# Patient Record
Sex: FEMALE | Race: WHITE | NOT HISPANIC OR LATINO | Employment: FULL TIME | URBAN - METROPOLITAN AREA
[De-identification: names, ages, dates, MRNs, and addresses within clinical notes are randomized per-mention and may not be internally consistent; named-entity substitution may affect disease eponyms.]

---

## 2017-03-13 ENCOUNTER — DOCUMENTATION ONLY (OUTPATIENT)
Dept: BARIATRICS | Facility: CLINIC | Age: 42
End: 2017-03-13

## 2017-03-13 NOTE — PROGRESS NOTES
Bariatric Surgery Online Course Form Submission  Someone has submitted a Bariatric Surgery Online Course Form Submission on this page.  Date: 2017-03-08 - 16:59  Patient's Name: Emilia Coyle  YOB: 1975 Email: [email or web address]  Phone: [phone]   Patient Address: [address] [place] [address]  Preferred Surgical Location: [facility]   I certify that I am 18 years of age or older:   Confirmation Code: [id number]  Verification of Bariatric Seminar: y  Insurance Information  Insurance or Self Pay? Insurance - Fill out fields below  Insurance Company Name: [employer]   Type of Coverage/Coverage Plan (i.e. PPO, HMO): PPO   Group Name: [id number]   Subscriber Name: Rivas Ireneo   Member Name (patient's name)   Member ID/Policy #:[id number]   Plan Effective Date:   Card Issuance date:

## 2017-03-22 ENCOUNTER — INITIAL CONSULT (OUTPATIENT)
Dept: BARIATRICS | Facility: CLINIC | Age: 42
End: 2017-03-22
Payer: COMMERCIAL

## 2017-03-22 VITALS
BODY MASS INDEX: 37.1 KG/M2 | WEIGHT: 222.69 LBS | SYSTOLIC BLOOD PRESSURE: 106 MMHG | HEIGHT: 65 IN | DIASTOLIC BLOOD PRESSURE: 72 MMHG | TEMPERATURE: 66 F

## 2017-03-22 DIAGNOSIS — K90.0 CELIAC DISEASE: ICD-10-CM

## 2017-03-22 DIAGNOSIS — R06.81 WITNESSED APNEIC SPELLS: ICD-10-CM

## 2017-03-22 DIAGNOSIS — R29.818 SUSPECTED SLEEP APNEA: ICD-10-CM

## 2017-03-22 DIAGNOSIS — E66.9 DIABETES MELLITUS TYPE 2 IN OBESE: ICD-10-CM

## 2017-03-22 DIAGNOSIS — G47.33 OSA (OBSTRUCTIVE SLEEP APNEA): ICD-10-CM

## 2017-03-22 DIAGNOSIS — E11.69 DIABETES MELLITUS TYPE 2 IN OBESE: ICD-10-CM

## 2017-03-22 DIAGNOSIS — E66.9 OBESITY (BMI 30-39.9): Primary | ICD-10-CM

## 2017-03-22 PROCEDURE — 3060F POS MICROALBUMINURIA REV: CPT | Mod: S$GLB,,, | Performed by: PHYSICIAN ASSISTANT

## 2017-03-22 PROCEDURE — 99205 OFFICE O/P NEW HI 60 MIN: CPT | Mod: S$GLB,,, | Performed by: PHYSICIAN ASSISTANT

## 2017-03-22 PROCEDURE — 3046F HEMOGLOBIN A1C LEVEL >9.0%: CPT | Mod: S$GLB,,, | Performed by: PHYSICIAN ASSISTANT

## 2017-03-22 PROCEDURE — 1160F RVW MEDS BY RX/DR IN RCRD: CPT | Mod: S$GLB,,, | Performed by: PHYSICIAN ASSISTANT

## 2017-03-22 PROCEDURE — 99999 PR PBB SHADOW E&M-EST. PATIENT-LVL V: CPT | Mod: PBBFAC,,, | Performed by: PHYSICIAN ASSISTANT

## 2017-03-22 PROCEDURE — 2022F DILAT RTA XM EVC RTNOPTHY: CPT | Mod: S$GLB,,, | Performed by: PHYSICIAN ASSISTANT

## 2017-03-22 RX ORDER — LEVOTHYROXINE SODIUM 100 UG/1
100 TABLET ORAL EVERY MORNING
Refills: 3 | COMMUNITY
Start: 2017-03-06 | End: 2018-12-10

## 2017-03-22 RX ORDER — ESTRADIOL 1 MG/1
1 TABLET ORAL
Refills: 3 | COMMUNITY
Start: 2017-03-06 | End: 2019-07-01

## 2017-03-22 RX ORDER — FERROUS SULFATE 324(65)MG
325 TABLET, DELAYED RELEASE (ENTERIC COATED) ORAL EVERY MORNING
COMMUNITY
End: 2017-08-01

## 2017-03-22 RX ORDER — BUTALBITAL, ASPIRIN, AND CAFFEINE 325; 50; 40 MG/1; MG/1; MG/1
1 CAPSULE ORAL 2 TIMES DAILY PRN
Refills: 1 | COMMUNITY
Start: 2016-12-28 | End: 2019-03-06

## 2017-03-22 RX ORDER — FUROSEMIDE 20 MG/1
1 TABLET ORAL DAILY PRN
Refills: 0 | Status: ON HOLD | COMMUNITY
Start: 2017-02-17 | End: 2017-07-29 | Stop reason: HOSPADM

## 2017-03-22 RX ORDER — CLONAZEPAM 1 MG/1
1 TABLET ORAL 2 TIMES DAILY PRN
Refills: 0 | COMMUNITY
Start: 2017-03-20 | End: 2017-07-05 | Stop reason: SDUPTHER

## 2017-03-22 RX ORDER — MULTIVITAMIN
1 TABLET ORAL 2 TIMES DAILY
COMMUNITY

## 2017-03-22 RX ORDER — METFORMIN HYDROCHLORIDE 500 MG/1
500 TABLET, EXTENDED RELEASE ORAL EVERY MORNING
Refills: 3 | Status: ON HOLD | COMMUNITY
Start: 2017-03-07 | End: 2017-07-29 | Stop reason: HOSPADM

## 2017-03-22 NOTE — PROGRESS NOTES
BARIATRIC NEW PATIENT EVALUATION    CHIEF COMPLAINT:   Obesity, Body mass index is 37.05 kg/(m^2). and inability to lose weight.    HPI:  Emilia Coyle is a 41 y.o. female presenting for obesity, Body mass index is 37.05 kg/(m^2). and inability to lose weight. The patient has tried Weight Watchers, Slim Fast, Maricruz Eric, Adipex several times, Topamax and multiple fad diets.  She has been overweight since childhood.  At 9 years old she was already 135 lbs.  The most weight lost with a diet was 45 lbs with Adipex, Weight Watchers and eating 500 calories daily.  Her highest weight was 255 lbs about 10 years ago.  Her challenge with weight loss is never feeling full.   She wants a Sleeve Gastrectomy with Dr. Aleman.    PAST MEDICAL HISTORY:  Past Medical History:   Diagnosis Date    Celiac disease     Diabetes mellitus     Hypothyroidism     Thyroid disease          PAST SURGICAL HISTORY:  Past Surgical History:   Procedure Laterality Date    ABDOMINAL SURGERY      abdominalplasty     ADENOIDECTOMY      BLADDER SUSPENSION      BREAST SURGERY      HYSTERECTOMY      TONSILLECTOMY      TOTAL THYROIDECTOMY Right     TUBAL LIGATION      TYMPANOSTOMY TUBE PLACEMENT         FAMILY HISTORY:  Family History   Problem Relation Age of Onset    No Known Problems Mother     Hyperlipidemia Father     Obesity Brother     Hyperlipidemia Brother     Hyperlipidemia Daughter     Kidney failure Daughter     Kidney failure Maternal Aunt     Hypothyroidism Maternal Aunt     Hyperlipidemia Paternal Uncle     Hypertension Paternal Uncle     Heart disease Paternal Uncle     Cancer Maternal Grandmother     Diabetes Maternal Grandmother     Depression Maternal Grandmother     Heart disease Maternal Grandmother     Hypertension Maternal Grandmother     Sleep apnea Maternal Grandmother     Obesity Maternal Grandmother     Cancer Maternal Grandfather     Hyperlipidemia Maternal Grandfather     Hypertension  Maternal Grandfather     Heart disease Maternal Grandfather     Kidney failure Maternal Grandfather     Stroke Maternal Grandfather     Hyperlipidemia Paternal Grandmother     Hypertension Paternal Grandmother     Diabetes Paternal Grandmother     Obesity Paternal Grandmother     Cancer Paternal Grandfather     Hyperlipidemia Paternal Grandfather     Diabetes Paternal Grandfather     Hypertension Paternal Grandfather     Heart disease Paternal Grandfather     Kidney failure Paternal Grandfather     Obesity Paternal Grandfather     Sleep apnea Paternal Grandfather     Stroke Paternal Grandfather           SOCIAL HISTORY:   reports that she has never smoked. She has never used smokeless tobacco. She reports that she does not drink alcohol or use illicit drugs.  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  She is  with 1 child and 2 step-kids.      MEDICATIONS:  Medications have been reviewed.    ALLERGIES:  Allergies have been reviewed.    Review of Systems   Constitutional: Negative for chills, fever and malaise/fatigue.   Eyes: Negative for blurred vision and double vision.   Respiratory: Negative for cough, hemoptysis and shortness of breath.    Cardiovascular: Negative for chest pain, palpitations and leg swelling.   Gastrointestinal: Negative for abdominal pain, blood in stool, constipation, diarrhea, heartburn, melena, nausea and vomiting.        Celiac dz   Genitourinary: Negative for dysuria and hematuria.   Musculoskeletal: Positive for joint pain (knees, ankles, hips) and myalgias. Negative for back pain, falls and neck pain.   Skin: Negative for rash.   Neurological: Positive for tingling (hands and feet). Negative for dizziness, weakness and headaches.   Endo/Heme/Allergies: Negative for environmental allergies. Does not bruise/bleed easily.   Psychiatric/Behavioral: Negative for depression, hallucinations, memory loss,  substance abuse and suicidal ideas. The patient is not nervous/anxious and does not have insomnia.        Vitals:    03/22/17 0828   BP: 106/72   Temp: (!) 66 °F (18.9 °C)       Physical Exam   Constitutional: She is oriented to person, place, and time and well-developed, well-nourished, and in no distress.   obese   HENT:   Head: Normocephalic and atraumatic.   Eyes: No scleral icterus.   Cardiovascular: Normal rate, regular rhythm, normal heart sounds and intact distal pulses.  Exam reveals no gallop and no friction rub.    No murmur heard.  Pulmonary/Chest: Effort normal and breath sounds normal. No respiratory distress. She has no wheezes. She has no rales. She exhibits no tenderness.   Abdominal: Soft. Bowel sounds are normal. She exhibits no distension and no mass. There is no tenderness. There is no rebound and no guarding.   Musculoskeletal: She exhibits no edema.   Neurological: She is alert and oriented to person, place, and time.   Skin: Skin is warm and dry. No rash noted. No erythema. No pallor.   Psychiatric: Mood, memory, affect and judgment normal.   Nursing note and vitals reviewed.      DIAGNOSIS:  1. Obesity, Body mass index is 37.05 kg/(m^2). and inability to lose weight.  2. Co-morbidities: diabetes mellitus and obstructive sleep apnea    PLAN:  The patient is a good candidate for Bariatric Surgery. The patient is interested in sleeve gastrectomy. The surgery and post-op care was discussed in detail with the patient. All questions were answered.    The patient understands that bariatric surgery is a tool to aid in weight loss and that they need to be committed to the diet and exercise post-operatively for successful weight loss. Discussed with patient that bariatric surgery is not the easy way out and that it will take plenty of dedication on the patient's part to be successful. Also discussed the possibility of weight regain if the patient strays from the diet guidelines or exercise  requirements. Patient verbalized understanding and wishes to proceed with the work-up.    Estimated Goal weight is 182 lbs, approximately 50% of their excess weight.      **I certify that I have personally reviewed the patient's blue intake packet with the patient.  All information has been added into epic.        ORDERS:  1. Sleep Study, Chest X-Ray and EKG  2. Psychological Consult, Bariatric Dietician Consult and PCP Clearance  3. Bariatric Labs: BMP, CBC, Folate Serum, H. pylori, HgA1C, Hepatic Panel/LFT, Iron & TIBC, Lipid Profile, Magnesium, Phosphate, T3, T4, TSH, Free T4, Vitamin B12, and Vitamin B1.    Referring Physician: Primary Doctor No  RTC: As scheduled.

## 2017-03-22 NOTE — MR AVS SNAPSHOT
[facility] - Bariatric Surgery  [address]  [facility] [address]  Phone: [phone]  Fax: [phone]                  Emilia Coyle   3/22/2017 7:40 AM   Initial consult    Description:  Female : 1975   Provider:  Marta Mayers PA-C   Department:  [facility] - Bariatric Surgery           Reason for Visit     Consult           Diagnoses this Visit        Comments    Obesity (BMI 35.0-39.9 without comorbidity)    -  Primary     Obesity (BMI 30-39.9)         Witnessed apneic spells         Suspected sleep apnea                To Do List           Future Appointments        Provider Department Dept Phone    3/22/2017 5:30 PM ONLINE BARIATRIC SEMINAR [facility] Bariatric Surgery [phone]      Goals (5 Years of Data)     None      [facility]     [facility] Care [facility] -  Assistance  Registered nurses in the [facility] provide clinical advisement, health education, appointment booking, and other advisory services.  Call for this free service at [phone].             Medications           Message regarding Medications     Verify the changes and/or additions to your medication regime listed below are the same as discussed with your clinician today.  If any of these changes or additions are incorrect, please notify your healthcare provider.             Verify that the below list of medications is an accurate representation of the medications you are currently taking.  If none reported, the list may be blank. If incorrect, please contact your healthcare provider. Carry this list with you in case of emergency.           Current Medications     butalbital-aspirin-caffeine -40 mg (FIORINAL) -40 mg Cap Take 1 capsule by mouth 2 (two) times daily as needed.    CALCIUM CARBONATE/VITAMIN D3 (CALCIUM 600 + D,3, ORAL) Take 1 tablet by mouth once daily.    clonazePAM (KLONOPIN) 1 MG tablet Take 1 tablet by mouth 2 (two) times daily as needed.    estradiol  "(ESTRACE) 1 MG tablet Take 1 mg by mouth once daily.    ferrous sulfate 324 mg (65 mg iron) TbEC Take 325 mg by mouth once daily.    furosemide (LASIX) 20 MG tablet Take 1 tablet by mouth daily as needed.    levothyroxine (SYNTHROID) 100 MCG tablet Take 100 mcg by mouth once daily.    metformin (GLUCOPHAGE-XR) 500 MG 24 hr tablet Take 500 mg by mouth 2 (two) times daily.    multivitamin (ONE DAILY MULTIVITAMIN) per tablet Take 1 tablet by mouth once daily.           Clinical Reference Information           Your Vitals Were     BP Temp Height Weight BMI    106/72 66 °F (18.9 °C) 5' 5" (1.651 m) 101 kg (222 lb 10.6 oz) 37.05 kg/m2      Blood Pressure          Most Recent Value    BP  106/72      Allergies as of 3/22/2017     Latex, Natural Rubber    Rizatriptan    Sumatriptan    Zolmitriptan      Immunizations Administered on Date of Encounter - 3/22/2017     None      Orders Placed During Today's Visit      Normal Orders This Visit    Ambulatory consult for Sleep Study     Psych Consult     Future Labs/Procedures Expected by Expires    BMP  3/22/2017 5/21/2018    CBC w/ Auto Differential  3/22/2017 5/21/2018    CXR  3/22/2017 3/22/2018    Folate Serum  3/22/2017 5/21/2018    Free T4  3/22/2017 5/21/2018    H. Pylori Antibody, IGG  3/22/2017 5/21/2018    Hepatic Panel  3/22/2017 5/21/2018    Hg A1c  3/22/2017 5/21/2018    Iron & TIBC  3/22/2017 5/21/2018    Lipid Profile  3/22/2017 5/21/2018    Magnesium  3/22/2017 5/21/2018    Phosphorus  3/22/2017 5/21/2018    T3  3/22/2017 5/21/2018    T4  3/22/2017 5/21/2018    TSH  3/22/2017 5/21/2018    Vitamin B12  3/22/2017 5/21/2018    Vitamin B1  3/22/2017 5/21/2018    Vitamin D 25 Hydroxy  3/22/2017 5/21/2018    EKG  As directed 3/22/2018      Instructions    The following tests will be required for your Bariatric Surgery work-up.  There may be additional tests added.      - Lab work  - Chest X-ray  - EKG  - Nutrition Consult and Clearance.    - Psychological Consult and " Clearance (Call the Psychiatry Department to schedule a Bariatric Surgery Clearance Appointment with Dr. Landry or Dr. Blanton at [phone])  - Primary Care Doctor Clearance (This needs to be done within 30 days of surgery date)  - Sleep Medicine Consult      In preparation for bariatric surgery, please complete the following:   · Discuss your current medications with your primary care provider, remember your medications will need to be crushed, chewable, or in liquid form for the first 3 months after a sleeve.  .    · If you take a blood thinner such as: Coumadin (warfarin), Pradaxa (dabigatran), or Plavix (clopidogrel), you will need to speak with your prescribing provider on how or if this medication can be stopped before surgery.   · If you take a medication for depression or anxiety, you will need to begin crushing or opening the capsule 1-3 months prior to surgery.  Remember to discuss this with the psychologist or psychiatrist that you see.   · If you take medication for arthritis on a daily basis that is considered a non-steroidal anti-inflammatory (NSAID), please discuss with your prescribing physician an alternative medication.  After having gastric bypass or gastric sleeve, this group of medications is not appropriate to take due to increased risk of bleeding stomach ulcers.      NO SHOW POLICY      DEFINITIONS  Appointments: Pre-scheduled meetings or consultations with any physician, advanced practice provider, dietitian, or psychologist, and labs, imaging studies, sleep studies, etc.   Late cancellation: Cancelling an appointment 24-48 hours prior to scheduled time.  No-Show appointment:  is when    You do NOT arrive to your appointment at the time its scheduled.   You call to cancel or cancel via [facility] less than 24 hours in advance of your scheduled appointment   You show up 15 minutes AFTER your scheduled appointment time without any notification of being late.     POLICY  1. You are allowed  up to 3 cancellations for appointments.    After 3 cancellations your case will be placed on hold for 2 months and after that time you can resume the program.   2. You are allowed only 1 no-show for an appointment.    You will be re-scheduled one time and if there is a 2nd no-show at any point, your case will be placed on hold for 3 months.  After 3 months you can resume the program.     3. Upon resuming the program after being placed on hold for either above mentioned reasons, if you have a late cancel or no show for any appointment, the bariatric team will review if youre an appropriate candidate for surgery at the monthly meeting.           Language Assistance Services     ATTENTION: Language assistance services are available, free of charge. Please call [phone].      ATENCIÓN: Si henriquela amada, tiene a olivo disposición servicios gratuitos de asistencia lingüística. Llame al [phone].     CHÚ Ý: N?u b?n nói Ti?ng Vi?t, có các d?ch v? h? tr? ngôn ng? mi?n phí dành cho b?n. G?i s? [phone].         [facility] complies with applicable Federal civil rights laws and does not discriminate on the basis of race, color, national origin, age, disability, or sex.

## 2017-03-22 NOTE — PATIENT INSTRUCTIONS
The following tests will be required for your Bariatric Surgery work-up.  There may be additional tests added.      - Lab work  - Chest X-ray  - EKG  - Nutrition Consult and Clearance.    - Psychological Consult and Clearance (Call the Psychiatry Department to schedule a Bariatric Surgery Clearance Appointment with Dr. Landry or Dr. Blanton at [phone])  - Primary Care Doctor Clearance (This needs to be done within 30 days of surgery date)  - Sleep Medicine Consult      In preparation for bariatric surgery, please complete the following:   · Discuss your current medications with your primary care provider, remember your medications will need to be crushed, chewable, or in liquid form for the first 3 months after a sleeve.  .    · If you take a blood thinner such as: Coumadin (warfarin), Pradaxa (dabigatran), or Plavix (clopidogrel), you will need to speak with your prescribing provider on how or if this medication can be stopped before surgery.   · If you take a medication for depression or anxiety, you will need to begin crushing or opening the capsule 1-3 months prior to surgery.  Remember to discuss this with the psychologist or psychiatrist that you see.   · If you take medication for arthritis on a daily basis that is considered a non-steroidal anti-inflammatory (NSAID), please discuss with your prescribing physician an alternative medication.  After having gastric bypass or gastric sleeve, this group of medications is not appropriate to take due to increased risk of bleeding stomach ulcers.      NO SHOW POLICY      DEFINITIONS  Appointments: Pre-scheduled meetings or consultations with any physician, advanced practice provider, dietitian, or psychologist, and labs, imaging studies, sleep studies, etc.   Late cancellation: Cancelling an appointment 24-48 hours prior to scheduled time.  No-Show appointment:  is when    You do NOT arrive to your appointment at the time its scheduled.   You call to cancel or  cancel via [facility] less than 24 hours in advance of your scheduled appointment   You show up 15 minutes AFTER your scheduled appointment time without any notification of being late.     POLICY  1. You are allowed up to 3 cancellations for appointments.    After 3 cancellations your case will be placed on hold for 2 months and after that time you can resume the program.   2. You are allowed only 1 no-show for an appointment.    You will be re-scheduled one time and if there is a 2nd no-show at any point, your case will be placed on hold for 3 months.  After 3 months you can resume the program.     3. Upon resuming the program after being placed on hold for either above mentioned reasons, if you have a late cancel or no show for any appointment, the bariatric team will review if youre an appropriate candidate for surgery at the monthly meeting.

## 2017-03-25 ENCOUNTER — LAB VISIT (OUTPATIENT)
Dept: LAB | Facility: HOSPITAL | Age: 42
End: 2017-03-25
Attending: SURGERY
Payer: COMMERCIAL

## 2017-03-25 DIAGNOSIS — R29.818 SUSPECTED SLEEP APNEA: ICD-10-CM

## 2017-03-25 DIAGNOSIS — E66.9 OBESITY (BMI 30-39.9): ICD-10-CM

## 2017-03-25 DIAGNOSIS — R06.81 WITNESSED APNEIC SPELLS: ICD-10-CM

## 2017-03-25 LAB
25(OH)D3+25(OH)D2 SERPL-MCNC: 17 NG/ML
ALBUMIN SERPL BCP-MCNC: 3.5 G/DL
ALP SERPL-CCNC: 103 U/L
ALT SERPL W/O P-5'-P-CCNC: 14 U/L
ANION GAP SERPL CALC-SCNC: 11 MMOL/L
AST SERPL-CCNC: 15 U/L
BASOPHILS # BLD AUTO: 0.05 K/UL
BASOPHILS NFR BLD: 0.6 %
BILIRUB DIRECT SERPL-MCNC: 0.1 MG/DL
BILIRUB SERPL-MCNC: 0.3 MG/DL
BUN SERPL-MCNC: 17 MG/DL
CALCIUM SERPL-MCNC: 9.2 MG/DL
CHLORIDE SERPL-SCNC: 105 MMOL/L
CHOLEST/HDLC SERPL: 3.1 {RATIO}
CO2 SERPL-SCNC: 25 MMOL/L
CREAT SERPL-MCNC: 0.8 MG/DL
DIFFERENTIAL METHOD: ABNORMAL
EOSINOPHIL # BLD AUTO: 0.2 K/UL
EOSINOPHIL NFR BLD: 2.7 %
ERYTHROCYTE [DISTWIDTH] IN BLOOD BY AUTOMATED COUNT: 14.8 %
EST. GFR  (AFRICAN AMERICAN): >60 ML/MIN/1.73 M^2
EST. GFR  (NON AFRICAN AMERICAN): >60 ML/MIN/1.73 M^2
FOLATE SERPL-MCNC: 5.2 NG/ML
GLUCOSE SERPL-MCNC: 137 MG/DL
HCT VFR BLD AUTO: 37.1 %
HDL/CHOLESTEROL RATIO: 32.1 %
HDLC SERPL-MCNC: 187 MG/DL
HDLC SERPL-MCNC: 60 MG/DL
HGB BLD-MCNC: 11.8 G/DL
IRON SERPL-MCNC: 64 UG/DL
LDLC SERPL CALC-MCNC: 90.8 MG/DL
LYMPHOCYTES # BLD AUTO: 1.6 K/UL
LYMPHOCYTES NFR BLD: 20.1 %
MAGNESIUM SERPL-MCNC: 1.7 MG/DL
MCH RBC QN AUTO: 27.4 PG
MCHC RBC AUTO-ENTMCNC: 31.8 %
MCV RBC AUTO: 86 FL
MONOCYTES # BLD AUTO: 0.5 K/UL
MONOCYTES NFR BLD: 5.6 %
NEUTROPHILS # BLD AUTO: 5.7 K/UL
NEUTROPHILS NFR BLD: 70.8 %
NONHDLC SERPL-MCNC: 127 MG/DL
PHOSPHATE SERPL-MCNC: 3.7 MG/DL
PLATELET # BLD AUTO: 359 K/UL
PMV BLD AUTO: 10.3 FL
POTASSIUM SERPL-SCNC: 4 MMOL/L
PROT SERPL-MCNC: 6.9 G/DL
RBC # BLD AUTO: 4.3 M/UL
SATURATED IRON: 16 %
SODIUM SERPL-SCNC: 141 MMOL/L
T3 SERPL-MCNC: 95 NG/DL
T4 FREE SERPL-MCNC: 0.93 NG/DL
T4 SERPL-MCNC: 6.1 UG/DL
TOTAL IRON BINDING CAPACITY: 403 UG/DL
TRANSFERRIN SERPL-MCNC: 272 MG/DL
TRIGL SERPL-MCNC: 181 MG/DL
TSH SERPL DL<=0.005 MIU/L-ACNC: 0.92 UIU/ML
VIT B12 SERPL-MCNC: 231 PG/ML
WBC # BLD AUTO: 8.07 K/UL

## 2017-03-25 PROCEDURE — 84439 ASSAY OF FREE THYROXINE: CPT

## 2017-03-25 PROCEDURE — 82607 VITAMIN B-12: CPT

## 2017-03-25 PROCEDURE — 83735 ASSAY OF MAGNESIUM: CPT

## 2017-03-25 PROCEDURE — 83540 ASSAY OF IRON: CPT

## 2017-03-25 PROCEDURE — 84466 ASSAY OF TRANSFERRIN: CPT

## 2017-03-25 PROCEDURE — 84480 ASSAY TRIIODOTHYRONINE (T3): CPT

## 2017-03-25 PROCEDURE — 84436 ASSAY OF TOTAL THYROXINE: CPT

## 2017-03-25 PROCEDURE — 80076 HEPATIC FUNCTION PANEL: CPT

## 2017-03-25 PROCEDURE — 36415 COLL VENOUS BLD VENIPUNCTURE: CPT

## 2017-03-25 PROCEDURE — 80061 LIPID PANEL: CPT

## 2017-03-25 PROCEDURE — 84100 ASSAY OF PHOSPHORUS: CPT

## 2017-03-25 PROCEDURE — 80048 BASIC METABOLIC PNL TOTAL CA: CPT

## 2017-03-25 PROCEDURE — 85025 COMPLETE CBC W/AUTO DIFF WBC: CPT

## 2017-03-25 PROCEDURE — 84425 ASSAY OF VITAMIN B-1: CPT

## 2017-03-25 PROCEDURE — 83036 HEMOGLOBIN GLYCOSYLATED A1C: CPT

## 2017-03-25 PROCEDURE — 82306 VITAMIN D 25 HYDROXY: CPT

## 2017-03-25 PROCEDURE — 86677 HELICOBACTER PYLORI ANTIBODY: CPT

## 2017-03-25 PROCEDURE — 82746 ASSAY OF FOLIC ACID SERUM: CPT

## 2017-03-25 PROCEDURE — 84443 ASSAY THYROID STIM HORMONE: CPT

## 2017-03-27 DIAGNOSIS — E55.9 VITAMIN D DEFICIENCY: Primary | ICD-10-CM

## 2017-03-27 LAB
ESTIMATED AVG GLUCOSE: 120 MG/DL
H PYLORI IGG SERPL QL IA: NEGATIVE
HBA1C MFR BLD HPLC: 5.8 %

## 2017-03-27 RX ORDER — ERGOCALCIFEROL 1.25 MG/1
50000 CAPSULE ORAL
Qty: 24 CAPSULE | Refills: 0 | Status: SHIPPED | OUTPATIENT
Start: 2017-03-30 | End: 2017-07-27 | Stop reason: ALTCHOICE

## 2017-03-29 ENCOUNTER — OFFICE VISIT (OUTPATIENT)
Dept: SLEEP MEDICINE | Facility: CLINIC | Age: 42
End: 2017-03-29
Attending: SURGERY
Payer: COMMERCIAL

## 2017-03-29 VITALS
HEART RATE: 72 BPM | HEIGHT: 65 IN | DIASTOLIC BLOOD PRESSURE: 70 MMHG | WEIGHT: 224.19 LBS | BODY MASS INDEX: 37.35 KG/M2 | SYSTOLIC BLOOD PRESSURE: 110 MMHG

## 2017-03-29 DIAGNOSIS — G47.30 UNSPECIFIED SLEEP APNEA: Primary | ICD-10-CM

## 2017-03-29 DIAGNOSIS — Z87.09 HX OF EXTRINSIC ASTHMA: ICD-10-CM

## 2017-03-29 PROCEDURE — 99204 OFFICE O/P NEW MOD 45 MIN: CPT | Mod: S$GLB,,, | Performed by: NURSE PRACTITIONER

## 2017-03-29 PROCEDURE — 1160F RVW MEDS BY RX/DR IN RCRD: CPT | Mod: S$GLB,,, | Performed by: NURSE PRACTITIONER

## 2017-03-29 PROCEDURE — 99999 PR PBB SHADOW E&M-EST. PATIENT-LVL IV: CPT | Mod: PBBFAC,,, | Performed by: NURSE PRACTITIONER

## 2017-03-29 RX ORDER — ALBUTEROL SULFATE 90 UG/1
2 AEROSOL, METERED RESPIRATORY (INHALATION) EVERY 6 HOURS PRN
Qty: 18 G | Refills: 3 | Status: SHIPPED | OUTPATIENT
Start: 2017-03-29 | End: 2019-07-01

## 2017-03-29 NOTE — PATIENT INSTRUCTIONS
Obstructive Sleep Apnea  Obstructive sleep apnea is a condition that causes your air passages to become narrowed or blocked during sleep. As a result, breathing stops for short periods. Your body wakes up enough for breathing to begin again, though you don't remember it. The cycle of stopped breathing and brief awakenings can repeat dozens of times a night. This prevents the body from getting to the deeper stages of sleep that are needed for good rest.  Signs of sleep apnea include loud snoring, noisy breathing, and gasping sounds during sleep. Daytime symptoms include waking up tired after a full night's sleep, waking up with headaches, feeling very sleepy or falling asleep during the day, and having problems with memory or concentration.  Risk factors for sleep apnea include:  · Being overweight  · Being a man, or a woman in menopause  · Smoking  · Using alcohol or sedating medications or herbs  · Having enlarged structures in the nose or throat  Home care  Lifestyle changes that can help treat snoring and sleep apnea include the following:  · If you are overweight, lose weight. Talk to your healthcare provider about a weight-loss plan for you.  · Avoid alcohol for 3 to 4 hours before bedtime. Avoid sedating medications. Ask your healthcare provider about the medications you take.  · If you smoke, talk to your healthcare provider about ways to quit.  · Sleep on your side. This can help prevent gravity from pulling relaxed throat tissues into your breathing passages.  · If you have allergies or sinus problems that block your nose, ask your healthcare provider for help.  Follow up  Follow up with your healthcare provider as advised. A diagnosis of sleep apnea is made with a sleep study. Your healthcare provider can tell you more about this test.  When to seek medical care  Sleep apnea can make you more likely to have certain health problems. These include high blood pressure, heart attack, stroke, and sexual  dysfunction. If you have sleep apnea, talk to your healthcare provider about the best treatments for you.  Date Last Reviewed: 5/3/2015  © 3492-3925 [employer] [address] [place] [address], [place] [address]. All rights reserved. This information is not intended as a substitute for professional medical care. Always follow your healthcare professional's instructions.      [place] or [place] will contact you to schedule your sleep study. Their number is [phone] (ext 1). The [facility] is located on 7th floor of the [facility].    We will call you when the sleep study results are ready - if you have not heard from us by 2 weeks from the date of the study, please call [phone] (ext 2).    You are advised to abstain from driving should you feel sleepy or drowsy.

## 2017-03-29 NOTE — LETTER
March 29, 2017      Laith Aleman MD  [address]  [facility] [address]           [facility]  [address]  [facility] [address]  Phone: [phone]          Patient: Emilia Coyle   MR Number: [id number]   YOB: 1975   Date of Visit: 3/29/2017       Dear Dr. Laith Aleman:    Thank you for referring Emilia Coyle to me for evaluation. Attached you will find relevant portions of my assessment and plan of care.    If you have questions, please do not hesitate to call me. I look forward to following Emilia Coyle along with you.    Sincerely,    Katerin Osei, NP    Enclosure  CC:  No Recipients    If you would like to receive this communication electronically, please contact [email or web address] or [phone] to request more information on [employer] access.    For providers and/or their staff who would like to refer a patient to [facility], please contact us through our one-stop-shop provider referral line, [facility] , at [phone].    If you feel you have received this communication in error or would no longer like to receive these types of communications, please e-mail [email or web address]

## 2017-03-29 NOTE — MR AVS SNAPSHOT
[facility], [place], operated by [facility]  [address]  [place] [address]  Phone: [phone]                  Emilia Coyle   3/29/2017 8:00 AM   Office Visit    Description:  Female : 1975   Provider:  Katerin Osei NP   Department:  [facility], [place], operated by [facility]           Reason for Visit     Snoring           Diagnoses this Visit        Comments    Unspecified sleep apnea    -  Primary     Hx of extrinsic asthma                To Do List           Future Appointments        Provider Department Dept Phone    4/3/2017 8:30 AM Mirian Ari [facility] - Bariatric Surgery [phone]    4/3/2017 9:45 AM EKG, APPT [facility] - [phone]    4/3/2017 10:15 AM [facility] [id number] 485 LB LIMIT [facility] [phone]      Goals (5 Years of Data)     None       These Medications        Disp Refills Start End    albuterol 90 mcg/actuation inhaler 18 g 3 3/29/2017 3/29/2018    Inhale 2 puffs into the lungs every 6 (six) hours as needed for Wheezing. Rescue - Inhalation    Pharmacy: [facility]  [place]  [place] [facility] Ph #: [phone]         [facility]     [facility] - 24/7 Assistance  Registered nurses in the [facility] provide clinical advisement, health education, appointment booking, and other advisory services.  Call for this free service at [phone].             Medications           Message regarding Medications     Verify the changes and/or additions to your medication regime listed below are the same as discussed with your clinician today.  If any of these changes or additions are incorrect, please notify your healthcare provider.        START taking these NEW medications        Refills    albuterol 90 mcg/actuation inhaler 3    Sig: Inhale 2 puffs into the lungs every 6 (six) hours as needed for Wheezing. Rescue    Class: Normal    Route: Inhalation           Verify that the below list of  "medications is an accurate representation of the medications you are currently taking.  If none reported, the list may be blank. If incorrect, please contact your healthcare provider. Carry this list with you in case of emergency.           Current Medications     butalbital-aspirin-caffeine -40 mg (FIORINAL) -40 mg Cap Take 1 capsule by mouth 2 (two) times daily as needed.    CALCIUM CARBONATE/VITAMIN D3 (CALCIUM 600 + D,3, ORAL) Take 1 tablet by mouth once daily.    clonazePAM (KLONOPIN) 1 MG tablet Take 1 tablet by mouth 2 (two) times daily as needed.    ergocalciferol (ERGOCALCIFEROL) 50,000 unit Cap Starting on Mar 30, 2017. Take 1 capsule (50,000 Units total) by mouth twice a week.    estradiol (ESTRACE) 1 MG tablet Take 1 mg by mouth once daily.    ferrous sulfate 324 mg (65 mg iron) TbEC Take 325 mg by mouth once daily.    furosemide (LASIX) 20 MG tablet Take 1 tablet by mouth daily as needed.    levothyroxine (SYNTHROID) 100 MCG tablet Take 100 mcg by mouth once daily.    metformin (GLUCOPHAGE-XR) 500 MG 24 hr tablet Take 500 mg by mouth 2 (two) times daily.    multivitamin (ONE DAILY MULTIVITAMIN) per tablet Take 1 tablet by mouth once daily.    albuterol 90 mcg/actuation inhaler Inhale 2 puffs into the lungs every 6 (six) hours as needed for Wheezing. Rescue           Clinical Reference Information           Your Vitals Were     BP Pulse Height Weight BMI    110/70 72 5' 5" (1.651 m) 101.7 kg (224 lb 3.3 oz) 37.31 kg/m2      Blood Pressure          Most Recent Value    BP  110/70      Allergies as of 3/29/2017     Latex, Natural Rubber    Rizatriptan    Sumatriptan    Zolmitriptan      Immunizations Administered on Date of Encounter - 3/29/2017     None      Orders Placed During Today's Visit     Future Labs/Procedures Expected by Expires    Home Sleep Studies  As directed 3/29/2018      Instructions      Obstructive Sleep Apnea  Obstructive sleep apnea is a condition that causes your air " passages to become narrowed or blocked during sleep. As a result, breathing stops for short periods. Your body wakes up enough for breathing to begin again, though you don't remember it. The cycle of stopped breathing and brief awakenings can repeat dozens of times a night. This prevents the body from getting to the deeper stages of sleep that are needed for good rest.  Signs of sleep apnea include loud snoring, noisy breathing, and gasping sounds during sleep. Daytime symptoms include waking up tired after a full night's sleep, waking up with headaches, feeling very sleepy or falling asleep during the day, and having problems with memory or concentration.  Risk factors for sleep apnea include:  · Being overweight  · Being a man, or a woman in menopause  · Smoking  · Using alcohol or sedating medications or herbs  · Having enlarged structures in the nose or throat  Home care  Lifestyle changes that can help treat snoring and sleep apnea include the following:  · If you are overweight, lose weight. Talk to your healthcare provider about a weight-loss plan for you.  · Avoid alcohol for 3 to 4 hours before bedtime. Avoid sedating medications. Ask your healthcare provider about the medications you take.  · If you smoke, talk to your healthcare provider about ways to quit.  · Sleep on your side. This can help prevent gravity from pulling relaxed throat tissues into your breathing passages.  · If you have allergies or sinus problems that block your nose, ask your healthcare provider for help.  Follow up  Follow up with your healthcare provider as advised. A diagnosis of sleep apnea is made with a sleep study. Your healthcare provider can tell you more about this test.  When to seek medical care  Sleep apnea can make you more likely to have certain health problems. These include high blood pressure, heart attack, stroke, and sexual dysfunction. If you have sleep apnea, talk to your healthcare provider about the best  treatments for you.  Date Last Reviewed: 5/3/2015  © 8742-5756 [employer]. [address] [place] [address], [place] [address]. All rights reserved. This information is not intended as a substitute for professional medical care. Always follow your healthcare professional's instructions.      [place] [facility] [place] will contact you to schedule your sleep study. Their number is [phone] (ext 1). The [facility] is located on 7th floor of the [facility].    We will call you when the sleep study results are ready - if you have not heard from us by 2 weeks from the date of the study, please call [phone] (ext 2).    You are advised to abstain from driving should you feel sleepy or drowsy.         Language Assistance Services     ATTENTION: Language assistance services are available, free of charge. Please call [phone].      ATENCIÓN: Si habla español, tiene a olivo disposición servicios gratuitos de asistencia lingüística. Llame al [phone].     CHÚ Ý: N?u b?n nói Ti?ng Vi?t, có các d?ch v? h? tr? ngôn ng? mi?n phí dành cho b?n. G?i s? [phone].         [facility] complies with applicable Federal civil rights laws and does not discriminate on the basis of race, color, national origin, age, disability, or sex.

## 2017-03-29 NOTE — PROGRESS NOTES
"Emilia Coyle  was seen as a new patient, referred by ROBERT Mayers , for the evaluation of obstructive sleep apnea.     CHIEF COMPLAINT: Snoring, excessive daytime sleepiness    HISTORY OF PRESENT ILLNESS: Emilia Coyle a 41 y.o. female presents for the evaluation of obstructive sleep apnea. She had a sleep study at [facility] [place] 1 yr ago. Was told she might have sleep apnea and encouraged to have sinus surgery for snoring. +persistent disruptive snoring. Previous use mouth guard young age for teeth grinding. +anixety, takes klonopin infrequently prn. Does not feel like she sleeps well. +excessive daytime sleepiness. Nocturia 1+. AM headaches 2-3x/week. , present today, reports frequent kicking and moving legs during sleep. She sleeps mostly on her side. Pursuing bariatric surgery (goal 150-170#). +disrupted sleep. She has awakened coughing and gasping for air.  +witnessed apneic pauses.     On todays [place] Sleepiness Scale the patient scores a 15/24.  Hx asthma   Exercises PF regularly,  hx gastric sleeve with requal PSG negative for MIKAELA  No ferritin, takes Fe qd       BT:10:30-12a  SL: 20min  Disruptions: 3x  WT: 3589-4504  Sleep quality: 2/5 quality, unrefreshing     FAMILY HISTORY: Brother and maternal GM +MIKAELA, dad snore    SOCIAL HISTORY: . rare ETOH, no tobacco. /    REVIEW OF SYSTEMS:  Sleep related symptoms as per HPI; Positive overweight; occasional sinus congestion (allergies, takes prn benadryl or zyrtec, infrequent sinus flush); Denies dyspnea with ADLs; Denies palpitations; occasional acid reflux; Denies polyuria;   + headaches; + mood disturbance. +body aches.  Otherwise, a balance of 10 systems reviewed is negative        PHYSICAL EXAM:   /70  Pulse 72  Ht 5' 5" (1.651 m)  Wt 101.7 kg (224 lb 3.3 oz)  BMI 37.31 kg/m2  GENERAL: Obese body habitus, well groomed   HEENT: Conjunctivae are non-erythematous; Pupils equal, round, and " reactive to light; Nose is symmetrical; Nasal mucosa is normal; Septum is midline; Inferior turbinates are normal; Nasal airflow is normal; Posterior pharynx is pink; Modified Mallampati: II; Posterior palate is low; Tonsils absent, redundant pharyngeal tissue; Uvula is normal;Tongue is high, with scalloped edges; Dentition is fair; No TMJ tenderness; Jaw opening and protrusion without click and without discomfort.   NECK: Supple. Neck circumference is 14.5 inches. No thyromegaly. No palpable nodes.   SKIN: On face and neck: No abrasions, no rashes, no lesions. No subcutaneous nodules are palpable.   RESPIRATORY: Inspiratory wheezes left anterior/left upper posterior. Normal chest expansion and non-labored breathing at rest.   CARDIOVASCULAR: Normal S1, S2. No murmurs, gallops or rubs. No carotid bruits bilaterally.   EXTREMITIES: No edema. No clubbing. No cyanosis. Station normal. Gait normal.   NEURO/PSYCH: Oriented to time, place and person. Normal attention span and concentration. Affect is full. Mood is normal.       ASSESSMENT:     Unspecified Sleep Apnea, with symptoms of disruptive snoring, witnessed apneic pauses, un-refreshing disrupted sleep, am headaches, teeth grinding, and excessive daytime sleepiness, with exam findings of a crowded oral airway, with medical comorbidities of obesity, DM2. Warrants further investigation for untreated sleep apnea.     Hx asthma  Allergic rhinitis, could be potential limiting factor to successful PAP acclimation  Limb movements during sleep    PLAN:   1. Home Sleep Study, discussed plan of care, myokatiener results/plan of care   2. Discussed etiology of MIKAELA and potential ramifications of untreated MIKAELA, including stroke, heart disease, HTN.  We discussed potential treatment options, which could include weight loss (10-15%), body positioning, continuous positive airway pressure (CPAP-definitive), mandibular advancement splint by dentist, or referral for surgical  consideration (defers).   3. The patient was advised to abstain from driving should she feel sleepy or drowsy.   4. Albuterol inh prn. Encouraged continued weight loss efforts for potential improvement of MIKAELA and overall health benefits. Continue side sleep, ensure optimal nasal patency to reduce potential UARS.  Further eval limb movements during sleep after study      Thank you for allowing me the opportunity to participate in the care of your patient

## 2017-03-30 ENCOUNTER — TELEPHONE (OUTPATIENT)
Dept: SLEEP MEDICINE | Facility: OTHER | Age: 42
End: 2017-03-30

## 2017-03-30 LAB — VIT B1 SERPL-MCNC: 53 UG/L (ref 38–122)

## 2017-03-31 ENCOUNTER — TELEPHONE (OUTPATIENT)
Dept: BARIATRICS | Facility: CLINIC | Age: 42
End: 2017-03-31

## 2017-03-31 NOTE — TELEPHONE ENCOUNTER
Left vm to confirm nutrition consult appointment on Monday April 3. Reminded patient to bring purple pkt with her to the appointment. Informed pt of location and time.

## 2017-04-03 ENCOUNTER — DOCUMENTATION ONLY (OUTPATIENT)
Dept: BARIATRICS | Facility: CLINIC | Age: 42
End: 2017-04-03

## 2017-04-03 ENCOUNTER — HOSPITAL ENCOUNTER (OUTPATIENT)
Dept: RADIOLOGY | Facility: HOSPITAL | Age: 42
Discharge: HOME | End: 2017-04-03
Attending: SURGERY
Payer: COMMERCIAL

## 2017-04-03 ENCOUNTER — CLINICAL SUPPORT (OUTPATIENT)
Dept: BARIATRICS | Facility: CLINIC | Age: 42
End: 2017-04-03
Payer: COMMERCIAL

## 2017-04-03 ENCOUNTER — TELEPHONE (OUTPATIENT)
Dept: BARIATRICS | Facility: CLINIC | Age: 42
End: 2017-04-03

## 2017-04-03 ENCOUNTER — HOSPITAL ENCOUNTER (OUTPATIENT)
Dept: CARDIOLOGY | Facility: CLINIC | Age: 42
Discharge: HOME | End: 2017-04-03
Payer: COMMERCIAL

## 2017-04-03 VITALS — BODY MASS INDEX: 37.64 KG/M2 | WEIGHT: 226.19 LBS

## 2017-04-03 DIAGNOSIS — E66.9 OBESITY (BMI 30-39.9): ICD-10-CM

## 2017-04-03 DIAGNOSIS — R06.81 WITNESSED APNEIC SPELLS: ICD-10-CM

## 2017-04-03 DIAGNOSIS — E11.69 DIABETES MELLITUS TYPE 2 IN OBESE: ICD-10-CM

## 2017-04-03 DIAGNOSIS — R94.31 ABNORMAL EKG: Primary | ICD-10-CM

## 2017-04-03 DIAGNOSIS — E66.09 NON MORBID OBESITY DUE TO EXCESS CALORIES: ICD-10-CM

## 2017-04-03 DIAGNOSIS — R29.818 SUSPECTED SLEEP APNEA: ICD-10-CM

## 2017-04-03 DIAGNOSIS — Z71.3 DIETARY COUNSELING AND SURVEILLANCE: ICD-10-CM

## 2017-04-03 DIAGNOSIS — E66.9 DIABETES MELLITUS TYPE 2 IN OBESE: ICD-10-CM

## 2017-04-03 PROCEDURE — 71020 XR CHEST PA AND LATERAL: CPT | Mod: 26,,, | Performed by: RADIOLOGY

## 2017-04-03 PROCEDURE — 99499 UNLISTED E&M SERVICE: CPT | Mod: S$GLB,,, | Performed by: DIETITIAN, REGISTERED

## 2017-04-03 PROCEDURE — 97802 MEDICAL NUTRITION INDIV IN: CPT | Mod: S$GLB,,, | Performed by: DIETITIAN, REGISTERED

## 2017-04-03 PROCEDURE — 93000 ELECTROCARDIOGRAM COMPLETE: CPT | Mod: S$GLB,,, | Performed by: INTERNAL MEDICINE

## 2017-04-03 PROCEDURE — 71020 XR CHEST PA AND LATERAL: CPT | Mod: TC

## 2017-04-03 PROCEDURE — 99999 PR PBB SHADOW E&M-EST. PATIENT-LVL II: CPT | Mod: PBBFAC,,, | Performed by: DIETITIAN, REGISTERED

## 2017-04-03 NOTE — PATIENT INSTRUCTIONS
1200- 1500 calorie Sample meal plan  80-120g protein per day.   Protein drinks and bars: 0-4 grams sugar  Drink lots of water throughout the day and exercise!  MENU # 1  Breakfast: 2 eggs, 1 turkey sausage abdiel, 1 apple  Snack: Atkins bar  Lunch: 2 roll-ups (sliced turkey, low-fat sliced cheese, mustard), 12 baby carrots dipped in 1 Tbsp natural peanut butter  Mid-Day Snack: ¼ cup unsalted almonds, ½ cup fruit  Dinner: 1 chicken thigh simmered in tomato sauce + 2 Tbsp mozzarella cheese, ½ cup black beans, 1/2 cup steamed carrots  Evening Snack: 1/2 cup grapes with 4 cubes low-fat cheddar cheese   ___________________________________________________  MENU # 2  Breakfast: 200 calorie protein drink  Mid-morning snack : ¼ cup unsalted nuts, medium banana  Lunch: 3oz tuna or chicken salad made with 2 tbsp light wynne, over salad with tomatoes and cucumbers.   Snack: low-fat cheese stick  Dinner: 3oz hamburger abdiel, slice of low-fat cheese, 1 cup boiled yellow squash and zucchini.   Snack: 6oz light yogurt  _______________________________________________________  Menu #3  Breakfast: 6oz plain Greek yogurt + fruit (½ banana, ½ cup fruit - pineapple, blueberries, strawberries, peach), may add Splenda to leidy.  Lunch: Grilled  chicken breast w/ slice low-fat pepper hilda cheese, 1/2 cup grilled/baked asparagus and small salad with Salad Spritzer.    Mid-Day snack: 200 calorie protein drink   Dinner: 4oz Grilled fish, ½ cup white beans, ½ cup cooked spinach  Evening Snack: fudgsicle no-sugar-added    Menu # 4  Breakfast: 1 scoop vanilla protein powder + 4oz skim milk + 4oz coffee   Snack: Pure protein bar  Lunch: 2 Lettuce tacos: 3oz seasoned ground turkey wrapped in a Seth lettuce leaves with 1 Tbsp shredded cheese and dollop low-fat sour cream  Snack: ½ cup cottage cheese, ½ cup pineapple chunks  Dinner: Shrimp omelet: 2 eggs, ½ cup shrimp, green onions, and shredded  cheese  ______________________________________________________  Menu #5  Breakfast: 1 cup low-fat cottage cheese, ½ cup peaches (no sugar added)  Snack: 1 apple, 4 cubes cheese  Lunch: 3oz baked pork chop, 1 cup okra and tomato stew  Snack: 1/2 cup black beans + salsa + dollop light sour cream  Dinner: Caprese chicken salad: 3 oz chicken breast, 1oz fresh mozzarella, sliced tomato, 1 Tbsp olive oil, basil  Snack: sugar-free popsicle    Menu #6  Breakfast: ½ cup part-skim ricotta cheese, 2 Tbsp sugar-free strawberry preserves, sprinkle of slivered almonds  Snack: 1 orange  Lunch: 3 oz canned chicken, 1oz shredded cheddar cheese, ½  cup black beans, 2 Tbsp salsa  Snack: 200 calorie Protein drink  Dinner: 4 oz grilled salmon steak, over mixed green salad with 2 tbsp light dressing    Meal Ideas for Regular Bariatric Diet  *Recipes and products available at [email or web address]      Breakfast: (15-20g protein)    - Egg white omelet: 2 egg whites or ½ cup Egg Beaters. (Optional proteins: cheese, shrimp, black beans, chicken, sliced turkey) (Optional veggies: tomatoes, salsa, spinach, mushrooms, onions, green peppers, or small slice avocado)     - Egg and sausage: 1 egg or ¼ cup Egg Beaters (any variety), with 1 abdiel or 2 links of Turkey sausage or Veggie breakfast sausage (Myndnet or ClaraStream)    - Crust-less breakfast quiche: To make a glass pie dish, mix 4oz part skim Ricotta, 1 cup skim milk, and 2 eggs as your base. Add protein: shredded cheese, sliced lean ham or turkey, turkey adams/sausage. Add veggies: tomato, onion, green onion, mushroom, green pepper, spinach, etc.    - Yogurt parfait: Mix 1 - 6oz container Dannon Light N Fit vanilla yogurt, with ¼ cup crushed unsalted nuts    - Cottage cheese and fruit: ½ cup part-skim cottage cheese or ricotta cheese topped with fresh fruit or sugar free preserves     - Chikis Snow's Vanilla Egg custard* (add 2 Tbsp instant coffee granules to make Cappuccino  Custard*)    - Hi-Protein café latte (skim milk, decaf coffee, 1 scoop protein powder). Optional to add Sugar free syrup or extract flavoring.    - Breakfast Lox: spread fat free cream cheese on slices of smoked salmon. Serve over scrambled or egg over easy (sauteed with nonstick cookspray) OR on a cucumber slice    - Eggwhich: Scramble or cook 1 large egg over easy using nonstick cookspray. Place between 2 slices of [place]inamese adams and low fat cheese.     Lunch: (20-30g protein)    - ½ cup Black bean soup (Homemade or Progresso), with ¼ cup shredded low-fat cheese. Top with chopped tomato or fresh salsa.     - Lean deli turkey breast and low-fat sliced cheese, mustard or light wynne to moisten, rolled up together, or wrapped in a Seth lettuce leaf    - Chicken salad made from dinner leftovers, moisten with low-fat salad dressing or light wynne. Also try leftover salmon, shrimp, tuna or boiled eggs. Serve ½ cup over dark green salad    - Fat-free canned refried beans, topped with ¼ cup shredded low-fat cheese. Top with chopped tomato or fresh salsa.     - Greek salad: Top mixed greens with 1-2oz grilled chicken, tomatoes, red onions, 2-3 kalamata olives, and sprinkle lightly with feta cheese. Spritz with Balsamic vinegar to taste.     - Crust-less lunch quiche: To make a glass pie dish, mix 4oz part skim Ricotta, 1 cup skim milk, and 2 eggs as your base. Add protein: shredded cheese, sliced lean ham or turkey, shrimp, chicken. Add veggies: tomato, onion, green onion, mushroom, green pepper, spinach, artichoke, broccoli, etc.    - Pizza bake: spread a  otilio fernando mushroom with tomato sauce, low-fat shredded mozzarella and turkey pepperoni or Cheyenne adams. Add any veggies. Roast for 10-15 minutes, until cheese melted.     - Cucumber crab bites: Spread ¼ cup crab dip (lump crabmeat + light cream cheese and green onions) over sliced cucumber.     - Chicken with light spinach and artichoke dip*: Puree in food  processor: 6oz cooked and drained spinach, 2 cloves garlic, 1 can cannelloni beans, ½ cup chopped green onions, 1 can drained artichoke hearts (not marinated in oil), lemon juice and basil. Mix in 2oz chopped up chicken.    Supper: (20-30g protein)    - Serve grilled fish over dark green salad tossed with low-fat dressing, served with grilled asparagus peck     - Rotisserie chicken salad: served with sliced strawberries, walnuts, fat-free feta cheese crumbles and 1 tbsp Padillas Own Light Raspberry [place] Vinaigrette    - Shrimp cocktail: Dip cold boiled shrimp in homemade low-sugar cocktail sauce (1/2 cup George One Carb ketchup, 2 tbsp horseradish, 1/4 tsp hot sauce, 1 tsp Worcestershire sauce, 1 tbsp freshly-squeezed lemon juice). Serve with dark green salad, walnuts, and crumbled blue cheese drizzled with olive oil and Balsamic vinegar    - Tuna Melt: Spread tuna salad onto 2 thick slices of tomato. Top with low-fat cheese and broil until cheese is melted. May also be made with chicken salad of shrimp salad. [place] with different types of cheeses.    - Chicken or beef fajitas (no tortilla, rice, beans, chips). Top meat and veggies w/ fresh salsa, fat free sour cream.     - Homemade low-fat Chili using extra lean ground beef or ground turkey. Top with shredded cheese and salsa as desired. May add dollop fat-free sour cream if desired    - Chicken parmesan: Top chicken breast w/ low sugar marinara sauce, mozzarella cheese and bake until chicken reaches 165*.  Serve w/ spaghetti SQUASH or [place]awk cut green beans    - Dinner Omelet with shrimp or chicken and onion, green peppers and chives.    - No noodle lasagna: Use sliced zucchini or eggplant in place of noodles.  Layer with part skim ricotta cheese and low sugar meat sauce (use very lean ground beef or ground turkey).    - Mexican chicken bake: Bake chunks of chicken breast or thigh with taco seasoning, Pace brand enchilada sauce, green onions and low-fat  cheese. Serve with ¼ cup black beans or fat free refried beans topped with chopped tomatoes or salsa.    - Robert frozen meatballs, simmered in Classico Marinara sauce. Different flavors of salsa or spaghetti sauce create different dishes! Sprinkle with parmesan cheese. Serve with grilled or steamed veggies, or a dark green salad.    - Simmer boneless skinless chicken thigh chunks in Classico Marinara sauce or roasted salsa until tender with chopped onion, bell pepper, garlic, mushrooms, spinach, etc.     - Hamburger or veggie burger, without the bun, dressed the way you like. Served with grilled or steamed veggies.    - Eggplant parmesan: Bake slices of eggplant at 350 degrees for 15 minutes. Layer tomato sauce, sliced eggplant and low-fat mozzarella cheese in a baking dish and cover with foil. Bake 30-40 more minutes or until bubbly. Uncover and bake at 400 degrees for about 15 more minutes, or until top is slightly crisp.    - Fish tacos: grilled/baked white fish, wrapped in Seth lettuce leaf, topped with salsa, shredded low-fat cheese, and light coleslaw.    - Chicken adolph: Sprinkle chicken w/ 1 tsp of hidden [place] ranch dip mix. Then grill chicken and top with black beans, salsa and 1 tsp fat free sour cream.     - Cauliflower pizza crust: Use cauliflower as crust (see recipe on john, no flour!). Top w/ low fat cheese, turkey pepperoni and veggies and bake again    - chicken or turkey crust pizza: use ground chicken or turkey instead of cauliflower, spread in [place] and bake at 350 for about 20-30 minutes(may want to add garlic, black pepper, oregano and other herbs to ground meat mixture).  Remove and top w/ low fat cheese, turkey pepperoni and veggies and bake again for another 10 minutes or until cheese is browned.     Snacks: (100-200 calories; >5g protein)    - 1 low-fat cheese stick with 8 cherry tomatoes or 1 serving fresh fruit  - 4 thin slices fat-free turkey breast and 1 slice low-fat  cheese  - 4 thin slices fat-free honey ham with wedge of melon  - 6-8 edamame pods (equivalent to about 1/4 cup edamame without pods).   - 1/4 cup unsalted nuts with ½ cup fruit  - 6-oz container Dannon Light n Fit vanilla yogurt, topped with 1oz unsalted nuts         - apple, celery or baby carrots spread with 2 Tbsp PB2  - apple slices with 1 oz slice low-fat cheese  - Apple slices dipped in 2 Tbsp of PB2  - celery, cucumber, bell pepper or baby carrots dipped in ¼ cup hummus bean spread or light spinach and artichoke dip (*recipe in lunch section)  - celery, cucumber, baby carrots dipped in high protein greek yogurt (Mix 16 oz plain greek yogurt + 1 packet of hidden valley ranch dip mix)  - Sergio Links Beef Steak - 14g protein! (similar to beef jerky)  - 2 wedges Laughing Cow - Light Herb & Garlic Cheese with sliced cucumber or green bell pepper  - 1/2 cup low-fat cottage cheese with ¼ cup fruit or ¼ cup salsa  - RTD Protein drinks: Atkins, Low Carb Slim Fast, EAS light, Muscle Milk Light, etc.  - Homemade Protein drinks: GNC Soy95, Isopure, Nectar, UNJURY, Whey Gourmet, etc. Mix 1 scoop powder with 8oz skim/1% milk or light soymilk.  - Protein bars: Atkins, EAS, Pure Protein, Think Thin, Detour, etc. Must have 0-4 grams sugar - Read the label.    Takeout Options: No more than twice/week  Deli - Salads (no pasta or rice), meats, cheeses. Roasted chicken. Lox (salmon)    Mexican - Platters which don't include tortillas, chips, or rice. Go easy on the beans. Example: Fajitas without the tortillas. Ask the  not to bring chips to the table if they are too tempting.    Greek - Meat or fish and vegetable, but no bread or rice. Including hummus, baba ganoush, etc, is OK. Most sit-down Greek restaurants can provide you with cucumber slices for dipping instead of дмитрий bread.    Fast Food (Avoid as much as possible) - Salads (no croutons and limit salad dressing to 2 tbsp), grilled chicken sandwich without the bun  and ask for no wynne. Shirins low fat chili or Taco Bell pintos and cheese.    BBQ - The meats are fine if you ask for sauces on the side, but most of the traditional side dishes are loaded with carbs. Tevin slaw, baked beans and BBQ sauce are typically made with sugar.    Chinese - Nothing deep-fried, no rice or noodles. Many Chinese sauces have starch and sugar in them, so you'll have to use your judgement. If you find that these sauces trigger cravings, or cause Dumping, you can ask for the sauce to be made without sugar or just use soy sauce.

## 2017-04-03 NOTE — PROGRESS NOTES
NUTRITIONAL CONSULT    Referring Physician: Laith Aleman M.D.  Reason for MNT Referral: Initial assessment for sleeve gastrectomy work-up    PAST MEDICAL HISTORY:   41 y.o. female presents with a BMI of Body mass index is 37.64 kg/(m^2).  Weight history includes she has been overweight since childhood. At 9 years old she was already 135 lbs. The most weight lost with a diet was 45 lbs with Adipex, Weight Watchers and eating 500 calories daily. Her highest weight was 255 lbs about 10 years ago. Her challenge with weight loss is never feeling full. Patient states her mom's side of the family is [place] and her great-grandmother and grandmother over-fed her as a child. Patient states over-eating was a learned behavior from childhood.   Dieting attempts include the patient has tried Weight Watchers, Slim Fast, Maricruz Eric, Adipex several times, Topamax and multiple fad diets.  Patient seems to be very motivated to make dietary changes in preparation for bariatric surgery.     Past Medical History:   Diagnosis Date    Celiac disease     Diabetes mellitus     Hypothyroidism     Thyroid disease        CLINICAL DATA:  41 y.o.-year-old    White   or Other  female.  Height: 5 ft 5 in  Weight: 226 lbs  IBW: 141 lbs  BMI: 37.6  The patient's goal weight (50 % EBW): 184 lbs  Personal goal weight: 150-170 lbs    Goal for Bariatric Surgery: to improve health, to improve quality of life, to lose weight, to prevent future medical conditions and to fit into smaller clothes    NUTRITION & HEALTH HISTORY:  Greatest challenge: sweets, starchy CHO and portion control    Current diet recall:   Brkfst (7:45 am): 2 eggs scrambled, 2 strips of adams, 1 cup of OJ  Lunch (1pm): Loaded baked potato, burger abdiel dressed no bun, Diet Coke  Snk (3:30pm): 4 Gluten free cookies  Dinner (6:45pm): 6 oz rib eye, steamed vegetables  Snk (7:30pm):1 cup ice cream    Current Diet:  Meal pattern: 3 meals/day  Protein  supplements: Premier Protein RTD & powder at home   Snackin-3 / day  Vegetables: Likes a variety. Eats daily.  Fruits: Likes a variety. Eats daily.  Beverages: water, diet soda, diet tea, coffee without sugar, soy milk and juice occassionally, unsweetened almond milk  Dining out: Weekly. (3x's/week) Mostly restaurants and take-out. (orders: Chinese-egg drop soup, rice dishes, others-baked potato, fried catfish, etc)  Cooking at home: Weekly. (meal prep-2 x's/week) Mostly baked and grilled meat, fish, starchy CHO and vegetables.    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  Grocery shopping and food prep done by patient.  Patient believes the household will be supportive after surgery.  Alcohol: None.  Smoking: None.    ASSESSMENT:  · Patient reports attempts at weight loss, only to regain lost weight.  · Patient demonstrated knowledge of healthy eating behaviors and exercise patterns; admits to not eating healthy and not exercising at this point.  · Patient states willingness to change lifestyle and make behavior modifications.  · Expect good  compliance after surgery at this time.    Insurance requires NO medically supervised diet prior to consideration for bariatric surgery.    BARIATRIC DIET DISCUSSION:  Discussed diet after surgery and related to patients food record.  Reviewed diet progression before and after surgery.  Reinforced that surgery is not a magic bullet and importance of low fat foods and no snacking.  Stressed importance of exercise and its role in achieving weight  loss goals.  Answered all questions.    RECOMMENDATIONS:  Patient is a potential candidate for bariatric surgery.    Needs additional visit(s) with RD for dietary modifications in preparation for bariatric surgery.     PLAN:  Continue to review Bariatric Nutrition Guidebook at home and call with any questions.  Work on Bariatric Nutrition Checklist.  Work on gradually cutting back on starchy CHO in the diet.  Begin trying various protein supplements to determine preference.  1200-calorie diet.  1500-calorie diet.  5-6 meals per day.  Start including protein supplements in the diet plan daily.  Reduce frequency of dining out.  More grocery shopping and meal preparation at home.  Start shopping for bariatric vitamins & minerals.  Return to clinic.    SESSION TIME:  60 minutes

## 2017-04-03 NOTE — TELEPHONE ENCOUNTER
----- Message from Marta Mayers PA-C sent at 4/3/2017  3:03 PM CDT -----  Abnormal need Dobutamine Stress Echo

## 2017-04-03 NOTE — MR AVS SNAPSHOT
[facility] - Bariatric Surgery  [address]  [facility] [address]  Phone: [phone]  Fax: [phone]                  Emilia Coyle   4/3/2017 8:30 AM   Clinical Support    Description:  Female : 1975   Provider:  Keyanna Rodriguez RD   Department:  [facility]                To Do List           Future Appointments        Provider Department Dept Phone    4/3/2017 9:45 AM EKG, APPT [facility] - [phone]    4/3/2017 10:15 AM NOM [id number] 485 LB LIMIT [facility] [phone]    2017 7:30 AM Keyanna Rodriguez RD [facility] - Bariatric Surgery [phone]    2017 1:00 PM HOME STUDY, SLEEP [facility] [phone]      Goals (5 Years of Data)     None      [facility]     [facility] Care Line -  Assistance  Unless otherwise directed by your provider, please contact [facility], our nurse care line that is available for  assistance.     Registered nurses in the [facility] provide: appointment scheduling, clinical advisement, health education, and other advisory services.  Call: [phone] (toll free)               Medications           Message regarding Medications     Verify the changes and/or additions to your medication regime listed below are the same as discussed with your clinician today.  If any of these changes or additions are incorrect, please notify your healthcare provider.             Verify that the below list of medications is an accurate representation of the medications you are currently taking.  If none reported, the list may be blank. If incorrect, please contact your healthcare provider. Carry this list with you in case of emergency.           Current Medications     albuterol 90 mcg/actuation inhaler Inhale 2 puffs into the lungs every 6 (six) hours as needed for Wheezing. Rescue    butalbital-aspirin-caffeine -40 mg (FIORINAL) -40 mg Cap Take 1  capsule by mouth 2 (two) times daily as needed.    CALCIUM CARBONATE/VITAMIN D3 (CALCIUM 600 + D,3, ORAL) Take 1 tablet by mouth once daily.    clonazePAM (KLONOPIN) 1 MG tablet Take 1 tablet by mouth 2 (two) times daily as needed.    ergocalciferol (ERGOCALCIFEROL) 50,000 unit Cap Take 1 capsule (50,000 Units total) by mouth twice a week.    estradiol (ESTRACE) 1 MG tablet Take 1 mg by mouth once daily.    ferrous sulfate 324 mg (65 mg iron) TbEC Take 325 mg by mouth once daily.    furosemide (LASIX) 20 MG tablet Take 1 tablet by mouth daily as needed.    levothyroxine (SYNTHROID) 100 MCG tablet Take 100 mcg by mouth once daily.    metformin (GLUCOPHAGE-XR) 500 MG 24 hr tablet Take 500 mg by mouth 2 (two) times daily.    multivitamin (ONE DAILY MULTIVITAMIN) per tablet Take 1 tablet by mouth once daily.           Clinical Reference Information           Your Vitals Were     Weight BMI             102.6 kg (226 lb 3.1 oz) 37.64 kg/m2         Allergies as of 4/3/2017     Latex, Natural Rubber    Rizatriptan    Sumatriptan    Zolmitriptan      Immunizations Administered on Date of Encounter - 4/3/2017     None      Instructions    1200- 1500 calorie Sample meal plan  80-120g protein per day.   Protein drinks and bars: 0-4 grams sugar  Drink lots of water throughout the day and exercise!  MENU # 1  Breakfast: 2 eggs, 1 turkey sausage abdiel, 1 apple  Snack: Atkins bar  Lunch: 2 roll-ups (sliced turkey, low-fat sliced cheese, mustard), 12 baby carrots dipped in 1 Tbsp natural peanut butter  Mid-Day Snack: ¼ cup unsalted almonds, ½ cup fruit  Dinner: 1 chicken thigh simmered in tomato sauce + 2 Tbsp mozzarella cheese, ½ cup black beans, 1/2 cup steamed carrots  Evening Snack: 1/2 cup grapes with 4 cubes low-fat cheddar cheese   ___________________________________________________  MENU # 2  Breakfast: 200 calorie protein drink  Mid-morning snack : ¼ cup unsalted nuts, medium banana  Lunch: 3oz tuna or chicken salad made  with 2 tbsp light wynne, over salad with tomatoes and cucumbers.   Snack: low-fat cheese stick  Dinner: 3oz hamburger abdiel, slice of low-fat cheese, 1 cup boiled yellow squash and zucchini.   Snack: 6oz light yogurt  _______________________________________________________  Menu #3  Breakfast: 6oz plain Greek yogurt + fruit (½ banana, ½ cup fruit - pineapple, blueberries, strawberries, peach), may add Splenda to leidy.  Lunch: Grilled  chicken breast w/ slice low-fat pepper hilda cheese, 1/2 cup grilled/baked asparagus and small salad with Salad Spritzer.    Mid-Day snack: 200 calorie protein drink   Dinner: 4oz Grilled fish, ½ cup white beans, ½ cup cooked spinach  Evening Snack: fudgsicle no-sugar-added    Menu # 4  Breakfast: 1 scoop vanilla protein powder + 4oz skim milk + 4oz coffee   Snack: Pure protein bar  Lunch: 2 Lettuce tacos: 3oz seasoned ground turkey wrapped in a Seth lettuce leaves with 1 Tbsp shredded cheese and dollop low-fat sour cream  Snack: ½ cup cottage cheese, ½ cup pineapple chunks  Dinner: Shrimp omelet: 2 eggs, ½ cup shrimp, green onions, and shredded cheese  ______________________________________________________  Menu #5  Breakfast: 1 cup low-fat cottage cheese, ½ cup peaches (no sugar added)  Snack: 1 apple, 4 cubes cheese  Lunch: 3oz baked pork chop, 1 cup okra and tomato stew  Snack: 1/2 cup black beans + salsa + dollop light sour cream  Dinner: Caprese chicken salad: 3 oz chicken breast, 1oz fresh mozzarella, sliced tomato, 1 Tbsp olive oil, basil  Snack: sugar-free popsicle    Menu #6  Breakfast: ½ cup part-skim ricotta cheese, 2 Tbsp sugar-free strawberry preserves, sprinkle of slivered almonds  Snack: 1 orange  Lunch: 3 oz canned chicken, 1oz shredded cheddar cheese, ½  cup black beans, 2 Tbsp salsa  Snack: 200 calorie Protein drink  Dinner: 4 oz grilled salmon steak, over mixed green salad with 2 tbsp light dressing    Meal Ideas for Regular Bariatric Diet  *Recipes and products  available at www.bariatriceating.com      Breakfast: (15-20g protein)    - Egg white omelet: 2 egg whites or ½ cup Egg Beaters. (Optional proteins: cheese, shrimp, black beans, chicken, sliced turkey) (Optional veggies: tomatoes, salsa, spinach, mushrooms, onions, green peppers, or small slice avocado)     - Egg and sausage: 1 egg or ¼ cup Egg Beaters (any variety), with 1 abdiel or 2 links of Turkey sausage or Veggie breakfast sausage (BitWave or [email or web address].COM)    - Crust-less breakfast quiche: To make a glass pie dish, mix 4oz part skim Ricotta, 1 cup skim milk, and 2 eggs as your base. Add protein: shredded cheese, sliced lean ham or turkey, turkey adams/sausage. Add veggies: tomato, onion, green onion, mushroom, green pepper, spinach, etc.    - Yogurt parfait: Mix 1 - 6oz container Dannon Light N Fit vanilla yogurt, with ¼ cup crushed unsalted nuts    - Cottage cheese and fruit: ½ cup part-skim cottage cheese or ricotta cheese topped with fresh fruit or sugar free preserves     - Chikis Snow's Vanilla Egg custard* (add 2 Tbsp instant coffee granules to make Cappuccino Custard*)    - Hi-Protein café latte (skim milk, decaf coffee, 1 scoop protein powder). Optional to add Sugar free syrup or extract flavoring.    - Breakfast Lox: spread fat free cream cheese on slices of smoked salmon. Serve over scrambled or egg over easy (sauteed with nonstick cookspray) OR on a cucumber slice    - Eggwhich: Scramble or cook 1 large egg over easy using nonstick cookspray. Place between 2 slices of Guamanian adams and low fat cheese.     Lunch: (20-30g protein)    - ½ cup Black bean soup (Homemade or Progresso), with ¼ cup shredded low-fat cheese. Top with chopped tomato or fresh salsa.     - Lean deli turkey breast and low-fat sliced cheese, mustard or light wynne to moisten, rolled up together, or wrapped in a Seth lettuce leaf    - Chicken salad made from dinner leftovers, moisten with low-fat salad dressing or light wynne.  Also try leftover salmon, shrimp, tuna or boiled eggs. Serve ½ cup over dark green salad    - Fat-free canned refried beans, topped with ¼ cup shredded low-fat cheese. Top with chopped tomato or fresh salsa.     - Greek salad: Top mixed greens with 1-2oz grilled chicken, tomatoes, red onions, 2-3 kalamata olives, and sprinkle lightly with feta cheese. Spritz with Balsamic vinegar to taste.     - Crust-less lunch quiche: To make a glass pie dish, mix 4oz part skim Ricotta, 1 cup skim milk, and 2 eggs as your base. Add protein: shredded cheese, sliced lean ham or turkey, shrimp, chicken. Add veggies: tomato, onion, green onion, mushroom, green pepper, spinach, artichoke, broccoli, etc.    - Pizza bake: spread a  otilio fernando mushroom with tomato sauce, low-fat shredded mozzarella and turkey pepperoni or [place] adams. Add any veggies. Roast for 10-15 minutes, until cheese melted.     - Cucumber crab bites: Spread ¼ cup crab dip (lump crabmeat + light cream cheese and green onions) over sliced cucumber.     - Chicken with light spinach and artichoke dip*: Puree in : 6oz cooked and drained spinach, 2 cloves garlic, 1 can cannelloni beans, ½ cup chopped green onions, 1 can drained artichoke hearts (not marinated in oil), lemon juice and basil. Mix in 2oz chopped up chicken.    Supper: (20-30g protein)    - Serve grilled fish over dark green salad tossed with low-fat dressing, served with grilled asparagus peck     - Rotisserie chicken salad: served with sliced strawberries, walnuts, fat-free feta cheese crumbles and 1 tbsp Padillas Own Light Raspberry [place] Vinaigrette    - Shrimp cocktail: Dip cold boiled shrimp in homemade low-sugar cocktail sauce (1/2 cup George One Carb ketchup, 2 tbsp horseradish, 1/4 tsp hot sauce, 1 tsp Worcestershire sauce, 1 tbsp freshly-squeezed lemon juice). Serve with dark green salad, walnuts, and crumbled blue cheese drizzled with olive oil and Balsamic vinegar    - Tuna  Melt: Spread tuna salad onto 2 thick slices of tomato. Top with low-fat cheese and broil until cheese is melted. May also be made with chicken salad of shrimp salad. Romeo with different types of cheeses.    - Chicken or beef fajitas (no tortilla, rice, beans, chips). Top meat and veggies w/ fresh salsa, fat free sour cream.     - Homemade low-fat Chili using extra lean ground beef or ground turkey. Top with shredded cheese and salsa as desired. May add dollop fat-free sour cream if desired    - Chicken parmesan: Top chicken breast w/ low sugar marinara sauce, mozzarella cheese and bake until chicken reaches 165*.  Serve w/ spaghetti SQUASH or Uzbek cut green beans    - Dinner Omelet with shrimp or chicken and onion, green peppers and chives.    - No noodle lasagna: Use sliced zucchini or eggplant in place of noodles.  Layer with part skim ricotta cheese and low sugar meat sauce (use very lean ground beef or ground turkey).    - Mexican chicken bake: Bake chunks of chicken breast or thigh with taco seasoning, Pace brand enchilada sauce, green onions and low-fat cheese. Serve with ¼ cup black beans or fat free refried beans topped with chopped tomatoes or salsa.    - Robert frozen meatballs, simmered in Classico Marinara sauce. Different flavors of salsa or spaghetti sauce create different dishes! Sprinkle with parmesan cheese. Serve with grilled or steamed veggies, or a dark green salad.    - Simmer boneless skinless chicken thigh chunks in Classico Marinara sauce or roasted salsa until tender with chopped onion, bell pepper, garlic, mushrooms, spinach, etc.     - Hamburger or veggie burger, without the bun, dressed the way you like. Served with grilled or steamed veggies.    - Eggplant parmesan: Bake slices of eggplant at 350 degrees for 15 minutes. Layer tomato sauce, sliced eggplant and low-fat mozzarella cheese in a baking dish and cover with foil. Bake 30-40 more minutes or until bubbly. Uncover and  bake at 400 degrees for about 15 more minutes, or until top is slightly crisp.    - Fish tacos: grilled/baked white fish, wrapped in Seth lettuce leaf, topped with salsa, shredded low-fat cheese, and light coleslaw.    - Chicken adolph: Sprinkle chicken w/ 1 tsp of hidden valley ranch dip mix. Then grill chicken and top with black beans, salsa and 1 tsp fat free sour cream.     - Cauliflower pizza crust: Use cauliflower as crust (see recipe on pinterest, no flour!). Top w/ low fat cheese, turkey pepperoni and veggies and bake again    - chicken or turkey crust pizza: use ground chicken or turkey instead of cauliflower, spread in [place] and bake at 350 for about 20-30 minutes(may want to add garlic, black pepper, oregano and other herbs to ground meat mixture).  Remove and top w/ low fat cheese, turkey pepperoni and veggies and bake again for another 10 minutes or until cheese is browned.     Snacks: (100-200 calories; >5g protein)    - 1 low-fat cheese stick with 8 cherry tomatoes or 1 serving fresh fruit  - 4 thin slices fat-free turkey breast and 1 slice low-fat cheese  - 4 thin slices fat-free honey ham with wedge of melon  - 6-8 edamame pods (equivalent to about 1/4 cup edamame without pods).   - 1/4 cup unsalted nuts with ½ cup fruit  - 6-oz container Dannon Light n Fit vanilla yogurt, topped with 1oz unsalted nuts         - apple, celery or baby carrots spread with 2 Tbsp PB2  - apple slices with 1 oz slice low-fat cheese  - Apple slices dipped in 2 Tbsp of PB2  - celery, cucumber, bell pepper or baby carrots dipped in ¼ cup hummus bean spread or light spinach and artichoke dip (*recipe in lunch section)  - celery, cucumber, baby carrots dipped in high protein greek yogurt (Mix 16 oz plain greek yogurt + 1 packet of hidden valley ranch dip mix)  - Sergio Links Beef Steak - 14g protein! (similar to beef jerky)  - 2 wedges Laughing Cow - Light Herb & Garlic Cheese with sliced cucumber or green bell  pepper  - 1/2 cup low-fat cottage cheese with ¼ cup fruit or ¼ cup salsa  - RTD Protein drinks: Atkins, Low Carb Slim Fast, EAS light, Muscle Milk Light, etc.  - Homemade Protein drinks: C Soy95, Isopure, Nectar, UNJURY, Whey Gourmet, etc. Mix 1 scoop powder with 8oz skim/1% milk or light soymilk.  - Protein bars: Atkins, EAS, Pure Protein, Think Thin, Detour, etc. Must have 0-4 grams sugar - Read the label.    Takeout Options: No more than twice/week  Deli - Salads (no pasta or rice), meats, cheeses. Roasted chicken. Lox (salmon)    Mexican - Platters which don't include tortillas, chips, or rice. Go easy on the beans. Example: Fajitas without the tortillas. Ask the  not to bring chips to the table if they are too tempting.    Greek - Meat or fish and vegetable, but no bread or rice. Including hummus, baba ganoush, etc, is OK. Most sit-down Greek restaurants can provide you with cucumber slices for dipping instead of дмитрий bread.    Fast Food (Avoid as much as possible) - Salads (no croutons and limit salad dressing to 2 tbsp), grilled chicken sandwich without the bun and ask for no wynne. Shirins low fat chili or Taco Bell pintos and cheese.    BBQ - The meats are fine if you ask for sauces on the side, but most of the traditional side dishes are loaded with carbs. Tevin slaw, baked beans and BBQ sauce are typically made with sugar.    Chinese - Nothing deep-fried, no rice or noodles. Many Chinese sauces have starch and sugar in them, so you'll have to use your judgement. If you find that these sauces trigger cravings, or cause Dumping, you can ask for the sauce to be made without sugar or just use soy sauce.           Language Assistance Services     ATTENTION: Language assistance services are available, free of charge. Please call [phone].      ATENCIÓN: Si habla amada, tiene a olivo disposición servicios gratuitos de asistencia lingüística. Llame al [phone].     VALENTE Ý: N?u b?n nói Ti?ng Vi?t,  có các d?ch v? h? tr? ngôn ng? mi?n phí dành cho b?n. G?i s? [phone].         [facility] complies with applicable Federal civil rights laws and does not discriminate on the basis of race, color, national origin, age, disability, or sex.

## 2017-04-06 ENCOUNTER — OFFICE VISIT (OUTPATIENT)
Dept: PSYCHIATRY | Facility: CLINIC | Age: 42
End: 2017-04-06
Payer: COMMERCIAL

## 2017-04-06 DIAGNOSIS — Z01.818 PREOPERATIVE EVALUATION TO RULE OUT SURGICAL CONTRAINDICATION: Primary | ICD-10-CM

## 2017-04-06 DIAGNOSIS — E66.9 OBESITY, UNSPECIFIED: ICD-10-CM

## 2017-04-06 DIAGNOSIS — E11.8 TYPE 2 DIABETES MELLITUS WITH COMPLICATION, WITHOUT LONG-TERM CURRENT USE OF INSULIN: ICD-10-CM

## 2017-04-06 PROCEDURE — 90791 PSYCH DIAGNOSTIC EVALUATION: CPT | Mod: S$GLB,,, | Performed by: PSYCHOLOGIST

## 2017-04-06 PROCEDURE — 96102 PR PSYCHOLOGIC TESTING BY TECHNICIAN: CPT | Mod: 59,S$GLB,, | Performed by: PSYCHOLOGIST

## 2017-04-06 PROCEDURE — 96101 PR PSYCHOLOGIC TESTING BY PSYCH/PHYS: CPT | Mod: S$GLB,,, | Performed by: PSYCHOLOGIST
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
## 2017-04-10 ENCOUNTER — DOCUMENTATION ONLY (OUTPATIENT)
Dept: BARIATRICS | Facility: CLINIC | Age: 42
End: 2017-04-10

## 2017-04-12 ENCOUNTER — HOSPITAL ENCOUNTER (OUTPATIENT)
Dept: CARDIOLOGY | Facility: CLINIC | Age: 42
Discharge: HOME | End: 2017-04-12
Payer: COMMERCIAL

## 2017-04-12 DIAGNOSIS — R94.31 ABNORMAL EKG: ICD-10-CM

## 2017-04-12 LAB
DIASTOLIC DYSFUNCTION: NO
RETIRED EF AND QEF - SEE NOTES: 55 (ref 55–65)

## 2017-04-12 PROCEDURE — 93351 STRESS TTE COMPLETE: CPT | Mod: S$GLB,,, | Performed by: INTERNAL MEDICINE

## 2017-04-12 PROCEDURE — 93321 DOPPLER ECHO F-UP/LMTD STD: CPT | Mod: S$GLB,,, | Performed by: INTERNAL MEDICINE

## 2017-04-12 PROCEDURE — 96372 THER/PROPH/DIAG INJ SC/IM: CPT | Mod: 59,S$GLB,, | Performed by: INTERNAL MEDICINE

## 2017-04-13 ENCOUNTER — DOCUMENTATION ONLY (OUTPATIENT)
Dept: BARIATRICS | Facility: CLINIC | Age: 42
End: 2017-04-13

## 2017-04-13 ENCOUNTER — PATIENT MESSAGE (OUTPATIENT)
Dept: BARIATRICS | Facility: CLINIC | Age: 42
End: 2017-04-13

## 2017-04-19 ENCOUNTER — TELEPHONE (OUTPATIENT)
Dept: BARIATRICS | Facility: CLINIC | Age: 42
End: 2017-04-19

## 2017-04-20 ENCOUNTER — PATIENT MESSAGE (OUTPATIENT)
Dept: BARIATRICS | Facility: CLINIC | Age: 42
End: 2017-04-20

## 2017-04-20 ENCOUNTER — CLINICAL SUPPORT (OUTPATIENT)
Dept: BARIATRICS | Facility: CLINIC | Age: 42
End: 2017-04-20
Payer: COMMERCIAL

## 2017-04-20 VITALS — BODY MASS INDEX: 37.57 KG/M2 | WEIGHT: 225.75 LBS

## 2017-04-20 DIAGNOSIS — Z71.3 DIETARY COUNSELING AND SURVEILLANCE: ICD-10-CM

## 2017-04-20 DIAGNOSIS — E66.9 OBESITY (BMI 30-39.9): ICD-10-CM

## 2017-04-20 DIAGNOSIS — E66.9 DIABETES MELLITUS TYPE 2 IN OBESE: ICD-10-CM

## 2017-04-20 DIAGNOSIS — E66.09 NON MORBID OBESITY DUE TO EXCESS CALORIES: ICD-10-CM

## 2017-04-20 DIAGNOSIS — E11.69 DIABETES MELLITUS TYPE 2 IN OBESE: ICD-10-CM

## 2017-04-20 PROCEDURE — 97803 MED NUTRITION INDIV SUBSEQ: CPT | Mod: S$GLB,,, | Performed by: DIETITIAN, REGISTERED

## 2017-04-20 PROCEDURE — 99999 PR PBB SHADOW E&M-EST. PATIENT-LVL I: CPT | Mod: PBBFAC,,, | Performed by: DIETITIAN, REGISTERED

## 2017-04-20 PROCEDURE — 99499 UNLISTED E&M SERVICE: CPT | Mod: S$GLB,,, | Performed by: DIETITIAN, REGISTERED

## 2017-04-20 NOTE — MR AVS SNAPSHOT
[facility]  [address]  [facility] [address]  Phone: [phone]  Fax: [phone]                  Emilia Coyle   2017 7:30 AM   Clinical Support    Description:  Female : 1975   Provider:  Keyanna Rodriguez RD   Department:  [facility]                To Do List           Future Appointments        Provider Department Dept Phone    2017 1:00 PM HOME STUDY, SLEEP [facility] [phone]      Goals (5 Years of Data)     None      [facility]     [facility] Nurse Care Line -  Assistance  Unless otherwise directed by your provider, please contact [facility], our nurse care line that is available for  assistance.     Registered nurses in the [facility] Center provide: appointment scheduling, clinical advisement, health education, and other advisory services.  Call: [phone] (toll free)               Medications           Message regarding Medications     Verify the changes and/or additions to your medication regime listed below are the same as discussed with your clinician today.  If any of these changes or additions are incorrect, please notify your healthcare provider.             Verify that the below list of medications is an accurate representation of the medications you are currently taking.  If none reported, the list may be blank. If incorrect, please contact your healthcare provider. Carry this list with you in case of emergency.           Current Medications     albuterol 90 mcg/actuation inhaler Inhale 2 puffs into the lungs every 6 (six) hours as needed for Wheezing. Rescue    butalbital-aspirin-caffeine -40 mg (FIORINAL) -40 mg Cap Take 1 capsule by mouth 2 (two) times daily as needed.    CALCIUM CARBONATE/VITAMIN D3 (CALCIUM 600 + D,3, ORAL) Take 1 tablet by mouth once daily.    clonazePAM (KLONOPIN) 1 MG tablet Take 1 tablet by mouth 2 (two) times daily as needed.     ergocalciferol (ERGOCALCIFEROL) 50,000 unit Cap Take 1 capsule (50,000 Units total) by mouth twice a week.    estradiol (ESTRACE) 1 MG tablet Take 1 mg by mouth once daily.    ferrous sulfate 324 mg (65 mg iron) TbEC Take 325 mg by mouth once daily.    furosemide (LASIX) 20 MG tablet Take 1 tablet by mouth daily as needed.    levothyroxine (SYNTHROID) 100 MCG tablet Take 100 mcg by mouth once daily.    metformin (GLUCOPHAGE-XR) 500 MG 24 hr tablet Take 500 mg by mouth 2 (two) times daily.    multivitamin (ONE DAILY MULTIVITAMIN) per tablet Take 1 tablet by mouth once daily.           Clinical Reference Information           Your Vitals Were     Weight BMI             102.4 kg (225 lb 12 oz) 37.57 kg/m2         Allergies as of 4/20/2017     Latex, Natural Rubber    Rizatriptan    Sumatriptan    Zolmitriptan      Immunizations Administered on Date of Encounter - 4/20/2017     None      Language Assistance Services     ATTENTION: Language assistance services are available, free of charge. Please call [phone].      ATENCIÓN: Si habla amada, tiene a olivo disposición servicios gratuitos de asistencia lingüística. Llame al [phone].     VALENTE Ý: N?u b?n nói Ti?ng Vi?t, có các d?ch v? h? tr? ngôn ng? mi?n phí dành cho b?n. G?i s? [phone].         [facility] complies with applicable Federal civil rights laws and does not discriminate on the basis of race, color, national origin, age, disability, or sex.

## 2017-04-20 NOTE — PROGRESS NOTES
NUTRITION NOTE    Referring Physician: Laith Aleman M.D.  Reason for MNT Referral: Nutrition Follow Up pending Gastric Sleeve    PAST MEDICAL HISTORY:    Patient presents for pre-op nutrition follow up with (-1 lbs) weight loss over the past 2 weeks; total weight loss by making numerous dietary and lifestyle changes.    Past Medical History:   Diagnosis Date    Celiac disease     Diabetes mellitus     Hypothyroidism     Thyroid disease        CLINICAL DATA:  41 y.o. female.    There were no vitals filed for this visit.    Current Weight: 225 lbs  Weight Change Since Initial Visit: -1 lbs  Ideal Body Weight: 141 lbs  BMI: 37.5    CURRENT DIET:  Reduced-calorie diet.  Diet Recall: Food records are present.    B: Egg white omelet with veggies and turkey  Snk: Premier Protein RTD  L: Tuna salad pkt, 1/2 cup of tomatoes and cucumbers  Snk: Greek yogurt  D: Salad with chicken  Snk: string cheese    Diet Includes: healthy options, lean proteins, vegetables, fruit, protein drinks  Meal Pattern: Same.  Protein Supplements: 1-2 per day.  Snacking: Adequate. Snacks include healthy choices.    Vegetables: Likes a variety. Eats daily.  Fruits: Likes a variety. Eats daily.  Beverages: water and decaf tea with splenda  Dining Out: Dining out: Reduced lately. Mostly restaurants.  Cooking at home: Daily. Mostly baked and grilled meat, fish and vegetables.    CURRENT EXERCISE:  Adequate  30-45 mins cardio (3-4x's/week)    Vitamins / Minerals / Herbs:   MultiVit  Vitamin D   Iron (occ)  Calcium     Food Allergies:   Celiac Disease  Occasional issues with milk and ice cream (okay with yogurt and cheeses)    Social:  Works regular daytime shifts.  Alcohol: None.  Smoking: None.    ASSESSMENT:  Patient demonstrates all willingness to change lifestyle habits as evidenced by weight loss, good exercise, daily food logs, dietary changes, including protein drinks, increased fruits, increased vegetables, reduced dining out, better  choices when dining out, more food preparation at [employer], healthier cooking at home, bringing snacks to work, healthier snacking at work and healthier snacking at home.    Doing well with working on greatest challenges (sweets, starchy CHO and portion control).    Adequate calorie intake.  Adequate protein intake.    PLAN:    Diet: Maintain diet plan.    Exercise: Maintain.    Behavior Modification: Continue to document food & activity logs daily.    RECOMMENDATIONS:  Patient a good candidate for bariatric surgery-sleeve.    SESSION TIME:  30 minutes

## 2017-05-01 ENCOUNTER — TELEPHONE (OUTPATIENT)
Dept: SLEEP MEDICINE | Facility: OTHER | Age: 42
End: 2017-05-01

## 2017-05-02 ENCOUNTER — HOSPITAL ENCOUNTER (OUTPATIENT)
Dept: SLEEP MEDICINE | Facility: OTHER | Age: 42
Discharge: HOME | End: 2017-05-02
Attending: NURSE PRACTITIONER
Payer: COMMERCIAL

## 2017-05-02 DIAGNOSIS — G47.33 OSA (OBSTRUCTIVE SLEEP APNEA): ICD-10-CM

## 2017-05-02 DIAGNOSIS — G47.30 UNSPECIFIED SLEEP APNEA: ICD-10-CM

## 2017-05-02 PROCEDURE — 95800 SLP STDY UNATTENDED: CPT | Mod: 26,,, | Performed by: PSYCHIATRY & NEUROLOGY

## 2017-05-02 PROCEDURE — 95800 SLP STDY UNATTENDED: CPT

## 2017-05-03 ENCOUNTER — PATIENT MESSAGE (OUTPATIENT)
Dept: BARIATRICS | Facility: CLINIC | Age: 42
End: 2017-05-03

## 2017-05-08 ENCOUNTER — PATIENT MESSAGE (OUTPATIENT)
Dept: BARIATRICS | Facility: CLINIC | Age: 42
End: 2017-05-08

## 2017-05-11 ENCOUNTER — DOCUMENTATION ONLY (OUTPATIENT)
Dept: BARIATRICS | Facility: CLINIC | Age: 42
End: 2017-05-11

## 2017-05-12 ENCOUNTER — PATIENT MESSAGE (OUTPATIENT)
Dept: BARIATRICS | Facility: CLINIC | Age: 42
End: 2017-05-12

## 2017-05-12 ENCOUNTER — PATIENT MESSAGE (OUTPATIENT)
Dept: SLEEP MEDICINE | Facility: CLINIC | Age: 42
End: 2017-05-12

## 2017-05-29 ENCOUNTER — PATIENT MESSAGE (OUTPATIENT)
Dept: BARIATRICS | Facility: CLINIC | Age: 42
End: 2017-05-29

## 2017-06-05 ENCOUNTER — PATIENT MESSAGE (OUTPATIENT)
Dept: BARIATRICS | Facility: CLINIC | Age: 42
End: 2017-06-05

## 2017-06-08 ENCOUNTER — PATIENT MESSAGE (OUTPATIENT)
Dept: BARIATRICS | Facility: CLINIC | Age: 42
End: 2017-06-08

## 2017-06-22 ENCOUNTER — PATIENT MESSAGE (OUTPATIENT)
Dept: BARIATRICS | Facility: CLINIC | Age: 42
End: 2017-06-22

## 2017-06-22 RX ORDER — ERGOCALCIFEROL 1.25 MG/1
CAPSULE ORAL
Qty: 24 CAPSULE | OUTPATIENT
Start: 2017-06-22

## 2017-06-23 NOTE — TELEPHONE ENCOUNTER
----- Message from Homero Schilling Jr., MD sent at 6/22/2017  4:17 PM CDT -----  This looks like a Rich patient.  I got sent a prescription for her.

## 2017-06-28 DIAGNOSIS — Z98.84 STATUS POST LAPAROSCOPIC SLEEVE GASTRECTOMY: ICD-10-CM

## 2017-06-28 DIAGNOSIS — E55.9 VITAMIN D DEFICIENCY: ICD-10-CM

## 2017-06-28 DIAGNOSIS — E66.9 DIABETES MELLITUS TYPE 2 IN OBESE: ICD-10-CM

## 2017-06-28 DIAGNOSIS — K90.0 CELIAC DISEASE: ICD-10-CM

## 2017-06-28 DIAGNOSIS — E11.69 DIABETES MELLITUS TYPE 2 IN OBESE: ICD-10-CM

## 2017-06-28 DIAGNOSIS — G47.30 SLEEP APNEA, UNSPECIFIED TYPE: ICD-10-CM

## 2017-06-28 DIAGNOSIS — Z01.818 PREOP TESTING: ICD-10-CM

## 2017-06-28 DIAGNOSIS — G47.33 OSA (OBSTRUCTIVE SLEEP APNEA): ICD-10-CM

## 2017-06-28 DIAGNOSIS — E66.09 NON MORBID OBESITY DUE TO EXCESS CALORIES: Primary | ICD-10-CM

## 2017-06-29 ENCOUNTER — TELEPHONE (OUTPATIENT)
Dept: BARIATRICS | Facility: CLINIC | Age: 42
End: 2017-06-29

## 2017-06-29 NOTE — TELEPHONE ENCOUNTER
----- Message from Betty Pritchett sent at 6/29/2017  2:52 PM CDT -----  Contact: self  Pt states she needs to speak with nurse and she states she would like for you to send her a message on [facility]. She states you are not on her drop down but, she has a question she forgot to ask.

## 2017-06-30 NOTE — TELEPHONE ENCOUNTER
Spoke with patient and scheduled preop appts/ surgery date/ post op appts. All dates and times agreed upon. Pt aware that must have PCP clearance within 30 days of surgery date signed and in chart for preop clearance. Pt aware that protein liquid diet start date is 7/21/17. Pt aware all appts can be seen in my [facility] patient portal at this time and appt reminders mailed to patient's address today by RN. Given office phone and fax number for any future questions/concerns.

## 2017-07-05 ENCOUNTER — PATIENT MESSAGE (OUTPATIENT)
Dept: INTERNAL MEDICINE | Facility: CLINIC | Age: 42
End: 2017-07-05

## 2017-07-05 ENCOUNTER — PATIENT MESSAGE (OUTPATIENT)
Dept: BARIATRICS | Facility: CLINIC | Age: 42
End: 2017-07-05

## 2017-07-05 ENCOUNTER — HOSPITAL ENCOUNTER (OUTPATIENT)
Dept: RADIOLOGY | Facility: HOSPITAL | Age: 42
Discharge: HOME | End: 2017-07-05
Attending: INTERNAL MEDICINE
Payer: COMMERCIAL

## 2017-07-05 ENCOUNTER — OFFICE VISIT (OUTPATIENT)
Dept: INTERNAL MEDICINE | Facility: CLINIC | Age: 42
End: 2017-07-05
Payer: COMMERCIAL

## 2017-07-05 VITALS
WEIGHT: 232.38 LBS | SYSTOLIC BLOOD PRESSURE: 98 MMHG | HEART RATE: 81 BPM | HEIGHT: 65 IN | BODY MASS INDEX: 38.72 KG/M2 | OXYGEN SATURATION: 96 % | DIASTOLIC BLOOD PRESSURE: 68 MMHG | RESPIRATION RATE: 16 BRPM | TEMPERATURE: 98 F

## 2017-07-05 DIAGNOSIS — Z01.818 PRE-OP EVALUATION: ICD-10-CM

## 2017-07-05 DIAGNOSIS — E66.09 NON MORBID OBESITY DUE TO EXCESS CALORIES: ICD-10-CM

## 2017-07-05 DIAGNOSIS — G47.33 OSA (OBSTRUCTIVE SLEEP APNEA): ICD-10-CM

## 2017-07-05 DIAGNOSIS — K90.0 CELIAC DISEASE: ICD-10-CM

## 2017-07-05 DIAGNOSIS — Z01.818 PREOPERATIVE CLEARANCE: Primary | ICD-10-CM

## 2017-07-05 DIAGNOSIS — E11.69 DIABETES MELLITUS TYPE 2 IN OBESE: ICD-10-CM

## 2017-07-05 DIAGNOSIS — E55.9 VITAMIN D DEFICIENCY: ICD-10-CM

## 2017-07-05 DIAGNOSIS — E66.9 DIABETES MELLITUS TYPE 2 IN OBESE: ICD-10-CM

## 2017-07-05 LAB
B-HCG UR QL: NEGATIVE
BILIRUB UR QL STRIP: NEGATIVE
CLARITY UR REFRACT.AUTO: CLEAR
COLOR UR AUTO: COLORLESS
CTP QC/QA: YES
GLUCOSE UR QL STRIP: NEGATIVE
HGB UR QL STRIP: NEGATIVE
KETONES UR QL STRIP: NEGATIVE
LEUKOCYTE ESTERASE UR QL STRIP: NEGATIVE
NITRITE UR QL STRIP: NEGATIVE
PH UR STRIP: 6 [PH] (ref 5–8)
PROT UR QL STRIP: NEGATIVE
SP GR UR STRIP: 1 (ref 1–1.03)
URN SPEC COLLECT METH UR: ABNORMAL
UROBILINOGEN UR STRIP-ACNC: NEGATIVE EU/DL

## 2017-07-05 PROCEDURE — 3044F HG A1C LEVEL LT 7.0%: CPT | Mod: S$GLB,,, | Performed by: INTERNAL MEDICINE

## 2017-07-05 PROCEDURE — 99999 PR PBB SHADOW E&M-EST. PATIENT-LVL III: CPT | Mod: PBBFAC,,, | Performed by: INTERNAL MEDICINE

## 2017-07-05 PROCEDURE — 71020 XR CHEST PA AND LATERAL: CPT | Mod: TC,PO

## 2017-07-05 PROCEDURE — 81003 URINALYSIS AUTO W/O SCOPE: CPT

## 2017-07-05 PROCEDURE — 99214 OFFICE O/P EST MOD 30 MIN: CPT | Mod: 25,S$GLB,, | Performed by: INTERNAL MEDICINE

## 2017-07-05 PROCEDURE — 93010 ELECTROCARDIOGRAM REPORT: CPT | Mod: S$GLB,,, | Performed by: INTERNAL MEDICINE

## 2017-07-05 PROCEDURE — 93005 ELECTROCARDIOGRAM TRACING: CPT | Mod: S$GLB,,, | Performed by: INTERNAL MEDICINE

## 2017-07-05 PROCEDURE — 81025 URINE PREGNANCY TEST: CPT | Mod: QW,S$GLB,, | Performed by: INTERNAL MEDICINE

## 2017-07-05 PROCEDURE — 71020 XR CHEST PA AND LATERAL: CPT | Mod: 26,,, | Performed by: RADIOLOGY

## 2017-07-05 RX ORDER — PROMETHAZINE HYDROCHLORIDE AND CODEINE PHOSPHATE 6.25; 1 MG/5ML; MG/5ML
SOLUTION ORAL
Refills: 0 | COMMUNITY
Start: 2017-04-05 | End: 2017-09-15 | Stop reason: SDUPTHER

## 2017-07-05 RX ORDER — CLONAZEPAM 1 MG/1
1 TABLET ORAL 2 TIMES DAILY PRN
Qty: 60 TABLET | Refills: 0 | Status: SHIPPED | OUTPATIENT
Start: 2017-07-05 | End: 2017-08-21 | Stop reason: SDUPTHER

## 2017-07-05 NOTE — PROGRESS NOTES
Subjective:       Patient ID: Emilia Coyle is a 42 y.o. female.    Chief Complaint: Pre-op Exam (gastric sleeve surgery scheduled 7/28/2017)    Patient is a 42 y.o.female who presents today for preop. She is scheduled to have gastric sleeve on 7/28.       Denies CP/SOB/nausea/MI last 3 months, denies heart failure, denies arrhythmias, denies valvular heart disease.    RCRI criteria: denies IDDM, denies CAD, denies CVA, denies chronic renal insufficiency, denies heart failure, denies high risk surgery    Functional status: good    Bleeding risk - history of bleeding with prior surgeries - none, family history of bleeding disorders - none    History of prior anesthetic complications none      Review of Systems   Constitutional: Negative for appetite change, chills, diaphoresis, fatigue and fever.   HENT: Negative for congestion, dental problem, ear discharge, ear pain, hearing loss, postnasal drip, sinus pressure and sore throat.    Eyes: Negative for discharge, redness and itching.   Respiratory: Negative for cough, chest tightness, shortness of breath and wheezing.    Cardiovascular: Negative for chest pain, palpitations and leg swelling.   Gastrointestinal: Negative for abdominal pain, constipation, diarrhea, nausea and vomiting.   Endocrine: Negative for cold intolerance and heat intolerance.   Genitourinary: Negative for difficulty urinating, frequency, hematuria and urgency.   Musculoskeletal: Negative for arthralgias, back pain, gait problem, myalgias and neck pain.   Skin: Negative for color change and rash.   Neurological: Negative for dizziness, syncope and headaches.   Hematological: Negative for adenopathy.   Psychiatric/Behavioral: Negative for behavioral problems and sleep disturbance. The patient is not nervous/anxious.        Objective:      Physical Exam   Constitutional: She is oriented to person, place, and time. She appears well-developed and well-nourished. No distress.   HENT:   Head:  Normocephalic and atraumatic.   Right Ear: External ear normal.   Left Ear: External ear normal.   Mouth/Throat: No oropharyngeal exudate.   Eyes: Conjunctivae and EOM are normal. Pupils are equal, round, and reactive to light. Right eye exhibits no discharge. Left eye exhibits no discharge. No scleral icterus.   Neck: Normal range of motion. Neck supple. No JVD present. No thyromegaly present.   Cardiovascular: Normal rate, regular rhythm, normal heart sounds and intact distal pulses.  Exam reveals no gallop and no friction rub.    No murmur heard.  Pulmonary/Chest: Effort normal and breath sounds normal. No stridor. No respiratory distress. She has no wheezes. She has no rales. She exhibits no tenderness.   Abdominal: Soft. Bowel sounds are normal. She exhibits no distension. There is no tenderness. There is no rebound.   Musculoskeletal: Normal range of motion. She exhibits no edema or tenderness.   Lymphadenopathy:     She has no cervical adenopathy.   Neurological: She is alert and oriented to person, place, and time.   Skin: Skin is warm. No rash noted. She is not diaphoretic. No erythema.   Psychiatric: She has a normal mood and affect. Her behavior is normal.   Nursing note and vitals reviewed.      Assessment and Plan:       1. Pre-op evaluation  - labs pending  - Urinalysis  - EKG 12-lead  - X-Ray Chest PA And Lateral; Future  - POCT urine pregnancy  - all labs are wnl  - pt cleared from cardiology  - No contraindications to anesthesia or surgery at this time.    2. Vitamin D deficiency  - on supplements  - Vitamin D  - Urinalysis  - EKG 12-lead  - X-Ray Chest PA And Lateral; Future    3. Non morbid obesity due to excess calories  - CBC auto differential  - Comprehensive metabolic panel  - Hemoglobin A1c  - Urinalysis  - EKG 12-lead  - X-Ray Chest PA And Lateral; Future    4. BMI 37.0-37.9, adult  - CBC auto differential  - Comprehensive metabolic panel  - Hemoglobin A1c  - Urinalysis  - EKG 12-lead  -  X-Ray Chest PA And Lateral; Future    5. MIKAELA (obstructive sleep apnea)  - CBC auto differential  - Comprehensive metabolic panel  - Hemoglobin A1c  - Urinalysis  - EKG 12-lead  - X-Ray Chest PA And Lateral; Future    6. Diabetes mellitus type 2 in obese  - on metformin once daily  - CBC auto differential  - Comprehensive metabolic panel  - Hemoglobin A1c  - Urinalysis  - EKG 12-lead  - X-Ray Chest PA And Lateral; Future    7. Celiac disease  - CBC auto differential  - Comprehensive metabolic panel  - Hemoglobin A1c  - Urinalysis  - EKG 12-lead  - X-Ray Chest PA And Lateral; Future          No Follow-up on file.

## 2017-07-05 NOTE — LETTER
July 5, 2017      Homero Schilling Jr., MD  [address] [facility] [address]           [place] [facility]  [address] [facility] [address] [place] [address]  Phone: [phone]  Fax: [phone]          Patient: Emilia Coyle   MR Number: [id number]   YOB: 1975   Date of Visit: 7/5/2017       Dear Dr. Homero Schilling Jr.:    Thank you for referring Emilia Coyle to me for evaluation. Attached you will find relevant portions of my assessment and plan of care.    If you have questions, please do not hesitate to call me. I look forward to following Emilia Coyle along with you.    Sincerely,    Rosanna Benitez,     Enclosure  CC:  No Recipients    If you would like to receive this communication electronically, please contact [email or web address] or [phone] to request more information on [email or web address] access.    For providers and/or their staff who would like to refer a patient to [facility], please contact us through our one-stop-shop provider referral line, [facility], at [phone].    If you feel you have received this communication in error or would no longer like to receive these types of communications, please e-mail [email or web address]

## 2017-07-06 ENCOUNTER — TELEPHONE (OUTPATIENT)
Dept: INTERNAL MEDICINE | Facility: CLINIC | Age: 42
End: 2017-07-06

## 2017-07-06 NOTE — TELEPHONE ENCOUNTER
Spoke with pt re: EKG. Informed her to keep appt with the cardiology next week.  She verbalized understanding.

## 2017-07-06 NOTE — TELEPHONE ENCOUNTER
----- Message from Gricelda Barron sent at 7/6/2017 10:49 AM CDT -----  Contact: [phone]  Pt would like to know if having her jewelry on, a underwire bra, and not laying flat could have effected her EKG. She would like to know if it should possibly be redone?    Thanks

## 2017-07-07 DIAGNOSIS — E11.9 TYPE 2 DIABETES MELLITUS WITHOUT COMPLICATION: ICD-10-CM

## 2017-07-10 ENCOUNTER — TELEPHONE (OUTPATIENT)
Dept: INTERNAL MEDICINE | Facility: CLINIC | Age: 42
End: 2017-07-10

## 2017-07-10 DIAGNOSIS — Z00.00 ANNUAL PHYSICAL EXAM: Primary | ICD-10-CM

## 2017-07-10 NOTE — TELEPHONE ENCOUNTER
Annual booked, labs already ordered for post-op apt   Cbc, cmp, lipid, vit b12, vit b1, vit d, and iron & tibc    Please order additional labs needed.

## 2017-07-12 ENCOUNTER — OFFICE VISIT (OUTPATIENT)
Dept: CARDIOLOGY | Facility: CLINIC | Age: 42
End: 2017-07-12
Payer: COMMERCIAL

## 2017-07-12 ENCOUNTER — TELEPHONE (OUTPATIENT)
Dept: INTERNAL MEDICINE | Facility: CLINIC | Age: 42
End: 2017-07-12

## 2017-07-12 ENCOUNTER — PATIENT MESSAGE (OUTPATIENT)
Dept: BARIATRICS | Facility: CLINIC | Age: 42
End: 2017-07-12

## 2017-07-12 VITALS
OXYGEN SATURATION: 97 % | HEART RATE: 67 BPM | DIASTOLIC BLOOD PRESSURE: 78 MMHG | HEIGHT: 65 IN | SYSTOLIC BLOOD PRESSURE: 111 MMHG | WEIGHT: 229.19 LBS | BODY MASS INDEX: 38.19 KG/M2

## 2017-07-12 DIAGNOSIS — E78.1 HIGH TRIGLYCERIDES: ICD-10-CM

## 2017-07-12 DIAGNOSIS — G47.33 OSA (OBSTRUCTIVE SLEEP APNEA): ICD-10-CM

## 2017-07-12 DIAGNOSIS — E11.69 DIABETES MELLITUS TYPE 2 IN OBESE: ICD-10-CM

## 2017-07-12 DIAGNOSIS — Z01.818 PRE-OP EVALUATION: Primary | ICD-10-CM

## 2017-07-12 DIAGNOSIS — E66.9 DIABETES MELLITUS TYPE 2 IN OBESE: ICD-10-CM

## 2017-07-12 DIAGNOSIS — E66.9 OBESITY (BMI 30-39.9): ICD-10-CM

## 2017-07-12 PROCEDURE — 99999 PR PBB SHADOW E&M-EST. PATIENT-LVL III: CPT | Mod: PBBFAC,,, | Performed by: INTERNAL MEDICINE

## 2017-07-12 PROCEDURE — 99204 OFFICE O/P NEW MOD 45 MIN: CPT | Mod: S$GLB,,, | Performed by: INTERNAL MEDICINE

## 2017-07-12 PROCEDURE — 3044F HG A1C LEVEL LT 7.0%: CPT | Mod: S$GLB,,, | Performed by: INTERNAL MEDICINE

## 2017-07-12 NOTE — LETTER
July 12, 2017        Rosanna Benitez, DO  [address] [facility] [address]             [facility]  [address], [address]  [facility] [address]  Phone: [phone]   Patient: Emilia Coyle   MR Number: [id number]   YOB: 1975   Date of Visit: 7/12/2017       Dear Dr. Benitez:    Thank you for referring Emilia Coyle to me for evaluation. Below are the relevant portions of my assessment and plan of care.        ECGs reviewed-NSR with non specific changes  LABS reviewed- elevated triglyceride  Imaging including Echoes reviewed- normal ef    Assessment:     1. Pre-op evaluation    2. Diabetes mellitus type 2 in obese    3. Obesity (BMI 30-39.9)    4. MIKAELA (obstructive sleep apnea)    5. High triglycerides        Plan:     Patient is low risk for intermediate risk surgery with no contraindications to surgery such as recent MI, CHF, tachyarrhythmias or severe obstructive valve disease. Mets >4. ECG changes secondary to lead placement and non specific.    Continue current medication  Activity as tolerated    F/u in 6 months.     If you have questions, please do not hesitate to call me. I look forward to following Emilia along with you.    Sincerely,      Tae Julien MD           CC  Marta Mayers PA-C

## 2017-07-12 NOTE — LETTER
July 12, 2017      [facility]  [address], [address]  [facility] [place] [address]  Phone: [phone]       Patient: Emilia Coyle   YOB: 1975  Date of Visit: 07/12/2017    To Whom It May Concern:    Emilia Moore was at [facility] on 07/12/2017. She may return to work/school on 7/12/2017 with no restrictions. If you have any questions or concerns, or if I can be of further assistance, please do not hesitate to contact me.    Sincerely,    Tae Julien MD

## 2017-07-12 NOTE — Clinical Note
July 12, 2017      Rosanna Benitez, DO  [address] [facility] [address]           [place] [facility]  [address], [address]  [facility] [address]  Phone: [phone]          Patient: Emilia Coyle   MR Number: [id number]   YOB: 1975   Date of Visit: 7/12/2017       Dear Dr. Rosanna Benitez:    Thank you for referring Emilia Coyle to me for evaluation. Attached you will find relevant portions of my assessment and plan of care.    If you have questions, please do not hesitate to call me. I look forward to following Emilia Coyle along with you.    Sincerely,    Tae Julien MD    Enclosure  CC:  No Recipients    If you would like to receive this communication electronically, please contact [email or web address] or [phone] to request more information on [employer] access.    For providers and/or their staff who would like to refer a patient to [facility], please contact us through our one-stop-shop provider referral line, [facility], at [phone].    If you feel you have received this communication in error or would no longer like to receive these types of communications, please e-mail [email or web address]

## 2017-07-12 NOTE — PROGRESS NOTES
Subjective:   Patient ID:  Emilia Coyle is a 42 y.o. female who presents for evaluation of Consult      HPI: 43 y/o female with DM and thyroid disease present for pre op valuation before having gastric sleeve done. She had stress test that was negative for ischemia. She had repeat ECG that showed T wave inversion in lead 3 and V3. No chest pain or dyspnea. She had Dobutamine stress done because that was the way test was ordered but she is very active going 10,000 steps daily as per fit bit. She denies orthopnea or PND. She does not smoke but is diabetes that is controlled. No history of MI or CVA. Stress echo showed no valve disease.     BP is controlled.     She does have family history of CAD.    Past Medical History:   Diagnosis Date    Celiac disease     Diabetes mellitus     Hypothyroidism     Thyroid disease        Past Surgical History:   Procedure Laterality Date    ABDOMINAL SURGERY      abdominalplasty     ADENOIDECTOMY      BLADDER SUSPENSION      BREAST SURGERY      HYSTERECTOMY      TONSILLECTOMY      TOTAL THYROIDECTOMY Right     TUBAL LIGATION      TYMPANOSTOMY TUBE PLACEMENT         Social History   Substance Use Topics    Smoking status: Never Smoker    Smokeless tobacco: Never Used    Alcohol use No       Family History   Problem Relation Age of Onset    No Known Problems Mother     Hyperlipidemia Father     Obesity Brother     Hyperlipidemia Brother     Hyperlipidemia Daughter     Kidney failure Daughter     Kidney failure Maternal Aunt     Hypothyroidism Maternal Aunt     Hyperlipidemia Paternal Uncle     Hypertension Paternal Uncle     Heart disease Paternal Uncle     Cancer Maternal Grandmother     Diabetes Maternal Grandmother     Depression Maternal Grandmother     Heart disease Maternal Grandmother     Hypertension Maternal Grandmother     Sleep apnea Maternal Grandmother     Obesity Maternal Grandmother     Cancer Maternal Grandfather     Hyperlipidemia  Maternal Grandfather     Hypertension Maternal Grandfather     Heart disease Maternal Grandfather     Kidney failure Maternal Grandfather     Stroke Maternal Grandfather     Hyperlipidemia Paternal Grandmother     Hypertension Paternal Grandmother     Diabetes Paternal Grandmother     Obesity Paternal Grandmother     Cancer Paternal Grandfather     Hyperlipidemia Paternal Grandfather     Diabetes Paternal Grandfather     Hypertension Paternal Grandfather     Heart disease Paternal Grandfather     Kidney failure Paternal Grandfather     Obesity Paternal Grandfather     Sleep apnea Paternal Grandfather     Stroke Paternal Grandfather        Patient's Medications   New Prescriptions    No medications on file   Previous Medications    ALBUTEROL 90 MCG/ACTUATION INHALER    Inhale 2 puffs into the lungs every 6 (six) hours as needed for Wheezing. Rescue    BUTALBITAL-ASPIRIN-CAFFEINE -40 MG (FIORINAL) -40 MG CAP    Take 1 capsule by mouth 2 (two) times daily as needed.    CALCIUM CARBONATE/VITAMIN D3 (CALCIUM 600 + D,3, ORAL)    Take 1 tablet by mouth once daily.    CLONAZEPAM (KLONOPIN) 1 MG TABLET    Take 1 tablet (1 mg total) by mouth 2 (two) times daily as needed.    ERGOCALCIFEROL (ERGOCALCIFEROL) 50,000 UNIT CAP    Take 1 capsule (50,000 Units total) by mouth twice a week.    ESTRADIOL (ESTRACE) 1 MG TABLET    Take 1 mg by mouth once daily.    FERROUS SULFATE 324 MG (65 MG IRON) TBEC    Take 325 mg by mouth once daily.    FUROSEMIDE (LASIX) 20 MG TABLET    Take 1 tablet by mouth daily as needed.    LEVOTHYROXINE (SYNTHROID) 100 MCG TABLET    Take 100 mcg by mouth once daily.    METFORMIN (GLUCOPHAGE-XR) 500 MG 24 HR TABLET    Take 500 mg by mouth 2 (two) times daily.    MULTIVITAMIN (ONE DAILY MULTIVITAMIN) PER TABLET    Take 1 tablet by mouth once daily.    PROMETHAZINE-CODEINE 6.25-10 MG/5 ML (PHENERGAN WITH CODEINE) 6.25-10 MG/5 ML SYRUP    TK 5 TO 10 ML PO Q 6 TO 8 H PRF COUGH AND  CONGESTION   Modified Medications    No medications on file   Discontinued Medications    No medications on file        Review of patient's allergies indicates:   Allergen Reactions    Latex, natural rubber Dermatitis     To the hands ONLY    Rizatriptan Nausea Only, Other (See Comments) and Photosensitivity    Sumatriptan Nausea Only, Other (See Comments) and Photosensitivity    Zolmitriptan Nausea Only, Other (See Comments) and Photosensitivity    Gluten protein Anxiety, Dermatitis, Diarrhea, Hives, Itching, Nausea And Vomiting and Swelling       Review of Systems   Constitution: Negative.   HENT: Negative.    Eyes: Negative.    Cardiovascular: Negative.    Respiratory: Negative.    Endocrine: Negative.    Hematologic/Lymphatic: Negative.    Skin: Negative.    Musculoskeletal: Negative.    Gastrointestinal: Negative.    Neurological: Negative.    Psychiatric/Behavioral: Negative.      Objective:   Physical Exam   Constitutional: She is oriented to person, place, and time. She appears well-developed and well-nourished. No distress.   Examination of the digits showed no clubbing or cyanosis   HENT:   Head: Normocephalic and atraumatic.   Eyes: Conjunctivae are normal. Pupils are equal, round, and reactive to light. Right eye exhibits no discharge.   Neck: Normal range of motion. Neck supple. No JVD present. No thyromegaly present.   No carotid bruits   Cardiovascular: Normal rate, regular rhythm, S1 normal, S2 normal, normal heart sounds, intact distal pulses and normal pulses.  PMI is not displaced.  Exam reveals no gallop, no friction rub and no opening snap.    No murmur heard.  Pulmonary/Chest: Effort normal and breath sounds normal. No respiratory distress. She has no wheezes. She has no rales. She exhibits no tenderness.   Abdominal: Soft. Bowel sounds are normal. She exhibits no distension and no mass. There is no tenderness. There is no guarding.   No hepatosplenomegaly   Musculoskeletal: Normal range  of motion. She exhibits no edema or tenderness.   Lymphadenopathy:     She has no cervical adenopathy.   Neurological: She is alert and oriented to person, place, and time.   Skin: Skin is warm. No rash noted. She is not diaphoretic. No erythema.   Psychiatric: She has a normal mood and affect.   Nursing note and vitals reviewed.      Lab Results   Component Value Date    WBC 6.33 07/05/2017    HGB 11.8 (L) 07/05/2017    HCT 36.5 (L) 07/05/2017    MCV 85 07/05/2017     (H) 07/05/2017         Chemistry        Component Value Date/Time     07/05/2017 0940    K 4.0 07/05/2017 0940     07/05/2017 0940    CO2 25 07/05/2017 0940    BUN 16 07/05/2017 0940    CREATININE 0.8 07/05/2017 0940     (H) 07/05/2017 0940        Component Value Date/Time    CALCIUM 8.9 07/05/2017 0940    ALKPHOS 90 07/05/2017 0940    AST 15 07/05/2017 0940    ALT 13 07/05/2017 0940    BILITOT 0.4 07/05/2017 0940    ESTGFRAFRICA >60.0 07/05/2017 0940    EGFRNONAA >60.0 07/05/2017 0940            Lab Results   Component Value Date    CHOL 187 03/25/2017    CHOL 194 03/30/2007     Lab Results   Component Value Date    HDL 60 03/25/2017    HDL 47.0 03/30/2007     Lab Results   Component Value Date    LDLCALC 90.8 03/25/2017    LDLCALC 110.4 03/30/2007     Lab Results   Component Value Date    TRIG 181 (H) 03/25/2017    TRIG 183 (H) 03/30/2007     Lab Results   Component Value Date    CHOLHDL 32.1 03/25/2017    CHOLHDL 24.2 03/30/2007       Lab Results   Component Value Date    TSH 0.922 03/25/2017       Lab Results   Component Value Date    HGBA1C 5.6 07/05/2017       ECGs reviewed-NSR with non specific changes  LABS reviewed- elevated triglyceride  Imaging including Echoes reviewed- normal ef    Assessment:     1. Pre-op evaluation    2. Diabetes mellitus type 2 in obese    3. Obesity (BMI 30-39.9)    4. MIKAELA (obstructive sleep apnea)    5. High triglycerides        Plan:     Patient is low risk for intermediate risk surgery  with no contraindications to surgery such as recent MI, CHF, tachyarrhythmias or severe obstructive valve disease. Mets >4. ECG changes secondary to lead placement and non specific.    Continue current medication  Activity as tolerated    F/u in 6 months.

## 2017-07-13 ENCOUNTER — TELEPHONE (OUTPATIENT)
Dept: BARIATRICS | Facility: CLINIC | Age: 42
End: 2017-07-13

## 2017-07-13 RX ORDER — ONDANSETRON 8 MG/1
8 TABLET, ORALLY DISINTEGRATING ORAL EVERY 6 HOURS PRN
Qty: 30 TABLET | Refills: 0 | Status: SHIPPED | OUTPATIENT
Start: 2017-07-13 | End: 2017-11-29 | Stop reason: SDUPTHER

## 2017-07-13 RX ORDER — PROMETHAZINE HYDROCHLORIDE 25 MG/1
SUPPOSITORY RECTAL
Qty: 15 SUPPOSITORY | Refills: 0 | Status: SHIPPED | OUTPATIENT
Start: 2017-07-13 | End: 2017-11-29 | Stop reason: ALTCHOICE

## 2017-07-13 RX ORDER — POLYETHYLENE GLYCOL 3350 17 G/17G
17 POWDER, FOR SOLUTION ORAL DAILY
Qty: 1 BOTTLE | Refills: 3 | Status: SHIPPED | OUTPATIENT
Start: 2017-07-13 | End: 2017-11-02 | Stop reason: SDUPTHER

## 2017-07-13 RX ORDER — URSODIOL 500 MG/1
TABLET, FILM COATED ORAL
Qty: 90 TABLET | Refills: 1 | Status: SHIPPED | OUTPATIENT
Start: 2017-07-13 | End: 2017-11-02 | Stop reason: ALTCHOICE

## 2017-07-13 RX ORDER — OMEPRAZOLE 20 MG/1
20 CAPSULE, DELAYED RELEASE ORAL 2 TIMES DAILY
Qty: 180 CAPSULE | Refills: 1 | Status: SHIPPED | OUTPATIENT
Start: 2017-07-13 | End: 2017-08-03 | Stop reason: SDUPTHER

## 2017-07-14 ENCOUNTER — DOCUMENTATION ONLY (OUTPATIENT)
Dept: BARIATRICS | Facility: CLINIC | Age: 42
End: 2017-07-14

## 2017-07-14 DIAGNOSIS — Z12.31 OTHER SCREENING MAMMOGRAM: ICD-10-CM

## 2017-07-15 ENCOUNTER — DOCUMENTATION ONLY (OUTPATIENT)
Dept: BARIATRICS | Facility: CLINIC | Age: 42
End: 2017-07-15

## 2017-07-18 ENCOUNTER — DOCUMENTATION ONLY (OUTPATIENT)
Dept: BARIATRICS | Facility: CLINIC | Age: 42
End: 2017-07-18

## 2017-07-18 ENCOUNTER — OFFICE VISIT (OUTPATIENT)
Dept: BARIATRICS | Facility: CLINIC | Age: 42
End: 2017-07-18
Payer: COMMERCIAL

## 2017-07-18 VITALS
DIASTOLIC BLOOD PRESSURE: 69 MMHG | HEART RATE: 69 BPM | SYSTOLIC BLOOD PRESSURE: 117 MMHG | WEIGHT: 231.06 LBS | HEIGHT: 65 IN | BODY MASS INDEX: 38.5 KG/M2

## 2017-07-18 DIAGNOSIS — E66.9 OBESITY (BMI 30-39.9): Primary | ICD-10-CM

## 2017-07-18 DIAGNOSIS — E66.9 DIABETES MELLITUS TYPE 2 IN OBESE: ICD-10-CM

## 2017-07-18 DIAGNOSIS — G47.33 OSA (OBSTRUCTIVE SLEEP APNEA): ICD-10-CM

## 2017-07-18 DIAGNOSIS — E11.69 DIABETES MELLITUS TYPE 2 IN OBESE: ICD-10-CM

## 2017-07-18 PROCEDURE — 3044F HG A1C LEVEL LT 7.0%: CPT | Mod: S$GLB,,, | Performed by: SURGERY

## 2017-07-18 PROCEDURE — 99213 OFFICE O/P EST LOW 20 MIN: CPT | Mod: S$GLB,,, | Performed by: SURGERY

## 2017-07-18 PROCEDURE — 99999 PR PBB SHADOW E&M-EST. PATIENT-LVL IV: CPT | Mod: PBBFAC,,, | Performed by: SURGERY

## 2017-07-18 RX ORDER — HEPARIN SODIUM 5000 [USP'U]/ML
5000 INJECTION, SOLUTION INTRAVENOUS; SUBCUTANEOUS ONCE
Status: CANCELLED | OUTPATIENT
Start: 2017-07-18 | End: 2017-07-18

## 2017-07-18 RX ORDER — FAMOTIDINE 10 MG/ML
20 INJECTION INTRAVENOUS ONCE
Status: CANCELLED | OUTPATIENT
Start: 2017-07-18 | End: 2017-07-18

## 2017-07-18 RX ORDER — SODIUM CITRATE AND CITRIC ACID MONOHYDRATE 334; 500 MG/5ML; MG/5ML
15 SOLUTION ORAL ONCE
Status: CANCELLED | OUTPATIENT
Start: 2017-07-18 | End: 2017-07-18

## 2017-07-18 RX ORDER — HYDROCODONE BITARTRATE AND ACETAMINOPHEN 7.5; 325 MG/15ML; MG/15ML
15 SOLUTION ORAL 4 TIMES DAILY PRN
Qty: 473 ML | Refills: 0 | Status: SHIPPED | OUTPATIENT
Start: 2017-07-18 | End: 2017-11-02 | Stop reason: ALTCHOICE

## 2017-07-18 RX ORDER — METOCLOPRAMIDE HYDROCHLORIDE 5 MG/ML
10 INJECTION INTRAMUSCULAR; INTRAVENOUS ONCE
Status: CANCELLED | OUTPATIENT
Start: 2017-07-18 | End: 2017-07-18

## 2017-07-18 NOTE — PROGRESS NOTES
NUTRITION NOTE    Pre-op for Gastric Sleeve:    Provided written/verbal information on pre- and post-bariatric surgery diet.  Reviewed pre-op liquid diet instructions.  Explained diet progression, stressing the importance of meeting protein & fluid needs.  Instructed on the importance of keeping a journal in order to monitor intake.  Provided and reviewed list of multivitamin/mineral supplementation to prevent deficiencies.  Provided written material and phone number to contact if any further questions.    Comprehension:  Patient with good  comprehension as evidenced by appropriate questions and concern expressed.

## 2017-07-18 NOTE — PROGRESS NOTES
History & Physical    SUBJECTIVE:     History of Present Illness:  Emilia Coyle is a 42 y.o. female who presents for pre-operative exam for weight loss surgery.  she has completed her pre-operative evaluation.  she has failed medical treatment for obesity.  1. Obesity, Body mass index is 37.05 kg/(m^2). and inability to lose weight.  2. Co-morbidities: type 2 diabetes mellitus, uncomplicated and obstructive sleep apnea    Chief Complaint   Patient presents with    Pre-op Exam       Review of patient's allergies indicates:   Allergen Reactions    Latex, natural rubber Dermatitis     To the hands ONLY    Rizatriptan Nausea Only, Other (See Comments) and Photosensitivity    Sumatriptan Nausea Only, Other (See Comments) and Photosensitivity    Zolmitriptan Nausea Only, Other (See Comments) and Photosensitivity    Gluten protein Anxiety, Dermatitis, Diarrhea, Hives, Itching, Nausea And Vomiting and Swelling       Current Outpatient Prescriptions   Medication Sig    albuterol 90 mcg/actuation inhaler Inhale 2 puffs into the lungs every 6 (six) hours as needed for Wheezing. Rescue    butalbital-aspirin-caffeine -40 mg (FIORINAL) -40 mg Cap Take 1 capsule by mouth 2 (two) times daily as needed.    CALCIUM CARBONATE/VITAMIN D3 (CALCIUM 600 + D,3, ORAL) Take 1 tablet by mouth once daily.    clonazePAM (KLONOPIN) 1 MG tablet Take 1 tablet (1 mg total) by mouth 2 (two) times daily as needed.    ergocalciferol (ERGOCALCIFEROL) 50,000 unit Cap Take 1 capsule (50,000 Units total) by mouth twice a week.    estradiol (ESTRACE) 1 MG tablet Take 1 mg by mouth once daily.    ferrous sulfate 324 mg (65 mg iron) TbEC Take 325 mg by mouth once daily.    furosemide (LASIX) 20 MG tablet Take 1 tablet by mouth daily as needed.    levothyroxine (SYNTHROID) 100 MCG tablet Take 100 mcg by mouth once daily.    metformin (GLUCOPHAGE-XR) 500 MG 24 hr tablet Take 500 mg by mouth 2 (two) times daily.    multivitamin  "(ONE DAILY MULTIVITAMIN) per tablet Take 1 tablet by mouth once daily.    omeprazole (PRILOSEC) 20 MG capsule Take 1 capsule (20 mg total) by mouth 2 (two) times daily. Open one capsule twice daily and add to yogurt or applesauce    ondansetron (ZOFRAN-ODT) 8 MG TbDL Take 1 tablet (8 mg total) by mouth every 6 (six) hours as needed.    polyethylene glycol (GLYCOLAX) 17 gram/dose powder Take 17 g by mouth once daily.    promethazine (PHENERGAN) 25 MG suppository 1 rectally every 6 hours prn.  Okay to change to liquid or pills.    promethazine-codeine 6.25-10 mg/5 ml (PHENERGAN WITH CODEINE) 6.25-10 mg/5 mL syrup TK 5 TO 10 ML PO Q 6 TO 8 H PRF COUGH AND CONGESTION    ursodiol (CORTES FORTE) 500 MG tablet Crush one tablet daily for gall bladder. Please compound into liquid and supply for 90 days if insurance approves     No current facility-administered medications for this visit.        Past Medical History:   Diagnosis Date    Celiac disease     Diabetes mellitus     Hypothyroidism     Thyroid disease        Past Surgical History:   Procedure Laterality Date    ABDOMINAL SURGERY      abdominalplasty     ADENOIDECTOMY      BLADDER SUSPENSION      BREAST SURGERY      HYSTERECTOMY      TONSILLECTOMY      TOTAL THYROIDECTOMY Right     TUBAL LIGATION      TYMPANOSTOMY TUBE PLACEMENT         Review of Systems   Constitutional: Negative for fever.   Respiratory: Negative for cough.    Cardiovascular: Negative for chest pain.   Gastrointestinal: Negative for abdominal pain, constipation, diarrhea, heartburn, nausea and vomiting.        Gets rare constipation if she eats the wrong things (on celiac diet)   Genitourinary: Negative for dysuria.   Endo/Heme/Allergies: Does not bruise/bleed easily.          OBJECTIVE:     Vitals:    07/18/17 1517   BP: 117/69   Pulse: 69   Weight: 104.8 kg (231 lb 0.7 oz)   Height: 5' 5" (1.651 m)       Physical Exam   Constitutional: She is oriented to person, place, and time " and well-developed, well-nourished, and in no distress.   Cardiovascular: Normal rate, regular rhythm and normal heart sounds.    Pulmonary/Chest: Effort normal and breath sounds normal.   Abdominal: Soft. Bowel sounds are normal. She exhibits no distension. There is no tenderness.   Neurological: She is alert and oriented to person, place, and time.   Skin: Skin is warm and dry.   Psychiatric: Mood, memory, affect and judgment normal.   Vitals reviewed.       Laboratory  CBC: Reviewed  CMP: Reviewed    Diagnostic Results:  ECG: Reviewed  X-Ray: Reviewed  Stress test: Reviewed     Dietitian: Patient has participated in pre-operative nutritional program with understanding of necessary lifelong dietary changes required with surgery.    Psych: No overt contraindications to bariatric surgery, patient has completed psychological evaluation and is able to give informed consent.    PCP: Medically cleared for surgery.     ASSESSMENT/PLAN:     Morbid obesity with failure of medical conservative therapy.    Co Morbid Conditions:   Patient Active Problem List   Diagnosis    Celiac disease    Obesity (BMI 30-39.9)    Witnessed apneic spells    Suspected sleep apnea    Diabetes mellitus type 2 in obese    Unspecified sleep apnea    MIKAELA (obstructive sleep apnea)    High triglycerides       Patient wishes to undergo sleeve gastrectomy.      The patient was informed that risks include, but are not limited to: death, leak, obstruction, bleeding, and sepsis. Any of these could require further surgery. Other risks include DVT, PE, pneumonia, wound dehiscence, hernia, wound infection, the need for dilatations and the inability to lose appropriate weight and keep it off.     We discussed that our goal is to ameliorate her medical problems and not to obtain a specific body mass index. she understands the risks and benefits and wishes to proceed with the procedure.  she has signed a consent form.

## 2017-07-19 ENCOUNTER — PATIENT MESSAGE (OUTPATIENT)
Dept: BARIATRICS | Facility: CLINIC | Age: 42
End: 2017-07-19

## 2017-07-21 ENCOUNTER — TELEPHONE (OUTPATIENT)
Dept: BARIATRICS | Facility: CLINIC | Age: 42
End: 2017-07-21

## 2017-07-21 NOTE — TELEPHONE ENCOUNTER
----- Message from Prerna Connor sent at 7/21/2017  9:15 AM CDT -----  Contact: self   Fabiana States that she needsa call returned by a nurse in reference to her liquid diet.    Please call fabiana @ [phone] . Thanks :)

## 2017-07-21 NOTE — TELEPHONE ENCOUNTER
----- Message from Maria Del Carmen Crain RN sent at 6/30/2017  7:43 AM CDT -----  Regarding: liquid diet  1 week liquid diet starts 7/21/17  sleeve surgery scheduled 7/28/17  preop appt scheduled 7/18/17

## 2017-07-27 ENCOUNTER — ANESTHESIA EVENT (OUTPATIENT)
Dept: SURGERY | Facility: HOSPITAL | Age: 42
DRG: 621 | End: 2017-07-27
Payer: COMMERCIAL

## 2017-07-27 ENCOUNTER — TELEPHONE (OUTPATIENT)
Dept: BARIATRICS | Facility: CLINIC | Age: 42
End: 2017-07-27

## 2017-07-27 RX ORDER — MULTIVIT WITH MINERALS/HERBS
2 TABLET ORAL EVERY MORNING
COMMUNITY
End: 2018-05-09

## 2017-07-27 RX ORDER — LANOLIN ALCOHOL/MO/W.PET/CERES
500 CREAM (GRAM) TOPICAL DAILY
COMMUNITY
End: 2019-07-01

## 2017-07-27 NOTE — TELEPHONE ENCOUNTER
Notified patient of arrival time to the [facility] at 0530 with expected surgery start time 0730 on 7/28/17.   Instructed patient regarding pre-op instructions including npo status, showering and preop medications to hold/take per anesthesia/preop.  Instructed patient on the s/s of dehydration and for patient to call at the first sign of dehydration.  Informed patient that someone from bariatrics will be call them 1 week post op to review diet/fluid intake and to ensure adequate hydration.   Pt verbalized understanding. Pt given office phone number for any additional questions/concerns.

## 2017-07-27 NOTE — PRE-PROCEDURE INSTRUCTIONS
Spoke with Patient.  NPO, medication, and pre-op instructions reviewed.  Denies previous problems with Anesthesia.  Stated that she has a fear of waking up during surgery, although this has never happened to her.  Is on a clear liquid diet.  Vitamins are on Hold.  Stated that her morning glucose readings have been 60-80.  Her Metformin has been on Hold for the past week.  Denies Hypoglycemia symptoms.  Verbalized understanding of instructions.

## 2017-07-27 NOTE — ANESTHESIA PREPROCEDURE EVALUATION
07/27/2017  Emilia Coyle is a 42 y.o., female pmhx T2DM, obesity, MIKAELA, Celiac presenting for the following:    Pre-operative evaluation for Procedure(s) (LRB):  GASTRECTOMY-SLEEVE-LAPAROSCOPIC-03964 with intraop EGD (N/A)    Last Airway:  None on file    Patient Active Problem List   Diagnosis    Celiac disease    Obesity (BMI 30-39.9)    Witnessed apneic spells    Suspected sleep apnea    Diabetes mellitus type 2 in obese    Unspecified sleep apnea    MIKAELA (obstructive sleep apnea)    High triglycerides       Review of patient's allergies indicates:   Allergen Reactions    Latex, natural rubber Dermatitis     To the hands ONLY    Rizatriptan Nausea Only, Other (See Comments) and Photosensitivity    Sumatriptan Nausea Only, Other (See Comments) and Photosensitivity    Zolmitriptan Nausea Only, Other (See Comments) and Photosensitivity    Gluten protein Anxiety, Dermatitis, Diarrhea, Hives, Itching, Nausea And Vomiting and Swelling       No current facility-administered medications on file prior to encounter.      Current Outpatient Prescriptions on File Prior to Encounter   Medication Sig Dispense Refill    albuterol 90 mcg/actuation inhaler Inhale 2 puffs into the lungs every 6 (six) hours as needed for Wheezing. Rescue 18 g 3    butalbital-aspirin-caffeine -40 mg (FIORINAL) -40 mg Cap Take 1 capsule by mouth 2 (two) times daily as needed.  1    CALCIUM CARBONATE/VITAMIN D3 (CALCIUM 600 + D,3, ORAL) Take 1 tablet by mouth every morning.       ergocalciferol (ERGOCALCIFEROL) 50,000 unit Cap Take 1 capsule (50,000 Units total) by mouth twice a week. 24 capsule 0    estradiol (ESTRACE) 1 MG tablet Take 1 mg by mouth daily with dinner or evening meal.   3    ferrous sulfate 324 mg (65 mg iron) TbEC Take 325 mg by mouth every morning.       furosemide (LASIX) 20 MG tablet Take 1  tablet by mouth daily as needed.  0    levothyroxine (SYNTHROID) 100 MCG tablet Take 100 mcg by mouth every morning.   3    metformin (GLUCOPHAGE-XR) 500 MG 24 hr tablet Take 500 mg by mouth every morning.   3    multivitamin (ONE DAILY MULTIVITAMIN) per tablet Take 1 tablet by mouth 2 (two) times daily.          Past Surgical History:   Procedure Laterality Date    ABDOMINAL SURGERY      abdominalplasty     ADENOIDECTOMY      BLADDER SUSPENSION      BREAST SURGERY      HYSTERECTOMY      TONSILLECTOMY      TOTAL THYROIDECTOMY Right     TUBAL LIGATION      TYMPANOSTOMY TUBE PLACEMENT         Social History     Social History    Marital status:      Spouse name: N/A    Number of children: N/A    Years of education: N/A     Occupational History    Not on file.     Social History Main Topics    Smoking status: Never Smoker    Smokeless tobacco: Never Used    Alcohol use No    Drug use: No    Sexual activity: Not on file     Other Topics Concern    Not on file     Social History Narrative    No narrative on file         Vital Signs Range (Last 24H):         CBC: No results for input(s): WBC, RBC, HGB, HCT, PLT, MCV, MCH, MCHC in the last 72 hours.    CMP: No results for input(s): NA, K, CL, CO2, BUN, CREATININE, GLU, MG, PHOS, CALCIUM, ALBUMIN, PROT, ALKPHOS, ALT, AST, BILITOT in the last 72 hours.    INR  No results for input(s): INR, PROTIME, APTT in the last 72 hours.    Invalid input(s): PT        Diagnostic Studies:      EK17  Vent. Rate : 058 BPM     Atrial Rate : 058 BPM     P-R Int : 162 ms          QRS Dur : 086 ms      QT Int : 418 ms       P-R-T Axes : 009 002 -09 degrees     QTc Int : 410 ms    Sinus bradycardia with sinus arrhythmia  Otherwise normal ECG  When compared with ECG of 2017 08:31,  Previous ECG has undetermined rhythm, needs review  Inverted T waves have replaced nonspecific T wave abnormality in Inferior  leads  T wave inversion now evident in  Anterior leads      2D Echo:  4/12/17  EKG Conclusions:    1. The EKG portion of this study is negative for ischemia at a peak heart rate of 146 bpm (86% of predicted).   2. Blood pressure remained stable throughout the protocol  (Presenting BP: 102/66 Peak BP: 136/60).   3. No significant arrhythmias were present.   4. There were no symptoms of chest discomfort or significant dyspnea throughout the protocol.     CONCLUSIONS     1 - Normal left ventricular systolic function (EF 55-60%).     2 - Normal left ventricular diastolic function.     3 - Normal right ventricular systolic function .     No evidence of stress induced myocardial ischemia.     Anesthesia Evaluation    I have reviewed the Patient Summary Reports.    I have reviewed the Nursing Notes.   I have reviewed the Medications.     Review of Systems  Anesthesia Hx:  History of prior surgery of interest to airway management or planning: Denies Family Hx of Anesthesia complications.   Denies Personal Hx of Anesthesia complications.   Social:  Non-Smoker    Cardiovascular:   Denies MI.  Denies CAD.    Denies CABG/stent.   Denies Angina.    Pulmonary:   Denies Pneumonia Denies COPD. Sleep Apnea    Hepatic/GI:   GERD, well controlled    Endocrine:   Diabetes, well controlled Hypothyroidism        Physical Exam  General:  Obesity    Airway/Jaw/Neck:  Airway Findings: Mouth Opening: Normal Tongue: Normal  General Airway Assessment: Adult  Mallampati: I  Improves to I with phonation.  TM Distance: Normal, at least 6 cm     Eyes/Ears/Nose:  EYES/EARS/NOSE FINDINGS: Normal   Dental:  Dental Findings: In tact   Chest/Lungs:  Chest/Lungs Findings: Clear to auscultation, Normal Respiratory Rate     Heart/Vascular:  Heart Findings: Rate: Normal  Rhythm: Regular Rhythm        Mental Status:  Mental Status Findings: Normal        Anesthesia Plan  Type of Anesthesia, risks & benefits discussed:  Anesthesia Type:  general  Patient's Preference:   Intra-op Monitoring Plan:  standard ASA monitors  Intra-op Monitoring Plan Comments:   Post Op Pain Control Plan: per primary service following discharge from PACU  Post Op Pain Control Plan Comments:   Induction:   IV  Beta Blocker:  Patient is not currently on a Beta-Blocker (No further documentation required).       Informed Consent: Patient understands risks and agrees with Anesthesia plan.  Questions answered. Anesthesia consent signed with patient.  ASA Score: 3     Day of Surgery Review of History & Physical:    H&P update referred to the surgeon.  H&P completed by Anesthesiologist.       Ready For Surgery From Anesthesia Perspective.

## 2017-07-28 ENCOUNTER — DOCUMENTATION ONLY (OUTPATIENT)
Dept: BARIATRICS | Facility: CLINIC | Age: 42
End: 2017-07-28

## 2017-07-28 ENCOUNTER — ANESTHESIA (OUTPATIENT)
Dept: SURGERY | Facility: HOSPITAL | Age: 42
DRG: 621 | End: 2017-07-28
Payer: COMMERCIAL

## 2017-07-28 ENCOUNTER — HOSPITAL ENCOUNTER (INPATIENT)
Facility: HOSPITAL | Age: 42
LOS: 1 days | Discharge: HOME | DRG: 621 | End: 2017-07-29
Attending: SURGERY | Admitting: SURGERY
Payer: COMMERCIAL

## 2017-07-28 DIAGNOSIS — E66.9 OBESITY (BMI 30-39.9): ICD-10-CM

## 2017-07-28 LAB
POCT GLUCOSE: 133 MG/DL (ref 70–110)
POCT GLUCOSE: 77 MG/DL (ref 70–110)
POCT GLUCOSE: 85 MG/DL (ref 70–110)

## 2017-07-28 PROCEDURE — 63600175 PHARM REV CODE 636 W HCPCS: Performed by: STUDENT IN AN ORGANIZED HEALTH CARE EDUCATION/TRAINING PROGRAM

## 2017-07-28 PROCEDURE — 36000710: Performed by: SURGERY

## 2017-07-28 PROCEDURE — 88307 TISSUE EXAM BY PATHOLOGIST: CPT | Performed by: PATHOLOGY

## 2017-07-28 PROCEDURE — 82962 GLUCOSE BLOOD TEST: CPT | Performed by: SURGERY

## 2017-07-28 PROCEDURE — 25000003 PHARM REV CODE 250: Performed by: SURGERY

## 2017-07-28 PROCEDURE — 36000711: Performed by: SURGERY

## 2017-07-28 PROCEDURE — 37000009 HC ANESTHESIA EA ADD 15 MINS: Performed by: SURGERY

## 2017-07-28 PROCEDURE — 63600175 PHARM REV CODE 636 W HCPCS: Performed by: SURGERY

## 2017-07-28 PROCEDURE — 27201423 OPTIME MED/SURG SUP & DEVICES STERILE SUPPLY: Performed by: SURGERY

## 2017-07-28 PROCEDURE — 25000003 PHARM REV CODE 250: Performed by: STUDENT IN AN ORGANIZED HEALTH CARE EDUCATION/TRAINING PROGRAM

## 2017-07-28 PROCEDURE — 37000008 HC ANESTHESIA 1ST 15 MINUTES: Performed by: SURGERY

## 2017-07-28 PROCEDURE — 71000033 HC RECOVERY, INTIAL HOUR: Performed by: SURGERY

## 2017-07-28 PROCEDURE — 11000001 HC ACUTE MED/SURG PRIVATE ROOM

## 2017-07-28 PROCEDURE — 0DJ08ZZ INSPECTION OF UPPER INTESTINAL TRACT, VIA NATURAL OR ARTIFICIAL OPENING ENDOSCOPIC: ICD-10-PCS | Performed by: SURGERY

## 2017-07-28 PROCEDURE — C9113 INJ PANTOPRAZOLE SODIUM, VIA: HCPCS | Performed by: SURGERY

## 2017-07-28 PROCEDURE — 63600175 PHARM REV CODE 636 W HCPCS

## 2017-07-28 PROCEDURE — 43775 LAP SLEEVE GASTRECTOMY: CPT | Mod: ,,, | Performed by: SURGERY

## 2017-07-28 PROCEDURE — 71000039 HC RECOVERY, EACH ADD'L HOUR: Performed by: SURGERY

## 2017-07-28 PROCEDURE — S0028 INJECTION, FAMOTIDINE, 20 MG: HCPCS | Performed by: SURGERY

## 2017-07-28 PROCEDURE — D9220A PRA ANESTHESIA: Mod: ,,, | Performed by: ANESTHESIOLOGY

## 2017-07-28 PROCEDURE — 88307 TISSUE EXAM BY PATHOLOGIST: CPT | Mod: 26,,, | Performed by: PATHOLOGY

## 2017-07-28 PROCEDURE — 0DB64Z3 EXCISION OF STOMACH, PERCUTANEOUS ENDOSCOPIC APPROACH, VERTICAL: ICD-10-PCS | Performed by: SURGERY

## 2017-07-28 RX ORDER — SODIUM CHLORIDE 9 MG/ML
INJECTION, SOLUTION INTRAVENOUS CONTINUOUS PRN
Status: DISCONTINUED | OUTPATIENT
Start: 2017-07-28 | End: 2017-07-28

## 2017-07-28 RX ORDER — ONDANSETRON 2 MG/ML
INJECTION INTRAMUSCULAR; INTRAVENOUS
Status: DISCONTINUED | OUTPATIENT
Start: 2017-07-28 | End: 2017-07-28

## 2017-07-28 RX ORDER — SODIUM CHLORIDE 0.9 % (FLUSH) 0.9 %
3 SYRINGE (ML) INJECTION
Status: DISCONTINUED | OUTPATIENT
Start: 2017-07-28 | End: 2017-07-28 | Stop reason: HOSPADM

## 2017-07-28 RX ORDER — ONDANSETRON 2 MG/ML
4 INJECTION INTRAMUSCULAR; INTRAVENOUS DAILY PRN
Status: DISCONTINUED | OUTPATIENT
Start: 2017-07-28 | End: 2017-07-28 | Stop reason: HOSPADM

## 2017-07-28 RX ORDER — METOCLOPRAMIDE HYDROCHLORIDE 5 MG/ML
10 INJECTION INTRAMUSCULAR; INTRAVENOUS ONCE
Status: COMPLETED | OUTPATIENT
Start: 2017-07-28 | End: 2017-07-28

## 2017-07-28 RX ORDER — HEPARIN SODIUM 5000 [USP'U]/ML
5000 INJECTION, SOLUTION INTRAVENOUS; SUBCUTANEOUS ONCE
Status: COMPLETED | OUTPATIENT
Start: 2017-07-28 | End: 2017-07-28

## 2017-07-28 RX ORDER — HYDROMORPHONE HCL IN 0.9% NACL 6 MG/30 ML
PATIENT CONTROLLED ANALGESIA SYRINGE INTRAVENOUS CONTINUOUS
Status: DISCONTINUED | OUTPATIENT
Start: 2017-07-28 | End: 2017-07-29

## 2017-07-28 RX ORDER — GLYCOPYRROLATE 0.2 MG/ML
INJECTION INTRAMUSCULAR; INTRAVENOUS
Status: DISCONTINUED | OUTPATIENT
Start: 2017-07-28 | End: 2017-07-28

## 2017-07-28 RX ORDER — ACETAMINOPHEN 10 MG/ML
1000 INJECTION, SOLUTION INTRAVENOUS EVERY 8 HOURS
Status: COMPLETED | OUTPATIENT
Start: 2017-07-28 | End: 2017-07-29

## 2017-07-28 RX ORDER — LIDOCAINE HYDROCHLORIDE 10 MG/ML
1 INJECTION, SOLUTION EPIDURAL; INFILTRATION; INTRACAUDAL; PERINEURAL ONCE
Status: DISCONTINUED | OUTPATIENT
Start: 2017-07-28 | End: 2017-07-28

## 2017-07-28 RX ORDER — FENTANYL CITRATE 50 UG/ML
25 INJECTION, SOLUTION INTRAMUSCULAR; INTRAVENOUS EVERY 5 MIN PRN
Status: DISCONTINUED | OUTPATIENT
Start: 2017-07-28 | End: 2017-07-28 | Stop reason: HOSPADM

## 2017-07-28 RX ORDER — KETAMINE HYDROCHLORIDE 100 MG/ML
INJECTION, SOLUTION INTRAMUSCULAR; INTRAVENOUS
Status: DISCONTINUED | OUTPATIENT
Start: 2017-07-28 | End: 2017-07-28

## 2017-07-28 RX ORDER — SUCCINYLCHOLINE CHLORIDE 20 MG/ML
INJECTION INTRAMUSCULAR; INTRAVENOUS
Status: DISCONTINUED | OUTPATIENT
Start: 2017-07-28 | End: 2017-07-28

## 2017-07-28 RX ORDER — NALOXONE HCL 0.4 MG/ML
0.02 VIAL (ML) INJECTION
Status: DISCONTINUED | OUTPATIENT
Start: 2017-07-28 | End: 2017-07-29

## 2017-07-28 RX ORDER — ENOXAPARIN SODIUM 100 MG/ML
40 INJECTION SUBCUTANEOUS
Status: DISCONTINUED | OUTPATIENT
Start: 2017-07-28 | End: 2017-07-29 | Stop reason: HOSPADM

## 2017-07-28 RX ORDER — ESMOLOL HYDROCHLORIDE 10 MG/ML
INJECTION INTRAVENOUS
Status: DISCONTINUED | OUTPATIENT
Start: 2017-07-28 | End: 2017-07-28

## 2017-07-28 RX ORDER — ONDANSETRON 2 MG/ML
4 INJECTION INTRAMUSCULAR; INTRAVENOUS EVERY 6 HOURS PRN
Status: DISCONTINUED | OUTPATIENT
Start: 2017-07-28 | End: 2017-07-29

## 2017-07-28 RX ORDER — DEXAMETHASONE SODIUM PHOSPHATE 4 MG/ML
INJECTION, SOLUTION INTRA-ARTICULAR; INTRALESIONAL; INTRAMUSCULAR; INTRAVENOUS; SOFT TISSUE
Status: DISCONTINUED | OUTPATIENT
Start: 2017-07-28 | End: 2017-07-28

## 2017-07-28 RX ORDER — INSULIN ASPART 100 [IU]/ML
0-5 INJECTION, SOLUTION INTRAVENOUS; SUBCUTANEOUS EVERY 6 HOURS PRN
Status: DISCONTINUED | OUTPATIENT
Start: 2017-07-28 | End: 2017-07-29 | Stop reason: HOSPADM

## 2017-07-28 RX ORDER — SODIUM CHLORIDE 9 MG/ML
INJECTION, SOLUTION INTRAVENOUS CONTINUOUS
Status: DISCONTINUED | OUTPATIENT
Start: 2017-07-28 | End: 2017-07-29 | Stop reason: HOSPADM

## 2017-07-28 RX ORDER — SODIUM CITRATE AND CITRIC ACID MONOHYDRATE 334; 500 MG/5ML; MG/5ML
15 SOLUTION ORAL ONCE
Status: COMPLETED | OUTPATIENT
Start: 2017-07-28 | End: 2017-07-28

## 2017-07-28 RX ORDER — ROCURONIUM BROMIDE 10 MG/ML
INJECTION, SOLUTION INTRAVENOUS
Status: DISCONTINUED | OUTPATIENT
Start: 2017-07-28 | End: 2017-07-28

## 2017-07-28 RX ORDER — LIDOCAINE HCL/PF 100 MG/5ML
SYRINGE (ML) INTRAVENOUS
Status: DISCONTINUED | OUTPATIENT
Start: 2017-07-28 | End: 2017-07-28

## 2017-07-28 RX ORDER — GLUCAGON 1 MG
1 KIT INJECTION
Status: DISCONTINUED | OUTPATIENT
Start: 2017-07-28 | End: 2017-07-29 | Stop reason: HOSPADM

## 2017-07-28 RX ORDER — ACETAMINOPHEN 10 MG/ML
INJECTION, SOLUTION INTRAVENOUS
Status: DISCONTINUED | OUTPATIENT
Start: 2017-07-28 | End: 2017-07-28

## 2017-07-28 RX ORDER — PROPOFOL 10 MG/ML
VIAL (ML) INTRAVENOUS
Status: DISCONTINUED | OUTPATIENT
Start: 2017-07-28 | End: 2017-07-28

## 2017-07-28 RX ORDER — FENTANYL CITRATE 50 UG/ML
INJECTION, SOLUTION INTRAMUSCULAR; INTRAVENOUS
Status: DISCONTINUED | OUTPATIENT
Start: 2017-07-28 | End: 2017-07-28

## 2017-07-28 RX ORDER — HYDROCODONE BITARTRATE AND ACETAMINOPHEN 7.5; 325 MG/15ML; MG/15ML
15 SOLUTION ORAL EVERY 4 HOURS PRN
Status: DISCONTINUED | OUTPATIENT
Start: 2017-07-29 | End: 2017-07-29 | Stop reason: HOSPADM

## 2017-07-28 RX ORDER — FAMOTIDINE 10 MG/ML
20 INJECTION INTRAVENOUS ONCE
Status: COMPLETED | OUTPATIENT
Start: 2017-07-28 | End: 2017-07-28

## 2017-07-28 RX ORDER — PANTOPRAZOLE SODIUM 40 MG/10ML
40 INJECTION, POWDER, LYOPHILIZED, FOR SOLUTION INTRAVENOUS 2 TIMES DAILY
Status: DISCONTINUED | OUTPATIENT
Start: 2017-07-28 | End: 2017-07-29 | Stop reason: HOSPADM

## 2017-07-28 RX ORDER — NEOSTIGMINE METHYLSULFATE 1 MG/ML
INJECTION, SOLUTION INTRAVENOUS
Status: DISCONTINUED | OUTPATIENT
Start: 2017-07-28 | End: 2017-07-28

## 2017-07-28 RX ORDER — HYDROMORPHONE HCL IN 0.9% NACL 6 MG/30 ML
PATIENT CONTROLLED ANALGESIA SYRINGE INTRAVENOUS
Status: COMPLETED
Start: 2017-07-28 | End: 2017-07-28

## 2017-07-28 RX ORDER — MIDAZOLAM HYDROCHLORIDE 1 MG/ML
INJECTION, SOLUTION INTRAMUSCULAR; INTRAVENOUS
Status: DISCONTINUED | OUTPATIENT
Start: 2017-07-28 | End: 2017-07-28

## 2017-07-28 RX ORDER — CEFAZOLIN SODIUM 1 G/3ML
INJECTION, POWDER, FOR SOLUTION INTRAMUSCULAR; INTRAVENOUS
Status: DISCONTINUED | OUTPATIENT
Start: 2017-07-28 | End: 2017-07-28

## 2017-07-28 RX ADMIN — FENTANYL CITRATE 150 MCG: 50 INJECTION, SOLUTION INTRAMUSCULAR; INTRAVENOUS at 07:07

## 2017-07-28 RX ADMIN — PANTOPRAZOLE SODIUM 40 MG: 40 INJECTION, POWDER, FOR SOLUTION INTRAVENOUS at 09:07

## 2017-07-28 RX ADMIN — GLYCOPYRROLATE 0.6 MG: 0.2 INJECTION, SOLUTION INTRAMUSCULAR; INTRAVENOUS at 09:07

## 2017-07-28 RX ADMIN — ONDANSETRON 4 MG: 2 INJECTION INTRAMUSCULAR; INTRAVENOUS at 10:07

## 2017-07-28 RX ADMIN — ROCURONIUM BROMIDE 20 MG: 10 INJECTION, SOLUTION INTRAVENOUS at 08:07

## 2017-07-28 RX ADMIN — Medication: at 10:07

## 2017-07-28 RX ADMIN — DEXAMETHASONE SODIUM PHOSPHATE 8 MG: 4 INJECTION, SOLUTION INTRAMUSCULAR; INTRAVENOUS at 07:07

## 2017-07-28 RX ADMIN — SODIUM CITRATE AND CITRIC ACID MONOHYDRATE 15 ML: 500; 334 SOLUTION ORAL at 06:07

## 2017-07-28 RX ADMIN — KETAMINE HYDROCHLORIDE 30 MG: 100 INJECTION, SOLUTION, CONCENTRATE INTRAMUSCULAR; INTRAVENOUS at 08:07

## 2017-07-28 RX ADMIN — ACETAMINOPHEN 1000 MG: 10 INJECTION, SOLUTION INTRAVENOUS at 08:07

## 2017-07-28 RX ADMIN — LIDOCAINE HYDROCHLORIDE 75 MG: 20 INJECTION, SOLUTION INTRAVENOUS at 07:07

## 2017-07-28 RX ADMIN — ONDANSETRON 4 MG: 2 INJECTION INTRAMUSCULAR; INTRAVENOUS at 09:07

## 2017-07-28 RX ADMIN — HEPARIN SODIUM 5000 UNITS: 5000 INJECTION, SOLUTION INTRAVENOUS; SUBCUTANEOUS at 06:07

## 2017-07-28 RX ADMIN — MIDAZOLAM HYDROCHLORIDE 2 MG: 1 INJECTION, SOLUTION INTRAMUSCULAR; INTRAVENOUS at 07:07

## 2017-07-28 RX ADMIN — PROMETHAZINE HYDROCHLORIDE 6.25 MG: 25 INJECTION INTRAMUSCULAR; INTRAVENOUS at 11:07

## 2017-07-28 RX ADMIN — SUCCINYLCHOLINE CHLORIDE 100 MG: 20 INJECTION, SOLUTION INTRAMUSCULAR; INTRAVENOUS at 07:07

## 2017-07-28 RX ADMIN — FAMOTIDINE 20 MG: 10 INJECTION, SOLUTION INTRAVENOUS at 06:07

## 2017-07-28 RX ADMIN — CEFAZOLIN 1 G: 1 INJECTION, POWDER, FOR SOLUTION INTRAVENOUS at 07:07

## 2017-07-28 RX ADMIN — SODIUM CHLORIDE: 0.9 INJECTION, SOLUTION INTRAVENOUS at 07:07

## 2017-07-28 RX ADMIN — METOCLOPRAMIDE 10 MG: 5 INJECTION, SOLUTION INTRAMUSCULAR; INTRAVENOUS at 06:07

## 2017-07-28 RX ADMIN — FENTANYL CITRATE 50 MCG: 50 INJECTION, SOLUTION INTRAMUSCULAR; INTRAVENOUS at 08:07

## 2017-07-28 RX ADMIN — PROPOFOL 150 MG: 10 INJECTION, EMULSION INTRAVENOUS at 07:07

## 2017-07-28 RX ADMIN — SODIUM CHLORIDE: 0.9 INJECTION, SOLUTION INTRAVENOUS at 10:07

## 2017-07-28 RX ADMIN — ACETAMINOPHEN 1000 MG: 10 INJECTION, SOLUTION INTRAVENOUS at 09:07

## 2017-07-28 RX ADMIN — NEOSTIGMINE METHYLSULFATE 5 MG: 1 INJECTION INTRAVENOUS at 09:07

## 2017-07-28 RX ADMIN — SODIUM CHLORIDE, SODIUM GLUCONATE, SODIUM ACETATE, POTASSIUM CHLORIDE, MAGNESIUM CHLORIDE, SODIUM PHOSPHATE, DIBASIC, AND POTASSIUM PHOSPHATE: .53; .5; .37; .037; .03; .012; .00082 INJECTION, SOLUTION INTRAVENOUS at 08:07

## 2017-07-28 RX ADMIN — ESMOLOL HYDROCHLORIDE 10 MG: 10 INJECTION INTRAVENOUS at 09:07

## 2017-07-28 RX ADMIN — PROPOFOL 50 MG: 10 INJECTION, EMULSION INTRAVENOUS at 08:07

## 2017-07-28 RX ADMIN — ENOXAPARIN SODIUM 40 MG: 100 INJECTION SUBCUTANEOUS at 04:07

## 2017-07-28 RX ADMIN — ACETAMINOPHEN 1000 MG: 10 INJECTION, SOLUTION INTRAVENOUS at 04:07

## 2017-07-28 RX ADMIN — ONDANSETRON 4 MG: 2 INJECTION INTRAMUSCULAR; INTRAVENOUS at 04:07

## 2017-07-28 RX ADMIN — ROCURONIUM BROMIDE 10 MG: 10 INJECTION, SOLUTION INTRAVENOUS at 07:07

## 2017-07-28 NOTE — OP NOTE
DATE OF PROCEDURE: 7/28/2017    PRE OP DIAGNOSIS: Celiac disease [K90.0]  Diabetes mellitus type 2 in obese [E11.9, E66.9]  BMI 37.0-37.9, adult [Z68.37]  Non morbid obesity due to excess calories [E66.09]  Sleep apnea, unspecified type [G47.30]    POST OP DIAGNOSIS: Celiac disease [K90.0]  Diabetes mellitus type 2 in obese [E11.9, E66.9]  BMI 37.0-37.9, adult [Z68.37]  Non morbid obesity due to excess calories [E66.09]  Sleep apnea, unspecified type [G47.30]    PROCEDURE: Procedure(s) (LRB):  GASTRECTOMY-SLEEVE-LAPAROSCOPIC-16073 with intraop EGD (N/A)    Surgeon(s) and Role:     * Eduardo Em MD - Resident - Assisting     * Laith Aleman MD - PrimaryANESTHESIA: General.   INDICATIONS: A 42 y.o. with 1. Obesity, Body mass index is 37.05 kg/(m^2). and inability to lose weight.  2. Co-morbidities: type 2 diabetes mellitus, uncomplicated and obstructive sleep apnea  DESCRIPTION OF PROCEDURE: The patient was placed under general anesthesia. The   abdomen was prepped and draped in usual manner. Access to peritoneum was   gained through the umbilicus using Optiview trocar under direct vision.   Pneumoperitoneum to 15 mmHg with CO2 gas was obtained. Four 5 mm trocars were   placed medially, subcostally at the midclavicular and anterior axial lines   bilaterally. A 10 mm trocar was placed 1 handbreadth caudad to the right   midclavicular trocar. The liver retractor was placed. The greater curve was   taken down starting 6 cm from the pylorus going all the way to the base of the   left yung taking all posterior gastric attachments with the Harmonic scalpel. A   42-Citizen of [place] bougie was passed towards the pylorus and the stomach was resected  along the bougie starting 6 cm from the pylorus and coming out just a   little bit at the angle of His. The staple line was oversewn with a running   Quill stitch and Tisseel placed across it. The bougie was removed. Endoscopy was   performed. The sleeve appeared appropriate size  and configuration and there   were no air leaks seen. The air was aspirated from the endoscope and the   endoscope was withdrawn. The liver retractor was removed. The gastrectomy was   removed through the primary trochar site. That incision was then closed with 0 Vicryl   transfascially. The trocars removed under direct vision. Prior to   removing the last trocar, the pneumoperitoneum was allowed to escape. The skin   incisions were infiltrated with Marcaine solution, closed with 4-0 plain catgut,   and reinforced with Mastisol, Steri-Strips, and Band-Aids. The patient   tolerated procedure well and was brought to Recovery Room in stable condition.   Sponge and needle counts were correct at the end of the case.    Blood loss was min, complications are none, consent was obtained and pathology was gastrectomy.

## 2017-07-28 NOTE — NURSING TRANSFER
Nursing Transfer Note      7/28/2017    Transfer 510    Transfer via strecher    Transfer with pca    Transported by pct    Medicines sent: no    Chart send with patient: yes    Notified: spouse    Patient reassessed at: July 27. 2017, 1968

## 2017-07-28 NOTE — PLAN OF CARE
Problem: Patient Care Overview  Goal: Plan of Care Review  Outcome: Ongoing (interventions implemented as appropriate)  Pt AAO x 4 Dx Lap Gastric Sleeve, incision CDI Ambulated to Bathroom, PCA pump, Diabetic, Free from falls, Q1 hour rounding protocol followed, Whiteboard updated.

## 2017-07-28 NOTE — TRANSFER OF CARE
"Anesthesia Transfer of Care Note    Patient: Emilia Coyle    Procedure(s) Performed: Procedure(s) (LRB):  GASTRECTOMY-SLEEVE-LAPAROSCOPIC-45922 with intraop EGD (N/A)    Patient location: PACU    Anesthesia Type: general    Transport from OR: Transported from OR on room air with adequate spontaneous ventilation    Post pain: adequate analgesia    Post assessment: no apparent anesthetic complications    Post vital signs: stable    Level of consciousness: awake and alert    Nausea/Vomiting: no nausea/vomiting    Complications: none    Transfer of care protocol was followed      Last vitals:   Visit Vitals  /72 (BP Location: Right arm, Patient Position: Lying, BP Method: Automatic)   Pulse 75   Temp 37.1 °C (98.7 °F) (Oral)   Resp 16   Ht 5' 5" (1.651 m)   Wt 101.1 kg (222 lb 14.2 oz)   SpO2 97%   Breastfeeding? No   BMI 37.09 kg/m²     "

## 2017-07-28 NOTE — PROGRESS NOTES
Vital signs monitored and recorded, spouse updated with pt progress of care, complain of pain, pca given as prescribe, complain of nausea, prn med given with very good effect

## 2017-07-28 NOTE — PLAN OF CARE
Vital signs stable, pt comfortable, nil nausea and vomitting , nil post op bleeding, criteria met for transfer

## 2017-07-28 NOTE — ANESTHESIA POSTPROCEDURE EVALUATION
"Anesthesia Post Evaluation    Patient: Emilia Coyle    Procedure(s) Performed: Procedure(s) (LRB):  GASTRECTOMY-SLEEVE-LAPAROSCOPIC-27601 with intraop EGD (N/A)    Final Anesthesia Type: general  Patient location during evaluation: PACU  Patient participation: Yes- Able to Participate  Level of consciousness: awake and alert  Post-procedure vital signs: reviewed and stable  Pain management: adequate  Airway patency: patent  PONV status at discharge: No PONV  Anesthetic complications: no      Cardiovascular status: blood pressure returned to baseline  Respiratory status: unassisted  Hydration status: euvolemic  Follow-up not needed.        Visit Vitals  /61 (BP Location: Right arm, Patient Position: Lying, BP Method: Automatic)   Pulse 61   Temp 36.5 °C (97.7 °F)   Resp 11   Ht 5' 5" (1.651 m)   Wt 101.1 kg (222 lb 14.2 oz)   SpO2 99%   Breastfeeding? No   BMI 37.09 kg/m²       Pain/Stella Score: Pain Assessment Performed: Yes (7/28/2017  1:38 PM)  Presence of Pain: non-verbal indicators absent (7/28/2017  1:38 PM)  Pain Rating Prior to Med Admin: 0 (7/28/2017  1:38 PM)  Pain Rating Post Med Admin: 0 (7/28/2017 11:21 AM)  Stella Score: 9 (7/28/2017  1:38 PM)      "

## 2017-07-28 NOTE — ANESTHESIA RELEASE NOTE
Anesthesia Release from PACU note     Patient: Emilia Coyle  Procedure(s) Performed: Procedure(s) (LRB):  GASTRECTOMY-SLEEVE-LAPAROSCOPIC-37117 with intraop EGD (N/A)  Anesthesia type: general  Post pain: Adequate analgesi  Post assessment: no apparent anesthetic complications, tolerated procedure well and no evidence of recall  Last Vitals:   Vitals:    07/28/17 1338   BP:    Pulse: 61   Resp:    Temp:    SpO2:      Post vital signs: stable  Level of consciousness: awake, alert  and oriented  Nausea/Vomiting: no nausea/no vomiting  Complications: none  Airway Patency: patent  Respiratory: unassisted  Cardiovascular: stable and blood pressure at baseline  Hydration: euvolemic

## 2017-07-28 NOTE — BRIEF OP NOTE
Operative Note       Surgery Date: 7/28/2017     Surgeon(s) and Role:     * Eduardo Em MD - Resident - Assisting     * Laith Aleman MD - Primary    Pre-op Diagnosis:  Celiac disease [K90.0]  Diabetes mellitus type 2 in obese [E11.9, E66.9]  BMI 37.0-37.9, adult [Z68.37]  Non morbid obesity due to excess calories [E66.09]  Sleep apnea, unspecified type [G47.30]    Post-op Diagnosis:  Celiac disease [K90.0]  Diabetes mellitus type 2 in obese [E11.9, E66.9]  BMI 37.0-37.9, adult [Z68.37]  Non morbid obesity due to excess calories [E66.09]  Sleep apnea, unspecified type [G47.30]    Procedure(s) (LRB):  GASTRECTOMY-SLEEVE-LAPAROSCOPIC-54285 with intraop EGD (N/A)    Anesthesia: General    Procedure in Detail/Findings:  Sleeve without apparent complication    Estimated Blood Loss: Minimal           Specimens     Start     Ordered    07/28/17 0922  Specimen to Pathology - Surgery  Once      07/28/17 0926        Implants: * No implants in log *           Disposition: PACU - hemodynamically stable.           Condition: Good    Attestation:  I was present and scrubbed for the entire procedure.

## 2017-07-28 NOTE — INTERVAL H&P NOTE
The patient has been examined and the H&P has been reviewed:    I concur with the findings and no changes have occurred since H&P was written.    Anesthesia/Surgery risks, benefits and alternative options discussed and understood by patient/family.          Active Hospital Problems    Diagnosis  POA    Obesity (BMI 30-39.9) [E66.9]  Yes      Resolved Hospital Problems    Diagnosis Date Resolved POA   No resolved problems to display.

## 2017-07-29 VITALS
DIASTOLIC BLOOD PRESSURE: 58 MMHG | HEART RATE: 71 BPM | HEIGHT: 65 IN | WEIGHT: 222.88 LBS | OXYGEN SATURATION: 97 % | SYSTOLIC BLOOD PRESSURE: 121 MMHG | RESPIRATION RATE: 18 BRPM | TEMPERATURE: 98 F | BODY MASS INDEX: 37.13 KG/M2

## 2017-07-29 LAB
ANION GAP SERPL CALC-SCNC: 8 MMOL/L
BASOPHILS # BLD AUTO: 0.02 K/UL
BASOPHILS # BLD AUTO: 0.03 K/UL
BASOPHILS NFR BLD: 0.2 %
BASOPHILS NFR BLD: 0.3 %
BUN SERPL-MCNC: 9 MG/DL
CALCIUM SERPL-MCNC: 8.1 MG/DL
CHLORIDE SERPL-SCNC: 109 MMOL/L
CO2 SERPL-SCNC: 22 MMOL/L
CREAT SERPL-MCNC: 0.7 MG/DL
DIFFERENTIAL METHOD: ABNORMAL
DIFFERENTIAL METHOD: ABNORMAL
EOSINOPHIL # BLD AUTO: 0 K/UL
EOSINOPHIL # BLD AUTO: 0 K/UL
EOSINOPHIL NFR BLD: 0.1 %
EOSINOPHIL NFR BLD: 0.3 %
ERYTHROCYTE [DISTWIDTH] IN BLOOD BY AUTOMATED COUNT: 13.8 %
ERYTHROCYTE [DISTWIDTH] IN BLOOD BY AUTOMATED COUNT: 13.8 %
EST. GFR  (AFRICAN AMERICAN): >60 ML/MIN/1.73 M^2
EST. GFR  (NON AFRICAN AMERICAN): >60 ML/MIN/1.73 M^2
GLUCOSE SERPL-MCNC: 81 MG/DL
HCT VFR BLD AUTO: 31.8 %
HCT VFR BLD AUTO: 31.9 %
HGB BLD-MCNC: 10.4 G/DL
HGB BLD-MCNC: 10.5 G/DL
LYMPHOCYTES # BLD AUTO: 1.8 K/UL
LYMPHOCYTES # BLD AUTO: 2.1 K/UL
LYMPHOCYTES NFR BLD: 15.2 %
LYMPHOCYTES NFR BLD: 17.8 %
MAGNESIUM SERPL-MCNC: 2.1 MG/DL
MCH RBC QN AUTO: 27.6 PG
MCH RBC QN AUTO: 27.9 PG
MCHC RBC AUTO-ENTMCNC: 32.6 G/DL
MCHC RBC AUTO-ENTMCNC: 33 G/DL
MCV RBC AUTO: 84 FL
MCV RBC AUTO: 86 FL
MONOCYTES # BLD AUTO: 0.6 K/UL
MONOCYTES # BLD AUTO: 0.7 K/UL
MONOCYTES NFR BLD: 5.5 %
MONOCYTES NFR BLD: 5.5 %
NEUTROPHILS # BLD AUTO: 9 K/UL
NEUTROPHILS # BLD AUTO: 9.2 K/UL
NEUTROPHILS NFR BLD: 75.9 %
NEUTROPHILS NFR BLD: 78.6 %
PHOSPHATE SERPL-MCNC: 2.4 MG/DL
PLATELET # BLD AUTO: 293 K/UL
PLATELET # BLD AUTO: 296 K/UL
PMV BLD AUTO: 10 FL
PMV BLD AUTO: 10.1 FL
POCT GLUCOSE: 69 MG/DL (ref 70–110)
POCT GLUCOSE: 72 MG/DL (ref 70–110)
POCT GLUCOSE: 77 MG/DL (ref 70–110)
POTASSIUM SERPL-SCNC: 4.3 MMOL/L
RBC # BLD AUTO: 3.73 M/UL
RBC # BLD AUTO: 3.8 M/UL
SODIUM SERPL-SCNC: 139 MMOL/L
WBC # BLD AUTO: 11.7 K/UL
WBC # BLD AUTO: 11.88 K/UL

## 2017-07-29 PROCEDURE — 80048 BASIC METABOLIC PNL TOTAL CA: CPT

## 2017-07-29 PROCEDURE — C9113 INJ PANTOPRAZOLE SODIUM, VIA: HCPCS | Performed by: SURGERY

## 2017-07-29 PROCEDURE — 84100 ASSAY OF PHOSPHORUS: CPT

## 2017-07-29 PROCEDURE — 36415 COLL VENOUS BLD VENIPUNCTURE: CPT

## 2017-07-29 PROCEDURE — 25000003 PHARM REV CODE 250: Performed by: STUDENT IN AN ORGANIZED HEALTH CARE EDUCATION/TRAINING PROGRAM

## 2017-07-29 PROCEDURE — 63600175 PHARM REV CODE 636 W HCPCS: Performed by: SURGERY

## 2017-07-29 PROCEDURE — 85025 COMPLETE CBC W/AUTO DIFF WBC: CPT | Mod: 91

## 2017-07-29 PROCEDURE — 83735 ASSAY OF MAGNESIUM: CPT

## 2017-07-29 PROCEDURE — 25000003 PHARM REV CODE 250: Performed by: SURGERY

## 2017-07-29 RX ORDER — LEVOTHYROXINE SODIUM 100 UG/1
100 TABLET ORAL EVERY MORNING
Status: DISCONTINUED | OUTPATIENT
Start: 2017-07-29 | End: 2017-07-29 | Stop reason: HOSPADM

## 2017-07-29 RX ORDER — ONDANSETRON 8 MG/1
8 TABLET, ORALLY DISINTEGRATING ORAL EVERY 6 HOURS PRN
Status: DISCONTINUED | OUTPATIENT
Start: 2017-07-29 | End: 2017-07-29 | Stop reason: HOSPADM

## 2017-07-29 RX ORDER — PROMETHAZINE HYDROCHLORIDE 25 MG/1
25 SUPPOSITORY RECTAL EVERY 6 HOURS PRN
Status: DISCONTINUED | OUTPATIENT
Start: 2017-07-29 | End: 2017-07-29 | Stop reason: HOSPADM

## 2017-07-29 RX ADMIN — LEVOTHYROXINE SODIUM 100 MCG: 100 TABLET ORAL at 08:07

## 2017-07-29 RX ADMIN — Medication: at 05:07

## 2017-07-29 RX ADMIN — PANTOPRAZOLE SODIUM 40 MG: 40 INJECTION, POWDER, FOR SOLUTION INTRAVENOUS at 08:07

## 2017-07-29 RX ADMIN — HYDROCODONE BITARTRATE AND ACETAMINOPHEN 15 ML: 7.5; 325 SOLUTION ORAL at 08:07

## 2017-07-29 RX ADMIN — SODIUM CHLORIDE, SODIUM LACTATE, POTASSIUM CHLORIDE, AND CALCIUM CHLORIDE 1000 ML: .6; .31; .03; .02 INJECTION, SOLUTION INTRAVENOUS at 08:07

## 2017-07-29 RX ADMIN — ACETAMINOPHEN 1000 MG: 10 INJECTION, SOLUTION INTRAVENOUS at 05:07

## 2017-07-29 RX ADMIN — ONDANSETRON 8 MG: 8 TABLET, ORALLY DISINTEGRATING ORAL at 09:07

## 2017-07-29 NOTE — PROGRESS NOTES
"[facility]  General Surgery  Progress Note    Subjective:     History of Present Illness:  Elective sleeve    Post-Op Info:  Procedure(s) (LRB):  GASTRECTOMY-SLEEVE-LAPAROSCOPIC-53368 with intraop EGD (N/A)   1 Day Post-Op     Interval History: No events overnight.  Pain and nausea well controlled.  BP slightly low this morning.      Medications:  Continuous Infusions:   sodium chloride 0.9% 125 mL/hr at 07/28/17 1008     Scheduled Meds:   enoxparin  40 mg Subcutaneous Q24H    lactated ringers  1,000 mL Intravenous Once    levothyroxine  100 mcg Oral QAM    pantoprazole  40 mg Intravenous BID     PRN Meds:dextrose 50%, glucagon (human recombinant), hydrocodone-apap 7.5-325 MG/15 ML, insulin aspart, ondansetron, promethazine     Review of patient's allergies indicates:   Allergen Reactions    Gluten protein Hives, Diarrhea, Itching, Nausea And Vomiting, Swelling, Anxiety and Dermatitis    Latex, natural rubber Dermatitis     Latex gloves with Powder    Rizatriptan Photosensitivity, Nausea Only and Other (See Comments)     "Feels like my brain is on fire."    Sumatriptan Photosensitivity, Nausea Only and Other (See Comments)     "Feels like my brain is on fire."    Zolmitriptan Photosensitivity, Nausea Only and Other (See Comments)     "Feels like my brain is on fire."     Objective:     Vital Signs (Most Recent):  Temp: 96.8 °F (36 °C) (07/29/17 0801)  Pulse: 65 (07/29/17 0801)  Resp: 16 (07/29/17 0801)  BP: (!) 95/52 (07/29/17 0801)  SpO2: 97 % (07/29/17 0801) Vital Signs (24h Range):  Temp:  [96.8 °F (36 °C)-98.3 °F (36.8 °C)] 96.8 °F (36 °C)  Pulse:  [57-73] 65  Resp:  [11-20] 16  SpO2:  [92 %-99 %] 97 %  BP: ()/(50-71) 95/52     Weight: 101.1 kg (222 lb 14.2 oz)  Body mass index is 37.09 kg/m².    Intake/Output - Last 3 Shifts       07/27 0700 - 07/28 0659 07/28 0700 - 07/29 0659 07/29 0700 - 07/30 0659    P.O.  0     I.V. (mL/kg)  1392 (13.8)     Total Intake(mL/kg)  1392 " (13.8)     Net   +1392             Urine Occurrence  2 x     Stool Occurrence  0 x     Emesis Occurrence  0 x           Physical Exam   Gen: NAD  CV: RRR  Pulm: Unlabored  GI: Soft, NT, ND.  Incisions CDI    Significant Labs:  CBC:   Recent Labs  Lab 07/29/17  0437   WBC 11.70   RBC 3.80*   HGB 10.5*   HCT 31.8*      MCV 84   MCH 27.6   MCHC 33.0     BMP:   Recent Labs  Lab 07/29/17  0437   GLU 81      K 4.3      CO2 22*   BUN 9   CREATININE 0.7   CALCIUM 8.1*   MG 2.1         Assessment/Plan:     Obesity (BMI 30-39.9)    POD1 sleeve    Doing well  Start water protocol  Bariatric clears  PO pain/ nausea  OOB  DVT proph  IV bolus now  Repeat CBC at noon    If labs stable, vitals improve, tolerating adequate PO, pain/nausea controlled- ok for DC later today vs tomorrow            Yovany Mejias MD  General Surgery  [facility]

## 2017-07-29 NOTE — NURSING
Pt ready for D/C home. AAox4. VSS. MD on call notified of Po intake, vital signs, CBC result--states may D/c Pt home. Pt already has RX. D/C instructions given -- understanding verbalized.

## 2017-07-29 NOTE — SUBJECTIVE & OBJECTIVE
"Interval History: No events overnight.  Pain and nausea well controlled.  BP slightly low this morning.      Medications:  Continuous Infusions:   sodium chloride 0.9% 125 mL/hr at 07/28/17 1008     Scheduled Meds:   enoxparin  40 mg Subcutaneous Q24H    lactated ringers  1,000 mL Intravenous Once    levothyroxine  100 mcg Oral QAM    pantoprazole  40 mg Intravenous BID     PRN Meds:dextrose 50%, glucagon (human recombinant), hydrocodone-apap 7.5-325 MG/15 ML, insulin aspart, ondansetron, promethazine     Review of patient's allergies indicates:   Allergen Reactions    Gluten protein Hives, Diarrhea, Itching, Nausea And Vomiting, Swelling, Anxiety and Dermatitis    Latex, natural rubber Dermatitis     Latex gloves with Powder    Rizatriptan Photosensitivity, Nausea Only and Other (See Comments)     "Feels like my brain is on fire."    Sumatriptan Photosensitivity, Nausea Only and Other (See Comments)     "Feels like my brain is on fire."    Zolmitriptan Photosensitivity, Nausea Only and Other (See Comments)     "Feels like my brain is on fire."     Objective:     Vital Signs (Most Recent):  Temp: 96.8 °F (36 °C) (07/29/17 0801)  Pulse: 65 (07/29/17 0801)  Resp: 16 (07/29/17 0801)  BP: (!) 95/52 (07/29/17 0801)  SpO2: 97 % (07/29/17 0801) Vital Signs (24h Range):  Temp:  [96.8 °F (36 °C)-98.3 °F (36.8 °C)] 96.8 °F (36 °C)  Pulse:  [57-73] 65  Resp:  [11-20] 16  SpO2:  [92 %-99 %] 97 %  BP: ()/(50-71) 95/52     Weight: 101.1 kg (222 lb 14.2 oz)  Body mass index is 37.09 kg/m².    Intake/Output - Last 3 Shifts       07/27 0700 - 07/28 0659 07/28 0700 - 07/29 0659 07/29 0700 - 07/30 0659    P.O.  0     I.V. (mL/kg)  1392 (13.8)     Total Intake(mL/kg)  1392 (13.8)     Net   +1392             Urine Occurrence  2 x     Stool Occurrence  0 x     Emesis Occurrence  0 x           Physical Exam   Gen: NAD  CV: RRR  Pulm: Unlabored  GI: Soft, NT, ND.  Incisions CDI    Significant Labs:  CBC:   Recent Labs  Lab " 07/29/17 0437   WBC 11.70   RBC 3.80*   HGB 10.5*   HCT 31.8*      MCV 84   MCH 27.6   MCHC 33.0     BMP:   Recent Labs  Lab 07/29/17 0437   GLU 81      K 4.3      CO2 22*   BUN 9   CREATININE 0.7   CALCIUM 8.1*   MG 2.1

## 2017-07-29 NOTE — ASSESSMENT & PLAN NOTE
POD1 sleeve    Doing well  Start water protocol  Bariatric clears  PO pain/ nausea  OOB  DVT proph  IV bolus now  Repeat CBC at noon    If labs stable, vitals improve, tolerating adequate PO, pain/nausea controlled- ok for DC later today vs tomorrow

## 2017-07-31 ENCOUNTER — PATIENT MESSAGE (OUTPATIENT)
Dept: BARIATRICS | Facility: CLINIC | Age: 42
End: 2017-07-31

## 2017-07-31 NOTE — ANESTHESIA POSTPROCEDURE EVALUATION
"Anesthesia Post Evaluation    Patient: Emilia Coyle    Procedure(s) Performed: Procedure(s) (LRB):  GASTRECTOMY-SLEEVE-LAPAROSCOPIC-73924 with intraop EGD (N/A)    Final Anesthesia Type: general  Patient location during evaluation: PACU  Patient participation: Yes- Able to Participate  Level of consciousness: awake and alert  Post-procedure vital signs: reviewed and stable  Pain management: adequate  Airway patency: patent  PONV status at discharge: No PONV  Anesthetic complications: no      Cardiovascular status: blood pressure returned to baseline  Respiratory status: unassisted  Hydration status: euvolemic  Follow-up not needed.        Visit Vitals  BP (!) 121/58 (BP Location: Right arm, Patient Position: Sitting, BP Method: Automatic)   Pulse 71   Temp 36.8 °C (98.3 °F)   Resp 18   Ht 5' 5" (1.651 m)   Wt 101.1 kg (222 lb 14.2 oz)   SpO2 97%   Breastfeeding? No   BMI 37.09 kg/m²       Pain/Stella Score: No Data Recorded      "

## 2017-07-31 NOTE — TELEPHONE ENCOUNTER
Spoke with patient.  Instructed her to either take children's Tylenol or Extra Strength Tylenol liquid.  Continue Prilosec OTC since unable to get prescription Prilosec. Patient getting sufficient fluids-  denies issues dehydration.  Last BM prior to surgery.   Pt is taking Glycolax 1 capful- increasing to 2 capfuls daily and will start emema or Suppository if no BM by Wednesday.   Patient doing Lift drinks and will attempt powder with water and almond milk tomorrow.  Patient to call if any further issues or questions

## 2017-07-31 NOTE — ANESTHESIA RELEASE NOTE
Anesthesia Release from PACU note     Patient: Emilia Coyle  Procedure(s) Performed: Procedure(s) (LRB):  GASTRECTOMY-SLEEVE-LAPAROSCOPIC-68829 with intraop EGD (N/A)  Anesthesia type: general  Post pain: Adequate analgesia  Post assessment: no apparent anesthetic complications, tolerated procedure well and no evidence of recall  Last Vitals:   Vitals:    07/29/17 1448   BP: (!) 121/58   Pulse: 71   Resp: 18   Temp: 36.8 °C (98.3 °F)   SpO2:      Post vital signs: stable  Level of consciousness: awake, alert  and oriented  Nausea/Vomiting: no nausea/no vomiting  Complications: none  Airway Patency: patent  Respiratory: unassisted  Cardiovascular: stable and blood pressure at baseline  Hydration: euvolemic

## 2017-08-01 ENCOUNTER — TELEPHONE (OUTPATIENT)
Dept: BARIATRICS | Facility: CLINIC | Age: 42
End: 2017-08-01

## 2017-08-01 ENCOUNTER — PATIENT OUTREACH (OUTPATIENT)
Dept: ADMINISTRATIVE | Facility: CLINIC | Age: 42
End: 2017-08-01

## 2017-08-01 RX ORDER — ACETAMINOPHEN 160 MG/5ML
10 SUSPENSION ORAL DAILY PRN
COMMUNITY
End: 2018-05-09

## 2017-08-01 NOTE — DISCHARGE SUMMARY
[facility]  Discharge Summary  General Surgery      Admit Date: 7/28/2017    Discharge Date and Time: 7/29/2017  4:35 PM    Attending Physician: No att. providers found     Discharge Provider: Laith Aleman    Reason for Admission: Celiac disease [K90.0]  Diabetes mellitus type 2 in obese [E11.9, E66.9]  BMI 37.0-37.9, adult [Z68.37]  Non morbid obesity due to excess calories [E66.09]  Sleep apnea, unspecified type [G47.30]    Procedures Performed: Procedure(s) (LRB):  GASTRECTOMY-SLEEVE-LAPAROSCOPIC-58277 with intraop EGD (N/A)    Hospital Course (synopsis of major diagnoses, care, treatment, and services provided during the course of the hospital stay): She had an uneventful postop stay     Consults: none    Significant Diagnostic Studies: none    Final Diagnoses:   Principal Problem: Obesity (BMI 30-39.9)   Secondary Diagnoses:   Active Hospital Problems    Diagnosis  POA    *Obesity (BMI 30-39.9) [E66.9]  Yes      Resolved Hospital Problems    Diagnosis Date Resolved POA   No resolved problems to display.       Discharged Condition: good    Disposition: Home or Self Care    Follow Up/Patient Instructions:     Medications:  Reconciled Home Medications: Discharge Medication List as of 7/29/2017  3:36 PM      CONTINUE these medications which have NOT CHANGED    Details   albuterol 90 mcg/actuation inhaler Inhale 2 puffs into the lungs every 6 (six) hours as needed for Wheezing. Rescue, Starting 3/29/2017, Until Thu 3/29/18, Normal      b complex vitamins tablet Take 1 tablet by mouth every morning., Historical Med      butalbital-aspirin-caffeine -40 mg (FIORINAL) -40 mg Cap Take 1 capsule by mouth 2 (two) times daily as needed., Starting 12/28/2016, Until Discontinued, Historical Med      CALCIUM CARBONATE/VITAMIN D3 (CALCIUM 600 + D,3, ORAL) Take 1 tablet by mouth every morning. , Historical Med      clonazePAM (KLONOPIN) 1 MG tablet Take 1 tablet (1 mg total) by mouth 2 (two) times  daily as needed., Starting Wed 7/5/2017, Print      cyanocobalamin (VITAMIN B-12) 1000 MCG tablet Take 100 mcg by mouth every morning., Historical Med      estradiol (ESTRACE) 1 MG tablet Take 1 mg by mouth daily with dinner or evening meal. , Starting Mon 3/6/2017, Historical Med      ferrous sulfate 324 mg (65 mg iron) TbEC Take 325 mg by mouth every morning. , Historical Med      hydrocodone-acetaminophen (HYCET) solution 7.5-325 mg/15mL Take 15 mLs by mouth 4 (four) times daily as needed., Starting Tue 7/18/2017, Normal      levothyroxine (SYNTHROID) 100 MCG tablet Take 100 mcg by mouth every morning. , Starting Mon 3/6/2017, Historical Med      multivitamin (ONE DAILY MULTIVITAMIN) per tablet Take 1 tablet by mouth 2 (two) times daily. , Historical Med      omeprazole (PRILOSEC) 20 MG capsule Take 1 capsule (20 mg total) by mouth 2 (two) times daily. Open one capsule twice daily and add to yogurt or applesauce, Starting Thu 7/13/2017, Normal      ondansetron (ZOFRAN-ODT) 8 MG TbDL Take 1 tablet (8 mg total) by mouth every 6 (six) hours as needed., Starting Thu 7/13/2017, Normal      polyethylene glycol (GLYCOLAX) 17 gram/dose powder Take 17 g by mouth once daily., Starting Thu 7/13/2017, Normal      promethazine (PHENERGAN) 25 MG suppository 1 rectally every 6 hours prn.  Okay to change to liquid or pills., Normal      promethazine-codeine 6.25-10 mg/5 ml (PHENERGAN WITH CODEINE) 6.25-10 mg/5 mL syrup TK 5 TO 10 ML PO Q 6 TO 8 H PRF COUGH AND CONGESTION, Historical Med      ursodiol (CORTES FORTE) 500 MG tablet Crush one tablet daily for gall bladder. Please compound into liquid and supply for 90 days if insurance approves, Normal         STOP taking these medications       furosemide (LASIX) 20 MG tablet Comments:   Reason for Stopping:         metformin (GLUCOPHAGE-XR) 500 MG 24 hr tablet Comments:   Reason for Stopping:               Discharge Procedure Orders  Diet general   Order Comments: Liquids as  instructed.  Please crush all meds and open all capsules.     Lifting restrictions   Order Comments: No more than 10 lbs.  Ok to shower tomorrow.  No soaking     Call MD for:  increased confusion or weakness     Call MD for:  persistent dizziness, light-headedness, or visual disturbances     Call MD for:  worsening rash     Call MD for:  severe persistent headache     Call MD for:  difficulty breathing or increased cough     Call MD for:  redness, tenderness, or signs of infection (pain, swelling, redness, odor or green/yellow discharge around incision site)     Call MD for:  severe uncontrolled pain     Call MD for:  persistent nausea and vomiting or diarrhea     Call MD for:  temperature >100.4     No dressing needed   Order Comments: Leave steri strips on for 2 weeks       Follow-up Information     Laith Aleman MD. Schedule an appointment as soon as possible for a visit in 2 weeks.    Specialties:  General Surgery, Bariatrics  Contact information:  [address]  [facility] [address]  [phone]

## 2017-08-01 NOTE — PATIENT INSTRUCTIONS
[facility] Discharge Instructions    Please follow these instructions from the Department of Bariatric Surgery  Dr. Laith Schilling    Medications  Specific, written instructions about your medications will be given to you by the nurse. The following general halie usually apply:    Most medications you were taken before surgery can and should be resumed at the same dose and schedule. Speak with your primary care doctor or the physician who prescribed your medication if you have any concerns. You should NOT take aspirin, Motrin, Ibuprofen, Advil, Celebrex or other pain medications in this group (medically known as non-steroidal anti-inflammatory drugs or NSAIDs). Tylenol is OK.    All of your medications must be taken in a liquid or chewable form. Pulls MUST BE CRUSHED until instructed differently by your surgeon. Make sure you know if your medications can be crushed and discuss this with the doctor who prescribed them.     Reminder: [facility] is located on the 1st floor in the atrium near the coffee shop.    You will receive a prescription for a liquid pain medication (usually Hycet elixir) upon discharge. Be sure you have your prescription for pain prior to going home. If not, speak to the nurse who is taking care of you.        You will receive a prescription for an antacid (Omeprazole) to take each day to prevent ulcers. PLEASE TAKE AS PRESCRIBED AND NOT AS NEEDED. This is a medication that you need to take. Since you are unable to swallow pills, pull it apart, mix its contents with a tablespoon of apple sauce or 2-3 tablespoons of your protein shake, and then swallow it. If your insurance does not cover this medication, contact our office for an alternative as soon as possible. If it's after hours or on the weekend you may reach out to the Surgical Resident on Call at [phone].     You will also receive medications for nausea. Zofran is a  dissolving tablet and Phenergan is a suppository. You can take either medication but do not take both at the same time.    You should begin taking a multivitamin of your choice immediately upon discharge.    You should begin taking 1,000-1,200 mg of Calcium Citrate every day for the rest of your life.    You should begin Vitamin B12 500 mcg every day. You can get this in a form to place under your tongue or request a monthly injection from your surgeon. Refer to your nutrition booklet for vitamin and mineral information.        If you still have your gallbladder, you will receive a prescription for Clay Forte on your first post-operative visit. This is a bile thinning medicine to help reduce your chances of forming gallstones during the most rapid period of your weight loss. You are instructed to take this medicine for six months. IT MUST BE CRUSHED.     Diet   Refer to your nutritional booklet for a complete description of what you are allowed to eat. Remember you will be resuming your protein liquid diet for another 2 weeks after surgery. Here are a few points should always be kept in mind:       You should drink zero-calorie fluids constantly. But remember the 30/30 rule: no drinking liquids 30 minutes after a meal or during a meal.     It is normal not to have much of an appetite for 2-4 weeks following weight loss surgery. If you force food too keep your energy up you will make yourself feel sick, delay your recovery, and impair weight loss.     This is the time to begin learning to listen to your stomach. Eat small amounts only when your new little stomach pouch tells you it is ready. If you go for a whole day or two without eating food, its OK--just be certain you are drinking plenty of fluids.     Activity     Activity   Walking is highly encouraged. You should walk as far as you can at least once each day, and twice is even better. This means you should get out of the house and walk around the  neighborhood. Going up and down stairs will not impair healing so it is OK as long as youre steady on your feet.     You should not lift anything greater than 10 lbs. for 6 weeks after your surgery.     Showers are OK.     Riding in a car is OK, but DO NOT drive until you have stopped taking all pain medications. If you go on a long drive, be sure to frequently stop and walk around to prevent developing blood clots.     You cannot travel by plane for 4-6 weeks after your surgery.     Wound Care       Your wound(s) should heal nicely without special care. It is OK for the incision(s) to get wet in the shower, but dont soak (tub bath, swimming pool, hot tub, etc) until your surgeon says it is OK.     Your incisions will be covered with steri-strips, which you should leave on until they fall off.     For women whose incision extends underneath the usual spot for their bra straps, put a large Band-Aid (the patch style) on the upper end of the incision. The Band-Aid will pad the incision and let the strap slide more easily in this area.     Be on the lookout for signs of an infection in the wound. If you have redness, increasing pain, a thick white or yellow substance leaking from the wound or have a fever, call our office right away.     Some patients develop a red-orange drainage from the incision within the first two weeks at home. In most cases, this is not serious. Call if you have any questions or concerns.     Follow-Up     Your surgeon would like to see you in the office for a general checkup 2 weeks after you have gone home. Refer to the follow up schedule you received and keep your follow up appointments. It is important that we maintain a relationship with you for life. We want to ensure you remain healthy and stay on track so your weight loss journey is a success.   Call our office at [phone] about any of the following problems:     Any obvious bleeding (some dried blood is normal)   · Fever of 101º F  or greater   · Chills   · Worsening or unrelieved pain   · Increasing or pus-like drainage from your incision   · Redness, swelling, or increasing tenderness for the incision   · Inability to keep down liquids   · Shortness of breath   · Chest pain   · Increased heart rate or palpitations of the heart     Should you go to the emergency department during your recovery, tell them you had a bariatric procedure and to notify your surgeon. This is very important.   For more information, call [facility]s [facility] at [phone].     Item: [id number]   Revised: 04/2016     © 2015 [facility] ([email or web address]) is a non-profit, academic, multi-specialty, healthcare delivery system dedicated to patient care, research and education.

## 2017-08-01 NOTE — TELEPHONE ENCOUNTER
Calling patient as the [employer] sent a note she wants a return to work letter written.  Wants to return to work on Thursday, 8/ 3/ 17. Letter written and sent to patient.

## 2017-08-03 ENCOUNTER — PATIENT MESSAGE (OUTPATIENT)
Dept: BARIATRICS | Facility: CLINIC | Age: 42
End: 2017-08-03

## 2017-08-03 RX ORDER — OMEPRAZOLE 20 MG/1
20 CAPSULE, DELAYED RELEASE ORAL 2 TIMES DAILY
Qty: 180 CAPSULE | Refills: 1 | Status: SHIPPED | OUTPATIENT
Start: 2017-08-03 | End: 2018-02-07

## 2017-08-04 ENCOUNTER — TELEPHONE (OUTPATIENT)
Dept: BARIATRICS | Facility: CLINIC | Age: 42
End: 2017-08-04

## 2017-08-04 ENCOUNTER — PATIENT MESSAGE (OUTPATIENT)
Dept: BARIATRICS | Facility: CLINIC | Age: 42
End: 2017-08-04

## 2017-08-04 NOTE — TELEPHONE ENCOUNTER
Returned patient's phone call in reference to 1 week phone call earlier this AM.   Discussed vitamin regimen. Updated phone note from earlier.

## 2017-08-10 ENCOUNTER — CLINICAL SUPPORT (OUTPATIENT)
Dept: BARIATRICS | Facility: CLINIC | Age: 42
End: 2017-08-10
Payer: COMMERCIAL

## 2017-08-10 ENCOUNTER — LAB VISIT (OUTPATIENT)
Dept: LAB | Facility: HOSPITAL | Age: 42
End: 2017-08-10
Attending: SURGERY
Payer: COMMERCIAL

## 2017-08-10 ENCOUNTER — OFFICE VISIT (OUTPATIENT)
Dept: BARIATRICS | Facility: CLINIC | Age: 42
End: 2017-08-10
Payer: COMMERCIAL

## 2017-08-10 VITALS
HEART RATE: 73 BPM | HEIGHT: 65 IN | BODY MASS INDEX: 35.01 KG/M2 | DIASTOLIC BLOOD PRESSURE: 73 MMHG | SYSTOLIC BLOOD PRESSURE: 99 MMHG | WEIGHT: 210.13 LBS

## 2017-08-10 DIAGNOSIS — E66.9 DIABETES MELLITUS TYPE 2 IN OBESE: ICD-10-CM

## 2017-08-10 DIAGNOSIS — K90.0 CELIAC DISEASE: ICD-10-CM

## 2017-08-10 DIAGNOSIS — E66.09 NON MORBID OBESITY DUE TO EXCESS CALORIES: ICD-10-CM

## 2017-08-10 DIAGNOSIS — Z98.84 STATUS POST LAPAROSCOPIC SLEEVE GASTRECTOMY: ICD-10-CM

## 2017-08-10 DIAGNOSIS — Z98.84 S/P LAPAROSCOPIC SLEEVE GASTRECTOMY: ICD-10-CM

## 2017-08-10 DIAGNOSIS — E11.69 DIABETES MELLITUS TYPE 2 IN OBESE: ICD-10-CM

## 2017-08-10 DIAGNOSIS — G47.33 OSA (OBSTRUCTIVE SLEEP APNEA): ICD-10-CM

## 2017-08-10 DIAGNOSIS — E66.9 OBESITY (BMI 30-39.9): Primary | ICD-10-CM

## 2017-08-10 PROBLEM — R06.81 WITNESSED APNEIC SPELLS: Status: RESOLVED | Noted: 2017-03-22 | Resolved: 2017-08-10

## 2017-08-10 PROBLEM — R29.818 SUSPECTED SLEEP APNEA: Status: RESOLVED | Noted: 2017-03-22 | Resolved: 2017-08-10

## 2017-08-10 LAB
ALBUMIN SERPL BCP-MCNC: 3.8 G/DL
ALP SERPL-CCNC: 70 U/L
ALT SERPL W/O P-5'-P-CCNC: 14 U/L
ANION GAP SERPL CALC-SCNC: 14 MMOL/L
AST SERPL-CCNC: 17 U/L
BASOPHILS # BLD AUTO: 0.07 K/UL
BASOPHILS NFR BLD: 1 %
BILIRUB SERPL-MCNC: 0.4 MG/DL
BUN SERPL-MCNC: 12 MG/DL
CALCIUM SERPL-MCNC: 9.4 MG/DL
CHLORIDE SERPL-SCNC: 101 MMOL/L
CO2 SERPL-SCNC: 25 MMOL/L
CREAT SERPL-MCNC: 0.7 MG/DL
DIFFERENTIAL METHOD: ABNORMAL
EOSINOPHIL # BLD AUTO: 0.2 K/UL
EOSINOPHIL NFR BLD: 2.9 %
ERYTHROCYTE [DISTWIDTH] IN BLOOD BY AUTOMATED COUNT: 13.8 %
EST. GFR  (AFRICAN AMERICAN): >60 ML/MIN/1.73 M^2
EST. GFR  (NON AFRICAN AMERICAN): >60 ML/MIN/1.73 M^2
GLUCOSE SERPL-MCNC: 83 MG/DL
HCT VFR BLD AUTO: 36.5 %
HGB BLD-MCNC: 12.4 G/DL
LYMPHOCYTES # BLD AUTO: 1.5 K/UL
LYMPHOCYTES NFR BLD: 21.1 %
MCH RBC QN AUTO: 27.2 PG
MCHC RBC AUTO-ENTMCNC: 34 G/DL
MCV RBC AUTO: 80 FL
MONOCYTES # BLD AUTO: 0.6 K/UL
MONOCYTES NFR BLD: 7.5 %
NEUTROPHILS # BLD AUTO: 4.9 K/UL
NEUTROPHILS NFR BLD: 67.4 %
PLATELET # BLD AUTO: 350 K/UL
PMV BLD AUTO: 10.7 FL
POTASSIUM SERPL-SCNC: 3.8 MMOL/L
PROT SERPL-MCNC: 7.3 G/DL
RBC # BLD AUTO: 4.56 M/UL
SODIUM SERPL-SCNC: 140 MMOL/L
VIT B12 SERPL-MCNC: >2000 PG/ML
WBC # BLD AUTO: 7.3 K/UL

## 2017-08-10 PROCEDURE — 99499 UNLISTED E&M SERVICE: CPT | Mod: S$GLB,,, | Performed by: DIETITIAN, REGISTERED

## 2017-08-10 PROCEDURE — 99999 PR PBB SHADOW E&M-EST. PATIENT-LVL I: CPT | Mod: PBBFAC,,, | Performed by: DIETITIAN, REGISTERED

## 2017-08-10 PROCEDURE — 99999 PR PBB SHADOW E&M-EST. PATIENT-LVL V: CPT | Mod: PBBFAC,,, | Performed by: PHYSICIAN ASSISTANT

## 2017-08-10 PROCEDURE — 85025 COMPLETE CBC W/AUTO DIFF WBC: CPT

## 2017-08-10 PROCEDURE — 84425 ASSAY OF VITAMIN B-1: CPT

## 2017-08-10 PROCEDURE — 82607 VITAMIN B-12: CPT

## 2017-08-10 PROCEDURE — 80053 COMPREHEN METABOLIC PANEL: CPT

## 2017-08-10 PROCEDURE — 99024 POSTOP FOLLOW-UP VISIT: CPT | Mod: S$GLB,,, | Performed by: PHYSICIAN ASSISTANT

## 2017-08-10 NOTE — PROGRESS NOTES
NUTRITION NOTE    Referring Physician: Laith Aleman M.D.  Reason for MNT Referral: Follow-up 2 Weeks s/p Gastric Sleeve    Denies nausea, vomiting, constipation and diarrhea.  Reports doing well.    Past Medical History:   Diagnosis Date    Celiac disease     Diabetes mellitus     Hypothyroidism     Sleep apnea in adult     Thyroid disease        CLINICAL DATA:  42 y.o. female.    CURRENT DIET:  Bariatric Liquid Diet    Diet Recall: 80 grams of protein/day; 48 oz of fluids/day    Diet Includes:   Protein Supplements: 2 Premier shakes and 1 Premier Clear    EXERCISE:  Adequate light exercise.    Restrictions to Exercise: None.    VITAMINS/MINERALS:  Adequate. See BA    ASSESSMENT:  Doing well overall.  Adequate protein intake.  Adequate fluid intake.    BARIATRIC DIET DISCUSSION:  Instructed and provided written materials on bariatric pureed diet plan.  Reinforced post-op nutrition guidelines.    PLAN/RECOMMONDATIONS:  Advance to bariatric pureed diet.  Maintain protein intake.  Maintain fluid intake.  Continue light exercise.  Continue appropriate vitamins & minerals.    Return to clinic in 2 weeks.    SESSION TIME: 15 minutes

## 2017-08-10 NOTE — LETTER
August 10, 2017    Emilia Coyle  [address]  [place] [address]         [facility] - Bariatric Surgery  [address]  [place] [address]  Phone: [phone]  Fax: [phone] August 10, 2017     Patient: Emilia Coyle   YOB: 1975   Date of Visit: 8/10/2017       To Whom It May Concern:    It is my medical opinion that Emilia Coyle may return to light duty immediately with the following restrictions: no lifting over 10 lbs and 1 day rest a week until November 1, 2017.    If you have any questions or concerns, please don't hesitate to call.    Sincerely,      Marta Mayers PA-C

## 2017-08-10 NOTE — PROGRESS NOTES
BARIATRIC POST OPERATIVE FOLLOW UP:    Chief Complaint   Patient presents with    Follow-up       HISTORY OF PRESENT ILLNESS: Emilia Coyle is a 42 y.o. female with a Body mass index is 34.96 kg/m². who presents for a 1 month follow up s/p Lap Sleeve with Dr. Aleman on 7/28/2017.  She is doing well and tolerating the diet without difficulty.  She feels great and is very pleased with her progress.  Diabetes is resolved and she is off medications, blood sugar is running normal.  She has lost 20 lbs, approximately 23% of their excess weight.  She has no complaints.      Denies: nausea, vomiting, abdominal pain, changes in bowel movement pattern, fever, chills, dysphagia, chest pain, and shortness of breath.    Review of Systems   Constitutional: Negative for chills, fever and malaise/fatigue.   Eyes: Negative for blurred vision and double vision.   Respiratory: Negative for cough, hemoptysis and shortness of breath.    Cardiovascular: Negative for chest pain, palpitations and leg swelling.   Gastrointestinal: Negative for abdominal pain, blood in stool, constipation, diarrhea, heartburn, melena, nausea and vomiting.   Genitourinary: Negative for dysuria and hematuria.   Musculoskeletal: Negative for back pain, falls, joint pain, myalgias and neck pain.   Skin: Negative for rash.   Neurological: Negative for dizziness, tingling, weakness and headaches.   Endo/Heme/Allergies: Negative for environmental allergies. Does not bruise/bleed easily.   Psychiatric/Behavioral: Negative.        EXERCISE & VITAMINS:  See Bariatric Assessment    MEDICATIONS/ALLERGIES:  Have been reviewed.    DIET:  Liquid Bariatric Diet.  2 Premier powder + almond milk  protein shakes daily and 1 Premier Clear, ~80 grams protein.  48+ oz H20 and Clear SF Liquids.      Vitals:    08/10/17 0801   BP: 99/73   Pulse: 73       Physical Exam   Constitutional: She is oriented to person, place, and time. She appears well-developed and well-nourished.    HENT:   Head: Normocephalic and atraumatic.   Cardiovascular: Normal rate and regular rhythm.    Pulmonary/Chest: Effort normal and breath sounds normal.   Abdominal: Soft. Bowel sounds are normal. She exhibits no distension and no mass. There is no tenderness. There is no rebound and no guarding. No hernia.   WHSS   Musculoskeletal: She exhibits no edema.   Neurological: She is alert and oriented to person, place, and time.   Skin: Skin is warm and dry. No rash noted. No erythema. No pallor.   Psychiatric: She has a normal mood and affect. Her behavior is normal. Judgment and thought content normal.   Nursing note and vitals reviewed.      ASSESSMENT:  - Obesity, Body mass index is 34.96 kg/m².,  s/p sleeve gastrectomy on 7/28/2017.  - Estimated goal weight, 185 lbs, which is 50% EWL  - Co-morbidities: MIKAELA (stable), DM2 (resolved)  - Great Weight loss, 20 lbs, 23% EWL  - Good Exercise regimen  - Good Vitamin Regimen  - Good Diet  - Not at risk for fall or abuse    PLAN:  - Emphasized the importance of regular exercise and adherence to bariatric diet to achieve maximum weight loss.  - Encouraged patient to continue regular exercise.  - Follow-up with dietician to advance diet.  - Continue daily vitamins and medications.  - Ursodiol 500 mg daily for 6 months for the gallbladder.  - Anti-Acid medication, Omeprazole daily for 3 months.  - No lifting more than 10 lbs for 4 weeks.  - Miralax daily for constipation, no fiber.  - No NSAIDs, Tylenol for pain.  - No swallowing whole pills for 3 months.  Can swallow whole pills on 10/28/17.  - RTC in 2 weeks or sooner if needed.  - Call the office for any issues.  - Check labs today.    15 minute visit, over 50% of time spent counseling patient face to face on diet, exercise, and weight loss.

## 2017-08-10 NOTE — PATIENT INSTRUCTIONS
- Follow-up with dietician to advance diet.  - Continue daily vitamins and medications.  - Ursodiol 500 mg daily for 6 months for the gallbladder.  - Anti-Acid medication, Omeprazole daily for 3 months.  - No lifting more than 10 lbs for 4 weeks.  - Miralax daily for constipation, no fiber.  - No NSAIDs, Tylenol for pain.  - No swallowing whole pills for 3 months.  Can swallow whole pills on 10/28/17.  - RTC in 2 weeks or sooner if needed.  - Call the office for any issues.  - Check labs today.

## 2017-08-11 LAB — VIT B1 SERPL-MCNC: 79 UG/L (ref 38–122)

## 2017-08-21 RX ORDER — CLONAZEPAM 1 MG/1
TABLET ORAL
Qty: 60 TABLET | Refills: 0 | Status: SHIPPED | OUTPATIENT
Start: 2017-08-21 | End: 2017-09-18 | Stop reason: SDUPTHER

## 2017-08-22 ENCOUNTER — PATIENT MESSAGE (OUTPATIENT)
Dept: INTERNAL MEDICINE | Facility: CLINIC | Age: 42
End: 2017-08-22

## 2017-08-22 RX ORDER — CLONAZEPAM 1 MG/1
TABLET ORAL
Qty: 60 TABLET | OUTPATIENT
Start: 2017-08-22

## 2017-08-24 ENCOUNTER — CLINICAL SUPPORT (OUTPATIENT)
Dept: BARIATRICS | Facility: CLINIC | Age: 42
End: 2017-08-24
Payer: COMMERCIAL

## 2017-08-24 ENCOUNTER — OFFICE VISIT (OUTPATIENT)
Dept: BARIATRICS | Facility: CLINIC | Age: 42
End: 2017-08-24
Payer: COMMERCIAL

## 2017-08-24 VITALS
DIASTOLIC BLOOD PRESSURE: 62 MMHG | SYSTOLIC BLOOD PRESSURE: 98 MMHG | BODY MASS INDEX: 34.56 KG/M2 | WEIGHT: 207.44 LBS | HEART RATE: 64 BPM | HEIGHT: 65 IN

## 2017-08-24 DIAGNOSIS — Z98.84 S/P LAPAROSCOPIC SLEEVE GASTRECTOMY: Primary | ICD-10-CM

## 2017-08-24 DIAGNOSIS — K90.0 CELIAC DISEASE: ICD-10-CM

## 2017-08-24 DIAGNOSIS — G47.33 OSA (OBSTRUCTIVE SLEEP APNEA): ICD-10-CM

## 2017-08-24 DIAGNOSIS — E66.9 OBESITY (BMI 30-39.9): ICD-10-CM

## 2017-08-24 PROCEDURE — 99999 PR PBB SHADOW E&M-EST. PATIENT-LVL I: CPT | Mod: PBBFAC,,, | Performed by: DIETITIAN, REGISTERED

## 2017-08-24 PROCEDURE — 99999 PR PBB SHADOW E&M-EST. PATIENT-LVL IV: CPT | Mod: PBBFAC,,, | Performed by: PHYSICIAN ASSISTANT

## 2017-08-24 PROCEDURE — 99499 UNLISTED E&M SERVICE: CPT | Mod: S$GLB,,, | Performed by: DIETITIAN, REGISTERED

## 2017-08-24 PROCEDURE — 99024 POSTOP FOLLOW-UP VISIT: CPT | Mod: S$GLB,,, | Performed by: PHYSICIAN ASSISTANT

## 2017-08-24 NOTE — LETTER
August 24, 2017    Emilia Coyle  [address]  [place] [address]         [facility]  [address]  [place] [address]  Phone: [phone]  Fax: [phone] August 24, 2017     Patient: Emilia Coyle   YOB: 1975   Date of Visit: 8/24/2017       To Whom It May Concern:    It is my medical opinion that Emilia Coyle may return to light duty immediately with the following restrictions: no lifting over 10 lbs until November 1, 2017.  If you have any questions or concerns, please don't hesitate to call.    Sincerely,      Marta Mayers PA-C

## 2017-08-24 NOTE — PROGRESS NOTES
NUTRITION NOTE    Referring Physician: Laith Aleman M.D.  Reason for MNT Referral: Follow-up 4 Weeks s/p Gastric Sleeve    Denies nausea, vomiting, constipation and diarrhea.  Reports doing well.    Past Medical History:   Diagnosis Date    Celiac disease     Diabetes mellitus     Hypothyroidism     Sleep apnea in adult     Thyroid disease        CLINICAL DATA:  42 y.o. female.    CURRENT DIET:  Bariatric Soft Diet    Diet Recall:  grams of protein/day; 48-64 oz of fluids/day    - decaf coffee  - prot shake  - babybell cheese  - prot shake  - 3oz tuna salad    Diet Includes: tender chicken, tuna salad, beef jerky, boiled egg, greek yogurt   Meal Pattern: 2 meal(s) + 2-3 protein supplement(s)  Adequate protein supplement intake. Premier shakes.  Adequate dairy intake.    EXERCISE:  Adequate light exercise.    Restrictions to Exercise: None.    VITAMINS/MINERALS:  Adequate. See BA.    ASSESSMENT:  Doing well overall.  Adequate protein intake.  Adequate fluid intake.  Advancing diet appropriately.  Exercising.  Adequate vitamins & minerals.    BARIATRIC DIET DISCUSSION:  Instructed and provided written materials on bariatric soft diet plan.  Reinforced post-op nutrition guidelines.    PLAN / RECOMMENDATIONS:  Advance to bariatric soft diet.  Maintain protein intake.  Maintain fluid intake.  Continue light exercise.  Continue appropriate vitamins & minerals.    Return to clinic in 2 months.    SESSION TIME: 15 minutes

## 2017-08-24 NOTE — PATIENT INSTRUCTIONS
Since surgery you have lost 24 lbs, 27% of your excess weight.    - Follow-up with dietician to advance diet.  - Continue daily vitamins and medications.  - Ursodiol 500 mg daily for 5 months for the gallbladder.  - Anti-Acid medication, Omeprazole daily for 2 months.  - No lifting more than 10 lbs for 2 weeks.  - Miralax daily for constipation, no fiber.  - No NSAIDs, Tylenol for pain.  - No swallowing whole pills for 2 months.  Can swallow whole pills on 10/28/17.  - RTC in 2 months or sooner if needed.  - Call the office for any issues.  - Check labs next visit.

## 2017-08-24 NOTE — PROGRESS NOTES
BARIATRIC POST OPERATIVE FOLLOW UP:    Chief Complaint   Patient presents with    Follow-up     4wk sleeve       HISTORY OF PRESENT ILLNESS: Emilia Coyle is a 42 y.o. female with a Body mass index is 34.52 kg/m². who presents for a 1 month follow up s/p Lap Sleeve with Dr. Aleman on 7/28/2017.  She is doing well and tolerating the diet without difficulty.  She feels great and is very pleased with her progress.  Diabetes is resolved and she is off medications, blood sugar is running normal.  She has lost 24 lbs, approximately 27% of their excess weight.  She has no complaints.      Denies: nausea, vomiting, abdominal pain, changes in bowel movement pattern, fever, chills, dysphagia, chest pain, and shortness of breath.    Review of Systems   Constitutional: Negative for chills, fever and malaise/fatigue.   Eyes: Negative for blurred vision and double vision.   Respiratory: Negative for cough, hemoptysis and shortness of breath.    Cardiovascular: Negative for chest pain, palpitations and leg swelling.   Gastrointestinal: Negative for abdominal pain, blood in stool, constipation, diarrhea, heartburn, melena, nausea and vomiting.   Genitourinary: Negative for dysuria and hematuria.   Musculoskeletal: Negative for back pain, falls, joint pain, myalgias and neck pain.   Skin: Negative for rash.   Neurological: Negative for dizziness, tingling, weakness and headaches.   Endo/Heme/Allergies: Negative for environmental allergies. Does not bruise/bleed easily.   Psychiatric/Behavioral: Negative.        EXERCISE & VITAMINS:  See Bariatric Assessment    MEDICATIONS/ALLERGIES:  Have been reviewed.    DIET:  Puree Bariatric Diet.  2-3 Premier RTD protein shakes daily plus chicken and eggs ~80 grams protein.  48+ oz H20 and Clear SF Liquids.      Vitals:    08/24/17 0852   BP: 98/62   Pulse: 64       Physical Exam   Constitutional: She is oriented to person, place, and time. She appears well-developed and well-nourished.    HENT:   Head: Normocephalic and atraumatic.   Cardiovascular: Normal rate and regular rhythm.    Pulmonary/Chest: Effort normal and breath sounds normal.   Abdominal: Soft. Bowel sounds are normal. She exhibits no distension and no mass. There is no tenderness. There is no rebound and no guarding. No hernia.   WHSS   Musculoskeletal: She exhibits no edema.   Neurological: She is alert and oriented to person, place, and time.   Skin: Skin is warm and dry. No rash noted. No erythema. No pallor.   Psychiatric: She has a normal mood and affect. Her behavior is normal. Judgment and thought content normal.   Nursing note and vitals reviewed.      ASSESSMENT:  - Obesity, Body mass index is 34.52 kg/m².,  s/p sleeve gastrectomy on 7/28/2017.  - Estimated goal weight, 185 lbs, which is 50% EWL  - Co-morbidities: MIKAELA (stable), DM2 (resolved)  - Great Weight loss, 24 lbs, 27% EWL  - Good Exercise regimen  - Good Vitamin Regimen  - Good Diet  - Not at risk for fall or abuse    PLAN:  - Emphasized the importance of regular exercise and adherence to bariatric diet to achieve maximum weight loss.  - Encouraged patient to continue regular exercise.  - Follow-up with dietician to advance diet.  - Continue daily vitamins and medications.  - Ursodiol 500 mg daily for 5 months for the gallbladder.  - Anti-Acid medication, Omeprazole daily for 2 months.  - No lifting more than 10 lbs for 2 weeks.  - Miralax daily for constipation, no fiber.  - No NSAIDs, Tylenol for pain.  - No swallowing whole pills for 2 months.  Can swallow whole pills on 10/28/17.  - RTC in 2 months or sooner if needed.  - Call the office for any issues.  - Check labs next visit.    15 minute visit, over 50% of time spent counseling patient face to face on diet, exercise, and weight loss.

## 2017-08-28 ENCOUNTER — PATIENT MESSAGE (OUTPATIENT)
Dept: INTERNAL MEDICINE | Facility: CLINIC | Age: 42
End: 2017-08-28

## 2017-08-28 ENCOUNTER — PATIENT MESSAGE (OUTPATIENT)
Dept: BARIATRICS | Facility: CLINIC | Age: 42
End: 2017-08-28

## 2017-08-29 ENCOUNTER — TELEPHONE (OUTPATIENT)
Dept: INTERNAL MEDICINE | Facility: CLINIC | Age: 42
End: 2017-08-29

## 2017-08-29 ENCOUNTER — OFFICE VISIT (OUTPATIENT)
Dept: INTERNAL MEDICINE | Facility: CLINIC | Age: 42
End: 2017-08-29
Payer: COMMERCIAL

## 2017-08-29 VITALS
DIASTOLIC BLOOD PRESSURE: 68 MMHG | OXYGEN SATURATION: 97 % | WEIGHT: 203.25 LBS | HEIGHT: 65 IN | SYSTOLIC BLOOD PRESSURE: 108 MMHG | TEMPERATURE: 99 F | HEART RATE: 95 BPM | BODY MASS INDEX: 33.86 KG/M2 | RESPIRATION RATE: 16 BRPM

## 2017-08-29 DIAGNOSIS — J01.90 ACUTE SINUSITIS, RECURRENCE NOT SPECIFIED, UNSPECIFIED LOCATION: Primary | ICD-10-CM

## 2017-08-29 PROCEDURE — 99999 PR PBB SHADOW E&M-EST. PATIENT-LVL III: CPT | Mod: PBBFAC,,, | Performed by: INTERNAL MEDICINE

## 2017-08-29 PROCEDURE — 99214 OFFICE O/P EST MOD 30 MIN: CPT | Mod: 25,S$GLB,, | Performed by: INTERNAL MEDICINE

## 2017-08-29 PROCEDURE — 3008F BODY MASS INDEX DOCD: CPT | Mod: S$GLB,,, | Performed by: INTERNAL MEDICINE

## 2017-08-29 PROCEDURE — 96372 THER/PROPH/DIAG INJ SC/IM: CPT | Mod: S$GLB,,, | Performed by: INTERNAL MEDICINE

## 2017-08-29 RX ORDER — PROMETHAZINE HYDROCHLORIDE AND CODEINE PHOSPHATE 6.25; 1 MG/5ML; MG/5ML
5 SOLUTION ORAL NIGHTLY PRN
Qty: 240 ML | Refills: 0 | Status: SHIPPED | OUTPATIENT
Start: 2017-08-29 | End: 2018-02-07

## 2017-08-29 RX ORDER — AMOXICILLIN AND CLAVULANATE POTASSIUM 250; 62.5 MG/5ML; MG/5ML
250 POWDER, FOR SUSPENSION ORAL 2 TIMES DAILY
Qty: 150 ML | Refills: 0 | Status: SHIPPED | OUTPATIENT
Start: 2017-08-29 | End: 2017-09-15 | Stop reason: ALTCHOICE

## 2017-08-29 RX ORDER — TRIAMCINOLONE ACETONIDE 40 MG/ML
40 INJECTION, SUSPENSION INTRA-ARTICULAR; INTRAMUSCULAR
Status: COMPLETED | OUTPATIENT
Start: 2017-08-29 | End: 2017-08-29

## 2017-08-29 RX ADMIN — TRIAMCINOLONE ACETONIDE 40 MG: 40 INJECTION, SUSPENSION INTRA-ARTICULAR; INTRAMUSCULAR at 11:08

## 2017-08-29 NOTE — MEDICAL/APP STUDENT
"Subjective:       Patient ID: Emilia Coyle is a 42 y.o. female.    Chief Complaint: Cough (cough; congestion)    HPI   41 yo F with type 2 DM, obesity, asthma, s/p lap gastric sleeve surgery on 7/28/17 presented to clinic with a 3 day h/o nasal and sinus congestion, cough, rhinorrhea. Initially began Sunday afternoon with the nasal and sinus congestion.  Rhinorrhea has changed from thin and clear to thick and green. She has sinus pressure and tenderness in addition to the congestion.  She stated that she spiked a temperature of 100.2 which is "a fever for me" according to her (her temp normally runs from 96-97).  She has an associated dry, hacking cough.  She denied dysphagia but she has a sore throat 2/2 cough and postnasal drip.  She has been taking Phenergen cough syrup at night to help her sleep and has been doing sinus rinses with minimal benefit.  Additionally, she reported nausea without vomiting and mild diarrhea today.    Review of Systems   Constitutional: Positive for fatigue and fever. Negative for activity change, appetite change, chills, diaphoresis and unexpected weight change.   HENT: Positive for congestion, ear pain (Described as a "deep ear pressure" rather than pain), postnasal drip, rhinorrhea, sinus pressure and sore throat. Negative for drooling and ear discharge.    Eyes: Negative for photophobia, pain, discharge, redness, itching and visual disturbance.   Respiratory: Positive for cough and chest tightness. Negative for apnea, choking, shortness of breath, wheezing and stridor.    Cardiovascular: Negative for chest pain, palpitations and leg swelling.   Gastrointestinal: Positive for diarrhea and nausea. Negative for abdominal distention, abdominal pain, anal bleeding, blood in stool, constipation, rectal pain and vomiting.   Genitourinary: Negative for difficulty urinating, dysuria, enuresis, frequency, hematuria and urgency.   Musculoskeletal: Negative for arthralgias and myalgias. "   Skin: Negative for color change, pallor, rash and wound.   Neurological: Positive for headaches. Negative for dizziness, tremors, seizures, weakness, light-headedness and numbness.       Objective:      Physical Exam   Constitutional: She appears well-developed and well-nourished. No distress.   HENT:   Head: Normocephalic and atraumatic.       Right Ear: Hearing normal. Tympanic membrane is not injected, not perforated, not erythematous and not bulging. A middle ear effusion is present. No hemotympanum.   Left Ear: Hearing normal. Tympanic membrane is not injected, not perforated, not erythematous and not bulging. A middle ear effusion is present. No hemotympanum.   Nose: Mucosal edema and rhinorrhea present. Right sinus exhibits maxillary sinus tenderness and frontal sinus tenderness. Left sinus exhibits maxillary sinus tenderness and frontal sinus tenderness.   Mouth/Throat: Mucous membranes are normal. Posterior oropharyngeal erythema present.   Cardiovascular: Normal rate, regular rhythm, normal heart sounds and intact distal pulses.  Exam reveals no gallop and no friction rub.    No murmur heard.  Pulmonary/Chest: Effort normal and breath sounds normal. No respiratory distress. She has no wheezes. She has no rales. She exhibits no tenderness.   Skin: She is not diaphoretic.   Nursing note and vitals reviewed.      Assessment:       1. Acute sinusitis, recurrence not specified, unspecified location        Plan:       Acute sinusitis  - Augmentin oral suspension 200mg/5ml QD for 10 days  - Steroid shot in office today  - Cont sinus rinse  - Phenergan 25 mg for cough Qpm    RTC in one week if no improvement. Risks, benefits, and limitations of medications were explained and pt expressed understanding.

## 2017-08-29 NOTE — TELEPHONE ENCOUNTER
----- Message from Elias Hylton sent at 8/29/2017  9:14 AM CDT -----  Contact: self   Patient sent detail e-mail on yesterday and would like a call back     Please advise

## 2017-08-29 NOTE — PROGRESS NOTES
Subjective:       Patient ID: Emilia Coyle is a 42 y.o. female.    Chief Complaint: Cough (cough; congestion)    Patient is a 42 y.o.female who presents today for sore throat, sinus drip and a productive cough. Ongoing for 2-3 days. Low grade fever in the evening as well.  Having green nasal discharge. Cough is severe; taking mucinex and codeine syrup at night as needed.    Review of Systems   Constitutional: Negative for appetite change, chills, diaphoresis, fatigue and fever.   HENT: Positive for congestion, ear pain, postnasal drip, rhinorrhea and sinus pressure. Negative for dental problem, hearing loss and sore throat.    Eyes: Negative for discharge, redness and itching.   Respiratory: Positive for cough. Negative for chest tightness, shortness of breath and wheezing.    Cardiovascular: Negative for chest pain, palpitations and leg swelling.   Gastrointestinal: Negative for abdominal pain, constipation, diarrhea, nausea and vomiting.   Endocrine: Negative for cold intolerance and heat intolerance.   Genitourinary: Negative for difficulty urinating, frequency, hematuria and urgency.   Musculoskeletal: Negative for arthralgias, back pain, gait problem, myalgias and neck pain.   Skin: Negative for color change and rash.   Neurological: Negative for dizziness, syncope and headaches.   Hematological: Negative for adenopathy.   Psychiatric/Behavioral: Negative for behavioral problems and sleep disturbance. The patient is not nervous/anxious.        Objective:      Physical Exam   Constitutional: She is oriented to person, place, and time. She appears well-developed and well-nourished. No distress.   HENT:   Head: Normocephalic and atraumatic.   Right Ear: Tympanic membrane and external ear normal.   Left Ear: Tympanic membrane and external ear normal.   Nose: Mucosal edema and rhinorrhea present.   Mouth/Throat: Uvula is midline, oropharynx is clear and moist and mucous membranes are normal. No oropharyngeal  exudate, posterior oropharyngeal edema, posterior oropharyngeal erythema or tonsillar abscesses.   Eyes: Conjunctivae and EOM are normal. Pupils are equal, round, and reactive to light. Right eye exhibits no discharge. Left eye exhibits no discharge. No scleral icterus.   Neck: Normal range of motion. Neck supple. No JVD present. No thyromegaly present.   Cardiovascular: Normal rate, regular rhythm, normal heart sounds and intact distal pulses.  Exam reveals no gallop and no friction rub.    No murmur heard.  Pulmonary/Chest: Effort normal and breath sounds normal. No stridor. No respiratory distress. She has no wheezes. She has no rales. She exhibits no tenderness.   Abdominal: Soft. Bowel sounds are normal. She exhibits no distension. There is no tenderness. There is no rebound.   Musculoskeletal: Normal range of motion. She exhibits no edema or tenderness.   Lymphadenopathy:     She has no cervical adenopathy.   Neurological: She is alert and oriented to person, place, and time. No cranial nerve deficit.   Skin: Skin is warm and dry. No rash noted. She is not diaphoretic. No erythema.   Psychiatric: She has a normal mood and affect. Her behavior is normal.   Nursing note and vitals reviewed.      Assessment and Plan:       1. Acute sinusitis, recurrence not specified, unspecified location  - Notify clinic if symptoms change, worsen or do not improve  - amoxicillin-pot clavulanate 250-62.5 mg/5ml (AUGMENTIN) 250-62.5 mg/5 mL suspension; Take 5 mLs (250 mg total) by mouth 2 (two) times daily.  Dispense: 150 mL; Refill: 0  - triamcinolone acetonide injection 40 mg; Inject 1 mL (40 mg total) into the muscle one time.  - promethazine-codeine 6.25-10 mg/5 ml (PHENERGAN WITH CODEINE) 6.25-10 mg/5 mL syrup; Take 5 mLs by mouth nightly as needed for Cough.  Dispense: 240 mL; Refill: 0  · - Medication Management: The risks and benefits of medication were discussed with the patient            No Follow-up on file.

## 2017-09-11 ENCOUNTER — PATIENT MESSAGE (OUTPATIENT)
Dept: BARIATRICS | Facility: CLINIC | Age: 42
End: 2017-09-11

## 2017-09-14 ENCOUNTER — TELEPHONE (OUTPATIENT)
Dept: BARIATRICS | Facility: CLINIC | Age: 42
End: 2017-09-14

## 2017-09-14 ENCOUNTER — PATIENT MESSAGE (OUTPATIENT)
Dept: INTERNAL MEDICINE | Facility: CLINIC | Age: 42
End: 2017-09-14

## 2017-09-14 ENCOUNTER — PATIENT MESSAGE (OUTPATIENT)
Dept: BARIATRICS | Facility: CLINIC | Age: 42
End: 2017-09-14

## 2017-09-14 NOTE — TELEPHONE ENCOUNTER
Called in response to an email sent. Left vm if she feels she needs to go to the ER she should go and if not I've scheduled an appt with Argelia at 0800.

## 2017-09-15 ENCOUNTER — HOSPITAL ENCOUNTER (OUTPATIENT)
Dept: RADIOLOGY | Facility: HOSPITAL | Age: 42
Discharge: HOME | End: 2017-09-15
Attending: PHYSICIAN ASSISTANT
Payer: COMMERCIAL

## 2017-09-15 ENCOUNTER — DOCUMENTATION ONLY (OUTPATIENT)
Dept: BARIATRICS | Facility: CLINIC | Age: 42
End: 2017-09-15

## 2017-09-15 ENCOUNTER — OFFICE VISIT (OUTPATIENT)
Dept: BARIATRICS | Facility: CLINIC | Age: 42
End: 2017-09-15
Payer: COMMERCIAL

## 2017-09-15 ENCOUNTER — TELEPHONE (OUTPATIENT)
Dept: BARIATRICS | Facility: CLINIC | Age: 42
End: 2017-09-15

## 2017-09-15 ENCOUNTER — PATIENT MESSAGE (OUTPATIENT)
Dept: BARIATRICS | Facility: CLINIC | Age: 42
End: 2017-09-15

## 2017-09-15 VITALS
HEART RATE: 61 BPM | SYSTOLIC BLOOD PRESSURE: 114 MMHG | DIASTOLIC BLOOD PRESSURE: 68 MMHG | HEIGHT: 65 IN | WEIGHT: 198 LBS | BODY MASS INDEX: 32.99 KG/M2 | TEMPERATURE: 98 F

## 2017-09-15 DIAGNOSIS — M54.9 ACUTE MID BACK PAIN: ICD-10-CM

## 2017-09-15 DIAGNOSIS — R11.0 NAUSEA: Primary | ICD-10-CM

## 2017-09-15 DIAGNOSIS — R63.4 WEIGHT LOSS: ICD-10-CM

## 2017-09-15 DIAGNOSIS — R10.11 RUQ ABDOMINAL PAIN: ICD-10-CM

## 2017-09-15 DIAGNOSIS — Z98.890 POST-OPERATIVE STATE: ICD-10-CM

## 2017-09-15 DIAGNOSIS — R12 HEARTBURN: ICD-10-CM

## 2017-09-15 DIAGNOSIS — R11.0 NAUSEA: ICD-10-CM

## 2017-09-15 DIAGNOSIS — Z98.84 S/P LAPAROSCOPIC SLEEVE GASTRECTOMY: ICD-10-CM

## 2017-09-15 DIAGNOSIS — R93.5 ABNORMAL ABDOMINAL CT SCAN: ICD-10-CM

## 2017-09-15 DIAGNOSIS — R10.13 EPIGASTRIC ABDOMINAL PAIN: ICD-10-CM

## 2017-09-15 DIAGNOSIS — R10.11 RUQ ABDOMINAL PAIN: Primary | ICD-10-CM

## 2017-09-15 PROCEDURE — 99024 POSTOP FOLLOW-UP VISIT: CPT | Mod: S$GLB,,, | Performed by: PHYSICIAN ASSISTANT

## 2017-09-15 PROCEDURE — 99999 PR PBB SHADOW E&M-EST. PATIENT-LVL IV: CPT | Mod: PBBFAC,,, | Performed by: PHYSICIAN ASSISTANT

## 2017-09-15 PROCEDURE — 74177 CT ABD & PELVIS W/CONTRAST: CPT | Mod: TC

## 2017-09-15 PROCEDURE — 25500020 PHARM REV CODE 255: Performed by: PHYSICIAN ASSISTANT

## 2017-09-15 PROCEDURE — 74177 CT ABD & PELVIS W/CONTRAST: CPT | Mod: 26,,, | Performed by: RADIOLOGY

## 2017-09-15 RX ORDER — SUCRALFATE 1 G/10ML
1 SUSPENSION ORAL
Qty: 1200 ML | Refills: 1 | Status: SHIPPED | OUTPATIENT
Start: 2017-09-15 | End: 2017-11-02 | Stop reason: ALTCHOICE

## 2017-09-15 RX ORDER — SODIUM, POTASSIUM,MAG SULFATES 17.5-3.13G
SOLUTION, RECONSTITUTED, ORAL ORAL
Qty: 1 BOTTLE | Refills: 0 | Status: SHIPPED | OUTPATIENT
Start: 2017-09-15 | End: 2017-11-02 | Stop reason: ALTCHOICE

## 2017-09-15 RX ADMIN — IOHEXOL 15 ML: 350 INJECTION, SOLUTION INTRAVENOUS at 10:09

## 2017-09-15 RX ADMIN — IOHEXOL 100 ML: 350 INJECTION, SOLUTION INTRAVENOUS at 11:09

## 2017-09-15 NOTE — PROGRESS NOTES
Subjective:       Patient ID: Emilia Coyle is a 42 y.o. female.    Chief Complaint: Abdominal Pain; Nausea; Heartburn; and Post-op Problem    Abdominal Pain   This is a new problem. The current episode started in the past 7 days (Friday Sept 8). The onset quality is sudden. The problem occurs intermittently. The most recent episode lasted 2 hours. The problem has been gradually worsening. The pain is located in the epigastric region and RUQ. The pain is at a severity of 8/10. The patient is experiencing no pain. The quality of the pain is a sensation of fullness, aching and colicky. The abdominal pain radiates to the back and RUQ. Associated symptoms include anorexia, belching, constipation and nausea. Pertinent negatives include no arthralgias, diarrhea, dysuria, fever, flatus, frequency, headaches, hematochezia, hematuria, melena, myalgias, vomiting or weight loss. The pain is aggravated by certain positions and eating. The pain is relieved by being still. She has tried nothing for the symptoms. The treatment provided no relief. Her past medical history is significant for abdominal surgery. There is no history of colon cancer, Crohn's disease, gallstones, GERD, irritable bowel syndrome, pancreatitis, PUD or ulcerative colitis. Patient's medical history does not include kidney stones and UTI.        1 week history of epigastric abdominal pain with radiation to RUQ and mid back, getting worse.   Omeprazole 20 mg BID.   Sleeping sitting up.      Review of Systems   Constitutional: Negative for activity change, appetite change, fatigue, fever and weight loss.   HENT: Negative for trouble swallowing and voice change.    Respiratory: Negative for cough, choking, chest tightness and shortness of breath.    Cardiovascular: Negative for chest pain and palpitations.   Gastrointestinal: Positive for abdominal pain, anorexia, constipation and nausea. Negative for abdominal distention, anal bleeding, blood in stool, diarrhea,  flatus, hematochezia, melena and vomiting.   Genitourinary: Negative for decreased urine volume, dysuria, frequency, hematuria, menstrual problem, pelvic pain and urgency.   Musculoskeletal: Positive for back pain. Negative for arthralgias and myalgias.   Neurological: Negative for dizziness, seizures, facial asymmetry, speech difficulty, light-headedness, numbness and headaches.   Hematological: Negative for adenopathy. Does not bruise/bleed easily.   Psychiatric/Behavioral: Negative for agitation, behavioral problems, confusion, decreased concentration, dysphoric mood and hallucinations. The patient is not nervous/anxious and is not hyperactive.        Objective:      Physical Exam   Constitutional: She is oriented to person, place, and time. She appears well-developed and well-nourished. No distress.   HENT:   Head: Normocephalic and atraumatic.   Eyes: Conjunctivae are normal. Right eye exhibits no discharge. Left eye exhibits no discharge. No scleral icterus.   Cardiovascular: Normal rate, regular rhythm, normal heart sounds and intact distal pulses.    No murmur heard.  Pulmonary/Chest: Effort normal and breath sounds normal. No respiratory distress.   Abdominal: Soft. Bowel sounds are normal. She exhibits no distension and no mass. There is tenderness. There is no rebound and no guarding. No hernia.   Musculoskeletal: She exhibits no edema.   Neurological: She is alert and oriented to person, place, and time.   Skin: Skin is warm and dry. No rash noted. She is not diaphoretic. No erythema. No pallor.   Psychiatric: She has a normal mood and affect. Her behavior is normal. Judgment and thought content normal.   Nursing note and vitals reviewed.      Assessment:       1. RUQ abdominal pain    2. Nausea    3. Heartburn    4. Epigastric abdominal pain    5. Acute mid back pain       *suspect biliary colic, could also be PUD.   Plan:       CT abd/ pelvis today  Labs today     Follow up with phone call this  afternoon.  Follow liquid/ puree bariatric diet  Go to ER if symptoms worsen.  Sent RX for Zantac and Carafate to pharmacy.  Cont omeprazole BID.   If above work up is negative, patient will need EGD.    Patient prefers to have lap choley on Friday next week, if she can manage symptoms and she is not acute.

## 2017-09-15 NOTE — LETTER
September 15, 2017      Emilia Coyle  [address]  [place] [address]             [facility] - Bariatric Surgery  [address]  [place] [address]  Phone: [phone]  Fax: [phone] Patient: Emilia Coyle  MRN: [id number]  YOB: 1975  Date of Visit: 09/15/2017    To Whom It May Concern:    Emilia Moore was seen in the Surgery Clinic on 09/15/2017. She may return to work today after her imaging and labs with no restrictions. If you have any questions or concerns, or if I can be of further assistance, please do not hesitate to contact my office.    Sincerely,    Argelia Nation PA-C

## 2017-09-15 NOTE — TELEPHONE ENCOUNTER
Spoke to patient: reviewed results of CT:  Wall thickening of TI, needs EGD/Colon. GB looks okay.  Order placed, she will call to schedule.  Will also check Hida Scan with [facility].  Pt will call GI to schedule for next Friday if possible.  All questions answered.

## 2017-09-15 NOTE — TELEPHONE ENCOUNTER
Called patient with new appt place, date and time. [facility] on 09/21/17 at 0800. Patient agreed with appt.

## 2017-09-15 NOTE — TELEPHONE ENCOUNTER
Called patient to give time and date of first available HIDA scan. [facility] on 09/18 at 1200. Refused appt, and wants appt at [facility] or [facility]. Instructed patient will call back with new appt.

## 2017-09-18 ENCOUNTER — TELEPHONE (OUTPATIENT)
Dept: INTERNAL MEDICINE | Facility: CLINIC | Age: 42
End: 2017-09-18

## 2017-09-18 ENCOUNTER — PATIENT MESSAGE (OUTPATIENT)
Dept: BARIATRICS | Facility: CLINIC | Age: 42
End: 2017-09-18

## 2017-09-18 RX ORDER — CLONAZEPAM 1 MG/1
1 TABLET ORAL 2 TIMES DAILY PRN
Qty: 60 TABLET | Refills: 0 | Status: SHIPPED | OUTPATIENT
Start: 2017-09-18 | End: 2017-10-18 | Stop reason: SDUPTHER

## 2017-09-21 ENCOUNTER — HOSPITAL ENCOUNTER (OUTPATIENT)
Dept: RADIOLOGY | Facility: HOSPITAL | Age: 42
Discharge: HOME | End: 2017-09-21
Attending: PHYSICIAN ASSISTANT
Payer: COMMERCIAL

## 2017-09-21 DIAGNOSIS — R11.0 NAUSEA: ICD-10-CM

## 2017-09-21 DIAGNOSIS — R10.11 RUQ ABDOMINAL PAIN: ICD-10-CM

## 2017-09-21 PROCEDURE — 78226 HEPATOBILIARY SYSTEM IMAGING: CPT | Mod: TC

## 2017-09-21 PROCEDURE — 78226 HEPATOBILIARY SYSTEM IMAGING: CPT | Mod: 26,,, | Performed by: RADIOLOGY

## 2017-09-22 ENCOUNTER — TELEPHONE (OUTPATIENT)
Dept: BARIATRICS | Facility: CLINIC | Age: 42
End: 2017-09-22

## 2017-09-22 DIAGNOSIS — R10.11 RUQ ABDOMINAL PAIN: ICD-10-CM

## 2017-09-22 DIAGNOSIS — R11.0 NAUSEA: Primary | ICD-10-CM

## 2017-09-22 NOTE — TELEPHONE ENCOUNTER
Called to inform patient that HIDA scan needs to be redone due to [facility] part not done bc medication was on back order. Patient understood but stated the only day she could do the test is 10/26/17 which is when she had her 3month post op planned. Also stated she feels 50% better and no nausea, vomiting or pain for 3 days and she even went to gym. Explained Argelia PA schedule and that the appt on 10/26 was with Sweta who is no longer here and that as of today Argelia does not work on Thursdays. We are trying to get her schedule opened up. She wants the appt left as is in hopes of Argelia working on thursdays at time of appt. Also informed will talk to Argelia about waiting til 10/26 for HIDA scan and then call nuclear med and make appt. Will call her back with time. Patient verbalized understanding.

## 2017-10-04 ENCOUNTER — PATIENT MESSAGE (OUTPATIENT)
Dept: INTERNAL MEDICINE | Facility: CLINIC | Age: 42
End: 2017-10-04

## 2017-10-04 ENCOUNTER — PATIENT MESSAGE (OUTPATIENT)
Dept: BARIATRICS | Facility: CLINIC | Age: 42
End: 2017-10-04

## 2017-10-04 DIAGNOSIS — Z12.83 SKIN CANCER SCREENING: Primary | ICD-10-CM

## 2017-10-06 ENCOUNTER — INITIAL CONSULT (OUTPATIENT)
Dept: DERMATOLOGY | Facility: CLINIC | Age: 42
End: 2017-10-06
Payer: COMMERCIAL

## 2017-10-06 VITALS — BODY MASS INDEX: 32.78 KG/M2 | WEIGHT: 197 LBS

## 2017-10-06 DIAGNOSIS — L60.1 ONYCHOLYSIS OF TOENAIL: Primary | ICD-10-CM

## 2017-10-06 DIAGNOSIS — D22.9 NEVUS OF MULTIPLE SITES: ICD-10-CM

## 2017-10-06 DIAGNOSIS — D23.9 DERMATOFIBROMA: ICD-10-CM

## 2017-10-06 DIAGNOSIS — L72.0 MILIUM CYST: ICD-10-CM

## 2017-10-06 PROCEDURE — 99999 PR PBB SHADOW E&M-EST. PATIENT-LVL III: CPT | Mod: PBBFAC,,, | Performed by: DERMATOLOGY

## 2017-10-06 PROCEDURE — 99203 OFFICE O/P NEW LOW 30 MIN: CPT | Mod: S$GLB,,, | Performed by: DERMATOLOGY

## 2017-10-06 RX ORDER — POLYETHYLENE GLYCOL 3350 17 G/17G
POWDER, FOR SOLUTION ORAL
Refills: 3 | COMMUNITY
Start: 2017-09-18 | End: 2023-03-01

## 2017-10-06 RX ORDER — POLY-UREAURETHANE 16 %
NAIL FILM SOLUTION (ML) TOPICAL
Qty: 1 BOTTLE | Refills: 5 | Status: SHIPPED | OUTPATIENT
Start: 2017-10-06 | End: 2017-11-02

## 2017-10-06 NOTE — LETTER
October 7, 2017      Rosanna Benitez DO  [address]  [place] [address]           [place] - Dermatology  [address] [address]  Phone: [phone]  Fax: [phone]          Patient: Emilia Coyle   MR Number: [id number]   YOB: 1975   Date of Visit: 10/6/2017       Dear Dr. Rosanna Benitez:    Thank you for referring Emilia Coyle to me for evaluation. Attached you will find relevant portions of my assessment and plan of care.    If you have questions, please do not hesitate to call me. I look forward to following Emilia Coyle along with you.    Sincerely,    Abbey Gonzales MD    Enclosure  CC:  No Recipients    If you would like to receive this communication electronically, please contact [email or web address] or [phone] to request more information on [facility] access.    For providers and/or their staff who would like to refer a patient to [facility], please contact us through our one-stop-shop provider referral line, [facility], at [phone].    If you feel you have received this communication in error or would no longer like to receive these types of communications, please e-mail [email or web address]

## 2017-10-07 NOTE — PROGRESS NOTES
"  Subjective:       Patient ID:  Emilia Coyle is a 42 y.o. female who presents for   Chief Complaint   Patient presents with    Lesion     left leg     Skin Check     Lesions on legs for over a year not painful no tx.  Also relates has peeling of toenails off and on now has polish on.  Would like skin exam.         Review of Systems   Constitutional: Negative for fever.   Skin: Negative for itching and rash.   Hematologic/Lymphatic: Does not bruise/bleed easily.        Objective:    Physical Exam   Constitutional: She appears well-developed and well-nourished. No distress.   Neurological: She is alert and oriented to person, place, and time. She is not disoriented.   Psychiatric: She has a normal mood and affect.   Skin:   Areas Examined (abnormalities noted in diagram):   Scalp / Hair Palpated and Inspected  Head / Face Inspection Performed  Neck Inspection Performed  Chest / Axilla Inspection Performed  Abdomen Inspection Performed  Genitals / Buttocks / Groin Inspection Performed  Back Inspection Performed  RUE Inspected  LUE Inspection Performed  RLE Inspected  LLE Inspection Performed  Nails and Digits Inspection Performed                   Diagram Legend       1-2 mm smooth white papules consistent with Milia        Firm pink to brown papule c/w dermatofibroma           Assessment / Plan:        Onycholysis of toenail by hx  -     NUVAIL 16 % NFSo; AAA nails qd - remove 1x/week and restart  Dispense: 1 Bottle; Refill: 5    Dermatofibroma  reassurance  Brochure provided      Milium cyst  reassurance  Brochure provided      Nevus of multiple sites  The "ABCD" rules to observe pigmented lesions were reviewed.  Brochure provided               Return in about 1 year (around 10/6/2018).  "

## 2017-10-13 ENCOUNTER — PATIENT MESSAGE (OUTPATIENT)
Dept: BARIATRICS | Facility: CLINIC | Age: 42
End: 2017-10-13

## 2017-10-18 RX ORDER — CLONAZEPAM 1 MG/1
TABLET ORAL
Qty: 60 TABLET | Refills: 0 | Status: SHIPPED | OUTPATIENT
Start: 2017-10-18 | End: 2017-11-14 | Stop reason: SDUPTHER

## 2017-10-25 ENCOUNTER — TELEPHONE (OUTPATIENT)
Dept: BARIATRICS | Facility: CLINIC | Age: 42
End: 2017-10-25

## 2017-10-26 ENCOUNTER — LAB VISIT (OUTPATIENT)
Dept: LAB | Facility: HOSPITAL | Age: 42
End: 2017-10-26
Attending: SURGERY
Payer: COMMERCIAL

## 2017-10-26 ENCOUNTER — OFFICE VISIT (OUTPATIENT)
Dept: BARIATRICS | Facility: CLINIC | Age: 42
End: 2017-10-26
Payer: COMMERCIAL

## 2017-10-26 ENCOUNTER — CLINICAL SUPPORT (OUTPATIENT)
Dept: BARIATRICS | Facility: CLINIC | Age: 42
End: 2017-10-26
Payer: COMMERCIAL

## 2017-10-26 VITALS
SYSTOLIC BLOOD PRESSURE: 111 MMHG | BODY MASS INDEX: 30.89 KG/M2 | WEIGHT: 185.63 LBS | DIASTOLIC BLOOD PRESSURE: 67 MMHG | HEART RATE: 55 BPM

## 2017-10-26 DIAGNOSIS — Z98.890 POST-OPERATIVE STATE: ICD-10-CM

## 2017-10-26 DIAGNOSIS — G47.33 OSA (OBSTRUCTIVE SLEEP APNEA): ICD-10-CM

## 2017-10-26 DIAGNOSIS — K90.0 CELIAC DISEASE: ICD-10-CM

## 2017-10-26 DIAGNOSIS — Z00.00 ANNUAL PHYSICAL EXAM: ICD-10-CM

## 2017-10-26 DIAGNOSIS — Z98.84 S/P LAPAROSCOPIC SLEEVE GASTRECTOMY: Primary | ICD-10-CM

## 2017-10-26 DIAGNOSIS — Z98.84 STATUS POST LAPAROSCOPIC SLEEVE GASTRECTOMY: ICD-10-CM

## 2017-10-26 DIAGNOSIS — E66.09 NON MORBID OBESITY DUE TO EXCESS CALORIES: ICD-10-CM

## 2017-10-26 DIAGNOSIS — E66.9 DIABETES MELLITUS TYPE 2 IN OBESE: ICD-10-CM

## 2017-10-26 DIAGNOSIS — E11.69 DIABETES MELLITUS TYPE 2 IN OBESE: ICD-10-CM

## 2017-10-26 DIAGNOSIS — R63.4 WEIGHT LOSS: ICD-10-CM

## 2017-10-26 LAB
25(OH)D3+25(OH)D2 SERPL-MCNC: 28 NG/ML
ALBUMIN SERPL BCP-MCNC: 3.8 G/DL
ALP SERPL-CCNC: 69 U/L
ALT SERPL W/O P-5'-P-CCNC: 9 U/L
ANION GAP SERPL CALC-SCNC: 7 MMOL/L
AST SERPL-CCNC: 14 U/L
BASOPHILS # BLD AUTO: 0.07 K/UL
BASOPHILS NFR BLD: 1.1 %
BILIRUB SERPL-MCNC: 0.5 MG/DL
BUN SERPL-MCNC: 12 MG/DL
CALCIUM SERPL-MCNC: 9.7 MG/DL
CHLORIDE SERPL-SCNC: 106 MMOL/L
CHOLEST SERPL-MCNC: 135 MG/DL
CHOLEST/HDLC SERPL: 2.8 {RATIO}
CO2 SERPL-SCNC: 29 MMOL/L
CREAT SERPL-MCNC: 0.8 MG/DL
DIFFERENTIAL METHOD: NORMAL
EOSINOPHIL # BLD AUTO: 0.1 K/UL
EOSINOPHIL NFR BLD: 1.8 %
ERYTHROCYTE [DISTWIDTH] IN BLOOD BY AUTOMATED COUNT: 14.4 %
EST. GFR  (AFRICAN AMERICAN): >60 ML/MIN/1.73 M^2
EST. GFR  (NON AFRICAN AMERICAN): >60 ML/MIN/1.73 M^2
GLUCOSE SERPL-MCNC: 87 MG/DL
HCT VFR BLD AUTO: 37.2 %
HDLC SERPL-MCNC: 48 MG/DL
HDLC SERPL: 35.6 %
HGB BLD-MCNC: 12.1 G/DL
IMM GRANULOCYTES # BLD AUTO: 0.01 K/UL
IMM GRANULOCYTES NFR BLD AUTO: 0.2 %
IRON SERPL-MCNC: 68 UG/DL
LDLC SERPL CALC-MCNC: 73.4 MG/DL
LYMPHOCYTES # BLD AUTO: 1.7 K/UL
LYMPHOCYTES NFR BLD: 27.7 %
MCH RBC QN AUTO: 27.8 PG
MCHC RBC AUTO-ENTMCNC: 32.5 G/DL
MCV RBC AUTO: 85 FL
MONOCYTES # BLD AUTO: 0.5 K/UL
MONOCYTES NFR BLD: 7.9 %
NEUTROPHILS # BLD AUTO: 3.8 K/UL
NEUTROPHILS NFR BLD: 61.3 %
NONHDLC SERPL-MCNC: 87 MG/DL
NRBC BLD-RTO: 0 /100 WBC
PLATELET # BLD AUTO: 317 K/UL
PMV BLD AUTO: 11.4 FL
POTASSIUM SERPL-SCNC: 4 MMOL/L
PROT SERPL-MCNC: 7.1 G/DL
RBC # BLD AUTO: 4.36 M/UL
SATURATED IRON: 22 %
SODIUM SERPL-SCNC: 142 MMOL/L
T4 FREE SERPL-MCNC: 1.41 NG/DL
TOTAL IRON BINDING CAPACITY: 309 UG/DL
TRANSFERRIN SERPL-MCNC: 209 MG/DL
TRIGL SERPL-MCNC: 68 MG/DL
TSH SERPL DL<=0.005 MIU/L-ACNC: 0.08 UIU/ML
VIT B12 SERPL-MCNC: 1370 PG/ML
WBC # BLD AUTO: 6.24 K/UL

## 2017-10-26 PROCEDURE — 99024 POSTOP FOLLOW-UP VISIT: CPT | Mod: S$GLB,,, | Performed by: PHYSICIAN ASSISTANT

## 2017-10-26 PROCEDURE — 99999 PR PBB SHADOW E&M-EST. PATIENT-LVL I: CPT | Mod: PBBFAC,,, | Performed by: DIETITIAN, REGISTERED

## 2017-10-26 PROCEDURE — 99499 UNLISTED E&M SERVICE: CPT | Mod: S$GLB,,, | Performed by: DIETITIAN, REGISTERED

## 2017-10-26 PROCEDURE — 80061 LIPID PANEL: CPT

## 2017-10-26 PROCEDURE — 82607 VITAMIN B-12: CPT

## 2017-10-26 PROCEDURE — 36415 COLL VENOUS BLD VENIPUNCTURE: CPT

## 2017-10-26 PROCEDURE — 84425 ASSAY OF VITAMIN B-1: CPT

## 2017-10-26 PROCEDURE — 99999 PR PBB SHADOW E&M-EST. PATIENT-LVL IV: CPT | Mod: PBBFAC,,, | Performed by: PHYSICIAN ASSISTANT

## 2017-10-26 PROCEDURE — 85025 COMPLETE CBC W/AUTO DIFF WBC: CPT

## 2017-10-26 PROCEDURE — 84443 ASSAY THYROID STIM HORMONE: CPT

## 2017-10-26 PROCEDURE — 82306 VITAMIN D 25 HYDROXY: CPT

## 2017-10-26 PROCEDURE — 83540 ASSAY OF IRON: CPT

## 2017-10-26 PROCEDURE — 84439 ASSAY OF FREE THYROXINE: CPT

## 2017-10-26 PROCEDURE — 80053 COMPREHEN METABOLIC PANEL: CPT

## 2017-10-26 NOTE — PROGRESS NOTES
NUTRITION NOTE    Referring Physician: Laith Aleman M.D.  Reason for MNT Referral: Follow-up 3 months s/p Gastric Sleeve    Denies nausea, vomiting, constipation and diarrhea.  Reports doing well.    Past Medical History:   Diagnosis Date    Celiac disease     Diabetes mellitus     Hypothyroidism     Sleep apnea in adult     Thyroid disease        CLINICAL DATA:  42 y.o. female.    Excess Weight Loss: 51%    CURRENT DIET:  Bariatric Diet.  Diet Recall: 40-70 grams of protein/day; 48+ oz of fluids/day     B: 1.5oz baked chicken (or 1 boiled egg)  Sn: Laughing cow cheese wedge or 1/2 string cheese  L: 1.5 oz baked chicken   D: 4-6oz greek yogurt or cottage cheese    Meal Pattern: 4 meal(s) + 0-1 protein supplement(s)  Inadequate protein supplement intake. 3-5/week.   Adequate dairy intake.  Adequate vegetable intake. Tolerates raw vegetables and lettuce.  Adequate fruit intake.    EXERCISE:  None. the past 2-3 weeks d/t grandma in the hospital.  Cardio at the gym 30-45 min.    Restrictions to Exercise: None.    VITAMINS / MINERALS:  Adequate. See BA    ASSESSMENT:  Doing well overall.  Weight loss excellent 51% EWL  Inadequate calorie intake.  Inadequate protein intake.  Adequate fluid intake.  Following diet appropriately.  Not exercising.  Adequate vitamins & minerals.    BARIATRIC DIET DISCUSSION:  Instructed and provided written materials on bariatric diet plan.  Reinforced post-op nutrition guidelines.    PLAN / RECOMMENDATIONS:  May begin to incorporate raw vegetables, lettuce, unsalted nuts, and protein bars as tolerated.  May begin to swallow whole pills as tolerated.  Back on track with diet plan.  Increase protein intake.   - maintain 40g prot/day from foods  - include 40-60g protein/day from supplements  Maintain fluid intake.  Resume exercise.  Continue appropriate vitamins & minerals.    Return to clinic in 3 months.    SESSION TIME: 15 minutes

## 2017-10-26 NOTE — LETTER
October 26, 2017        Chas Chegn MD  [address], [address]  [place] [address]             [facility] - Bariatric Surgery  [address]  [place] [address]  Phone: [phone]  Fax: [phone]   Patient: Emilia Coyle   MR Number: [id number]   YOB: 1975   Date of Visit: 10/26/2017       Dear Dr. Cheng:    Thank you for referring Emilia Coyle to me for evaluation. Below are the relevant portions of my assessment and plan of care.            If you have questions, please do not hesitate to call me. I look forward to following Emilia along with you.    Sincerely,      Argelia Nation PA-C           CC  No Recipients

## 2017-10-26 NOTE — PATIENT INSTRUCTIONS
Meal and Vitamin Regimen: set timers or use phone parish to remind you to eat every 3-4 hours.  Each meal should contain 10-25 gm protein    - BF: MVI (with iron and thiamine) and SL Vitamin B12 500 mcg  - snack: 2 calcium citrate tablets (500 mg)  - DINESH: 2 calcium citrate tablets  - snack: 2 calcium citrate tablets  - DI: MVI (with iron and thiamine)      Take 1 in the morning with breakfast and 1 at night with dinner    STRETCHING  Stretching involves the ability to move joints and muscles through their full range of motion. It is a neglected area of most fitness routines. Stretching must be an integral part of a workout because it keeps the muscles and joints loose. In addition, it protects against injury, improves blood flow, and increases tendon flexibility. Stretching can be performed at any time of the day and practically anywhere. All one needs is a padded surface or exercise mat.    Stretching should be done for about 10 to 12 minutes and should cover all the muscle groups. Patients should be encouraged to stretch to the point that they can feel some minor discomfort, hold the stretch for 15 to 20 seconds, and repeat 2 to 3 times. Stretching should be done at least 2 to 3 times per week. The following are some basic stretches targeting the major muscle groups:    · [place] stretch (hamstrings, lower back): Sit on the floor with your legs outstretched in front of you and your feet together. Bend forward at the waist and reach toward your toes with your hands.  · Straddle stretch (groin, upper back, obliques): Sit on the floor and spread your legs apart. Reach your right hand toward your left foot. Hold for 15 to 20 seconds. Reach your left hand toward your right foot.  · Cat stretch (back, triceps, laterals): Kneel on the floor with your forearms and hands outstretched on the floor in front of you. Slide your forearms forward as far as possible while trying to keep your thighs perpendicular to the floor.  Maintaining this position, while supporting yourself with your arms and shoulders, attempt to lower and press your abdomen to the floor. Try to hold this position for 15 to 20 seconds.  · Door push (chest):  front of a doorway, step slightly in, and press both hands and arms against the door frame. Lean forward.  · Shoulder stretch: In a seated position with your back upright, grab your elbow with the opposite hand. Gently pull across your body. Hold for 15 to 20 seconds. Repeat with the other arm.    STRENGTHENING  An adequate resistance training program could include the following types of exercise, focusing on the major muscle groups. One can use 5 to 10 pound dumbbells for those exercises that require the use of weight. At home, one can use a household item that provides a comfortable weight, such as a milk carton or beverage container. These exercises can also be performed without weights.    · Chest (Bench Press): Hold the weights with your hands. Lying on a flat surface, with knees bent and feet flat, slowly bring the weights to the chest area with palms facing upward. Begin to exhale and press the weights up, fully extending the arms, and keeping them above your eyes. Inhale as you lower them to the starting position and repeat the movement.  · Back (Bent-Over Row): Start by placing your feet shoulder-width apart.  a weight with each hand just outside the knees. Keeping the back straight and the knees flexed, slightly bend forward at the waist. Let the arms hang naturally while holding the weights. From this starting position, pull the weights to the lower abdomen, keeping the elbows close to the body. Exhale as you pull. Return to the starting position, inhaling as you go.  · Arms (Bicep Curls): Hold a weight in each hand, with elbows at your sides, palms facing forward. The back should be straight, the chest flared outward. Begin to bend your right arm up first while exhaling, keeping the elbow  "totally stationary. Wait until the right arm goes completely down to the fully extended position, and then begin to curl the left arm. Each arm curled completes one full repetition.  · Shoulders (Lateral Raises): Place the feet a few inches apart with the knees bent slightly. Keep the back erect as you lean forward slightly. With the weights in front of your thighs and palms facing together, begin to slowly raise them up to the side until parallel with the floor. Lower the weights to your thighs, and repeat.  · Legs (Stiff Leg Dead Lift): Start by standing straight, holding the weights close to your sides, nearly touching your thighs. Keep the weights close to the body--this protects the back. The back must stay straight. Bend at the waist as far forward as you comfortably can while keeping your legs straight, and begin to feel the pull in your hamstrings as you lower the weights toward the ground. Slowly return to the starting position, keeping the back straight.  · Legs (Dumbbell Lunge): Hold a weight in each hand, arms hanging at your sides. Step out with one leg, keeping the back straight. It is important to step out far enough so that the knee does not extend over the toe. This puts too much stress on the knee. Go down far enough so that the opposite knee nearly touches the ground. Keep this stance and repeat the lunging motion for several repetitions.  · Abdominals: Do not perform standard sit-ups as these could hurt the lower back. Rather, focus on the following 2 exercises: (1) Obliques--Lie on your back with the knees flexed in the bent sit-up position. From this position, bring both knees down to the ground. With the back remaining flat, begin to flex your body toward your toes ("crunch"). Bring your shoulders up off the ground, but go slowly, controlling your momentum. Repeat this for 10 to 15 times. (2) Seated bench kicks/hilda knives--Sit on the end of a bench or chair with the hands placed behind the " buttocks. Begin to kick the legs outward with the knees bent slightly--at the same time, lean back to extend the torso. Come back to the beginning position and repeat this motion 10 to 15 times    Fruits and Vegetables       Include 1-2 servings of fruit daily.      1 serving of fruit includes ½ cup unsweetened applesauce, ½ medium banana, tennis ball size piece of fruit, 17 grapes, 1 cup melon, 1 cup strawberries, ¼ cup dried fruit     Include 2-3 servings of vegetables daily. 1 serving is 1 cup raw or ½ cup cooked.     Non-starchy vegetables include artichoke, asparagus, baby corn, bamboo shoots, beans: green/Italian/wax, bean sprouts, beets, broccoli, [place] sprouts, cabbage, carrots, cauliflower, celery, cucumber, eggplant, green onions or scallions, greens, jicama, leeks, mushrooms, okra, onions, pea pods, peppers, radishes, spinach, summer squash, tomatoes and salsa, turnips, vegetable juice cocktail, water chestnuts, zucchini      Remember these principles:   No liquids with meals. Do no drink 30 minutes before meals and wait 30 minutes to 1 hour after meals to start drinking.   Eat PROTEIN rich foods first at every meal or snack.   Sip on water, sugar-free beverages or non-fat milk throughout the day.  You will need to continue drinking at least 1 protein drink daily to meet protein needs.   100% fruit juice (no sugar added) is allowed, but limit to 4oz a day because it is high in calories and does not contain any protein.   Chew foods slowly; one meal should take 20-30 minutes.   Eat 5 meals (3 solid meals and 2 protein shakes) per day, without any additional snacking.   Stop eating as soon as you feel full.   Avoid using table sugar and foods made with refined sugar, which can trigger dumping syndrome.   Marinating meats with a low sugar marinade, adding low-fat salad dressing, or adding low calorie gravy (made from powder and water) can help meats to digest easier.     May add:  Raw  veggies  Lettuce: start with baby spinach leaves  Plain, Unsalted Nuts and Seeds: almonds, peanuts, pistachios.  1 serving daily or less  Protein bars with 0-4 grams of sugar, see attached handout    Snacks: (100-200 calories; >5g protein)    - 1 low-fat cheese stick with 8 cherry tomatoes or 1 serving fresh fruit  - 4 thin slices fat-free turkey breast and 1 slice low-fat cheese  - 4 thin slices fat-free honey ham with wedge of melon  - 2 slices of turkey adams  - Boiled eggs (can buy at [employer] already boiled w/ shell removed)  - for convenience,  [place] read, snack, go (deli meat and cheese rolls)  - P3 packets (Protein packs w/ cheese, nuts, lean deli meat)  - MHP Fit and Lean Protein Pudding (find at [employer] - per 1 cup serving = 100 calories, 15 g protein, 0 g sugar)  - 6-8 edamame pods (equivalent to about 1/4 cup edamame without pods).   - 1/4 cup unsalted nuts with ½ cup fruit  - 6-oz container Dannon Light n Fit vanilla yogurt, topped with 1oz unsalted nuts         - apple, celery or baby carrots spread with 2 Tbsp PB2  - apple slices with 1 oz slice low-fat cheese  - Apple slices dipped in 2 Tbsp of PB2  - 2 Tbsp PB2 mixed in light or greek yogurt or sugar-free pudding  - celery, cucumber, bell pepper or baby carrots dipped in ¼ cup hummus bean spread   - celery, cucumber, baby carrots dipped in high protein greek yogurt (Mix 16 oz plain greek yogurt + 1 packet of hidden valley ranch dip mix)  - Sergio Links Beef Steak - 14g protein! (similar to beef jerky but very lean)  - 2 wedges Laughing Cow - Light Herb & Garlic Cheese with sliced cucumber or green bell pepper  - 1/2 cup low-fat cottage cheese with ¼ cup fruit or ¼ cup salsa  - 1/2 cup low fat cottage cheese with 10-15 cherry tomatoes  - 8 oz glass of FAIRLIFE fat free milk (13 g protein)  - 8 oz glass of FAIRLIFE fat free milk + 1 packet of sugar-free hot cocoa  - Add Metwit advantage Cafe Caramel shake to decaf coffee. Serve hot or  "blend with ice for "frappaccino" like drink  - RTD Protein drinks: Atkins, Low Carb Slim Fast, EAS light, Muscle Milk Light, etc.  - Homemade Protein drinks: GNC Soy95, Isopure, Nectar, UNJURY, Whey Gourmet, etc. Mix 1 scoop powder with 8oz skim/1% milk or light soymilk.  - Protein bars: Atkins, EAS, Pure Protein,  Quest, Think Thin, Detour, etc. Must have 0-4 grams sugar - Read the label.    ** Be CREATIVE. You can always snack on bites of grilled chicken or tuna salad made with low fat wynne, if needed!   "

## 2017-10-26 NOTE — PROGRESS NOTES
BARIATRIC POST OPERATIVE FOLLOW UP:    Chief Complaint   Patient presents with    Follow-up     3 month sleeve       HISTORY OF PRESENT ILLNESS: Emilia Coyle is a 42 y.o. female with a Body mass index is 30.89 kg/m². who presents for a 3 month follow up s/p Lap Sleeve with Dr. Aleman on 7/28/2017.  She is doing well and tolerating the diet without difficulty.  She feels great and is very pleased with her progress.  Diabetes is resolved and she is off medications, blood sugar is running normal.  She has lost 46 lbs, approximately 51% of their excess weight.  She has no complaints.  She reports that abdominal pain she was experiencing has subsided.  She will have the EGD and colonoscopy with biopsy at the  tomorrow.    Denies: nausea, vomiting, abdominal pain, changes in bowel movement pattern, fever, chills, dysphagia, chest pain, and shortness of breath.    Review of Systems   Constitutional: Negative for chills, fever and malaise/fatigue.   Eyes: Negative for blurred vision and double vision.   Respiratory: Negative for cough, hemoptysis and shortness of breath.    Cardiovascular: Negative for chest pain, palpitations and leg swelling.   Gastrointestinal: Negative for abdominal pain, blood in stool, constipation, diarrhea, heartburn, melena, nausea and vomiting.   Genitourinary: Negative for dysuria and hematuria.   Musculoskeletal: Negative for back pain, falls, joint pain, myalgias and neck pain.   Skin: Negative for rash.   Neurological: Negative for dizziness, tingling, weakness and headaches.   Endo/Heme/Allergies: Negative for environmental allergies. Does not bruise/bleed easily.   Psychiatric/Behavioral: Negative.        EXERCISE & VITAMINS:  See Bariatric Assessment    MEDICATIONS/ALLERGIES:  Have been reviewed.    DIET:  Soft Bariatric Diet.  0-1 Premier RTD protein shakes daily plus chicken and eggs ~40 grams protein.  48+ oz H20 and Clear SF Liquids.      Vitals:    10/26/17 0825   BP: 111/67    Pulse: (!) 55       Physical Exam   Constitutional: She is oriented to person, place, and time. She appears well-developed and well-nourished.   HENT:   Head: Normocephalic and atraumatic.   Cardiovascular: Normal rate and regular rhythm.    Pulmonary/Chest: Effort normal and breath sounds normal.   Abdominal: Soft. Bowel sounds are normal. She exhibits no distension and no mass. There is no tenderness. There is no rebound and no guarding. No hernia.   WHSS   Musculoskeletal: She exhibits no edema.   Neurological: She is alert and oriented to person, place, and time.   Skin: Skin is warm and dry. No rash noted. No erythema. No pallor.   Psychiatric: She has a normal mood and affect. Her behavior is normal. Judgment and thought content normal.   Nursing note and vitals reviewed.      ASSESSMENT:  - Obesity, Body mass index is 30.89 kg/m².,  s/p sleeve gastrectomy on 7/28/2017.  - Estimated goal weight, 185 lbs, which is 50% EWL  - Co-morbidities: MIKAELA (stable), DM2 (resolved)  - Great Weight loss, 46 lbs, 51% EWL  - Good Exercise regimen  - Good Vitamin Regimen  - Good Diet  - Not at risk for fall or abuse    PLAN:  - Emphasized the importance of regular exercise and adherence to bariatric diet to achieve maximum weight loss.  - Encouraged patient to continue regular exercise.  - Follow-up with dietician to advance diet.  - Continue daily vitamins and medications.  - Ursodiol 500 mg daily for 5 months for the gallbladder.  - Anti-Acid medication, Omeprazole daily until next visit.  Will discuss taper in Jan 2017.  - No restrictions to exercise.  - Miralax daily for constipation, no fiber.  - No NSAIDs, Tylenol for pain.  - Can swallow whole pills on 10/28/17.  - RTC in 3 months or sooner if needed.  - Call the office for any issues.  - Check labs next visit.    15 minute visit, over 50% of time spent counseling patient face to face on diet, exercise, and weight loss.

## 2017-10-27 LAB — VIT B1 SERPL-MCNC: 47 UG/L (ref 38–122)

## 2017-11-02 ENCOUNTER — OFFICE VISIT (OUTPATIENT)
Dept: INTERNAL MEDICINE | Facility: CLINIC | Age: 42
End: 2017-11-02
Payer: COMMERCIAL

## 2017-11-02 ENCOUNTER — PATIENT MESSAGE (OUTPATIENT)
Dept: BARIATRICS | Facility: CLINIC | Age: 42
End: 2017-11-02

## 2017-11-02 VITALS
SYSTOLIC BLOOD PRESSURE: 118 MMHG | HEIGHT: 66 IN | TEMPERATURE: 98 F | BODY MASS INDEX: 29.83 KG/M2 | WEIGHT: 185.63 LBS | HEART RATE: 64 BPM | DIASTOLIC BLOOD PRESSURE: 60 MMHG | RESPIRATION RATE: 20 BRPM

## 2017-11-02 DIAGNOSIS — Z00.00 ANNUAL PHYSICAL EXAM: Primary | ICD-10-CM

## 2017-11-02 DIAGNOSIS — K59.00 CONSTIPATION, UNSPECIFIED CONSTIPATION TYPE: ICD-10-CM

## 2017-11-02 DIAGNOSIS — R79.89 LOW TSH LEVEL: ICD-10-CM

## 2017-11-02 PROCEDURE — 99396 PREV VISIT EST AGE 40-64: CPT | Mod: S$GLB,,, | Performed by: INTERNAL MEDICINE

## 2017-11-02 PROCEDURE — 99999 PR PBB SHADOW E&M-EST. PATIENT-LVL III: CPT | Mod: PBBFAC,,, | Performed by: INTERNAL MEDICINE

## 2017-11-02 NOTE — PROGRESS NOTES
"Subjective:       Patient ID: Emilia Coyle is a 42 y.o. female.    Chief Complaint: Annual Exam    Patient is a 42 y.o.female who presents today for annual.      Cholesterol: reviewed  Eye: up to date; done in may  Vaccines: Influenza: done this month  Mammogram: done in aug, sept at [facility]  Gyn exam: goes in may/ [facility]  Colonoscopy: last Friday; some ulcerative spots; colitis with ulcers; going back next Thursday to see dr. Cheng; hemorrhoid surgery in office next week      Past Medical History:   Diagnosis Date    Celiac disease     Diabetes mellitus     Hypothyroidism     Sleep apnea in adult     Thyroid disease      Past Surgical History:   Procedure Laterality Date    ABDOMINAL SURGERY      abdominalplasty     ADENOIDECTOMY      BLADDER SUSPENSION      BREAST SURGERY      GASTRECTOMY      HYSTERECTOMY      TONSILLECTOMY      TOTAL THYROIDECTOMY Right     TUBAL LIGATION      TYMPANOSTOMY TUBE PLACEMENT       Social History     Social History    Marital status:      Spouse name: N/A    Number of children: N/A    Years of education: N/A     Occupational History    Not on file.     Social History Main Topics    Smoking status: Never Smoker    Smokeless tobacco: Never Used    Alcohol use No    Drug use: No    Sexual activity: Not on file     Other Topics Concern    Not on file     Social History Narrative    No narrative on file     Review of patient's allergies indicates:   Allergen Reactions    Gluten protein Hives, Diarrhea, Itching, Nausea And Vomiting, Swelling, Anxiety and Dermatitis    Latex, natural rubber Dermatitis     Latex gloves with Powder    Rizatriptan Photosensitivity, Nausea Only and Other (See Comments)     "Feels like my brain is on fire."    Sumatriptan Photosensitivity, Nausea Only and Other (See Comments)     "Feels like my brain is on fire."    Zolmitriptan Photosensitivity, Nausea Only and Other (See Comments)     "Feels like my brain is on fire." "     Ms. Coyle had no medications administered during this visit.    Review of Systems   Constitutional: Negative for activity change, appetite change, chills, diaphoresis, fatigue, fever and unexpected weight change.   HENT: Negative for congestion, dental problem, ear discharge, ear pain, hearing loss, postnasal drip, rhinorrhea, sinus pressure, sore throat and trouble swallowing.    Eyes: Negative for discharge, redness, itching and visual disturbance.   Respiratory: Negative for cough, chest tightness, shortness of breath and wheezing.    Cardiovascular: Negative for chest pain, palpitations and leg swelling.   Gastrointestinal: Positive for constipation. Negative for abdominal pain, blood in stool, diarrhea, nausea and vomiting.   Endocrine: Negative for cold intolerance, heat intolerance, polydipsia and polyuria.   Genitourinary: Negative for difficulty urinating, dysuria, frequency, hematuria, menstrual problem and urgency.   Musculoskeletal: Positive for arthralgias. Negative for back pain, gait problem, joint swelling, myalgias and neck pain.   Skin: Negative for color change and rash.   Neurological: Negative for dizziness, syncope, weakness and headaches.   Hematological: Negative for adenopathy.   Psychiatric/Behavioral: Negative for behavioral problems, confusion, dysphoric mood and sleep disturbance. The patient is not nervous/anxious.        Objective:      Physical Exam   Constitutional: She is oriented to person, place, and time. She appears well-developed and well-nourished. No distress.   HENT:   Head: Normocephalic and atraumatic.   Right Ear: External ear normal.   Left Ear: External ear normal.   Nose: Nose normal.   Mouth/Throat: Oropharynx is clear and moist. No oropharyngeal exudate.   Eyes: Conjunctivae and EOM are normal. Pupils are equal, round, and reactive to light. Right eye exhibits no discharge. Left eye exhibits no discharge. No scleral icterus.   Neck: Normal range of motion. Neck  supple. No JVD present. No thyromegaly present.   Cardiovascular: Normal rate, regular rhythm, normal heart sounds and intact distal pulses.  Exam reveals no gallop and no friction rub.    No murmur heard.  Pulmonary/Chest: Effort normal and breath sounds normal. No stridor. No respiratory distress. She has no wheezes. She has no rales. She exhibits no tenderness.   Abdominal: Soft. Bowel sounds are normal. She exhibits no distension. There is no tenderness. There is no rebound.   Musculoskeletal: Normal range of motion. She exhibits no edema or tenderness.   Lymphadenopathy:     She has no cervical adenopathy.   Neurological: She is alert and oriented to person, place, and time. No cranial nerve deficit.   Skin: Skin is warm and dry. No rash noted. She is not diaphoretic. No erythema.   Psychiatric: She has a normal mood and affect. Her behavior is normal.   Nursing note and vitals reviewed.      Assessment and Plan:       1. Annual physical exam  - labs reviewed  - mammo and gyn up to date    2. Low TSH level  - TSH; Future  - T4, free; Future    3. Constipation, unspecified constipation type  - on miralax          No Follow-up on file.

## 2017-11-14 RX ORDER — CLONAZEPAM 1 MG/1
TABLET ORAL
Qty: 60 TABLET | Refills: 0 | Status: SHIPPED | OUTPATIENT
Start: 2017-11-14 | End: 2017-12-18 | Stop reason: SDUPTHER

## 2017-11-16 ENCOUNTER — TELEPHONE (OUTPATIENT)
Dept: BARIATRICS | Facility: CLINIC | Age: 42
End: 2017-11-16

## 2017-11-16 RX ORDER — ERGOCALCIFEROL 1.25 MG/1
50000 CAPSULE ORAL WEEKLY
Qty: 12 CAPSULE | Refills: 0 | Status: SHIPPED | OUTPATIENT
Start: 2017-11-16 | End: 2018-02-14

## 2017-11-16 NOTE — TELEPHONE ENCOUNTER
Called to discuss vitamin deficiencies Vitamin D.   Reccommended patient begin taking Vitamin D Rx.  Patient verbalized understanding.

## 2017-11-16 NOTE — TELEPHONE ENCOUNTER
----- Message from Argelia Nation PA-C sent at 11/15/2017  3:59 PM CST -----  Please make sure patient has Vit D 50,000 IU once/ week.

## 2017-11-22 ENCOUNTER — PATIENT MESSAGE (OUTPATIENT)
Dept: BARIATRICS | Facility: CLINIC | Age: 42
End: 2017-11-22

## 2017-11-22 DIAGNOSIS — R10.13 EPIGASTRIC PAIN: Primary | ICD-10-CM

## 2017-11-22 DIAGNOSIS — Z98.84 STATUS POST LAPAROSCOPIC SLEEVE GASTRECTOMY: ICD-10-CM

## 2017-11-22 DIAGNOSIS — R12 HEARTBURN: ICD-10-CM

## 2017-11-22 DIAGNOSIS — R10.33 UMBILICAL PAIN: Primary | ICD-10-CM

## 2017-11-22 DIAGNOSIS — K21.9 GASTROESOPHAGEAL REFLUX DISEASE, ESOPHAGITIS PRESENCE NOT SPECIFIED: ICD-10-CM

## 2017-11-22 RX ORDER — ESOMEPRAZOLE MAGNESIUM 40 MG/1
40 CAPSULE, DELAYED RELEASE ORAL
Qty: 30 CAPSULE | Refills: 12 | Status: SHIPPED | OUTPATIENT
Start: 2017-11-22 | End: 2018-07-16

## 2017-11-22 NOTE — TELEPHONE ENCOUNTER
Spoke to patient: she has been at the ED at .  She is being discharged currently.  She was given GI cocktail, which helped her pain some.  She did not tolerate it easily.      She did HIDA scan, however CCK was not administered.  She needs to have this re-done.  She is available to do it this Friday at 2-3pm.      Suspect she is havin biliary colic.      Will switch PPI to Nexium 40 mg qac and zantac 150 mg qhs.  All questions answered.  She will see gi next week for path report from EGD and colonoscopy. She will call/ email back with those results.

## 2017-11-22 NOTE — TELEPHONE ENCOUNTER
Called patient, Argelia wanting a HIDA with CCK done. Called [facility] unable to take patient Friday as patient requested. Patient informed and wants the earliest scheduled time [facility] has. Called [facility] Tuesday Nov 28th at 8am. Instructions given to patient.

## 2017-11-27 ENCOUNTER — PATIENT MESSAGE (OUTPATIENT)
Dept: BARIATRICS | Facility: CLINIC | Age: 42
End: 2017-11-27

## 2017-11-28 ENCOUNTER — HOSPITAL ENCOUNTER (OUTPATIENT)
Dept: RADIOLOGY | Facility: HOSPITAL | Age: 42
Discharge: HOME | End: 2017-11-28
Attending: PHYSICIAN ASSISTANT
Payer: COMMERCIAL

## 2017-11-28 ENCOUNTER — TELEPHONE (OUTPATIENT)
Dept: BARIATRICS | Facility: CLINIC | Age: 42
End: 2017-11-28

## 2017-11-28 DIAGNOSIS — R12 HEARTBURN: ICD-10-CM

## 2017-11-28 DIAGNOSIS — R10.13 EPIGASTRIC PAIN: ICD-10-CM

## 2017-11-28 DIAGNOSIS — Z98.84 STATUS POST LAPAROSCOPIC SLEEVE GASTRECTOMY: ICD-10-CM

## 2017-11-28 PROCEDURE — 78227 HEPATOBIL SYST IMAGE W/DRUG: CPT | Mod: TC

## 2017-11-28 PROCEDURE — 78227 HEPATOBIL SYST IMAGE W/DRUG: CPT | Mod: 26,,, | Performed by: NUCLEAR MEDICINE

## 2017-11-28 PROCEDURE — B4155 EF INCOMPLETE/MODULAR: HCPCS

## 2017-11-28 NOTE — TELEPHONE ENCOUNTER
----- Message from Rui Maya sent at 11/28/2017  2:32 PM CST -----  Patient states that (s)he needs to speak with nurse in ref to scheduling her sx//please call back at [phone]//thank you

## 2017-11-28 NOTE — TELEPHONE ENCOUNTER
Called patient and notified her of bililary colic and need for lap yared.  appt scheduled with preop clinic on 11/30/17.  Patient aware of date/time/location of appt

## 2017-11-29 ENCOUNTER — PATIENT MESSAGE (OUTPATIENT)
Dept: BARIATRICS | Facility: CLINIC | Age: 42
End: 2017-11-29

## 2017-11-29 DIAGNOSIS — R10.9 ABDOMINAL CRAMPING: ICD-10-CM

## 2017-11-29 DIAGNOSIS — R11.0 NAUSEA: Primary | ICD-10-CM

## 2017-11-29 RX ORDER — PROMETHAZINE HYDROCHLORIDE 25 MG/1
25 TABLET ORAL EVERY 6 HOURS PRN
Qty: 30 TABLET | Refills: 0 | Status: SHIPPED | OUTPATIENT
Start: 2017-11-29 | End: 2018-02-07

## 2017-11-29 RX ORDER — ONDANSETRON 8 MG/1
8 TABLET, ORALLY DISINTEGRATING ORAL EVERY 6 HOURS PRN
Qty: 30 TABLET | Refills: 0 | Status: SHIPPED | OUTPATIENT
Start: 2017-11-29 | End: 2018-02-07

## 2017-11-29 RX ORDER — DICYCLOMINE HYDROCHLORIDE 10 MG/1
10 CAPSULE ORAL 3 TIMES DAILY
Qty: 90 CAPSULE | Refills: 0 | Status: SHIPPED | OUTPATIENT
Start: 2017-11-29 | End: 2017-12-29

## 2017-11-30 ENCOUNTER — OFFICE VISIT (OUTPATIENT)
Dept: SURGERY | Facility: CLINIC | Age: 42
End: 2017-11-30
Payer: COMMERCIAL

## 2017-11-30 ENCOUNTER — PATIENT MESSAGE (OUTPATIENT)
Dept: BARIATRICS | Facility: CLINIC | Age: 42
End: 2017-11-30

## 2017-11-30 VITALS
BODY MASS INDEX: 29.77 KG/M2 | HEART RATE: 59 BPM | WEIGHT: 178.69 LBS | DIASTOLIC BLOOD PRESSURE: 59 MMHG | SYSTOLIC BLOOD PRESSURE: 97 MMHG | HEIGHT: 65 IN

## 2017-11-30 DIAGNOSIS — R10.13 EPIGASTRIC PAIN: ICD-10-CM

## 2017-11-30 DIAGNOSIS — K80.20 GALLSTONES: Primary | ICD-10-CM

## 2017-11-30 PROCEDURE — 99213 OFFICE O/P EST LOW 20 MIN: CPT | Mod: S$GLB,,, | Performed by: SURGERY

## 2017-11-30 PROCEDURE — 99999 PR PBB SHADOW E&M-EST. PATIENT-LVL III: CPT | Mod: PBBFAC,,, | Performed by: SURGERY

## 2017-11-30 NOTE — Clinical Note
November 30, 2017      Rosanna Benitez, DO  [address] [facility] [place] [address]           [facility]  [address]  [place] [address]  Phone: [phone]          Patient: Emilia Coyle   MR Number: [id number]   YOB: 1975   Date of Visit: 11/30/2017       Dear Dr. Rosanna Benitez:    Thank you for referring Emilia Coyle to me for evaluation. Attached you will find relevant portions of my assessment and plan of care.    If you have questions, please do not hesitate to call me. I look forward to following Emilia Coyle along with you.    Sincerely,    Laith Aleman MD    Enclosure  CC:  No Recipients    If you would like to receive this communication electronically, please contact [email or web address] or [phone] to request more information on [facility] access.    For providers and/or their staff who would like to refer a patient to [facility], please contact us through our one-stop-shop provider referral line, [facility] , at [phone].    If you feel you have received this communication in error or would no longer like to receive these types of communications, please e-mail [email or web address]

## 2017-11-30 NOTE — LETTER
[facility]  [address]  [place] [address]  Phone: [phone] November 30, 2017      Rosanna Benitez, DO  [address] [facility] [address]    Patient: Emilia Coyle   MR Number: [id number]   YOB: 1975   Date of Visit: 11/30/2017     Dear Dr. Benitez:    Thank you for referring Emilia Coyle to me for evaluation. Below are the relevant portions of my assessment and plan of care.    Assessment:          1. Epigastric pain     2. Inflammatory bowel disease  Plan:       RUQ pain-               Likely biliary colic, obtain ultrasound  Inflammatory bowel disease (RLQ) -              Follow up GI    If you have questions, please do not hesitate to call me. I look forward to following Emilia along with you.    Sincerely,      Laith Aleman MD   Section Head - General, Laparoscopic, Bariatric  Acute Care and Oncologic Surgery   - Surgical Weight Loss Program  [facility]    WSR/ruth    CC  ROBERT Bradley MD

## 2017-11-30 NOTE — PROGRESS NOTES
Subjective:       Patient ID: Emilia Coyle is a 42 y.o. female.    Chief Complaint: Consult    HPI S/p sleeve 7/28/17.  Now with abdominal pain.  She has recurring epigastric pain and radiates to the right side and right/mid back.  This has been going on for weeks and is getting worse.  She is not sure what makes it start up but when she had a fried pickle she had the pain and also vomited.  The pain lasts 2 minutes to several hours.  It happens intermittently every day.  She has been to the ER twice (but only seen once as she had to wait three hours in our OR and left without being seen).  She denies fevers, is still losing weight due to sleeve, denies diarrhea but is taking mirilax for constipation.  Review of Systems   Respiratory: Negative for chest tightness.    Cardiovascular: Negative for chest pain.   Genitourinary: Negative for difficulty urinating and dysuria.   Hematological: Does not bruise/bleed easily.       Objective:    ct, cckhida, cscope, egd and labs reviewed  Physical Exam   Constitutional: She is oriented to person, place, and time. She appears well-developed and well-nourished.   Cardiovascular: Normal rate, regular rhythm and normal heart sounds.    Pulmonary/Chest: Effort normal and breath sounds normal.   Abdominal: Soft. Bowel sounds are normal. There is tenderness in the right upper quadrant and epigastric area. There is no rigidity and no guarding.   Neurological: She is alert and oriented to person, place, and time.   Skin: Skin is warm and dry.   Psychiatric: She has a normal mood and affect. Her behavior is normal. Judgment and thought content normal.   Vitals reviewed.      Assessment:       1. Epigastric pain      2. Inflammatory bowel disease  Plan:       RUQ pain   Likely biliary colic, obtain u/s  Inflammatory bowel disease (rlq)   Follow up gi.

## 2017-12-11 ENCOUNTER — PATIENT MESSAGE (OUTPATIENT)
Dept: BARIATRICS | Facility: CLINIC | Age: 42
End: 2017-12-11

## 2017-12-12 ENCOUNTER — PATIENT MESSAGE (OUTPATIENT)
Dept: BARIATRICS | Facility: CLINIC | Age: 42
End: 2017-12-12

## 2017-12-12 ENCOUNTER — TELEPHONE (OUTPATIENT)
Dept: BARIATRICS | Facility: CLINIC | Age: 42
End: 2017-12-12

## 2017-12-12 NOTE — TELEPHONE ENCOUNTER
----- Message from Warren Reed sent at 12/12/2017  4:43 PM CST -----  [phone] after [phone]//pt states that she needs to nurse in ref her GI stating that she needs her gallbladder out//please call/thank you

## 2017-12-12 NOTE — TELEPHONE ENCOUNTER
Returned patient call- notified patient that we will call her tomorrow after I discuss a date with the surgeon.  Patient verbalized understanding

## 2017-12-14 ENCOUNTER — HOSPITAL ENCOUNTER (INPATIENT)
Facility: HOSPITAL | Age: 42
LOS: 1 days | Discharge: HOME | DRG: 419 | End: 2017-12-16
Attending: SURGERY | Admitting: SURGERY
Payer: COMMERCIAL

## 2017-12-14 ENCOUNTER — TELEPHONE (OUTPATIENT)
Dept: SURGERY | Facility: CLINIC | Age: 42
End: 2017-12-14

## 2017-12-14 ENCOUNTER — ANESTHESIA EVENT (OUTPATIENT)
Dept: SURGERY | Facility: HOSPITAL | Age: 42
DRG: 419 | End: 2017-12-14
Payer: COMMERCIAL

## 2017-12-14 ENCOUNTER — TELEPHONE (OUTPATIENT)
Dept: BARIATRICS | Facility: CLINIC | Age: 42
End: 2017-12-14

## 2017-12-14 DIAGNOSIS — K80.20 GALLSTONES: Primary | ICD-10-CM

## 2017-12-14 DIAGNOSIS — K81.0 ACUTE CHOLECYSTITIS: ICD-10-CM

## 2017-12-14 DIAGNOSIS — R10.11 RUQ PAIN: ICD-10-CM

## 2017-12-14 LAB
ALBUMIN SERPL BCP-MCNC: 3.8 G/DL
ALP SERPL-CCNC: 100 U/L
ALT SERPL W/O P-5'-P-CCNC: 26 U/L
ANION GAP SERPL CALC-SCNC: 9 MMOL/L
AST SERPL-CCNC: 70 U/L
BASOPHILS # BLD AUTO: 0.09 K/UL
BASOPHILS NFR BLD: 0.6 %
BILIRUB SERPL-MCNC: 0.4 MG/DL
BUN SERPL-MCNC: 17 MG/DL
CALCIUM SERPL-MCNC: 9.3 MG/DL
CHLORIDE SERPL-SCNC: 107 MMOL/L
CO2 SERPL-SCNC: 22 MMOL/L
CREAT SERPL-MCNC: 0.7 MG/DL
DIFFERENTIAL METHOD: ABNORMAL
EOSINOPHIL # BLD AUTO: 0.1 K/UL
EOSINOPHIL NFR BLD: 0.8 %
ERYTHROCYTE [DISTWIDTH] IN BLOOD BY AUTOMATED COUNT: 14.2 %
EST. GFR  (AFRICAN AMERICAN): >60 ML/MIN/1.73 M^2
EST. GFR  (NON AFRICAN AMERICAN): >60 ML/MIN/1.73 M^2
GLUCOSE SERPL-MCNC: 93 MG/DL
HCT VFR BLD AUTO: 35.8 %
HGB BLD-MCNC: 11.9 G/DL
IMM GRANULOCYTES # BLD AUTO: 0.06 K/UL
IMM GRANULOCYTES NFR BLD AUTO: 0.4 %
LIPASE SERPL-CCNC: 31 U/L
LYMPHOCYTES # BLD AUTO: 2 K/UL
LYMPHOCYTES NFR BLD: 13.2 %
MCH RBC QN AUTO: 28.3 PG
MCHC RBC AUTO-ENTMCNC: 33.2 G/DL
MCV RBC AUTO: 85 FL
MONOCYTES # BLD AUTO: 0.6 K/UL
MONOCYTES NFR BLD: 4.1 %
NEUTROPHILS # BLD AUTO: 12.5 K/UL
NEUTROPHILS NFR BLD: 80.9 %
NRBC BLD-RTO: 0 /100 WBC
PLATELET # BLD AUTO: 292 K/UL
PMV BLD AUTO: 11.2 FL
POTASSIUM SERPL-SCNC: 3.7 MMOL/L
PROT SERPL-MCNC: 7.2 G/DL
RBC # BLD AUTO: 4.21 M/UL
SODIUM SERPL-SCNC: 138 MMOL/L
WBC # BLD AUTO: 15.44 K/UL

## 2017-12-14 PROCEDURE — G0378 HOSPITAL OBSERVATION PER HR: HCPCS

## 2017-12-14 PROCEDURE — 25000003 PHARM REV CODE 250: Performed by: STUDENT IN AN ORGANIZED HEALTH CARE EDUCATION/TRAINING PROGRAM

## 2017-12-14 PROCEDURE — 80053 COMPREHEN METABOLIC PANEL: CPT

## 2017-12-14 PROCEDURE — 96375 TX/PRO/DX INJ NEW DRUG ADDON: CPT

## 2017-12-14 PROCEDURE — 25000003 PHARM REV CODE 250: Performed by: SURGERY

## 2017-12-14 PROCEDURE — 96372 THER/PROPH/DIAG INJ SC/IM: CPT | Mod: 59

## 2017-12-14 PROCEDURE — 96374 THER/PROPH/DIAG INJ IV PUSH: CPT

## 2017-12-14 PROCEDURE — 99284 EMERGENCY DEPT VISIT MOD MDM: CPT | Mod: ,,, | Performed by: EMERGENCY MEDICINE

## 2017-12-14 PROCEDURE — S0028 INJECTION, FAMOTIDINE, 20 MG: HCPCS | Performed by: SURGERY

## 2017-12-14 PROCEDURE — 63600175 PHARM REV CODE 636 W HCPCS: Performed by: SURGERY

## 2017-12-14 PROCEDURE — 99221 1ST HOSP IP/OBS SF/LOW 40: CPT | Mod: ,,, | Performed by: SURGERY

## 2017-12-14 PROCEDURE — 85025 COMPLETE CBC W/AUTO DIFF WBC: CPT

## 2017-12-14 PROCEDURE — 96361 HYDRATE IV INFUSION ADD-ON: CPT

## 2017-12-14 PROCEDURE — 63600175 PHARM REV CODE 636 W HCPCS: Performed by: STUDENT IN AN ORGANIZED HEALTH CARE EDUCATION/TRAINING PROGRAM

## 2017-12-14 PROCEDURE — 99285 EMERGENCY DEPT VISIT HI MDM: CPT | Mod: 25

## 2017-12-14 PROCEDURE — 83690 ASSAY OF LIPASE: CPT

## 2017-12-14 RX ORDER — FAMOTIDINE 10 MG/ML
20 INJECTION INTRAVENOUS 2 TIMES DAILY
Status: DISCONTINUED | OUTPATIENT
Start: 2017-12-14 | End: 2017-12-16 | Stop reason: HOSPADM

## 2017-12-14 RX ORDER — ALBUTEROL SULFATE 90 UG/1
2 AEROSOL, METERED RESPIRATORY (INHALATION) EVERY 6 HOURS PRN
Status: DISCONTINUED | OUTPATIENT
Start: 2017-12-14 | End: 2017-12-16 | Stop reason: HOSPADM

## 2017-12-14 RX ORDER — ONDANSETRON 2 MG/ML
4 INJECTION INTRAMUSCULAR; INTRAVENOUS
Status: COMPLETED | OUTPATIENT
Start: 2017-12-14 | End: 2017-12-14

## 2017-12-14 RX ORDER — ENOXAPARIN SODIUM 100 MG/ML
40 INJECTION SUBCUTANEOUS
Status: DISCONTINUED | OUTPATIENT
Start: 2017-12-14 | End: 2017-12-16 | Stop reason: HOSPADM

## 2017-12-14 RX ORDER — HYDROMORPHONE HYDROCHLORIDE 1 MG/ML
1 INJECTION, SOLUTION INTRAMUSCULAR; INTRAVENOUS; SUBCUTANEOUS EVERY 4 HOURS PRN
Status: DISCONTINUED | OUTPATIENT
Start: 2017-12-14 | End: 2017-12-16 | Stop reason: HOSPADM

## 2017-12-14 RX ORDER — SODIUM CHLORIDE 0.9 % (FLUSH) 0.9 %
3 SYRINGE (ML) INJECTION
Status: DISCONTINUED | OUTPATIENT
Start: 2017-12-14 | End: 2017-12-16 | Stop reason: HOSPADM

## 2017-12-14 RX ORDER — HYDROMORPHONE HYDROCHLORIDE 1 MG/ML
0.5 INJECTION, SOLUTION INTRAMUSCULAR; INTRAVENOUS; SUBCUTANEOUS
Status: COMPLETED | OUTPATIENT
Start: 2017-12-14 | End: 2017-12-14

## 2017-12-14 RX ORDER — SODIUM CHLORIDE 9 MG/ML
INJECTION, SOLUTION INTRAVENOUS CONTINUOUS
Status: DISCONTINUED | OUTPATIENT
Start: 2017-12-14 | End: 2017-12-15

## 2017-12-14 RX ORDER — PROMETHAZINE HYDROCHLORIDE 25 MG/1
25 SUPPOSITORY RECTAL EVERY 6 HOURS PRN
Status: DISCONTINUED | OUTPATIENT
Start: 2017-12-14 | End: 2017-12-16 | Stop reason: HOSPADM

## 2017-12-14 RX ORDER — LEVOTHYROXINE SODIUM 100 UG/1
100 TABLET ORAL EVERY MORNING
Status: DISCONTINUED | OUTPATIENT
Start: 2017-12-15 | End: 2017-12-16 | Stop reason: HOSPADM

## 2017-12-14 RX ORDER — HYDROMORPHONE HYDROCHLORIDE 1 MG/ML
0.5 INJECTION, SOLUTION INTRAMUSCULAR; INTRAVENOUS; SUBCUTANEOUS EVERY 4 HOURS PRN
Status: DISCONTINUED | OUTPATIENT
Start: 2017-12-14 | End: 2017-12-16 | Stop reason: HOSPADM

## 2017-12-14 RX ORDER — ONDANSETRON 8 MG/1
8 TABLET, ORALLY DISINTEGRATING ORAL EVERY 8 HOURS PRN
Status: DISCONTINUED | OUTPATIENT
Start: 2017-12-14 | End: 2017-12-16 | Stop reason: HOSPADM

## 2017-12-14 RX ADMIN — SODIUM CHLORIDE 1000 ML: 0.9 INJECTION, SOLUTION INTRAVENOUS at 06:12

## 2017-12-14 RX ADMIN — ONDANSETRON 4 MG: 2 INJECTION INTRAMUSCULAR; INTRAVENOUS at 06:12

## 2017-12-14 RX ADMIN — FAMOTIDINE 20 MG: 10 INJECTION, SOLUTION INTRAVENOUS at 09:12

## 2017-12-14 RX ADMIN — ENOXAPARIN SODIUM 40 MG: 100 INJECTION SUBCUTANEOUS at 09:12

## 2017-12-14 RX ADMIN — HYDROMORPHONE HYDROCHLORIDE 1 MG: 1 INJECTION, SOLUTION INTRAMUSCULAR; INTRAVENOUS; SUBCUTANEOUS at 10:12

## 2017-12-14 RX ADMIN — HYDROMORPHONE HYDROCHLORIDE 0.5 MG: 1 INJECTION, SOLUTION INTRAMUSCULAR; INTRAVENOUS; SUBCUTANEOUS at 06:12

## 2017-12-14 RX ADMIN — SODIUM CHLORIDE: 0.9 INJECTION, SOLUTION INTRAVENOUS at 09:12

## 2017-12-14 NOTE — ED TRIAGE NOTES
"Pt c/o "gallbladder pain".  Pain is epigastric with radiation to RUQ and to back.  Pt is scheduled for surgery tomorrow but pain is worsened.  (+) n/v.    "

## 2017-12-14 NOTE — TELEPHONE ENCOUNTER
----- Message from Alexey Boyle sent at 12/14/2017  4:18 PM CST -----  Maria Del Carmen    Pt said she was scheduled for surgery on tomorrow. Pt said she is in a lot of pain. When I clicked back over to pt she stated that she was going to the Er. Please call pt at [phone]

## 2017-12-14 NOTE — TELEPHONE ENCOUNTER
Called patient and her cousin said she's very sick and calling an ambulance. They are coming to [facility] and I told her I'd notify the ER and also .  I called the ER and notified Cassie the charge RN and gave her info.. Notified Dr. Aleman as well.

## 2017-12-14 NOTE — ED NOTES
Appearance:  Pt awake, alert & oriented to person, place & time.  Pt in no acute distress at present time.  Skin:  Skin warm, dry & intact.  Mucous membranes moist.  Skin turgor normal.  Respiratory:  Respirations even, non-labored.    Neurologic:  Pt moving all extremities without difficulty.  Sensation intact.     Peripheral Vascular:  All peripheral pulses present.  Abdomen:  Abdomen soft.  Tenderness epigastric & RUQ.

## 2017-12-14 NOTE — ANESTHESIA PREPROCEDURE EVALUATION
[facility]  Anesthesia Pre-Operative Evaluation         Patient Name: Emilia Coyle  YOB: 1975  MRN: [id number]    SUBJECTIVE:     Pre-operative evaluation for Procedure(s) (LRB):  CHOLECYSTECTOMY-LAPAROSCOPIC (N/A)  Scheduled for 12/15/2017    HPI 12/14/2017:  Emilia Coyle is a 42 y.o. female with PMH of DM, hypothyroidism, sleep apnea, celiac disease, s/p sleeve gastrectomy and hysterectomy.  Pt saw Dr. Aleman for epigastric abd pain on 11/30/2017 and was scheduled for lap yared on 12/15 but came in to ED and was admitted on 12/14 for worsened epigastric pain.    Patient presents for the above procedure(s).    Prev airway:   Lap Sleeve Gastrectomy 7/28/2017  Placement Date: 07/28/17; Placement Time: 0746; Method of Intubation: Direct laryngoscopy; Inserted by: Anesthesia Resident; Airway Device: Endotracheal Tube; Mask Ventilation: Easy; Intubated: Postinduction; Blade: Byron #3; Airway Device Size: 7.0; Style: Cuffed; Cuff Inflation: Minimal occlusive pressure; Inflation Amount: 6; Placement Verified By: Auscultation, Capnometry, ETT Condensation; Grade: Grade I; Complicating Factors: Overbite, Short neck, Anterior larynx; Intubation Findings: Positive EtCO2, Bilateral breath sounds, Atraumatic/Condition of teeth unchanged;  Depth of Insertion: 21; Securment: Lips; Complications: None; Breath Sounds: Equal Bilateral; Insertion Attempts: 1; Removal Date: 07/28/17;  Removal Time: 0946    Oxygen/Ventilation Requirements:  On room air       Current LDA:   None documented.       Current Drips:  None documented.      Patient Active Problem List   Diagnosis    Celiac disease    Obesity (BMI 30-39.9)    MIKAELA (obstructive sleep apnea)    High triglycerides    S/P laparoscopic sleeve gastrectomy    Epigastric pain    Gallstones       Review of patient's allergies indicates:   Allergen Reactions    Gluten protein Hives, Diarrhea, Itching, Nausea And Vomiting, Swelling,  "Anxiety and Dermatitis    Latex, natural rubber Dermatitis     Latex gloves with Powder    Rizatriptan Photosensitivity, Nausea Only and Other (See Comments)     "Feels like my brain is on fire."    Sumatriptan Photosensitivity, Nausea Only and Other (See Comments)     "Feels like my brain is on fire."    Zolmitriptan Photosensitivity, Nausea Only and Other (See Comments)     "Feels like my brain is on fire."       Outpatient Medications:  No current facility-administered medications on file prior to encounter.      Current Outpatient Prescriptions on File Prior to Encounter   Medication Sig Dispense Refill    levothyroxine (SYNTHROID) 100 MCG tablet Take 100 mcg by mouth every morning.   3    acetaminophen (TYLENOL) 160 mg/5 mL (5 mL) Susp Take 10 mg/kg by mouth daily as needed.       albuterol 90 mcg/actuation inhaler Inhale 2 puffs into the lungs every 6 (six) hours as needed for Wheezing. Rescue 18 g 3    b complex vitamins tablet Take 2 tablets by mouth every morning.       butalbital-aspirin-caffeine -40 mg (FIORINAL) -40 mg Cap Take 1 capsule by mouth 2 (two) times daily as needed.  1    CALCIUM CITRATE/VITAMIN D3 (CALCIUM CITRATE + D ORAL) Take 1 tablet by mouth 2 (two) times daily.       clonazePAM (KLONOPIN) 1 MG tablet TAKE ONE TABLET BY MOUTH TWICE DAILY AS NEEDED 60 tablet 0    cyanocobalamin (VITAMIN B-12) 1000 MCG tablet Take 500 mcg by mouth once daily.       dicyclomine (BENTYL) 10 MG capsule Take 1 capsule (10 mg total) by mouth 3 (three) times daily. 90 capsule 0    ergocalciferol (VITAMIN D2) 50,000 unit Cap Take 1 capsule (50,000 Units total) by mouth once a week. 12 capsule 0    esomeprazole (NEXIUM) 40 MG capsule Take 1 capsule (40 mg total) by mouth before breakfast. 30 capsule 12    estradiol (ESTRACE) 1 MG tablet Take 1 mg by mouth daily with dinner or evening meal.   3    FLUARIX QUAD 2637-0165, PF, 60 mcg (15 mcg x 4)/0.5 mL vaccine TO BE ADMINISTERED BY " PHARMACIST FOR IMMUNIZATION  0    multivitamin (ONE DAILY MULTIVITAMIN) per tablet Take 1 tablet by mouth 2 (two) times daily.       omeprazole (PRILOSEC) 20 MG capsule Take 1 capsule (20 mg total) by mouth 2 (two) times daily. Open one capsule twice daily and add to yogurt or applesauce 180 capsule 1    ondansetron (ZOFRAN-ODT) 8 MG TbDL Take 1 tablet (8 mg total) by mouth every 6 (six) hours as needed. 30 tablet 0    polyethylene glycol (GLYCOLAX) 17 gram PwPk MIX ONE PACKET IN LIQUID AND TAKE DAILY AS DIRECTED  3    promethazine (PHENERGAN) 25 MG tablet Take 1 tablet (25 mg total) by mouth every 6 (six) hours as needed for Nausea. 30 tablet 0    promethazine-codeine 6.25-10 mg/5 ml (PHENERGAN WITH CODEINE) 6.25-10 mg/5 mL syrup Take 5 mLs by mouth nightly as needed for Cough. 240 mL 0    ranitidine (ZANTAC) 150 MG tablet Take 1 tablet (150 mg total) by mouth 2 (two) times daily. 60 tablet 11        Current Inpatient Medications:   HYDROmorphone  0.5 mg Intravenous ED 1 Time    sodium chloride 0.9%  1,000 mL Intravenous ED 1 Time       Past Surgical History:   Procedure Laterality Date    ABDOMINAL SURGERY      abdominalplasty     ADENOIDECTOMY      BLADDER SUSPENSION      BREAST SURGERY      GASTRECTOMY      HYSTERECTOMY      TONSILLECTOMY      TOTAL THYROIDECTOMY Right     TUBAL LIGATION      TYMPANOSTOMY TUBE PLACEMENT         Social History     Social History    Marital status:      Spouse name: N/A    Number of children: N/A    Years of education: N/A     Occupational History    Not on file.     Social History Main Topics    Smoking status: Never Smoker    Smokeless tobacco: Never Used    Alcohol use No    Drug use: No    Sexual activity: Not on file     Other Topics Concern    Not on file     Social History Narrative    No narrative on file       OBJECTIVE:   Weight:  Wt Readings from Last 4 Encounters:   12/14/17 77.1 kg (170 lb)   11/30/17 81.1 kg (178 lb 10.9 oz)    17 80.7 kg (178 lb)   17 84.2 kg (185 lb 10 oz)       Vital Signs Range (Last 24H):  Temp:  [36.8 °C (98.2 °F)]   Pulse:  [84]   Resp:  [16]   BP: (124)/(80)   SpO2:  [99 %]       CBC:   No results for input(s): WBC, RBC, HGB, HCT, PLT, MCV, MCH, MCHC in the last 72 hours.    CMP: No results for input(s): NA, K, CL, CO2, BUN, CREATININE, GLU, MG, PHOS, CALCIUM, ALBUMIN, PROT, ALKPHOS, ALT, AST, BILITOT in the last 72 hours.    INR:  No results for input(s): INR, PROTIME, APTT in the last 72 hours.    Diagnostic Studies:  HIDA scan 2017  1.No evidence of acute cholecystitis.  2.No evidence of chronic cholecystitis.  3.The common bile duct and the cystic duct are patent.    EK2017  Vent. Rate : 058 BPM     Atrial Rate : 058 BPM     P-R Int : 162 ms          QRS Dur : 086 ms      QT Int : 418 ms       P-R-T Axes : 009 002 -09 degrees     QTc Int : 410 ms    Sinus bradycardia with sinus arrhythmia  Otherwise normal ECG  When compared with ECG of 2017 08:31,  Previous ECG has undetermined rhythm, needs review  Inverted T waves have replaced nonspecific T wave abnormality in Inferior  leads  T wave inversion now evident in Anterior leads     2D Echo:  No results found for this or any previous visit.    Dobutamine Stress Test 2017  1. The EKG portion of this study is negative for ischemia at a peak heart rate of 146 bpm (86% of predicted).   2. Blood pressure remained stable throughout the protocol  (Presenting BP: 102/66 Peak BP: 136/60).   3. No significant arrhythmias were present.   4. There were no symptoms of chest discomfort or significant dyspnea throughout the protocol.     1 - Normal left ventricular systolic function (EF 55-60%).     2 - Normal left ventricular diastolic function.     3 - Normal right ventricular systolic function .     No evidence of stress induced myocardial ischemia.       ASSESSMENT/PLAN:         Anesthesia Evaluation    I have reviewed the Patient  Summary Reports.    I have reviewed the Nursing Notes.   I have reviewed the Medications.     Review of Systems  Anesthesia Hx:  History of prior surgery of interest to airway management or planning: Denies Family Hx of Anesthesia complications.   Denies Personal Hx of Anesthesia complications.   Social:  Non-Smoker    Cardiovascular:   Denies MI.  Denies CAD.    Denies CABG/stent.   Denies Angina.    Pulmonary:   Denies Pneumonia Denies COPD. Sleep Apnea    Hepatic/GI:   GERD, well controlled    Endocrine:   Diabetes, well controlled Hypothyroidism        Physical Exam  General:  Obesity    Airway/Jaw/Neck:  Airway Findings: Mouth Opening: Normal Tongue: Normal  General Airway Assessment: Adult  Mallampati: I  Improves to I with phonation.  TM Distance: Normal, at least 6 cm     Eyes/Ears/Nose:  EYES/EARS/NOSE FINDINGS: Normal   Dental:  Dental Findings: In tact   Chest/Lungs:  Chest/Lungs Findings: Clear to auscultation, Normal Respiratory Rate     Heart/Vascular:  Heart Findings: Rate: Normal  Rhythm: Regular Rhythm        Mental Status:  Mental Status Findings: Normal        Anesthesia Plan  Type of Anesthesia, risks & benefits discussed:  Anesthesia Type:  general  Patient's Preference:   Intra-op Monitoring Plan: standard ASA monitors  Intra-op Monitoring Plan Comments:   Post Op Pain Control Plan: per primary service following discharge from PACU, multimodal analgesia and IV/PO Opioids PRN  Post Op Pain Control Plan Comments:   Induction:   IV  Beta Blocker:  Patient is not currently on a Beta-Blocker (No further documentation required).       Informed Consent: Patient understands risks and agrees with Anesthesia plan.  Questions answered. Anesthesia consent signed with patient.  ASA Score: 3     Day of Surgery Review of History & Physical: I have interviewed and examined the patient. I have reviewed the patient's H&P dated:  There are no significant changes.  H&P update referred to the surgeon.  H&P  completed by Anesthesiologist.       Ready For Surgery From Anesthesia Perspective.

## 2017-12-15 ENCOUNTER — ANESTHESIA (OUTPATIENT)
Dept: SURGERY | Facility: HOSPITAL | Age: 42
DRG: 419 | End: 2017-12-15
Payer: COMMERCIAL

## 2017-12-15 LAB
ALBUMIN SERPL BCP-MCNC: 3 G/DL
ALP SERPL-CCNC: 117 U/L
ALT SERPL W/O P-5'-P-CCNC: 185 U/L
ANION GAP SERPL CALC-SCNC: 8 MMOL/L
AST SERPL-CCNC: 279 U/L
BASOPHILS # BLD AUTO: 0.05 K/UL
BASOPHILS NFR BLD: 0.7 %
BILIRUB SERPL-MCNC: 0.8 MG/DL
BUN SERPL-MCNC: 11 MG/DL
CALCIUM SERPL-MCNC: 8.2 MG/DL
CHLORIDE SERPL-SCNC: 112 MMOL/L
CO2 SERPL-SCNC: 23 MMOL/L
CREAT SERPL-MCNC: 0.6 MG/DL
DIFFERENTIAL METHOD: ABNORMAL
EOSINOPHIL # BLD AUTO: 0.1 K/UL
EOSINOPHIL NFR BLD: 1.2 %
ERYTHROCYTE [DISTWIDTH] IN BLOOD BY AUTOMATED COUNT: 14.2 %
EST. GFR  (AFRICAN AMERICAN): >60 ML/MIN/1.73 M^2
EST. GFR  (NON AFRICAN AMERICAN): >60 ML/MIN/1.73 M^2
GLUCOSE SERPL-MCNC: 72 MG/DL
HCT VFR BLD AUTO: 33.8 %
HGB BLD-MCNC: 10.9 G/DL
IMM GRANULOCYTES # BLD AUTO: 0.02 K/UL
IMM GRANULOCYTES NFR BLD AUTO: 0.3 %
LYMPHOCYTES # BLD AUTO: 1.5 K/UL
LYMPHOCYTES NFR BLD: 21.3 %
MAGNESIUM SERPL-MCNC: 1.7 MG/DL
MCH RBC QN AUTO: 27.5 PG
MCHC RBC AUTO-ENTMCNC: 32.2 G/DL
MCV RBC AUTO: 85 FL
MONOCYTES # BLD AUTO: 0.5 K/UL
MONOCYTES NFR BLD: 7.9 %
NEUTROPHILS # BLD AUTO: 4.7 K/UL
NEUTROPHILS NFR BLD: 68.6 %
NRBC BLD-RTO: 0 /100 WBC
PHOSPHATE SERPL-MCNC: 2.9 MG/DL
PLATELET # BLD AUTO: 262 K/UL
PMV BLD AUTO: 11.4 FL
POCT GLUCOSE: 85 MG/DL (ref 70–110)
POTASSIUM SERPL-SCNC: 3.9 MMOL/L
PROT SERPL-MCNC: 5.9 G/DL
RBC # BLD AUTO: 3.97 M/UL
SODIUM SERPL-SCNC: 143 MMOL/L
WBC # BLD AUTO: 6.86 K/UL

## 2017-12-15 PROCEDURE — 84100 ASSAY OF PHOSPHORUS: CPT

## 2017-12-15 PROCEDURE — 25000003 PHARM REV CODE 250: Performed by: SURGERY

## 2017-12-15 PROCEDURE — 36000708 HC OR TIME LEV III 1ST 15 MIN: Performed by: SURGERY

## 2017-12-15 PROCEDURE — 11000001 HC ACUTE MED/SURG PRIVATE ROOM

## 2017-12-15 PROCEDURE — 63600175 PHARM REV CODE 636 W HCPCS: Performed by: NURSE ANESTHETIST, CERTIFIED REGISTERED

## 2017-12-15 PROCEDURE — BF101ZZ FLUOROSCOPY OF BILE DUCTS USING LOW OSMOLAR CONTRAST: ICD-10-PCS | Performed by: SURGERY

## 2017-12-15 PROCEDURE — D9220A PRA ANESTHESIA: Mod: CRNA,,, | Performed by: NURSE ANESTHETIST, CERTIFIED REGISTERED

## 2017-12-15 PROCEDURE — 80053 COMPREHEN METABOLIC PANEL: CPT

## 2017-12-15 PROCEDURE — 88304 TISSUE EXAM BY PATHOLOGIST: CPT | Performed by: PATHOLOGY

## 2017-12-15 PROCEDURE — 63600175 PHARM REV CODE 636 W HCPCS: Performed by: ANESTHESIOLOGY

## 2017-12-15 PROCEDURE — 25000003 PHARM REV CODE 250: Performed by: NURSE ANESTHETIST, CERTIFIED REGISTERED

## 2017-12-15 PROCEDURE — 63600175 PHARM REV CODE 636 W HCPCS: Performed by: SURGERY

## 2017-12-15 PROCEDURE — 83735 ASSAY OF MAGNESIUM: CPT

## 2017-12-15 PROCEDURE — 63600175 PHARM REV CODE 636 W HCPCS: Performed by: STUDENT IN AN ORGANIZED HEALTH CARE EDUCATION/TRAINING PROGRAM

## 2017-12-15 PROCEDURE — S0028 INJECTION, FAMOTIDINE, 20 MG: HCPCS | Performed by: SURGERY

## 2017-12-15 PROCEDURE — 47563 LAPARO CHOLECYSTECTOMY/GRAPH: CPT | Mod: ,,, | Performed by: SURGERY

## 2017-12-15 PROCEDURE — 37000009 HC ANESTHESIA EA ADD 15 MINS: Performed by: SURGERY

## 2017-12-15 PROCEDURE — 88304 TISSUE EXAM BY PATHOLOGIST: CPT | Mod: 26,,, | Performed by: PATHOLOGY

## 2017-12-15 PROCEDURE — 36000709 HC OR TIME LEV III EA ADD 15 MIN: Performed by: SURGERY

## 2017-12-15 PROCEDURE — 71000039 HC RECOVERY, EACH ADD'L HOUR: Performed by: SURGERY

## 2017-12-15 PROCEDURE — 0FT44ZZ RESECTION OF GALLBLADDER, PERCUTANEOUS ENDOSCOPIC APPROACH: ICD-10-PCS | Performed by: SURGERY

## 2017-12-15 PROCEDURE — 71000033 HC RECOVERY, INTIAL HOUR: Performed by: SURGERY

## 2017-12-15 PROCEDURE — 27201423 OPTIME MED/SURG SUP & DEVICES STERILE SUPPLY: Performed by: SURGERY

## 2017-12-15 PROCEDURE — D9220A PRA ANESTHESIA: Mod: ANES,,, | Performed by: ANESTHESIOLOGY

## 2017-12-15 PROCEDURE — 82962 GLUCOSE BLOOD TEST: CPT | Performed by: SURGERY

## 2017-12-15 PROCEDURE — 27000221 HC OXYGEN, UP TO 24 HOURS

## 2017-12-15 PROCEDURE — 37000008 HC ANESTHESIA 1ST 15 MINUTES: Performed by: SURGERY

## 2017-12-15 PROCEDURE — 94761 N-INVAS EAR/PLS OXIMETRY MLT: CPT

## 2017-12-15 PROCEDURE — 36415 COLL VENOUS BLD VENIPUNCTURE: CPT

## 2017-12-15 PROCEDURE — 74300 X-RAY BILE DUCTS/PANCREAS: CPT | Mod: 26,,, | Performed by: SURGERY

## 2017-12-15 PROCEDURE — 85025 COMPLETE CBC W/AUTO DIFF WBC: CPT

## 2017-12-15 PROCEDURE — 25500020 PHARM REV CODE 255: Performed by: SURGERY

## 2017-12-15 RX ORDER — ROCURONIUM BROMIDE 10 MG/ML
INJECTION, SOLUTION INTRAVENOUS
Status: DISCONTINUED | OUTPATIENT
Start: 2017-12-15 | End: 2017-12-15

## 2017-12-15 RX ORDER — FENTANYL CITRATE 50 UG/ML
INJECTION, SOLUTION INTRAMUSCULAR; INTRAVENOUS
Status: DISCONTINUED | OUTPATIENT
Start: 2017-12-15 | End: 2017-12-15

## 2017-12-15 RX ORDER — LIDOCAINE HCL/PF 100 MG/5ML
SYRINGE (ML) INTRAVENOUS
Status: DISCONTINUED | OUTPATIENT
Start: 2017-12-15 | End: 2017-12-15

## 2017-12-15 RX ORDER — ONDANSETRON 2 MG/ML
4 INJECTION INTRAMUSCULAR; INTRAVENOUS DAILY PRN
Status: DISCONTINUED | OUTPATIENT
Start: 2017-12-15 | End: 2017-12-15

## 2017-12-15 RX ORDER — PROPOFOL 10 MG/ML
VIAL (ML) INTRAVENOUS
Status: DISCONTINUED | OUTPATIENT
Start: 2017-12-15 | End: 2017-12-15

## 2017-12-15 RX ORDER — ONDANSETRON 2 MG/ML
4 INJECTION INTRAMUSCULAR; INTRAVENOUS ONCE
Status: COMPLETED | OUTPATIENT
Start: 2017-12-15 | End: 2017-12-15

## 2017-12-15 RX ORDER — DEXAMETHASONE SODIUM PHOSPHATE 4 MG/ML
INJECTION, SOLUTION INTRA-ARTICULAR; INTRALESIONAL; INTRAMUSCULAR; INTRAVENOUS; SOFT TISSUE
Status: DISCONTINUED | OUTPATIENT
Start: 2017-12-15 | End: 2017-12-15

## 2017-12-15 RX ORDER — BUPIVACAINE HYDROCHLORIDE 2.5 MG/ML
INJECTION, SOLUTION EPIDURAL; INFILTRATION; INTRACAUDAL
Status: DISCONTINUED | OUTPATIENT
Start: 2017-12-15 | End: 2017-12-15 | Stop reason: HOSPADM

## 2017-12-15 RX ORDER — GLYCOPYRROLATE 0.2 MG/ML
INJECTION INTRAMUSCULAR; INTRAVENOUS
Status: DISCONTINUED | OUTPATIENT
Start: 2017-12-15 | End: 2017-12-15

## 2017-12-15 RX ORDER — HYDROMORPHONE HYDROCHLORIDE 2 MG/ML
0.2 INJECTION, SOLUTION INTRAMUSCULAR; INTRAVENOUS; SUBCUTANEOUS EVERY 5 MIN PRN
Status: DISCONTINUED | OUTPATIENT
Start: 2017-12-15 | End: 2017-12-15 | Stop reason: HOSPADM

## 2017-12-15 RX ORDER — HYDROCODONE BITARTRATE AND ACETAMINOPHEN 5; 325 MG/1; MG/1
1 TABLET ORAL EVERY 6 HOURS PRN
Qty: 25 TABLET | Refills: 0 | Status: SHIPPED | OUTPATIENT
Start: 2017-12-15 | End: 2017-12-16

## 2017-12-15 RX ORDER — SODIUM CHLORIDE 0.9 % (FLUSH) 0.9 %
3 SYRINGE (ML) INJECTION
Status: DISCONTINUED | OUTPATIENT
Start: 2017-12-15 | End: 2017-12-16 | Stop reason: HOSPADM

## 2017-12-15 RX ORDER — MIDAZOLAM HYDROCHLORIDE 1 MG/ML
INJECTION, SOLUTION INTRAMUSCULAR; INTRAVENOUS
Status: DISCONTINUED | OUTPATIENT
Start: 2017-12-15 | End: 2017-12-15

## 2017-12-15 RX ORDER — NEOSTIGMINE METHYLSULFATE 1 MG/ML
INJECTION, SOLUTION INTRAVENOUS
Status: DISCONTINUED | OUTPATIENT
Start: 2017-12-15 | End: 2017-12-15

## 2017-12-15 RX ORDER — ONDANSETRON 2 MG/ML
INJECTION INTRAMUSCULAR; INTRAVENOUS
Status: DISCONTINUED | OUTPATIENT
Start: 2017-12-15 | End: 2017-12-15

## 2017-12-15 RX ADMIN — HYDROMORPHONE HYDROCHLORIDE 1 MG: 1 INJECTION, SOLUTION INTRAMUSCULAR; INTRAVENOUS; SUBCUTANEOUS at 05:12

## 2017-12-15 RX ADMIN — SODIUM CHLORIDE, SODIUM GLUCONATE, SODIUM ACETATE, POTASSIUM CHLORIDE, MAGNESIUM CHLORIDE, SODIUM PHOSPHATE, DIBASIC, AND POTASSIUM PHOSPHATE: .53; .5; .37; .037; .03; .012; .00082 INJECTION, SOLUTION INTRAVENOUS at 04:12

## 2017-12-15 RX ADMIN — NEOSTIGMINE METHYLSULFATE 3 MG: 1 INJECTION INTRAVENOUS at 05:12

## 2017-12-15 RX ADMIN — FENTANYL CITRATE 100 MCG: 50 INJECTION, SOLUTION INTRAMUSCULAR; INTRAVENOUS at 04:12

## 2017-12-15 RX ADMIN — HYDROMORPHONE HYDROCHLORIDE 1 MG: 1 INJECTION, SOLUTION INTRAMUSCULAR; INTRAVENOUS; SUBCUTANEOUS at 07:12

## 2017-12-15 RX ADMIN — DEXAMETHASONE SODIUM PHOSPHATE 8 MG: 4 INJECTION, SOLUTION INTRAMUSCULAR; INTRAVENOUS at 04:12

## 2017-12-15 RX ADMIN — ONDANSETRON 8 MG: 8 TABLET, ORALLY DISINTEGRATING ORAL at 08:12

## 2017-12-15 RX ADMIN — ONDANSETRON 8 MG: 8 TABLET, ORALLY DISINTEGRATING ORAL at 12:12

## 2017-12-15 RX ADMIN — HYDROMORPHONE HYDROCHLORIDE 0.2 MG: 2 INJECTION INTRAMUSCULAR; INTRAVENOUS; SUBCUTANEOUS at 05:12

## 2017-12-15 RX ADMIN — HYDROMORPHONE HYDROCHLORIDE 1 MG: 1 INJECTION, SOLUTION INTRAMUSCULAR; INTRAVENOUS; SUBCUTANEOUS at 02:12

## 2017-12-15 RX ADMIN — ENOXAPARIN SODIUM 40 MG: 100 INJECTION SUBCUTANEOUS at 09:12

## 2017-12-15 RX ADMIN — ONDANSETRON 4 MG: 2 INJECTION INTRAMUSCULAR; INTRAVENOUS at 05:12

## 2017-12-15 RX ADMIN — HYDROMORPHONE HYDROCHLORIDE 1 MG: 1 INJECTION, SOLUTION INTRAMUSCULAR; INTRAVENOUS; SUBCUTANEOUS at 11:12

## 2017-12-15 RX ADMIN — PROPOFOL 200 MG: 10 INJECTION, EMULSION INTRAVENOUS at 04:12

## 2017-12-15 RX ADMIN — ROCURONIUM BROMIDE 20 MG: 10 INJECTION, SOLUTION INTRAVENOUS at 04:12

## 2017-12-15 RX ADMIN — LIDOCAINE HYDROCHLORIDE 100 MG: 20 INJECTION, SOLUTION INTRAVENOUS at 04:12

## 2017-12-15 RX ADMIN — HYDROMORPHONE HYDROCHLORIDE 1 MG: 1 INJECTION, SOLUTION INTRAMUSCULAR; INTRAVENOUS; SUBCUTANEOUS at 01:12

## 2017-12-15 RX ADMIN — DEXTROSE 2 G: 50 INJECTION, SOLUTION INTRAVENOUS at 04:12

## 2017-12-15 RX ADMIN — MIDAZOLAM HYDROCHLORIDE 2 MG: 1 INJECTION, SOLUTION INTRAMUSCULAR; INTRAVENOUS at 04:12

## 2017-12-15 RX ADMIN — FAMOTIDINE 20 MG: 10 INJECTION, SOLUTION INTRAVENOUS at 08:12

## 2017-12-15 RX ADMIN — ONDANSETRON 8 MG: 8 TABLET, ORALLY DISINTEGRATING ORAL at 07:12

## 2017-12-15 RX ADMIN — FAMOTIDINE 20 MG: 10 INJECTION, SOLUTION INTRAVENOUS at 09:12

## 2017-12-15 RX ADMIN — ONDANSETRON 4 MG: 2 INJECTION, SOLUTION INTRAMUSCULAR; INTRAVENOUS at 03:12

## 2017-12-15 RX ADMIN — GLYCOPYRROLATE 0.4 MG: 0.2 INJECTION, SOLUTION INTRAMUSCULAR; INTRAVENOUS at 05:12

## 2017-12-15 NOTE — ED PROVIDER NOTES
"Encounter Date: 12/14/2017       History     Chief Complaint   Patient presents with    Abdominal Pain     RUQ pain. Scheduled for gallbladder removal tomorrow.      Patient is a 42 year old female with history of gastrectomy sleeve (7/28/17) who presents with RUQ pain. She was scheduled for cholecystectomy tomorrow, but states the pain became unbearable and currently rates it 10/10. She has been having intermittent RUQ pain for the last 2-3 weeks associated with nausea and vomiting, including bilious vomiting at work today. Pain is positional, no aggravation with eating/drinking. She denies fevers, chills, shortness of breath.          Review of patient's allergies indicates:   Allergen Reactions    Gluten protein Hives, Diarrhea, Itching, Nausea And Vomiting, Swelling, Anxiety and Dermatitis    Latex, natural rubber Dermatitis     Latex gloves with Powder    Rizatriptan Photosensitivity, Nausea Only and Other (See Comments)     "Feels like my brain is on fire."    Sumatriptan Photosensitivity, Nausea Only and Other (See Comments)     "Feels like my brain is on fire."    Zolmitriptan Photosensitivity, Nausea Only and Other (See Comments)     "Feels like my brain is on fire."     Past Medical History:   Diagnosis Date    Celiac disease     Diabetes mellitus     Hypothyroidism     Sleep apnea in adult     Thyroid disease      Past Surgical History:   Procedure Laterality Date    ABDOMINAL SURGERY      abdominalplasty     ADENOIDECTOMY      BLADDER SUSPENSION      BREAST SURGERY      GASTRECTOMY      HYSTERECTOMY      TONSILLECTOMY      TOTAL THYROIDECTOMY Right     TUBAL LIGATION      TYMPANOSTOMY TUBE PLACEMENT       Family History   Problem Relation Age of Onset    No Known Problems Mother     Hyperlipidemia Father     Obesity Brother     Hyperlipidemia Brother     Hyperlipidemia Daughter     Kidney failure Daughter     Kidney failure Maternal Aunt     Hypothyroidism Maternal Aunt     " Hyperlipidemia Paternal Uncle     Hypertension Paternal Uncle     Heart disease Paternal Uncle     Cancer Maternal Grandmother     Diabetes Maternal Grandmother     Depression Maternal Grandmother     Heart disease Maternal Grandmother     Hypertension Maternal Grandmother     Sleep apnea Maternal Grandmother     Obesity Maternal Grandmother     Melanoma Maternal Grandmother     Cancer Maternal Grandfather     Hyperlipidemia Maternal Grandfather     Hypertension Maternal Grandfather     Heart disease Maternal Grandfather     Kidney failure Maternal Grandfather     Stroke Maternal Grandfather     Hyperlipidemia Paternal Grandmother     Hypertension Paternal Grandmother     Diabetes Paternal Grandmother     Obesity Paternal Grandmother     Cancer Paternal Grandfather     Hyperlipidemia Paternal Grandfather     Diabetes Paternal Grandfather     Hypertension Paternal Grandfather     Heart disease Paternal Grandfather     Kidney failure Paternal Grandfather     Obesity Paternal Grandfather     Sleep apnea Paternal Grandfather     Stroke Paternal Grandfather      Social History   Substance Use Topics    Smoking status: Never Smoker    Smokeless tobacco: Never Used    Alcohol use No     Review of Systems   Constitutional: Negative for chills, fatigue and fever.   HENT: Negative for sore throat and trouble swallowing.    Eyes: Negative for pain and visual disturbance.   Respiratory: Negative for cough and shortness of breath.    Cardiovascular: Negative for chest pain and palpitations.   Gastrointestinal: Positive for abdominal pain, nausea and vomiting. Negative for constipation and diarrhea.   Endocrine: Negative for cold intolerance and heat intolerance.   Genitourinary: Negative for dysuria and hematuria.   Musculoskeletal: Negative for myalgias.   Skin: Negative for rash.   Neurological: Negative for weakness and headaches.   Psychiatric/Behavioral: Negative for confusion and  hallucinations.       Physical Exam     Initial Vitals [12/14/17 1714]   BP Pulse Resp Temp SpO2   124/80 84 16 98.2 °F (36.8 °C) 99 %      MAP       94.67         Physical Exam    Constitutional: She appears well-developed and well-nourished. No distress.   HENT:   Head: Normocephalic and atraumatic.   Eyes: EOM are normal. Pupils are equal, round, and reactive to light.   Neck: Normal range of motion. Neck supple.   Cardiovascular: Normal rate, regular rhythm, normal heart sounds and intact distal pulses.   No murmur heard.  Pulmonary/Chest: Breath sounds normal. No respiratory distress.   Abdominal: Soft. Bowel sounds are normal. She exhibits no distension. There is tenderness. There is no rebound and no guarding.   Moderate RUQ tenderness, zaldivar's sign negative (patient with pain at the end of expiration)   Neurological: She is alert and oriented to person, place, and time. No cranial nerve deficit.   Skin: Skin is warm and dry.   Psychiatric: She has a normal mood and affect. Her behavior is normal.         ED Course   Procedures  Labs Reviewed   CBC W/ AUTO DIFFERENTIAL   COMPREHENSIVE METABOLIC PANEL   LIPASE             Medical Decision Making:   History:   Old Medical Records: I decided to obtain old medical records.  Old Records Summarized: records from clinic visits and records from previous admission(s).  Initial Assessment:   42 year old female with acute cholecystitis.  Differential Diagnosis:   Acute cholecystitis, biliary colic, choledocholithiasis, ascending cholangitis  Clinical Tests:   Lab Tests: Ordered  ED Management:  CBC, CMP, lipase ordered. IV pain control, NPO, hydration with IVF. General surgery consulted.       APC / Resident Notes:   12/14/2017 6:11 PM   IV pain control, IVF, NPO. Gen surg consulted. CBC, CMP ordered. Patient had her abdominal ultrasound done at [facility], sent the images to [facility] and confirmed with a nurse that it was received. Upon chart review, currently unable to  locate the images at this time.  Added Lipase per gen surg.  Jaylen Wilson MD  Internal Medicine Prelim   Pager: [phone]    12/14/2017 7:01 PM   Labs show leukocytosis, mild elevation of AST (70), lipase wnl. Patient remains afebrile, vitals stable.  Jaylen Wilson MD  Internal Medicine Prelim   Pager: [phone]    12/14/2017 7:17 PM  Repeat US per gen surg as it may  - abx vs ERCP. Will be admitted to gen surg service.              ED Course      Clinical Impression:   The primary encounter diagnosis was Gallstones. Diagnoses of Acute cholecystitis and RUQ pain were also pertinent to this visit.

## 2017-12-15 NOTE — PLAN OF CARE
Patient lives in a 1 story house w/her spouse. Discharging to home, without needs, pending having surgery(scheduled today) & medical stability post-op.     [facility] Packet given to patient after informed about it;patient verbalized their understanding.        12/15/17 1150   Discharge Assessment   Assessment Type Discharge Planning Assessment   Confirmed/corrected address and phone number on facesheet? Yes   Assessment information obtained from? Patient;Medical Record   Expected Length of Stay (days) (1-2)   Communicated expected length of stay with patient/caregiver no  (Per MD)   Prior to hospitilization cognitive status: Alert/Oriented;No Deficits   Prior to hospitalization functional status: Independent   Current cognitive status: Alert/Oriented;No Deficits   Current Functional Status: Independent   Facility Arrived From: (N/A)   Lives With spouse;child(rajiv), adult  (Daughter stays w/her sometimes. )   Able to Return to Prior Arrangements yes   Is patient able to care for self after discharge? Yes   Who are your caregiver(s) and their phone number(s)? (LeonidesRivas Spouse [phone]. Loni Vasques Mother [phone]/patient states her parents live 6 blocks away & will help her if she needs. )   Patient's perception of discharge disposition home or selfcare   Readmission Within The Last 30 Days no previous admission in last 30 days   Patient currently being followed by outpatient case management? No   Patient currently receives any other outside agency services? No   Equipment Currently Used at Home none   Do you have any problems affording any of your prescribed medications? No   Is the patient taking medications as prescribed? yes   Does the patient have transportation home? Yes   Transportation Available car;family or friend will provide   Dialysis Name and Scheduled days (N/a)   Does the patient receive services at the Coumadin Clinic? No   Discharge Plan A Home  with family   Discharge Plan B Home with family   Patient/Family In Agreement With Plan yes

## 2017-12-15 NOTE — TRANSFER OF CARE
"Anesthesia Transfer of Care Note    Patient: Emilia Coyle    Procedure(s) Performed: Procedure(s) (LRB):  CHOLECYSTECTOMY-LAPAROSCOPIC (N/A)  CHOLANGIOGRAM (N/A)    Patient location: PACU    Anesthesia Type: general    Transport from OR: Transported from OR on 6-10 L/min O2 by face mask with adequate spontaneous ventilation    Post pain: adequate analgesia    Post assessment: no apparent anesthetic complications    Post vital signs: stable    Level of consciousness: awake, alert and oriented    Nausea/Vomiting: no nausea/vomiting    Complications: none    Transfer of care protocol was followed      Last vitals:   Visit Vitals  BP (!) 116/55 (BP Location: Right arm, Patient Position: Lying)   Pulse (!) 57   Temp 36.4 °C (97.5 °F) (Temporal)   Resp 14   Ht 5' 5" (1.651 m)   Wt 77.1 kg (170 lb)   SpO2 100%   Breastfeeding? No   BMI 28.29 kg/m²     "

## 2017-12-15 NOTE — OP NOTE
DATE OF PROCEDURE: 12/15/2017    PRE OP DIAGNOSIS: Gallstones [K80.20]    POST OP DIAGNOSIS: Gallstones [K80.20]    PROCEDURE: Procedure(s) (LRB):  CHOLECYSTECTOMY-LAPAROSCOPIC (N/A)  CHOLANGIOGRAM (N/A)    Surgeon(s) and Role:     * Laith Aleman MD - Primary     * Francisco Kumar MD - Resident - Assisting    ANESTHESIA: General.   PROCEDURE IN DETAIL: The patient was placed under general anesthesia. The   abdomen was prepped and draped in the usual manner. Access to peritoneum was   gained through the umbilicus using the Optiview trocar under direct vision.   Pneumoperitoneum to 15 mmHg with CO2 gas was obtained. Three 5 mm trocars were   placed under the right costal margin under direct vision at midline,   midclavicular line, and the anterior axial line. The gallbladder was pulled up   over the liver, exposing the triangle of Calot. Peritoneum was stripped off the   base of the gallbladder, exposing the cystic duct and artery as they entered   the gallbladder. The critical view was obtained. Cystic artery was clipped and   divided. The cystic duct was clipped proximally, a ductotomy made and a   cholangiogram shot. It showed free flow of contrast into the duodenum and into   the hepatic biliary radicals without any obstruction or filling defects. The   catheter was removed. The cystic duct was doubly clipped distally and divided.   The gallbladder was removed from the gallbladder bed using the hook cautery,   placed in an EndoCatch bag and removed from the abdomen through the navel.  The base was cauterized. The abdomen was irrigated, all irrigation fluid removed and   inspected for hemostasis. The trocars were removed under direct vision. Prior   to removing the last trocar, pneumoperitoneum was allowed to escape. The fascia   at the naval was closed with 0 Vicryl. The skin incisions were closed with 4-0   plain catgut, and reinforced with Mastisol, Steri-Strips, and Band-Aids. The   patient  tolerated the procedure well and was brought to Recovery Room in stable   condition. Sponge and needle counts were correct at the end of the case.    Blood loss was min, complications were none, consent was obtained and pathology was gallbladder

## 2017-12-15 NOTE — PROGRESS NOTES
Patient arrived to floor via wheelchair from ED. Patient is awake, alert, and oriented.  and parents at bedside. Skin is warm and dry. Pulses are present in all extremities. Neurovascularly intact. Lungs clear in all fields. Abdomen is slightly tender to touch with hypoactive bowel sounds. Patient is presently complaining of discomfort 8/10. Ambulates and voids without difficulty. Patient currently upset that she requested a private room prior to arrival. Charge nurse and nursing supervisor both aware of situation. Oriented to environment. Will continue to monitor.

## 2017-12-15 NOTE — ED NOTES
Pt resting in stretcher in no acute distress. Pt on continuous cardiac monitoring, continuous pulse oximetry, and automatic BP cuff. Pt in no acute distress. Side rails up X2. Bed low and locked. Call light within reach and instructed on use. Family members at bedside.

## 2017-12-15 NOTE — PROGRESS NOTES
"Progress Note  Surgery    Admit Date: 12/14/2017  Post-operative Day:   Hospital Day: 2    SUBJECTIVE:     Follow-up For: Procedure(s) (LRB):  CHOLECYSTECTOMY-LAPAROSCOPIC (N/A)    Pain controlled.  Intermittent nausea.  Vitals stable.  Labs pending.    Scheduled Meds:   enoxaparin  40 mg Subcutaneous Q24H    famotidine (PF)  20 mg Intravenous BID    levothyroxine  100 mcg Oral QAM     Continuous Infusions:   sodium chloride 0.9% 125 mL/hr at 12/14/17 2130     PRN Meds:albuterol, HYDROmorphone, HYDROmorphone, ondansetron, promethazine, sodium chloride 0.9%    Review of patient's allergies indicates:   Allergen Reactions    Gluten protein Hives, Diarrhea, Itching, Nausea And Vomiting, Swelling, Anxiety and Dermatitis    Latex, natural rubber Dermatitis     Latex gloves with Powder    Rizatriptan Photosensitivity, Nausea Only and Other (See Comments)     "Feels like my brain is on fire."    Sumatriptan Photosensitivity, Nausea Only and Other (See Comments)     "Feels like my brain is on fire."    Zolmitriptan Photosensitivity, Nausea Only and Other (See Comments)     "Feels like my brain is on fire."       Review of Systems  OBJECTIVE:     Vital Signs (Most Recent)  Temp: 97.9 °F (36.6 °C) (12/15/17 0400)  Pulse: 62 (12/15/17 0400)  Resp: 16 (12/15/17 0400)  BP: 110/66 (12/15/17 0400)  SpO2: 98 % (12/15/17 0400)    Vital Signs Range (Last 24H):  Temp:  [97.9 °F (36.6 °C)-98.2 °F (36.8 °C)]   Pulse:  [54-84]   Resp:  [16-17]   BP: (100-124)/(59-80)   SpO2:  [98 %-100 %]     I & O (Last 24H):  Intake/Output Summary (Last 24 hours) at 12/15/17 0743  Last data filed at 12/15/17 0400   Gross per 24 hour   Intake              725 ml   Output                0 ml   Net              725 ml     Physical Exam  NAD, AAOx3  RRR  CTAB  Abd S/ND/TTP in RUQ      Laboratory:  CBC:   Recent Labs  Lab 12/14/17  1810   WBC 15.44*   RBC 4.21   HGB 11.9*   HCT 35.8*      MCV 85   MCH 28.3   MCHC 33.2     CMP:   Recent " Labs  Lab 12/14/17  1810   GLU 93   CALCIUM 9.3   ALBUMIN 3.8   PROT 7.2      K 3.7   CO2 22*      BUN 17   CREATININE 0.7   ALKPHOS 100   ALT 26   AST 70*   BILITOT 0.4     Labs within the past 24 hours have been reviewed.      ASSESSMENT/PLAN:     Assessment/Plan:   41 yo F with biliary colic    To OR today for lap yared  NPO, IV fluids  The procedure was described in detail to the patient and all questions were answered.  The risks and benefits of the procedure were discussed and the patient wishes to proceed with surgery.    Stan Bartholomew  General Surgery, PGY-5  Pager # [phone]

## 2017-12-15 NOTE — BRIEF OP NOTE
Operative Note       Surgery Date: 12/15/2017     Surgeon(s) and Role:     * Laith Aleman MD - Primary     * Francisco Kumar MD - Resident - Assisting    Pre-op Diagnosis:  Gallstones [K80.20]    Post-op Diagnosis:  Gallstones [K80.20]    Procedure(s) (LRB):  CHOLECYSTECTOMY-LAPAROSCOPIC (N/A)  CHOLANGIOGRAM (N/A)    Anesthesia: General    Procedure in Detail/Findings:  Gallbladder with few adhesions, unopened.  Ioc clear.    Estimated Blood Loss: Minimal           Specimens     Start     Ordered    12/15/17 1712  Specimen to Pathology - Surgery  Once      12/15/17 1712        Implants: * No implants in log *           Disposition: PACU - hemodynamically stable.           Condition: Good    Attestation:  I was present and scrubbed for the entire procedure.

## 2017-12-15 NOTE — HPI
Emilia Coyle is a 42 y.o. female with history of DM and hypothyroidism, who presents to ED with abdominal pain, nausea, and vomiting.  She is scheduled for lap yared tomorrow with Dr. Aleman for a similar problem.  She reports that for the past 3-4 weeks, she has had RUQ and epigastric abdominal pain.  Pain waxes and wanes but has become more constant.  No obvious relation of pain with any activity (including eating).  Some occasional nausea but usually no vomiting.  Appetite up and down. She had HIDA scans that were unremarkable.  She had an US that reportedly showed numerous gallstones (done at outside facility).  Given her symptoms, she was scheduled for lap yared tomorrow.  Today, her severe pain episodes were increasing in frequency and lasting longer.  She also started vomiting and having chills.  This concerned her, so she came to the ED for evaluation.  She denies objective fever, changes in bowel habits, pale stool, dark urine, itching, jaundice.  She had a sleeve gastrectomy 4.5 months ago and has lost over 60 lbs.

## 2017-12-15 NOTE — H&P
[facility]  General Surgery  History & Physical    Patient Name: Emilia Coyle  MRN: [id number]  Admission Date: 12/14/2017  Attending Physician: Laith Aleman MD   Primary Care Provider: Rosanna Benitez DO    Patient information was obtained from patient, spouse/SO, parent, past medical records and ER records.     Subjective:     Chief Complaint/Reason for Admission: abdominal pain, nausea, vomiting    History of Present Illness: Emilia Coyle is a 42 y.o. female with history of DM and hypothyroidism, who presents to ED with abdominal pain, nausea, and vomiting.  She is scheduled for lap yared tomorrow with Dr. Aleman for a similar problem.  She reports that for the past 3-4 weeks, she has had RUQ and epigastric abdominal pain.  Pain waxes and wanes but has become more constant.  No obvious relation of pain with any activity (including eating).  Some occasional nausea but usually no vomiting.  Appetite up and down. She had HIDA scans that were unremarkable.  She had an US that reportedly showed numerous gallstones (done at outside facility).  Given her symptoms, she was scheduled for lap yared tomorrow.  Today, her severe pain episodes were increasing in frequency and lasting longer.  She also started vomiting and having chills.  This concerned her, so she came to the ED for evaluation.  She denies objective fever, changes in bowel habits, pale stool, dark urine, itching, jaundice.  She had a sleeve gastrectomy 4.5 months ago and has lost over 60 lbs.    No current facility-administered medications on file prior to encounter.      Current Outpatient Prescriptions on File Prior to Encounter   Medication Sig    levothyroxine (SYNTHROID) 100 MCG tablet Take 100 mcg by mouth every morning.     acetaminophen (TYLENOL) 160 mg/5 mL (5 mL) Susp Take 10 mg/kg by mouth daily as needed.     albuterol 90 mcg/actuation inhaler Inhale 2 puffs into the lungs every 6 (six) hours as needed for  Wheezing. Rescue    b complex vitamins tablet Take 2 tablets by mouth every morning.     butalbital-aspirin-caffeine -40 mg (FIORINAL) -40 mg Cap Take 1 capsule by mouth 2 (two) times daily as needed.    CALCIUM CITRATE/VITAMIN D3 (CALCIUM CITRATE + D ORAL) Take 1 tablet by mouth 2 (two) times daily.     clonazePAM (KLONOPIN) 1 MG tablet TAKE ONE TABLET BY MOUTH TWICE DAILY AS NEEDED    cyanocobalamin (VITAMIN B-12) 1000 MCG tablet Take 500 mcg by mouth once daily.     dicyclomine (BENTYL) 10 MG capsule Take 1 capsule (10 mg total) by mouth 3 (three) times daily.    ergocalciferol (VITAMIN D2) 50,000 unit Cap Take 1 capsule (50,000 Units total) by mouth once a week.    esomeprazole (NEXIUM) 40 MG capsule Take 1 capsule (40 mg total) by mouth before breakfast.    estradiol (ESTRACE) 1 MG tablet Take 1 mg by mouth daily with dinner or evening meal.     FLUARIX QUAD 8310-0626, PF, 60 mcg (15 mcg x 4)/0.5 mL vaccine TO BE ADMINISTERED BY PHARMACIST FOR IMMUNIZATION    multivitamin (ONE DAILY MULTIVITAMIN) per tablet Take 1 tablet by mouth 2 (two) times daily.     omeprazole (PRILOSEC) 20 MG capsule Take 1 capsule (20 mg total) by mouth 2 (two) times daily. Open one capsule twice daily and add to yogurt or applesauce    ondansetron (ZOFRAN-ODT) 8 MG TbDL Take 1 tablet (8 mg total) by mouth every 6 (six) hours as needed.    polyethylene glycol (GLYCOLAX) 17 gram PwPk MIX ONE PACKET IN LIQUID AND TAKE DAILY AS DIRECTED    promethazine (PHENERGAN) 25 MG tablet Take 1 tablet (25 mg total) by mouth every 6 (six) hours as needed for Nausea.    promethazine-codeine 6.25-10 mg/5 ml (PHENERGAN WITH CODEINE) 6.25-10 mg/5 mL syrup Take 5 mLs by mouth nightly as needed for Cough.    ranitidine (ZANTAC) 150 MG tablet Take 1 tablet (150 mg total) by mouth 2 (two) times daily.       Review of patient's allergies indicates:   Allergen Reactions    Gluten protein Hives, Diarrhea, Itching, Nausea And  "Vomiting, Swelling, Anxiety and Dermatitis    Latex, natural rubber Dermatitis     Latex gloves with Powder    Rizatriptan Photosensitivity, Nausea Only and Other (See Comments)     "Feels like my brain is on fire."    Sumatriptan Photosensitivity, Nausea Only and Other (See Comments)     "Feels like my brain is on fire."    Zolmitriptan Photosensitivity, Nausea Only and Other (See Comments)     "Feels like my brain is on fire."       Past Medical History:   Diagnosis Date    Celiac disease     Diabetes mellitus     Hypothyroidism     Sleep apnea in adult     Thyroid disease      Past Surgical History:   Procedure Laterality Date    ABDOMINAL SURGERY      abdominalplasty     ADENOIDECTOMY      BLADDER SUSPENSION      BREAST SURGERY      GASTRECTOMY      HYSTERECTOMY      TONSILLECTOMY      TOTAL THYROIDECTOMY Right     TUBAL LIGATION      TYMPANOSTOMY TUBE PLACEMENT       Family History     Problem Relation (Age of Onset)    Cancer Maternal Grandmother, Maternal Grandfather, Paternal Grandfather    Depression Maternal Grandmother    Diabetes Maternal Grandmother, Paternal Grandmother, Paternal Grandfather    Heart disease Paternal Uncle, Maternal Grandmother, Maternal Grandfather, Paternal Grandfather    Hyperlipidemia Father, Brother, Daughter, Paternal Uncle, Maternal Grandfather, Paternal Grandmother, Paternal Grandfather    Hypertension Paternal Uncle, Maternal Grandmother, Maternal Grandfather, Paternal Grandmother, Paternal Grandfather    Hypothyroidism Maternal Aunt    Kidney failure Daughter, Maternal Aunt, Maternal Grandfather, Paternal Grandfather    Melanoma Maternal Grandmother    No Known Problems Mother    Obesity Brother, Maternal Grandmother, Paternal Grandmother, Paternal Grandfather    Sleep apnea Maternal Grandmother, Paternal Grandfather    Stroke Maternal Grandfather, Paternal Grandfather        Social History Main Topics    Smoking status: Never Smoker    Smokeless tobacco: " Never Used    Alcohol use No    Drug use: No    Sexual activity: Not on file     Review of Systems   Constitutional: Positive for appetite change and chills. Negative for activity change, fever and unexpected weight change.   HENT: Negative.    Respiratory: Negative for shortness of breath.    Cardiovascular: Negative for chest pain and palpitations.   Gastrointestinal: Positive for abdominal pain, nausea and vomiting. Negative for abdominal distention, constipation and diarrhea.   Genitourinary: Negative.    Musculoskeletal: Negative.    Hematological: Does not bruise/bleed easily.     Objective:     Vital Signs (Most Recent):  Temp: 98.2 °F (36.8 °C) (12/14/17 1714)  Pulse: (!) 54 (12/14/17 2016)  Resp: 17 (12/14/17 2016)  BP: (!) 100/59 (12/14/17 2016)  SpO2: 100 % (12/14/17 2016) Vital Signs (24h Range):  Temp:  [98.2 °F (36.8 °C)] 98.2 °F (36.8 °C)  Pulse:  [54-84] 54  Resp:  [16-17] 17  SpO2:  [99 %-100 %] 100 %  BP: (100-124)/(59-80) 100/59     Weight: 77.1 kg (170 lb)  Body mass index is 28.29 kg/m².    Physical Exam   Constitutional: She is oriented to person, place, and time. She appears well-developed and well-nourished. No distress.   HENT:   Head: Normocephalic and atraumatic.   Eyes: EOM are normal. No scleral icterus.   Neck: Normal range of motion.   Cardiovascular: Normal rate and regular rhythm.    Pulmonary/Chest: Effort normal. No respiratory distress.   Abdominal: Soft. She exhibits no distension. There is tenderness (RUQ>epigastric; negative Chacon's sign (patient received Dilaudid IV)). There is no rebound and no guarding.   Musculoskeletal: Normal range of motion.   Neurological: She is alert and oriented to person, place, and time.   Skin: Skin is warm and dry.   Nursing note and vitals reviewed.      Significant Labs:  CBC:   Recent Labs  Lab 12/14/17  1810   WBC 15.44*   RBC 4.21   HGB 11.9*   HCT 35.8*      MCV 85   MCH 28.3   MCHC 33.2     CMP:   Recent Labs  Lab 12/14/17  1812    GLU 93   CALCIUM 9.3   ALBUMIN 3.8   PROT 7.2      K 3.7   CO2 22*      BUN 17   CREATININE 0.7   ALKPHOS 100   ALT 26   AST 70*   BILITOT 0.4     Lipase: 31    Significant Diagnostics:  US: pending    Assessment/Plan:     * Gallstones    -Admit to general surgery  -Patient with worsening pain and leukocytosis; will obtain US of gallbladder to assess for cholecystitis - this will change whether or not to start patient on antibiotics; however, we will plan either way for lap yared tomorrow as scheduled, barring any evidence of choledocholithiasis, necessitating ERCP prior to lap yared.  -Ambulate  -NPO  -IVFs  -Repeat labs in AM  -Ambulate  -DVT and GI ppx          VTE Risk Mitigation         Ordered     enoxaparin injection 40 mg  Every 24 hours (non-standard times)     Route:  Subcutaneous        12/14/17 1928     Place AMANDA hose  Until discontinued      12/14/17 1928     Medium Risk of VTE  Once      12/14/17 1928     Place sequential compression device  Until discontinued      12/14/17 1928          Jaylen Ortiz Jr., MD  General Surgery  [facility]

## 2017-12-15 NOTE — ED NOTES
Ultrasound notified pt's room is ready. Ultrasound will put transport in for pt to go to room 542A once ultrasound is complete.

## 2017-12-15 NOTE — SUBJECTIVE & OBJECTIVE
No current facility-administered medications on file prior to encounter.      Current Outpatient Prescriptions on File Prior to Encounter   Medication Sig    levothyroxine (SYNTHROID) 100 MCG tablet Take 100 mcg by mouth every morning.     acetaminophen (TYLENOL) 160 mg/5 mL (5 mL) Susp Take 10 mg/kg by mouth daily as needed.     albuterol 90 mcg/actuation inhaler Inhale 2 puffs into the lungs every 6 (six) hours as needed for Wheezing. Rescue    b complex vitamins tablet Take 2 tablets by mouth every morning.     butalbital-aspirin-caffeine -40 mg (FIORINAL) -40 mg Cap Take 1 capsule by mouth 2 (two) times daily as needed.    CALCIUM CITRATE/VITAMIN D3 (CALCIUM CITRATE + D ORAL) Take 1 tablet by mouth 2 (two) times daily.     clonazePAM (KLONOPIN) 1 MG tablet TAKE ONE TABLET BY MOUTH TWICE DAILY AS NEEDED    cyanocobalamin (VITAMIN B-12) 1000 MCG tablet Take 500 mcg by mouth once daily.     dicyclomine (BENTYL) 10 MG capsule Take 1 capsule (10 mg total) by mouth 3 (three) times daily.    ergocalciferol (VITAMIN D2) 50,000 unit Cap Take 1 capsule (50,000 Units total) by mouth once a week.    esomeprazole (NEXIUM) 40 MG capsule Take 1 capsule (40 mg total) by mouth before breakfast.    estradiol (ESTRACE) 1 MG tablet Take 1 mg by mouth daily with dinner or evening meal.     FLUARIX QUAD 8472-5298, PF, 60 mcg (15 mcg x 4)/0.5 mL vaccine TO BE ADMINISTERED BY PHARMACIST FOR IMMUNIZATION    multivitamin (ONE DAILY MULTIVITAMIN) per tablet Take 1 tablet by mouth 2 (two) times daily.     omeprazole (PRILOSEC) 20 MG capsule Take 1 capsule (20 mg total) by mouth 2 (two) times daily. Open one capsule twice daily and add to yogurt or applesauce    ondansetron (ZOFRAN-ODT) 8 MG TbDL Take 1 tablet (8 mg total) by mouth every 6 (six) hours as needed.    polyethylene glycol (GLYCOLAX) 17 gram PwPk MIX ONE PACKET IN LIQUID AND TAKE DAILY AS DIRECTED    promethazine (PHENERGAN) 25 MG tablet Take 1  "tablet (25 mg total) by mouth every 6 (six) hours as needed for Nausea.    promethazine-codeine 6.25-10 mg/5 ml (PHENERGAN WITH CODEINE) 6.25-10 mg/5 mL syrup Take 5 mLs by mouth nightly as needed for Cough.    ranitidine (ZANTAC) 150 MG tablet Take 1 tablet (150 mg total) by mouth 2 (two) times daily.       Review of patient's allergies indicates:   Allergen Reactions    Gluten protein Hives, Diarrhea, Itching, Nausea And Vomiting, Swelling, Anxiety and Dermatitis    Latex, natural rubber Dermatitis     Latex gloves with Powder    Rizatriptan Photosensitivity, Nausea Only and Other (See Comments)     "Feels like my brain is on fire."    Sumatriptan Photosensitivity, Nausea Only and Other (See Comments)     "Feels like my brain is on fire."    Zolmitriptan Photosensitivity, Nausea Only and Other (See Comments)     "Feels like my brain is on fire."       Past Medical History:   Diagnosis Date    Celiac disease     Diabetes mellitus     Hypothyroidism     Sleep apnea in adult     Thyroid disease      Past Surgical History:   Procedure Laterality Date    ABDOMINAL SURGERY      abdominalplasty     ADENOIDECTOMY      BLADDER SUSPENSION      BREAST SURGERY      GASTRECTOMY      HYSTERECTOMY      TONSILLECTOMY      TOTAL THYROIDECTOMY Right     TUBAL LIGATION      TYMPANOSTOMY TUBE PLACEMENT       Family History     Problem Relation (Age of Onset)    Cancer Maternal Grandmother, Maternal Grandfather, Paternal Grandfather    Depression Maternal Grandmother    Diabetes Maternal Grandmother, Paternal Grandmother, Paternal Grandfather    Heart disease Paternal Uncle, Maternal Grandmother, Maternal Grandfather, Paternal Grandfather    Hyperlipidemia Father, Brother, Daughter, Paternal Uncle, Maternal Grandfather, Paternal Grandmother, Paternal Grandfather    Hypertension Paternal Uncle, Maternal Grandmother, Maternal Grandfather, Paternal Grandmother, Paternal Grandfather    Hypothyroidism Maternal Aunt    " Kidney failure Daughter, Maternal Aunt, Maternal Grandfather, Paternal Grandfather    Melanoma Maternal Grandmother    No Known Problems Mother    Obesity Brother, Maternal Grandmother, Paternal Grandmother, Paternal Grandfather    Sleep apnea Maternal Grandmother, Paternal Grandfather    Stroke Maternal Grandfather, Paternal Grandfather        Social History Main Topics    Smoking status: Never Smoker    Smokeless tobacco: Never Used    Alcohol use No    Drug use: No    Sexual activity: Not on file     Review of Systems   Constitutional: Positive for appetite change and chills. Negative for activity change, fever and unexpected weight change.   HENT: Negative.    Respiratory: Negative for shortness of breath.    Cardiovascular: Negative for chest pain and palpitations.   Gastrointestinal: Positive for abdominal pain, nausea and vomiting. Negative for abdominal distention, constipation and diarrhea.   Genitourinary: Negative.    Musculoskeletal: Negative.    Hematological: Does not bruise/bleed easily.     Objective:     Vital Signs (Most Recent):  Temp: 98.2 °F (36.8 °C) (12/14/17 1714)  Pulse: (!) 54 (12/14/17 2016)  Resp: 17 (12/14/17 2016)  BP: (!) 100/59 (12/14/17 2016)  SpO2: 100 % (12/14/17 2016) Vital Signs (24h Range):  Temp:  [98.2 °F (36.8 °C)] 98.2 °F (36.8 °C)  Pulse:  [54-84] 54  Resp:  [16-17] 17  SpO2:  [99 %-100 %] 100 %  BP: (100-124)/(59-80) 100/59     Weight: 77.1 kg (170 lb)  Body mass index is 28.29 kg/m².    Physical Exam   Constitutional: She is oriented to person, place, and time. She appears well-developed and well-nourished. No distress.   HENT:   Head: Normocephalic and atraumatic.   Eyes: EOM are normal. No scleral icterus.   Neck: Normal range of motion.   Cardiovascular: Normal rate and regular rhythm.    Pulmonary/Chest: Effort normal. No respiratory distress.   Abdominal: Soft. She exhibits no distension. There is tenderness (RUQ>epigastric; negative Chacon's sign (patient  received Dilaudid IV)). There is no rebound and no guarding.   Musculoskeletal: Normal range of motion.   Neurological: She is alert and oriented to person, place, and time.   Skin: Skin is warm and dry.   Nursing note and vitals reviewed.      Significant Labs:  CBC:   Recent Labs  Lab 12/14/17  1810   WBC 15.44*   RBC 4.21   HGB 11.9*   HCT 35.8*      MCV 85   MCH 28.3   MCHC 33.2     CMP:   Recent Labs  Lab 12/14/17  1810   GLU 93   CALCIUM 9.3   ALBUMIN 3.8   PROT 7.2      K 3.7   CO2 22*      BUN 17   CREATININE 0.7   ALKPHOS 100   ALT 26   AST 70*   BILITOT 0.4     Lipase: 31    Significant Diagnostics:  US: pending

## 2017-12-15 NOTE — ASSESSMENT & PLAN NOTE
-Admit to general surgery  -Patient with worsening pain and leukocytosis; will obtain US of gallbladder to assess for cholecystitis - this will change whether or not to start patient on antibiotics; however, we will plan either way for lap yared tomorrow as scheduled, barring any evidence of choledocholithiasis, necessitating ERCP prior to lap yared.  -Ambulate  -NPO  -IVFs  -Repeat labs in AM  -Ambulate  -DVT and GI ppx

## 2017-12-16 VITALS
RESPIRATION RATE: 16 BRPM | OXYGEN SATURATION: 99 % | WEIGHT: 170 LBS | SYSTOLIC BLOOD PRESSURE: 100 MMHG | BODY MASS INDEX: 28.32 KG/M2 | TEMPERATURE: 98 F | HEIGHT: 65 IN | HEART RATE: 55 BPM | DIASTOLIC BLOOD PRESSURE: 63 MMHG

## 2017-12-16 LAB
ALBUMIN SERPL BCP-MCNC: 3.3 G/DL
ALP SERPL-CCNC: 122 U/L
ALT SERPL W/O P-5'-P-CCNC: 134 U/L
ANION GAP SERPL CALC-SCNC: 9 MMOL/L
AST SERPL-CCNC: 103 U/L
BASOPHILS # BLD AUTO: 0.02 K/UL
BASOPHILS NFR BLD: 0.2 %
BILIRUB SERPL-MCNC: 0.4 MG/DL
BUN SERPL-MCNC: 7 MG/DL
CALCIUM SERPL-MCNC: 9 MG/DL
CHLORIDE SERPL-SCNC: 107 MMOL/L
CO2 SERPL-SCNC: 22 MMOL/L
CREAT SERPL-MCNC: 0.7 MG/DL
DIFFERENTIAL METHOD: ABNORMAL
EOSINOPHIL # BLD AUTO: 0 K/UL
EOSINOPHIL NFR BLD: 0 %
ERYTHROCYTE [DISTWIDTH] IN BLOOD BY AUTOMATED COUNT: 14.4 %
EST. GFR  (AFRICAN AMERICAN): >60 ML/MIN/1.73 M^2
EST. GFR  (NON AFRICAN AMERICAN): >60 ML/MIN/1.73 M^2
GLUCOSE SERPL-MCNC: 122 MG/DL
HCT VFR BLD AUTO: 35.5 %
HGB BLD-MCNC: 11.5 G/DL
IMM GRANULOCYTES # BLD AUTO: 0.03 K/UL
IMM GRANULOCYTES NFR BLD AUTO: 0.3 %
LYMPHOCYTES # BLD AUTO: 0.8 K/UL
LYMPHOCYTES NFR BLD: 8.8 %
MAGNESIUM SERPL-MCNC: 1.8 MG/DL
MCH RBC QN AUTO: 27.8 PG
MCHC RBC AUTO-ENTMCNC: 32.4 G/DL
MCV RBC AUTO: 86 FL
MONOCYTES # BLD AUTO: 0.2 K/UL
MONOCYTES NFR BLD: 1.8 %
NEUTROPHILS # BLD AUTO: 7.7 K/UL
NEUTROPHILS NFR BLD: 88.9 %
NRBC BLD-RTO: 0 /100 WBC
PHOSPHATE SERPL-MCNC: 3.6 MG/DL
PLATELET # BLD AUTO: 263 K/UL
PMV BLD AUTO: 11.5 FL
POTASSIUM SERPL-SCNC: 4.3 MMOL/L
PROT SERPL-MCNC: 6.5 G/DL
RBC # BLD AUTO: 4.14 M/UL
SODIUM SERPL-SCNC: 138 MMOL/L
WBC # BLD AUTO: 8.72 K/UL

## 2017-12-16 PROCEDURE — 85025 COMPLETE CBC W/AUTO DIFF WBC: CPT

## 2017-12-16 PROCEDURE — 80053 COMPREHEN METABOLIC PANEL: CPT

## 2017-12-16 PROCEDURE — 84100 ASSAY OF PHOSPHORUS: CPT

## 2017-12-16 PROCEDURE — S0028 INJECTION, FAMOTIDINE, 20 MG: HCPCS | Performed by: SURGERY

## 2017-12-16 PROCEDURE — 25000003 PHARM REV CODE 250: Performed by: SURGERY

## 2017-12-16 PROCEDURE — 36415 COLL VENOUS BLD VENIPUNCTURE: CPT

## 2017-12-16 PROCEDURE — 83735 ASSAY OF MAGNESIUM: CPT

## 2017-12-16 RX ORDER — HYDROCODONE BITARTRATE AND ACETAMINOPHEN 5; 325 MG/1; MG/1
1 TABLET ORAL EVERY 6 HOURS PRN
Qty: 25 TABLET | Refills: 0 | Status: SHIPPED | OUTPATIENT
Start: 2017-12-16 | End: 2018-02-07 | Stop reason: ALTCHOICE

## 2017-12-16 RX ADMIN — LEVOTHYROXINE SODIUM 100 MCG: 100 TABLET ORAL at 06:12

## 2017-12-16 RX ADMIN — FAMOTIDINE 20 MG: 10 INJECTION, SOLUTION INTRAVENOUS at 09:12

## 2017-12-16 RX ADMIN — ONDANSETRON 8 MG: 8 TABLET, ORALLY DISINTEGRATING ORAL at 03:12

## 2017-12-16 RX ADMIN — HYDROMORPHONE HYDROCHLORIDE 1 MG: 1 INJECTION, SOLUTION INTRAMUSCULAR; INTRAVENOUS; SUBCUTANEOUS at 03:12

## 2017-12-16 NOTE — NURSING
Patient resting in bed. Patient denies c/o pain or n/v. Discharge orders and prescriptions are reviewed with the patient. Patient states understanding of orders. D/c PIV and tolerated well with patient. Patient waiting for transport

## 2017-12-16 NOTE — NURSING
Pt on floor from sx, pt states she is nauseous and c/o pain of 9, prn meds given, vs stable, pt is comfortable in bed with family at bedside

## 2017-12-16 NOTE — NURSING TRANSFER
Nursing Transfer Note      12/15/2017     Transfer To: 542A    Transfer via stretcher    Transfer with None    Transported by PCT    Medicines sent: None    Chart send with patient: Yes    Notified: patient mother    Patient reassessed at: 12/15/17 1830    Upon arrival to floor: patient oriented to room, call bell in reach and bed in lowest position

## 2017-12-17 NOTE — DISCHARGE SUMMARY
[facility]  General Surgery  Discharge Summary      Patient Name: Emilia Coyle  MRN: [id number]  Admission Date: 12/14/2017  Hospital Length of Stay: 1 days  Discharge Date and Time: 12/16/2017 10:16 AM  Attending Physician: Laith Aleman MD  Discharging Provider: Laith Bartholomew MD  Primary Care Provider: Rosanna Benitez DO     HPI: 43 yo F with biliary colic, nausea, vomiting.    Procedure(s) (LRB):  CHOLECYSTECTOMY-LAPAROSCOPIC (N/A)  CHOLANGIOGRAM (N/A)     Hospital Course: Pt admitted the night before scheduled outpatient cholecystectomy due to intractable symptoms.  She underwent lap yared with IOC on hospital day two.  The procedure was uncomplicated.  She tolerated a regular diet and PO meds and was dicharged home in stable condition on POD 1.    Physical exam:  NAD, AAOx3  RRR  CTAB  Abd S/ND/ATTP      Consults:   Consults         Status Ordering Provider     Inpatient consult to General surgery  Once     Provider:  (Not yet assigned)    LILI Post          Significant Diagnostic Studies: see EMR    Pending Diagnostic Studies:     None        Final Active Diagnoses:    Diagnosis Date Noted POA    PRINCIPAL PROBLEM:  Gallstones [K80.20] 12/14/2017 Unknown    Acute cholecystitis [K81.0] 12/14/2017 Yes      Problems Resolved During this Admission:    Diagnosis Date Noted Date Resolved POA      Discharged Condition: good    Disposition: Home or Self Care    Follow Up:  Follow-up Information     Laith Aleman MD In 2 weeks.    Specialties:  General Surgery, Bariatrics  Why:  For wound re-check/Appt: 1/4/18 at 07:45 AM.   Contact information:  [address] [place] [address] [phone]                 Patient Instructions:     Diet general     Diet general     Lifting restrictions   Order Comments: No     Call MD for:  increased confusion or weakness     Call MD for:  persistent dizziness, light-headedness, or visual disturbances     Call MD for:   worsening rash     Call MD for:  severe persistent headache     Call MD for:  difficulty breathing or increased cough     Call MD for:  redness, tenderness, or signs of infection (pain, swelling, redness, odor or green/yellow discharge around incision site)     Call MD for:  severe uncontrolled pain     Call MD for:  persistent nausea and vomiting or diarrhea     Call MD for:  temperature >100.4     Remove dressing in 48 hours   Order Comments: Remove dressing in 48 hours. May shower after dressing removed. Steristrips will fall off on their own.       Medications:  Reconciled Home Medications:   Discharge Medication List as of 12/16/2017  9:10 AM      START taking these medications    Details   hydrocodone-acetaminophen 5-325mg (NORCO) 5-325 mg per tablet Take 1 tablet by mouth every 6 (six) hours as needed for Pain., Starting Fri 12/15/2017, Print         CONTINUE these medications which have NOT CHANGED    Details   acetaminophen (TYLENOL) 160 mg/5 mL (5 mL) Susp Take 10 mg/kg by mouth daily as needed. , Historical Med      albuterol 90 mcg/actuation inhaler Inhale 2 puffs into the lungs every 6 (six) hours as needed for Wheezing. Rescue, Starting 3/29/2017, Until u 3/29/18, Normal      b complex vitamins tablet Take 2 tablets by mouth every morning. , Historical Med      butalbital-aspirin-caffeine -40 mg (FIORINAL) -40 mg Cap Take 1 capsule by mouth 2 (two) times daily as needed., Starting 12/28/2016, Until Discontinued, Historical Med      CALCIUM CITRATE/VITAMIN D3 (CALCIUM CITRATE + D ORAL) Take 1 tablet by mouth 2 (two) times daily. , Historical Med      clonazePAM (KLONOPIN) 1 MG tablet TAKE ONE TABLET BY MOUTH TWICE DAILY AS NEEDED, Phone In      cyanocobalamin (VITAMIN B-12) 1000 MCG tablet Take 500 mcg by mouth once daily. , Historical Med      dicyclomine (BENTYL) 10 MG capsule Take 1 capsule (10 mg total) by mouth 3 (three) times daily., Starting Wed 11/29/2017, Until Fri 12/29/2017,  Normal      ergocalciferol (VITAMIN D2) 50,000 unit Cap Take 1 capsule (50,000 Units total) by mouth once a week., Starting Thu 11/16/2017, Until Wed 2/14/2018, Normal      esomeprazole (NEXIUM) 40 MG capsule Take 1 capsule (40 mg total) by mouth before breakfast., Starting Wed 11/22/2017, Normal      estradiol (ESTRACE) 1 MG tablet Take 1 mg by mouth daily with dinner or evening meal. , Starting Mon 3/6/2017, Historical Med      FLUARIX QUAD [id number], PF, 60 mcg (15 mcg x 4)/0.5 mL vaccine TO BE ADMINISTERED BY PHARMACIST FOR IMMUNIZATION, Historical Med      levothyroxine (SYNTHROID) 100 MCG tablet Take 100 mcg by mouth every morning. , Starting Mon 3/6/2017, Historical Med      multivitamin (ONE DAILY MULTIVITAMIN) per tablet Take 1 tablet by mouth 2 (two) times daily. , Historical Med      omeprazole (PRILOSEC) 20 MG capsule Take 1 capsule (20 mg total) by mouth 2 (two) times daily. Open one capsule twice daily and add to yogurt or applesauce, Starting Thu 8/3/2017, Normal      ondansetron (ZOFRAN-ODT) 8 MG TbDL Take 1 tablet (8 mg total) by mouth every 6 (six) hours as needed., Starting Wed 11/29/2017, Normal      polyethylene glycol (GLYCOLAX) 17 gram PwPk MIX ONE PACKET IN LIQUID AND TAKE DAILY AS DIRECTED, Historical Med      promethazine (PHENERGAN) 25 MG tablet Take 1 tablet (25 mg total) by mouth every 6 (six) hours as needed for Nausea., Starting Wed 11/29/2017, Normal      promethazine-codeine 6.25-10 mg/5 ml (PHENERGAN WITH CODEINE) 6.25-10 mg/5 mL syrup Take 5 mLs by mouth nightly as needed for Cough., Starting Tue 8/29/2017, Print      ranitidine (ZANTAC) 150 MG tablet Take 1 tablet (150 mg total) by mouth 2 (two) times daily., Starting Wed 11/22/2017, Until Thu 11/22/2018, Normal             Laith Bartholomew MD  General Surgery  [facility]

## 2017-12-18 ENCOUNTER — PATIENT MESSAGE (OUTPATIENT)
Dept: BARIATRICS | Facility: CLINIC | Age: 42
End: 2017-12-18

## 2017-12-18 RX ORDER — CLONAZEPAM 1 MG/1
TABLET ORAL
Qty: 60 TABLET | Refills: 0 | Status: SHIPPED | OUTPATIENT
Start: 2017-12-18 | End: 2018-01-22 | Stop reason: SDUPTHER

## 2017-12-18 NOTE — ANESTHESIA POSTPROCEDURE EVALUATION
"Anesthesia Post Evaluation    Patient: Emilia Coyle    Procedure(s) Performed: Procedure(s) (LRB):  CHOLECYSTECTOMY-LAPAROSCOPIC (N/A)  CHOLANGIOGRAM (N/A)    Final Anesthesia Type: general  Patient location during evaluation: PACU  Patient participation: Yes- Able to Participate  Level of consciousness: awake and alert  Post-procedure vital signs: reviewed and stable  Pain management: adequate  Airway patency: patent  PONV status at discharge: No PONV  Anesthetic complications: no      Cardiovascular status: hemodynamically stable  Respiratory status: unassisted  Hydration status: euvolemic  Follow-up not needed.        Visit Vitals  /63 (Patient Position: Sitting)   Pulse (!) 55   Temp 36.5 °C (97.7 °F) (Oral)   Resp 16   Ht 5' 5" (1.651 m)   Wt 77.1 kg (170 lb)   SpO2 99%   Breastfeeding? No   BMI 28.29 kg/m²       Pain/Stella Score: No Data Recorded      "

## 2017-12-19 NOTE — PLAN OF CARE
12/18/17 10:28 AM Patient discharged to home, without needs, on 12/16.        12/18/17 1028   Final Note   Assessment Type Final Discharge Note   Discharge Disposition Home   What phone number can be called within the next 1-3 days to see how you are doing after discharge? ([phone])   Hospital Follow Up  Appt(s) scheduled? Yes   Discharge plans and expectations educations in teach back method with documentation complete? Yes  (per floor nurse & MD)   Right Care Referral Info   Post Acute Recommendation No Care

## 2017-12-20 ENCOUNTER — PATIENT MESSAGE (OUTPATIENT)
Dept: INTERNAL MEDICINE | Facility: CLINIC | Age: 42
End: 2017-12-20

## 2018-01-02 ENCOUNTER — PATIENT MESSAGE (OUTPATIENT)
Dept: BARIATRICS | Facility: CLINIC | Age: 43
End: 2018-01-02

## 2018-01-04 ENCOUNTER — OFFICE VISIT (OUTPATIENT)
Dept: SURGERY | Facility: CLINIC | Age: 43
End: 2018-01-04
Payer: COMMERCIAL

## 2018-01-04 VITALS
BODY MASS INDEX: 28.1 KG/M2 | HEART RATE: 67 BPM | DIASTOLIC BLOOD PRESSURE: 63 MMHG | TEMPERATURE: 98 F | WEIGHT: 168.63 LBS | HEIGHT: 65 IN | SYSTOLIC BLOOD PRESSURE: 114 MMHG

## 2018-01-04 DIAGNOSIS — Z09 POSTOP CHECK: Primary | ICD-10-CM

## 2018-01-04 DIAGNOSIS — R79.89 ELEVATED LFTS: ICD-10-CM

## 2018-01-04 PROBLEM — R10.13 EPIGASTRIC PAIN: Status: RESOLVED | Noted: 2017-11-30 | Resolved: 2018-01-04

## 2018-01-04 PROBLEM — K81.0 ACUTE CHOLECYSTITIS: Status: RESOLVED | Noted: 2017-12-14 | Resolved: 2018-01-04

## 2018-01-04 PROBLEM — K80.20 GALLSTONES: Status: RESOLVED | Noted: 2017-12-14 | Resolved: 2018-01-04

## 2018-01-04 PROCEDURE — 99024 POSTOP FOLLOW-UP VISIT: CPT | Mod: S$GLB,,, | Performed by: SURGERY

## 2018-01-04 PROCEDURE — 99999 PR PBB SHADOW E&M-EST. PATIENT-LVL III: CPT | Mod: PBBFAC,,, | Performed by: SURGERY

## 2018-01-04 NOTE — PROGRESS NOTES
"Emilia Coyle is a 42 y.o. female patient.   No diagnosis found.  Past Medical History:   Diagnosis Date    Celiac disease     Diabetes mellitus     Hypothyroidism     Sleep apnea in adult     Thyroid disease      No past surgical history pertinent negatives on file.  Scheduled Meds:  Continuous Infusions:  PRN Meds:    Review of patient's allergies indicates:   Allergen Reactions    Gluten protein Hives, Diarrhea, Itching, Nausea And Vomiting, Swelling, Anxiety and Dermatitis    Latex, natural rubber Dermatitis     Latex gloves with Powder    Rizatriptan Photosensitivity, Nausea Only and Other (See Comments)     "Feels like my brain is on fire."    Sumatriptan Photosensitivity, Nausea Only and Other (See Comments)     "Feels like my brain is on fire."    Zolmitriptan Photosensitivity, Nausea Only and Other (See Comments)     "Feels like my brain is on fire."     There are no hospital problems to display for this patient.    Blood pressure 114/63, pulse 67, temperature 98 °F (36.7 °C), height 5' 5" (1.651 m), weight 76.5 kg (168 lb 10.4 oz).    Subjective S/p lap yared with ioc 12/15/17.  She has no complaints and her preop pain is resolved.  Objective Abdomen benign, wounds clear, path reviewed.   Assessment & Plan Doing well after surgery.  Regular duty, rtc prn and will check lfts today.       Laith Aleman MD  1/4/2018  "

## 2018-01-04 NOTE — LETTER
[facility] - General Surgery  [address]  [place] [address]  Phone: [phone] January 4, 2018    Rosanna Benitez, DO  [address] [facility] [address]    Patient: Emilia Coyle   MR Number: [id number]   YOB: 1975   Date of Visit: 1/4/2018     Dear Dr. Benitez:    Thank you for referring Emilia Coyle to me for evaluation. Below are the relevant portions of my assessment and plan of care.    Patient is status post lap yared with IOC 12/15/17.  She has no complaints and her pre op pain is resolved.    PLAN:Patient is doing well after surgery.  Regular duty, RTC PRN and will check LFT's today.    If you have questions, please do not hesitate to call me. I look forward to following Emilia along with you.    Sincerely,      Laith Aleman MD   Section Head - General, Laparoscopic, Bariatric  Acute Care and Oncologic Surgery   - Surgical Weight Loss Program  [facility]    YOLY/ruth    CC  Argelia Nation PA-C

## 2018-01-22 RX ORDER — CLONAZEPAM 1 MG/1
TABLET ORAL
Qty: 60 TABLET | Refills: 0 | Status: SHIPPED | OUTPATIENT
Start: 2018-01-22 | End: 2018-02-21 | Stop reason: SDUPTHER

## 2018-02-07 ENCOUNTER — OFFICE VISIT (OUTPATIENT)
Dept: BARIATRICS | Facility: CLINIC | Age: 43
End: 2018-02-07
Payer: COMMERCIAL

## 2018-02-07 VITALS
DIASTOLIC BLOOD PRESSURE: 72 MMHG | HEART RATE: 92 BPM | WEIGHT: 158.06 LBS | BODY MASS INDEX: 26.33 KG/M2 | SYSTOLIC BLOOD PRESSURE: 120 MMHG | HEIGHT: 65 IN

## 2018-02-07 DIAGNOSIS — Z98.84 S/P LAPAROSCOPIC SLEEVE GASTRECTOMY: Primary | ICD-10-CM

## 2018-02-07 DIAGNOSIS — R63.4 WEIGHT LOSS: ICD-10-CM

## 2018-02-07 PROCEDURE — 3008F BODY MASS INDEX DOCD: CPT | Mod: S$GLB,,, | Performed by: PHYSICIAN ASSISTANT

## 2018-02-07 PROCEDURE — 99213 OFFICE O/P EST LOW 20 MIN: CPT | Mod: 24,S$GLB,, | Performed by: PHYSICIAN ASSISTANT

## 2018-02-07 PROCEDURE — 99999 PR PBB SHADOW E&M-EST. PATIENT-LVL V: CPT | Mod: PBBFAC,,, | Performed by: PHYSICIAN ASSISTANT

## 2018-02-07 NOTE — PROGRESS NOTES
BARIATRIC FOLLOW UP:    Chief Complaint   Patient presents with    Follow-up     6 month sleeve       HISTORY OF PRESENT ILLNESS: Emilia Coyle is a 42 y.o. female with a Body mass index is 26.3 kg/m². who presents for a 6 month follow up s/p Lap Sleeve with Dr. Aleman on 7/28/2017.  She is doing well and tolerating the diet without difficulty.  She feels great and is very pleased with her progress.  Diabetes is resolved and she is off medications, blood sugar is running normal.  She has lost 73 lbs, approximately 81% of their excess weight.  She has no complaints.   She is feeling much better now that her gallbladder has been removed.  She avoids wheat due to Celiac disease.  Her  had sleeve done twice with Dr Silva office.  He is still not losing weight and interested in revision to bypass.  Her brother is also interested in gastric sleeve.  Denies: nausea, vomiting, abdominal pain, changes in bowel movement pattern, fever, chills, dysphagia, chest pain, and shortness of breath.    Review of Systems   Constitutional: Negative for chills, fever and malaise/fatigue.   Eyes: Negative for blurred vision and double vision.   Respiratory: Negative for cough, hemoptysis and shortness of breath.    Cardiovascular: Negative for chest pain, palpitations and leg swelling.   Gastrointestinal: Negative for abdominal pain, blood in stool, constipation, diarrhea, heartburn, melena, nausea and vomiting.   Genitourinary: Negative for dysuria and hematuria.   Musculoskeletal: Negative for back pain, falls, joint pain, myalgias and neck pain.   Skin: Negative for rash.   Neurological: Negative for dizziness, tingling, weakness and headaches.   Endo/Heme/Allergies: Negative for environmental allergies. Does not bruise/bleed easily.   Psychiatric/Behavioral: Negative.        EXERCISE & VITAMINS:  See Bariatric Assessment    MEDICATIONS/ALLERGIES:  Have been reviewed.    DIET:  Regular Bariatric Diet.  1-2 Premier RTD and   Protein 2O protein shakes daily plus 2-3 protein rich meals (chicken, turkey, shrimp, tuna) 80+ grams protein.  48+ oz H20 and Clear SF Liquids.  Reports that her total carbs for the week: 2 tablespoon rice, 1/2 gluten free tortilla, 1-2 slice gluten free bread.    Vitals:    02/07/18 0820   BP: 120/72   Pulse: 92       Physical Exam   Constitutional: She is oriented to person, place, and time. She appears well-developed and well-nourished.   HENT:   Head: Normocephalic and atraumatic.   Cardiovascular: Normal rate and regular rhythm.    Pulmonary/Chest: Effort normal and breath sounds normal.   Abdominal: Soft. Bowel sounds are normal. She exhibits no distension and no mass. There is no tenderness. There is no rebound and no guarding. No hernia.   WHSS   Musculoskeletal: She exhibits no edema.   Neurological: She is alert and oriented to person, place, and time.   Skin: Skin is warm and dry. No rash noted. No erythema. No pallor.   Psychiatric: She has a normal mood and affect. Her behavior is normal. Judgment and thought content normal.   Nursing note and vitals reviewed.      ASSESSMENT:  - Obesity, Body mass index is 26.3 kg/m².,  s/p sleeve gastrectomy on 7/28/2017.  - Estimated goal weight, 185 lbs, which is 50% EWL  - Co-morbidities: MIKAELA (stable), DM2 (resolved)  - Great Weight loss, 73 lbs, 81% EWL  - Good Exercise regimen  - Good Vitamin Regimen  - Good Diet  - Not at risk for fall or abuse    PLAN:  - Emphasized the importance of regular exercise and adherence to bariatric diet to achieve maximum weight loss.  - Follow-up with dietician to reinforce diet.  Pt declined.   - Patient advised to exercise 30-45 minutes 4-5 days / week.  - Reviewed importance of following bariatric diet guidelines for best weight loss results, including eating a high protein diet with over 80 grams of protein daily; avoiding foods made with rice, potato, flour, corn, and peas; and eating low-calorie, low-fat, and sugar-free food  items and drinks.  - Reviewed vitamin regimen with patient: complete MVI with iron twice daily, calcium citrate 1500 mg daily in split doses, Vitamin B12 sublingual daily or monthly injection.  - Reviewed important dietary habits to follow such as following the 30-minute rule, taking 20 minutes to eat meals, eating 4-6 small protein-rich meals daily, and drinking water or other non-carbonated sugar-free beverages between meals.  - Advised patient to keep a continue food diary to ensure she maintains daily protein intake.  - Check weight weekly to ensure weight loss continues and does not start increasing.  - Continue daily vitamins and medications.  - Okay to taper off of Nexium, slow taper discussed and handout provided.  - No restrictions to exercise.  - Miralax daily for constipation, no fiber.  - No NSAIDs, Tylenol for pain.  - Can swallow whole pills on 10/28/17.  - RTC in 6 months or sooner if needed.  - Call the office for any issues.  - Check labs next visit.    15 minute visit, over 50% of time spent counseling patient face to face on diet, exercise, and weight loss.

## 2018-02-07 NOTE — PATIENT INSTRUCTIONS
How to taper off of Nexium (esmaprazole):  - Take 1 Tablet every other day for 2 weeks.  If you do not experience any heartburn, indigestion, nausea symptoms, the following week, take 1 tablet every 3 days.  Again if you remain without the above symptoms, you may completely discontinue the medication.  If symptoms return at any point, please restart the medication.        Meal Ideas for Regular Bariatric Diet  *Recipes and products available at [email or web address]      Breakfast: (15-20g protein)    - Egg white omelet: 2 egg whites or ½ cup Egg Beaters. (Optional proteins: cheese, shrimp, black beans, chicken, sliced turkey) (Optional veggies: tomatoes, salsa, spinach, mushrooms, onions, green peppers, or small slice avocado)     - Egg and sausage: 1 egg or ¼ cup Egg Beaters (any variety), with 1 abdiel or 2 links of Turkey sausage or Veggie breakfast sausage (Snabboteket or 3Jam)    - Crust-less breakfast quiche: To make a glass pie dish, mix 4oz part skim Ricotta, 1 cup skim milk, and 2 eggs as your base. Add protein: shredded cheese, sliced lean ham or turkey, turkey adams/sausage. Add veggies: tomato, onion, green onion, mushroom, green pepper, spinach, etc.    - Yogurt parfait: Mix 1 - 6oz container Dannon Light N Fit vanilla yogurt, with ¼ cup crushed unsalted nuts    - Cottage cheese and fruit: ½ cup part-skim cottage cheese or ricotta cheese topped with fresh fruit or sugar free preserves     - Chikis Snow's Vanilla Egg custard* (add 2 Tbsp instant coffee granules to make Cappuccino Custard*)    - Hi-Protein café latte (skim milk, decaf coffee, 1 scoop protein powder). Optional to add Sugar free syrup or extract flavoring.    - Breakfast Lox: spread fat free cream cheese on slices of smoked salmon. Serve over scrambled or egg over easy (sauteed with nonstick cookspray) OR on a cucumber slice    - Eggwhich: Scramble or cook 1 large egg over easy using nonstick cookspray. Place between 2 slices of  Zimbabwean adams and low fat cheese.     Lunch: (20-30g protein)    - ½ cup Black bean soup (Homemade or Progresso), with ¼ cup shredded low-fat cheese. Top with chopped tomato or fresh salsa.     - Lean deli turkey breast and low-fat sliced cheese, mustard or light wynne to moisten, rolled up together, or wrapped in a Seth lettuce leaf    - Chicken salad made from dinner leftovers, moisten with low-fat salad dressing or light wynne. Also try leftover salmon, shrimp, tuna or boiled eggs. Serve ½ cup over dark green salad    - Fat-free canned refried beans, topped with ¼ cup shredded low-fat cheese. Top with chopped tomato or fresh salsa.     - Greek salad: Top mixed greens with 1-2oz grilled chicken, tomatoes, red onions, 2-3 kalamata olives, and sprinkle lightly with feta cheese. Spritz with Balsamic vinegar to taste.     - Crust-less lunch quiche: To make a glass pie dish, mix 4oz part skim Ricotta, 1 cup skim milk, and 2 eggs as your base. Add protein: shredded cheese, sliced lean ham or turkey, shrimp, chicken. Add veggies: tomato, onion, green onion, mushroom, green pepper, spinach, artichoke, broccoli, etc.    - Pizza bake: spread a  otilio fernando mushroom with tomato sauce, low-fat shredded mozzarella and turkey pepperoni or [place] adams. Add any veggies. Roast for 10-15 minutes, until cheese melted.     - Cucumber crab bites: Spread ¼ cup crab dip (lump crabmeat + light cream cheese and green onions) over sliced cucumber.     - Chicken with light spinach and artichoke dip*: Puree in : 6oz cooked and drained spinach, 2 cloves garlic, 1 can cannelloni beans, ½ cup chopped green onions, 1 can drained artichoke hearts (not marinated in oil), lemon juice and basil. Mix in 2oz chopped up chicken.    Supper: (20-30g protein)    - Serve grilled fish over dark green salad tossed with low-fat dressing, served with grilled asparagus peck     - Rotisserie chicken salad: served with sliced strawberries,  walnuts, fat-free feta cheese crumbles and 1 tbsp Padillas Own Light Raspberry Crenshaw Vinaigrette    - Shrimp cocktail: Dip cold boiled shrimp in homemade low-sugar cocktail sauce (1/2 cup George One Carb ketchup, 2 tbsp horseradish, 1/4 tsp hot sauce, 1 tsp Worcestershire sauce, 1 tbsp freshly-squeezed lemon juice). Serve with dark green salad, walnuts, and crumbled blue cheese drizzled with olive oil and Balsamic vinegar    - Tuna Melt: Spread tuna salad onto 2 thick slices of tomato. Top with low-fat cheese and broil until cheese is melted. May also be made with chicken salad of shrimp salad. Gunter with different types of cheeses.    - Chicken or beef fajitas (no tortilla, rice, beans, chips). Top meat and veggies w/ fresh salsa, fat free sour cream.     - Homemade low-fat Chili using extra lean ground beef or ground turkey. Top with shredded cheese and salsa as desired. May add dollop fat-free sour cream if desired    - Chicken parmesan: Top chicken breast w/ low sugar marinara sauce, mozzarella cheese and bake until chicken reaches 165*.  Serve w/ spaghetti SQUASH or Maori cut green beans    - Dinner Omelet with shrimp or chicken and onion, green peppers and chives.    - No noodle lasagna: Use sliced zucchini or eggplant in place of noodles.  Layer with part skim ricotta cheese and low sugar meat sauce (use very lean ground beef or ground turkey).    - Mexican chicken bake: Bake chunks of chicken breast or thigh with taco seasoning, Pace brand enchilada sauce, green onions and low-fat cheese. Serve with ¼ cup black beans or fat free refried beans topped with chopped tomatoes or salsa.    - Robert frozen meatballs, simmered in Classico Marinara sauce. Different flavors of salsa or spaghetti sauce create different dishes! Sprinkle with parmesan cheese. Serve with grilled or steamed veggies, or a dark green salad.    - Simmer boneless skinless chicken thigh chunks in Classico Marimaria antoniaa sauce or roasted  salsa until tender with chopped onion, bell pepper, garlic, mushrooms, spinach, etc.     - Hamburger or veggie burger, without the bun, dressed the way you like. Served with grilled or steamed veggies.    - Eggplant parmesan: Bake slices of eggplant at 350 degrees for 15 minutes. Layer tomato sauce, sliced eggplant and low-fat mozzarella cheese in a baking dish and cover with foil. Bake 30-40 more minutes or until bubbly. Uncover and bake at 400 degrees for about 15 more minutes, or until top is slightly crisp.    - Fish tacos: grilled/baked white fish, wrapped in Seth lettuce leaf, topped with salsa, shredded low-fat cheese, and light coleslaw.    - Chicken adolph: Sprinkle chicken w/ 1 tsp of hidden valley ranch dip mix. Then grill chicken and top with black beans, salsa and 1 tsp fat free sour cream.     - Cauliflower pizza crust: Use cauliflower as crust (see recipe on john, no flour!). Top w/ low fat cheese, turkey pepperoni and veggies and bake again    - chicken or turkey crust pizza: use ground chicken or turkey instead of cauliflower, spread in [place] and bake at 350 for about 20-30 minutes(may want to add garlic, black pepper, oregano and other herbs to ground meat mixture).  Remove and top w/ low fat cheese, turkey pepperoni and veggies and bake again for another 10 minutes or until cheese is browned.     Snacks: (100-200 calories; >5g protein)    - 1 low-fat cheese stick with 8 cherry tomatoes or 1 serving fresh fruit  - 4 thin slices fat-free turkey breast and 1 slice low-fat cheese  - 4 thin slices fat-free honey ham with wedge of melon  - 6-8 edamame pods (equivalent to about 1/4 cup edamame without pods).   - 1/4 cup unsalted nuts with ½ cup fruit  - 6-oz container Dannon Light n Fit vanilla yogurt, topped with 1oz unsalted nuts         - apple, celery or baby carrots spread with 2 Tbsp PB2  - apple slices with 1 oz slice low-fat cheese  - Apple slices dipped in 2 Tbsp of PB2  - celery,  cucumber, bell pepper or baby carrots dipped in ¼ cup hummus bean spread or light spinach and artichoke dip (*recipe in lunch section)  - celery, cucumber, baby carrots dipped in high protein greek yogurt (Mix 16 oz plain greek yogurt + 1 packet of hidden valley ranch dip mix)  - Sergio Chaparro Beef Steak - 14g protein! (similar to beef jerky)  - 2 wedges Laughing Cow - Light Herb & Garlic Cheese with sliced cucumber or green bell pepper  - 1/2 cup low-fat cottage cheese with ¼ cup fruit or ¼ cup salsa  - RTD Protein drinks: Atkins, Low Carb Slim Fast, EAS light, Muscle Milk Light, etc.  - Homemade Protein drinks: GNC Soy95, Isopure, Nectar, UNJURY, Whey Gourmet, etc. Mix 1 scoop powder with 8oz skim/1% milk or light soymilk.  - Protein bars: Atkins, EAS, Pure Protein, Think Thin, Detour, etc. Must have 0-4 grams sugar - Read the label.    Takeout Options: No more than twice/week  Deli - Salads (no pasta or rice), meats, cheeses. Roasted chicken. Lox (salmon)    Mexican - Platters which don't include tortillas, chips, or rice. Go easy on the beans. Example: Fajitas without the tortillas. Ask the  not to bring chips to the table if they are too tempting.    Greek - Meat or fish and vegetable, but no bread or rice. Including hummus, baba ganoush, etc, is OK. Most sit-down Greek restaurants can provide you with cucumber slices for dipping instead of дмитрий bread.    Fast Food (Avoid as much as possible) - Salads (no croutons and limit salad dressing to 2 tbsp), grilled chicken sandwich without the bun and ask for no wynne. Shirins low fat chili or Taco Bell pintos and cheese.    BBQ - The meats are fine if you ask for sauces on the side, but most of the traditional side dishes are loaded with carbs. Tevin slaw, baked beans and BBQ sauce are typically made with sugar.    Chinese - Nothing deep-fried, no rice or noodles. Many Chinese sauces have starch and sugar in them, so you'll have to use your judgement. If you find  that these sauces trigger cravings, or cause Dumping, you can ask for the sauce to be made without sugar or just use soy sauce.

## 2018-02-07 NOTE — LETTER
February 7, 2018      Emilia Coyle  [address]  [place] [address]             [facility] - Bariatric Surgery  [address]  [place] [address]  Phone: [phone]  Fax: [phone] Patient: Emilia Coyle  MRN: [id number]  YOB: 1975  Date of Visit: 02/07/2018    To Whom It May Concern:    Emilia Moore was seen in the Surgery Clinic on 02/07/2018. She may return to work/school on 2/7/2018 with no restrictions. If you have any questions or concerns, or if I can be of further assistance, please do not hesitate to contact my office.    Sincerely,        Argelia Nation PA-C

## 2018-02-14 ENCOUNTER — PATIENT MESSAGE (OUTPATIENT)
Dept: INTERNAL MEDICINE | Facility: CLINIC | Age: 43
End: 2018-02-14

## 2018-02-14 RX ORDER — BUTALBITAL, ACETAMINOPHEN AND CAFFEINE 50; 325; 40 MG/1; MG/1; MG/1
1 TABLET ORAL EVERY 6 HOURS PRN
Qty: 20 TABLET | Refills: 0 | Status: SHIPPED | OUTPATIENT
Start: 2018-02-14 | End: 2018-03-16

## 2018-02-20 ENCOUNTER — PATIENT MESSAGE (OUTPATIENT)
Dept: INTERNAL MEDICINE | Facility: CLINIC | Age: 43
End: 2018-02-20

## 2018-02-21 RX ORDER — CLONAZEPAM 1 MG/1
TABLET ORAL
Qty: 60 TABLET | Refills: 0 | Status: SHIPPED | OUTPATIENT
Start: 2018-02-21 | End: 2018-03-16 | Stop reason: SDUPTHER

## 2018-03-16 RX ORDER — CLONAZEPAM 1 MG/1
TABLET ORAL
Qty: 60 TABLET | Refills: 0 | Status: SHIPPED | OUTPATIENT
Start: 2018-03-16 | End: 2018-11-05

## 2018-04-05 RX ORDER — BUTALBITAL, ACETAMINOPHEN AND CAFFEINE 50; 325; 40 MG/1; MG/1; MG/1
TABLET ORAL
Qty: 30 TABLET | Refills: 0 | Status: SHIPPED | OUTPATIENT
Start: 2018-04-05 | End: 2018-07-05 | Stop reason: SDUPTHER

## 2018-05-02 ENCOUNTER — PATIENT MESSAGE (OUTPATIENT)
Dept: BARIATRICS | Facility: CLINIC | Age: 43
End: 2018-05-02

## 2018-05-02 ENCOUNTER — PATIENT MESSAGE (OUTPATIENT)
Dept: INTERNAL MEDICINE | Facility: CLINIC | Age: 43
End: 2018-05-02

## 2018-05-03 ENCOUNTER — PATIENT MESSAGE (OUTPATIENT)
Dept: INTERNAL MEDICINE | Facility: CLINIC | Age: 43
End: 2018-05-03

## 2018-05-03 RX ORDER — BUTALBITAL, ACETAMINOPHEN AND CAFFEINE 50; 325; 40 MG/1; MG/1; MG/1
TABLET ORAL
Qty: 30 TABLET | OUTPATIENT
Start: 2018-05-03

## 2018-05-03 NOTE — TELEPHONE ENCOUNTER
Spoke with pt; informed that we are not able to fill RX until appt on May 9th due to pt not being seen in 6 months and RX being a narcotic; pt not happy but verbalized understanding. Nothing further at this time.

## 2018-05-03 NOTE — TELEPHONE ENCOUNTER
Called pt; not available; LM with my name and call back number informing pt appt needs to be made before further RX can be done.

## 2018-05-03 NOTE — TELEPHONE ENCOUNTER
Patient needs an appointment before she can get refills.  This is been 6 months since last appointment.

## 2018-05-09 ENCOUNTER — OFFICE VISIT (OUTPATIENT)
Dept: INTERNAL MEDICINE | Facility: CLINIC | Age: 43
End: 2018-05-09
Payer: COMMERCIAL

## 2018-05-09 ENCOUNTER — HOSPITAL ENCOUNTER (OUTPATIENT)
Dept: RADIOLOGY | Facility: HOSPITAL | Age: 43
Discharge: HOME | End: 2018-05-09
Attending: INTERNAL MEDICINE
Payer: COMMERCIAL

## 2018-05-09 VITALS
TEMPERATURE: 99 F | HEART RATE: 56 BPM | SYSTOLIC BLOOD PRESSURE: 98 MMHG | DIASTOLIC BLOOD PRESSURE: 56 MMHG | BODY MASS INDEX: 24.57 KG/M2 | WEIGHT: 147.5 LBS | HEIGHT: 65 IN

## 2018-05-09 DIAGNOSIS — M53.3 SACRAL BACK PAIN: Primary | ICD-10-CM

## 2018-05-09 DIAGNOSIS — M53.3 SACRAL BACK PAIN: ICD-10-CM

## 2018-05-09 PROCEDURE — 3008F BODY MASS INDEX DOCD: CPT | Mod: CPTII,S$GLB,, | Performed by: INTERNAL MEDICINE

## 2018-05-09 PROCEDURE — 99999 PR PBB SHADOW E&M-EST. PATIENT-LVL III: CPT | Mod: PBBFAC,,, | Performed by: INTERNAL MEDICINE

## 2018-05-09 PROCEDURE — 72220 X-RAY EXAM SACRUM TAILBONE: CPT | Mod: TC,PO

## 2018-05-09 PROCEDURE — 72220 X-RAY EXAM SACRUM TAILBONE: CPT | Mod: 26,,, | Performed by: RADIOLOGY

## 2018-05-09 PROCEDURE — 99214 OFFICE O/P EST MOD 30 MIN: CPT | Mod: S$GLB,,, | Performed by: INTERNAL MEDICINE

## 2018-05-09 RX ORDER — OMEPRAZOLE 20 MG/1
CAPSULE, DELAYED RELEASE ORAL
Qty: 180 CAPSULE | Refills: 1 | Status: SHIPPED | OUTPATIENT
Start: 2018-05-09 | End: 2018-07-16 | Stop reason: ALTCHOICE

## 2018-05-09 RX ORDER — METHYLPREDNISOLONE 4 MG/1
TABLET ORAL
Qty: 1 PACKAGE | Refills: 0 | Status: SHIPPED | OUTPATIENT
Start: 2018-05-09 | End: 2018-11-05

## 2018-05-09 NOTE — PROGRESS NOTES
Subjective:       Patient ID: Emilia Coyle is a 42 y.o. female.    Chief Complaint: Back Pain (lower back pain)    HPI   Pt here c/o 2-3 months of intermittent sacral back pain described as a dull ache in nature with radiation down. She can feel a small knot which is tender to the touch. No trauma to the area. Some relief with Tylenol.   Review of Systems   Constitutional: Negative for activity change, appetite change, chills, diaphoresis, fatigue, fever and unexpected weight change.   HENT: Negative for hearing loss, postnasal drip, rhinorrhea, sinus pressure, sneezing, sore throat, trouble swallowing and voice change.    Eyes: Negative for discharge and visual disturbance.   Respiratory: Negative for cough, chest tightness, shortness of breath and wheezing.    Cardiovascular: Negative for chest pain, palpitations and leg swelling.   Gastrointestinal: Negative for abdominal pain, blood in stool, constipation, diarrhea, nausea and vomiting.   Endocrine: Negative for polydipsia and polyuria.   Genitourinary: Negative for difficulty urinating, dysuria, hematuria and menstrual problem.   Musculoskeletal: Positive for arthralgias and back pain. Negative for joint swelling, myalgias and neck pain.   Skin: Negative for rash and wound.   Allergic/Immunologic: Negative for environmental allergies and food allergies.   Neurological: Positive for headaches. Negative for weakness.   Hematological: Negative for adenopathy. Does not bruise/bleed easily.   Psychiatric/Behavioral: Negative for confusion and dysphoric mood.       Objective:      Physical Exam   Constitutional: She is oriented to person, place, and time. She appears well-developed and well-nourished. No distress.   HENT:   Head: Normocephalic and atraumatic.   Eyes: Conjunctivae and EOM are normal. Pupils are equal, round, and reactive to light. Right eye exhibits no discharge. Left eye exhibits no discharge. No scleral icterus.   Neck: Neck supple. No JVD present.    Cardiovascular: Normal rate, regular rhythm, normal heart sounds and intact distal pulses.    Pulmonary/Chest: Effort normal and breath sounds normal. No respiratory distress. She has no wheezes. She has no rales.   Musculoskeletal: She exhibits no edema.        Back:    Lymphadenopathy:     She has no cervical adenopathy.   Neurological: She is alert and oriented to person, place, and time.   Skin: Skin is warm and dry. No rash noted. She is not diaphoretic. No pallor.       Assessment:       1. Sacral back pain        Plan:    1. X-ray sacrum       Rx Medrol Pack        Referral to PM&R

## 2018-06-13 ENCOUNTER — PATIENT MESSAGE (OUTPATIENT)
Dept: BARIATRICS | Facility: CLINIC | Age: 43
End: 2018-06-13

## 2018-07-05 RX ORDER — BUTALBITAL, ACETAMINOPHEN AND CAFFEINE 50; 325; 40 MG/1; MG/1; MG/1
TABLET ORAL
Qty: 30 TABLET | Refills: 6 | Status: SHIPPED | OUTPATIENT
Start: 2018-07-05 | End: 2018-12-05 | Stop reason: SDUPTHER

## 2018-07-09 ENCOUNTER — PATIENT MESSAGE (OUTPATIENT)
Dept: BARIATRICS | Facility: CLINIC | Age: 43
End: 2018-07-09

## 2018-07-15 NOTE — PROGRESS NOTES
BARIATRIC FOLLOW UP:    Chief Complaint   Patient presents with    Follow-up     HISTORY OF PRESENT ILLNESS: Emilia Coyle is a 43 y.o. female with a Body mass index is 23.08 kg/m². who presents for a 12 month follow up s/p Lap Sleeve with Dr. Aleman on 7/28/2017.  She is doing well and tolerating the diet without difficulty.  She feels great and is very pleased with her progress.  Diabetes remains resolved and she is off medications, blood sugar is running normal. She has lost 100% EBW and is maintaining. She has no complaints. She is s/p lap yared 12/15/2017. She avoids wheat due to Celiac disease.  Her  had sleeve done twice with Dr Silva office. She would like labs added for her endocrinologist.    Denies: nausea, vomiting, abdominal pain, changes in bowel movement pattern, fever, chills, dysphagia, chest pain, and shortness of breath.    Review of Systems   Constitutional: Negative for chills, fever and malaise/fatigue.   Eyes: Negative for blurred vision and double vision.   Respiratory: Negative for cough, hemoptysis and shortness of breath.    Cardiovascular: Negative for chest pain, palpitations and leg swelling.   Gastrointestinal: Negative for abdominal pain, blood in stool, constipation, diarrhea, heartburn, melena, nausea and vomiting.   Genitourinary: Negative for dysuria and hematuria.   Musculoskeletal: Negative for back pain, falls, joint pain, myalgias and neck pain.   Skin: Negative for rash.   Neurological: Negative for dizziness, tingling, weakness and headaches.   Endo/Heme/Allergies: Negative for environmental allergies. Does not bruise/bleed easily.   Psychiatric/Behavioral: Negative.        EXERCISE & VITAMINS:  See Bariatric Assessment    MEDICATIONS/ALLERGIES:  Have been reviewed.    DIET:  Regular Bariatric Diet.   80-90gm protein in diet    Vitals:    07/16/18 0822   BP: (!) 102/57   Pulse: (!) 53       Physical Exam   Constitutional: She is oriented to person, place, and  time. She appears well-developed and well-nourished.   HENT:   Head: Normocephalic and atraumatic.   Cardiovascular: Normal rate and regular rhythm.    Pulmonary/Chest: Effort normal and breath sounds normal.   Abdominal: Soft. Bowel sounds are normal. She exhibits no distension and no mass. There is no tenderness. There is no rebound and no guarding. No hernia.   WHSS   Musculoskeletal: She exhibits no edema.   Neurological: She is alert and oriented to person, place, and time.   Skin: Skin is warm and dry. No rash noted. No erythema. No pallor.   Psychiatric: She has a normal mood and affect. Her behavior is normal. Judgment and thought content normal.   Nursing note and vitals reviewed.      ASSESSMENT:  - Obesity, Body mass index is 23.08 kg/m².,  s/p sleeve gastrectomy on 7/28/2017.  - Estimated goal weight, 185 lbs, which is 50% EWL  - Co-morbidities: MIKAELA (stable), DM2 (resolved)  - Great Weight loss, 93 lbs, 100% EWL   - Good Exercise regimen  - Good Vitamin Regimen  - Good Diet    PLAN:  - Emphasized the importance of regular exercise and adherence to bariatric diet to achieve maximum weight loss.  - Continue vitamin regimen   - No restrictions to exercise.  - Miralax daily for constipation, no fiber.  - No NSAIDs, Tylenol for pain.  - Can swallow whole pills as of 10/28/17.  - RTC in 12 months or sooner if needed.  - Call the office for any issues.  - Check labs . Endocrine labs added    15 minute visit, over 50% of time spent counseling patient face to face on diet, exercise, and weight loss.

## 2018-07-16 ENCOUNTER — OFFICE VISIT (OUTPATIENT)
Dept: BARIATRICS | Facility: CLINIC | Age: 43
End: 2018-07-16
Payer: COMMERCIAL

## 2018-07-16 ENCOUNTER — LAB VISIT (OUTPATIENT)
Dept: LAB | Facility: HOSPITAL | Age: 43
End: 2018-07-16
Payer: COMMERCIAL

## 2018-07-16 VITALS
DIASTOLIC BLOOD PRESSURE: 57 MMHG | SYSTOLIC BLOOD PRESSURE: 102 MMHG | HEART RATE: 53 BPM | HEIGHT: 65 IN | BODY MASS INDEX: 23.11 KG/M2 | WEIGHT: 138.69 LBS

## 2018-07-16 DIAGNOSIS — R63.4 WEIGHT LOSS: ICD-10-CM

## 2018-07-16 DIAGNOSIS — E03.9 HYPOTHYROIDISM, UNSPECIFIED TYPE: ICD-10-CM

## 2018-07-16 DIAGNOSIS — Z98.84 S/P LAPAROSCOPIC SLEEVE GASTRECTOMY: Primary | ICD-10-CM

## 2018-07-16 DIAGNOSIS — Z98.84 S/P LAPAROSCOPIC SLEEVE GASTRECTOMY: ICD-10-CM

## 2018-07-16 PROBLEM — E66.9 OBESITY (BMI 30-39.9): Status: RESOLVED | Noted: 2017-03-22 | Resolved: 2018-07-16

## 2018-07-16 PROBLEM — E78.1 HIGH TRIGLYCERIDES: Status: RESOLVED | Noted: 2017-07-12 | Resolved: 2018-07-16

## 2018-07-16 LAB
25(OH)D3+25(OH)D2 SERPL-MCNC: 38 NG/ML
ALBUMIN SERPL BCP-MCNC: 3.8 G/DL
ALP SERPL-CCNC: 72 U/L
ALT SERPL W/O P-5'-P-CCNC: 12 U/L
ANION GAP SERPL CALC-SCNC: 6 MMOL/L
AST SERPL-CCNC: 16 U/L
BASOPHILS # BLD AUTO: 0.08 K/UL
BASOPHILS NFR BLD: 1.3 %
BILIRUB SERPL-MCNC: 0.6 MG/DL
BUN SERPL-MCNC: 19 MG/DL
CALCIUM SERPL-MCNC: 9.3 MG/DL
CHLORIDE SERPL-SCNC: 107 MMOL/L
CHOLEST SERPL-MCNC: 140 MG/DL
CHOLEST/HDLC SERPL: 2.9 {RATIO}
CO2 SERPL-SCNC: 27 MMOL/L
CREAT SERPL-MCNC: 0.7 MG/DL
DIFFERENTIAL METHOD: ABNORMAL
EOSINOPHIL # BLD AUTO: 0.2 K/UL
EOSINOPHIL NFR BLD: 3 %
ERYTHROCYTE [DISTWIDTH] IN BLOOD BY AUTOMATED COUNT: 14.2 %
EST. GFR  (AFRICAN AMERICAN): >60 ML/MIN/1.73 M^2
EST. GFR  (NON AFRICAN AMERICAN): >60 ML/MIN/1.73 M^2
GLUCOSE SERPL-MCNC: 83 MG/DL
HCT VFR BLD AUTO: 35.3 %
HDLC SERPL-MCNC: 49 MG/DL
HDLC SERPL: 35 %
HGB BLD-MCNC: 11.5 G/DL
IMM GRANULOCYTES # BLD AUTO: 0.01 K/UL
IMM GRANULOCYTES NFR BLD AUTO: 0.2 %
IRON SERPL-MCNC: 59 UG/DL
LDLC SERPL CALC-MCNC: 77.6 MG/DL
LYMPHOCYTES # BLD AUTO: 2.5 K/UL
LYMPHOCYTES NFR BLD: 42.6 %
MCH RBC QN AUTO: 28.5 PG
MCHC RBC AUTO-ENTMCNC: 32.6 G/DL
MCV RBC AUTO: 88 FL
MONOCYTES # BLD AUTO: 0.4 K/UL
MONOCYTES NFR BLD: 6.2 %
NEUTROPHILS # BLD AUTO: 2.8 K/UL
NEUTROPHILS NFR BLD: 46.7 %
NONHDLC SERPL-MCNC: 91 MG/DL
NRBC BLD-RTO: 0 /100 WBC
PLATELET # BLD AUTO: 267 K/UL
PMV BLD AUTO: 9.8 FL
POTASSIUM SERPL-SCNC: 3.9 MMOL/L
PROT SERPL-MCNC: 6.7 G/DL
RBC # BLD AUTO: 4.03 M/UL
SATURATED IRON: 19 %
SODIUM SERPL-SCNC: 140 MMOL/L
TOTAL IRON BINDING CAPACITY: 308 UG/DL
TRANSFERRIN SERPL-MCNC: 208 MG/DL
TRIGL SERPL-MCNC: 67 MG/DL
VIT B12 SERPL-MCNC: 1158 PG/ML
WBC # BLD AUTO: 5.94 K/UL

## 2018-07-16 PROCEDURE — 82306 VITAMIN D 25 HYDROXY: CPT

## 2018-07-16 PROCEDURE — 99213 OFFICE O/P EST LOW 20 MIN: CPT | Mod: S$GLB,,, | Performed by: NURSE PRACTITIONER

## 2018-07-16 PROCEDURE — 3008F BODY MASS INDEX DOCD: CPT | Mod: CPTII,S$GLB,, | Performed by: NURSE PRACTITIONER

## 2018-07-16 PROCEDURE — 99999 PR PBB SHADOW E&M-EST. PATIENT-LVL IV: CPT | Mod: PBBFAC,,, | Performed by: NURSE PRACTITIONER

## 2018-07-16 PROCEDURE — 83540 ASSAY OF IRON: CPT

## 2018-07-16 PROCEDURE — 80061 LIPID PANEL: CPT

## 2018-07-16 PROCEDURE — 82607 VITAMIN B-12: CPT

## 2018-07-16 PROCEDURE — 36415 COLL VENOUS BLD VENIPUNCTURE: CPT

## 2018-07-16 PROCEDURE — 85025 COMPLETE CBC W/AUTO DIFF WBC: CPT

## 2018-07-16 PROCEDURE — 80053 COMPREHEN METABOLIC PANEL: CPT

## 2018-07-16 PROCEDURE — 84425 ASSAY OF VITAMIN B-1: CPT

## 2018-07-16 RX ORDER — ACETAMINOPHEN AND PHENYLEPHRINE HCL 325; 5 MG/1; MG/1
TABLET ORAL
COMMUNITY
Start: 2018-06-17

## 2018-07-16 RX ORDER — DICYCLOMINE HYDROCHLORIDE 10 MG/1
CAPSULE ORAL
COMMUNITY
End: 2018-11-05

## 2018-07-16 RX ORDER — ALPRAZOLAM 1 MG/1
1 TABLET ORAL 2 TIMES DAILY
Refills: 1 | COMMUNITY
Start: 2018-06-22

## 2018-07-16 RX ORDER — TRIPROLIDINE/PSEUDOEPHEDRINE 2.5MG-60MG
TABLET ORAL
COMMUNITY
End: 2019-05-29

## 2018-07-16 RX ORDER — SERTRALINE HYDROCHLORIDE 100 MG/1
100 TABLET, FILM COATED ORAL
COMMUNITY
Start: 2018-06-30 | End: 2020-01-13

## 2018-07-18 LAB — VIT B1 SERPL-MCNC: 47 UG/L (ref 38–122)

## 2018-07-19 ENCOUNTER — PATIENT MESSAGE (OUTPATIENT)
Dept: ADMINISTRATIVE | Facility: HOSPITAL | Age: 43
End: 2018-07-19

## 2018-07-20 ENCOUNTER — PATIENT MESSAGE (OUTPATIENT)
Dept: INTERNAL MEDICINE | Facility: CLINIC | Age: 43
End: 2018-07-20

## 2018-08-08 ENCOUNTER — PATIENT MESSAGE (OUTPATIENT)
Dept: ADMINISTRATIVE | Facility: HOSPITAL | Age: 43
End: 2018-08-08

## 2018-08-10 ENCOUNTER — LAB VISIT (OUTPATIENT)
Dept: LAB | Facility: HOSPITAL | Age: 43
End: 2018-08-10
Attending: INTERNAL MEDICINE
Payer: COMMERCIAL

## 2018-08-10 DIAGNOSIS — E03.9 HYPOTHYROIDISM, UNSPECIFIED TYPE: ICD-10-CM

## 2018-08-10 DIAGNOSIS — R63.4 WEIGHT LOSS: ICD-10-CM

## 2018-08-10 DIAGNOSIS — Z98.84 S/P LAPAROSCOPIC SLEEVE GASTRECTOMY: ICD-10-CM

## 2018-08-10 LAB
ESTIMATED AVG GLUCOSE: 103 MG/DL
HBA1C MFR BLD HPLC: 5.2 %

## 2018-08-10 PROCEDURE — 83036 HEMOGLOBIN GLYCOSYLATED A1C: CPT

## 2018-08-10 PROCEDURE — 36415 COLL VENOUS BLD VENIPUNCTURE: CPT | Mod: PO

## 2018-08-15 ENCOUNTER — PATIENT MESSAGE (OUTPATIENT)
Dept: BARIATRICS | Facility: CLINIC | Age: 43
End: 2018-08-15

## 2018-09-06 ENCOUNTER — PATIENT MESSAGE (OUTPATIENT)
Dept: BARIATRICS | Facility: CLINIC | Age: 43
End: 2018-09-06

## 2018-09-06 ENCOUNTER — PATIENT MESSAGE (OUTPATIENT)
Dept: INTERNAL MEDICINE | Facility: CLINIC | Age: 43
End: 2018-09-06

## 2018-09-06 DIAGNOSIS — Z00.00 ANNUAL PHYSICAL EXAM: Primary | ICD-10-CM

## 2018-09-12 ENCOUNTER — PATIENT MESSAGE (OUTPATIENT)
Dept: INTERNAL MEDICINE | Facility: CLINIC | Age: 43
End: 2018-09-12

## 2018-09-19 ENCOUNTER — PATIENT MESSAGE (OUTPATIENT)
Dept: BARIATRICS | Facility: CLINIC | Age: 43
End: 2018-09-19

## 2018-09-19 ENCOUNTER — PATIENT MESSAGE (OUTPATIENT)
Dept: INTERNAL MEDICINE | Facility: CLINIC | Age: 43
End: 2018-09-19

## 2018-09-26 ENCOUNTER — PATIENT MESSAGE (OUTPATIENT)
Dept: INTERNAL MEDICINE | Facility: CLINIC | Age: 43
End: 2018-09-26

## 2018-10-26 NOTE — PROGRESS NOTES
Subjective:       Patient ID: Emilia Coyle is a 43 y.o. female.    Chief Complaint: Annual Exam    Patient is a 43 y.o.female who presents today for annual    Cholesterol: reviewed  Eye: up to date; done in may  Vaccines: Influenza: done this month  Mammogram: done in aug, sept at [facility]  Gyn exam: goes in may/ june  Colonoscopy: done last year with dr. Cheng; due in ten years    Mild right scapula/ back pain; started after lunch one day; thought it was a gas bubble. Improved with some tylenol. Not worsened by taking a deep breath.  Review of Systems   Constitutional: Negative for appetite change, chills, diaphoresis, fatigue and fever.   HENT: Negative for congestion, dental problem, ear discharge, ear pain, hearing loss, postnasal drip, sinus pressure and sore throat.    Eyes: Negative for discharge, redness and itching.   Respiratory: Negative for cough, chest tightness, shortness of breath and wheezing.    Cardiovascular: Negative for chest pain, palpitations and leg swelling.   Gastrointestinal: Negative for abdominal pain, constipation, diarrhea, nausea and vomiting.   Endocrine: Negative for cold intolerance and heat intolerance.   Genitourinary: Negative for difficulty urinating, frequency, hematuria and urgency.   Musculoskeletal: Negative for arthralgias, back pain, gait problem, myalgias and neck pain.   Skin: Negative for color change and rash.   Neurological: Negative for dizziness, syncope and headaches.   Hematological: Negative for adenopathy.   Psychiatric/Behavioral: Negative for behavioral problems and sleep disturbance. The patient is not nervous/anxious.        Objective:      Physical Exam   Constitutional: She is oriented to person, place, and time. She appears well-developed and well-nourished. No distress.   HENT:   Head: Normocephalic and atraumatic.   Right Ear: External ear normal.   Left Ear: External ear normal.   Nose: Nose normal.   Mouth/Throat: Oropharynx is clear and moist. No  oropharyngeal exudate.   Eyes: Conjunctivae and EOM are normal. Pupils are equal, round, and reactive to light. Right eye exhibits no discharge. Left eye exhibits no discharge. No scleral icterus.   Neck: Normal range of motion. Neck supple. No JVD present. No thyromegaly present.   Cardiovascular: Normal rate, regular rhythm, normal heart sounds and intact distal pulses. Exam reveals no gallop and no friction rub.   No murmur heard.  Pulses:       Dorsalis pedis pulses are 2+ on the right side, and 2+ on the left side.        Posterior tibial pulses are 2+ on the right side, and 2+ on the left side.   Pulmonary/Chest: Effort normal and breath sounds normal. No stridor. No respiratory distress. She has no wheezes. She has no rales. She exhibits no tenderness.   Abdominal: Soft. Bowel sounds are normal. She exhibits no distension. There is no tenderness. There is no rebound.   Musculoskeletal: Normal range of motion. She exhibits no edema or tenderness.        Right foot: There is normal range of motion and no deformity.        Left foot: There is normal range of motion and no deformity.   Feet:   Right Foot:   Protective Sensation: 3 sites tested. 3 sites sensed.   Skin Integrity: Negative for ulcer, blister, skin breakdown, erythema, warmth, callus or dry skin.   Left Foot:   Protective Sensation: 3 sites tested. 3 sites sensed.   Skin Integrity: Negative for ulcer, blister, skin breakdown, erythema, warmth, callus or dry skin.   Lymphadenopathy:     She has no cervical adenopathy.   Neurological: She is alert and oriented to person, place, and time. No cranial nerve deficit.   Skin: Skin is warm and dry. No rash noted. She is not diaphoretic. No erythema.   Psychiatric: She has a normal mood and affect. Her behavior is normal.   Nursing note and vitals reviewed.      Assessment and Plan:       1. Annual physical exam  - labs reviewed  - tdap today    2. Hypothyroidism, unspecified type  - on synthroid    3. Low  TSH level  - TSH; Future    4. Pleurisy  - trial of flexeril prn  - X-Ray Chest PA And Lateral; Future    5. Need for tetanus, diphtheria, and acellular pertussis (Tdap) vaccine    - Tdap Vaccine          No Follow-up on file.

## 2018-10-29 ENCOUNTER — LAB VISIT (OUTPATIENT)
Dept: LAB | Facility: HOSPITAL | Age: 43
End: 2018-10-29
Attending: INTERNAL MEDICINE
Payer: COMMERCIAL

## 2018-10-29 DIAGNOSIS — Z00.00 ANNUAL PHYSICAL EXAM: ICD-10-CM

## 2018-10-29 LAB
25(OH)D3+25(OH)D2 SERPL-MCNC: 34 NG/ML
ALBUMIN SERPL BCP-MCNC: 4 G/DL
ALP SERPL-CCNC: 60 U/L
ALT SERPL W/O P-5'-P-CCNC: 10 U/L
ANION GAP SERPL CALC-SCNC: 7 MMOL/L
AST SERPL-CCNC: 15 U/L
BASOPHILS # BLD AUTO: 0.11 K/UL
BASOPHILS NFR BLD: 1.5 %
BILIRUB SERPL-MCNC: 0.5 MG/DL
BUN SERPL-MCNC: 21 MG/DL
CALCIUM SERPL-MCNC: 9.3 MG/DL
CHLORIDE SERPL-SCNC: 105 MMOL/L
CHOLEST SERPL-MCNC: 151 MG/DL
CHOLEST/HDLC SERPL: 2.6 {RATIO}
CO2 SERPL-SCNC: 27 MMOL/L
CREAT SERPL-MCNC: 0.7 MG/DL
DIFFERENTIAL METHOD: ABNORMAL
EOSINOPHIL # BLD AUTO: 0.2 K/UL
EOSINOPHIL NFR BLD: 2.8 %
ERYTHROCYTE [DISTWIDTH] IN BLOOD BY AUTOMATED COUNT: 13.9 %
EST. GFR  (AFRICAN AMERICAN): >60 ML/MIN/1.73 M^2
EST. GFR  (NON AFRICAN AMERICAN): >60 ML/MIN/1.73 M^2
GLUCOSE SERPL-MCNC: 80 MG/DL
HCT VFR BLD AUTO: 35.2 %
HDLC SERPL-MCNC: 57 MG/DL
HDLC SERPL: 37.7 %
HGB BLD-MCNC: 11.3 G/DL
IMM GRANULOCYTES # BLD AUTO: 0.02 K/UL
IMM GRANULOCYTES NFR BLD AUTO: 0.3 %
LDLC SERPL CALC-MCNC: 80.4 MG/DL
LYMPHOCYTES # BLD AUTO: 3 K/UL
LYMPHOCYTES NFR BLD: 40.1 %
MCH RBC QN AUTO: 28.9 PG
MCHC RBC AUTO-ENTMCNC: 32.1 G/DL
MCV RBC AUTO: 90 FL
MONOCYTES # BLD AUTO: 0.4 K/UL
MONOCYTES NFR BLD: 5.9 %
NEUTROPHILS # BLD AUTO: 3.7 K/UL
NEUTROPHILS NFR BLD: 49.4 %
NONHDLC SERPL-MCNC: 94 MG/DL
NRBC BLD-RTO: 0 /100 WBC
PLATELET # BLD AUTO: 296 K/UL
PMV BLD AUTO: 10.9 FL
POTASSIUM SERPL-SCNC: 3.9 MMOL/L
PROT SERPL-MCNC: 7 G/DL
RBC # BLD AUTO: 3.91 M/UL
SODIUM SERPL-SCNC: 139 MMOL/L
T4 FREE SERPL-MCNC: 1.09 NG/DL
TRIGL SERPL-MCNC: 68 MG/DL
TSH SERPL DL<=0.005 MIU/L-ACNC: 0.24 UIU/ML
WBC # BLD AUTO: 7.43 K/UL

## 2018-10-29 PROCEDURE — 84439 ASSAY OF FREE THYROXINE: CPT

## 2018-10-29 PROCEDURE — 36415 COLL VENOUS BLD VENIPUNCTURE: CPT | Mod: PO

## 2018-10-29 PROCEDURE — 82306 VITAMIN D 25 HYDROXY: CPT

## 2018-10-29 PROCEDURE — 85025 COMPLETE CBC W/AUTO DIFF WBC: CPT

## 2018-10-29 PROCEDURE — 80061 LIPID PANEL: CPT

## 2018-10-29 PROCEDURE — 80053 COMPREHEN METABOLIC PANEL: CPT

## 2018-10-29 PROCEDURE — 84443 ASSAY THYROID STIM HORMONE: CPT

## 2018-11-05 ENCOUNTER — PATIENT MESSAGE (OUTPATIENT)
Dept: INTERNAL MEDICINE | Facility: CLINIC | Age: 43
End: 2018-11-05

## 2018-11-05 ENCOUNTER — HOSPITAL ENCOUNTER (OUTPATIENT)
Dept: RADIOLOGY | Facility: HOSPITAL | Age: 43
Discharge: HOME | End: 2018-11-05
Attending: INTERNAL MEDICINE
Payer: COMMERCIAL

## 2018-11-05 ENCOUNTER — OFFICE VISIT (OUTPATIENT)
Dept: INTERNAL MEDICINE | Facility: CLINIC | Age: 43
End: 2018-11-05
Payer: COMMERCIAL

## 2018-11-05 VITALS
RESPIRATION RATE: 12 BRPM | WEIGHT: 136.88 LBS | BODY MASS INDEX: 22.78 KG/M2 | TEMPERATURE: 98 F | SYSTOLIC BLOOD PRESSURE: 98 MMHG | HEART RATE: 62 BPM | DIASTOLIC BLOOD PRESSURE: 60 MMHG

## 2018-11-05 DIAGNOSIS — R09.1 PLEURISY: ICD-10-CM

## 2018-11-05 DIAGNOSIS — E03.9 HYPOTHYROIDISM, UNSPECIFIED TYPE: ICD-10-CM

## 2018-11-05 DIAGNOSIS — Z23 NEED FOR TETANUS, DIPHTHERIA, AND ACELLULAR PERTUSSIS (TDAP) VACCINE: ICD-10-CM

## 2018-11-05 DIAGNOSIS — R79.89 LOW TSH LEVEL: ICD-10-CM

## 2018-11-05 DIAGNOSIS — Z00.00 ANNUAL PHYSICAL EXAM: Primary | ICD-10-CM

## 2018-11-05 PROCEDURE — 71046 X-RAY EXAM CHEST 2 VIEWS: CPT | Mod: 26,,, | Performed by: RADIOLOGY

## 2018-11-05 PROCEDURE — 99396 PREV VISIT EST AGE 40-64: CPT | Mod: S$GLB,,, | Performed by: INTERNAL MEDICINE

## 2018-11-05 PROCEDURE — 71046 X-RAY EXAM CHEST 2 VIEWS: CPT | Mod: TC,PO

## 2018-11-05 PROCEDURE — 99999 PR PBB SHADOW E&M-EST. PATIENT-LVL III: CPT | Mod: PBBFAC,,, | Performed by: INTERNAL MEDICINE

## 2018-11-05 RX ORDER — CYCLOBENZAPRINE HCL 5 MG
5 TABLET ORAL NIGHTLY
Qty: 10 TABLET | Refills: 0 | Status: SHIPPED | OUTPATIENT
Start: 2018-11-05 | End: 2018-11-15

## 2018-12-05 RX ORDER — BUTALBITAL, ACETAMINOPHEN AND CAFFEINE 50; 325; 40 MG/1; MG/1; MG/1
TABLET ORAL
Qty: 30 TABLET | Refills: 0 | Status: SHIPPED | OUTPATIENT
Start: 2018-12-05 | End: 2019-03-06

## 2018-12-10 ENCOUNTER — PATIENT MESSAGE (OUTPATIENT)
Dept: INTERNAL MEDICINE | Facility: CLINIC | Age: 43
End: 2018-12-10

## 2018-12-10 DIAGNOSIS — E03.9 HYPOTHYROIDISM, UNSPECIFIED TYPE: Primary | ICD-10-CM

## 2018-12-10 RX ORDER — LEVOTHYROXINE SODIUM 88 UG/1
88 TABLET ORAL DAILY
Qty: 30 TABLET | Refills: 11 | Status: SHIPPED | OUTPATIENT
Start: 2018-12-10 | End: 2020-02-12 | Stop reason: SDUPTHER

## 2018-12-13 ENCOUNTER — PATIENT MESSAGE (OUTPATIENT)
Dept: INTERNAL MEDICINE | Facility: CLINIC | Age: 43
End: 2018-12-13

## 2019-01-04 DIAGNOSIS — E11.9 TYPE 2 DIABETES MELLITUS WITHOUT COMPLICATION: ICD-10-CM

## 2019-01-07 RX ORDER — BUTALBITAL, ACETAMINOPHEN AND CAFFEINE 50; 325; 40 MG/1; MG/1; MG/1
TABLET ORAL
Qty: 30 TABLET | Refills: 0 | Status: SHIPPED | OUTPATIENT
Start: 2019-01-07 | End: 2019-02-05 | Stop reason: SDUPTHER

## 2019-01-16 ENCOUNTER — PATIENT MESSAGE (OUTPATIENT)
Dept: INTERNAL MEDICINE | Facility: CLINIC | Age: 44
End: 2019-01-16

## 2019-01-16 ENCOUNTER — HOSPITAL ENCOUNTER (OUTPATIENT)
Dept: RADIOLOGY | Facility: HOSPITAL | Age: 44
Discharge: HOME | End: 2019-01-16
Attending: INTERNAL MEDICINE
Payer: COMMERCIAL

## 2019-01-16 ENCOUNTER — PATIENT MESSAGE (OUTPATIENT)
Dept: BARIATRICS | Facility: CLINIC | Age: 44
End: 2019-01-16

## 2019-01-16 ENCOUNTER — OFFICE VISIT (OUTPATIENT)
Dept: INTERNAL MEDICINE | Facility: CLINIC | Age: 44
End: 2019-01-16
Payer: COMMERCIAL

## 2019-01-16 VITALS
TEMPERATURE: 99 F | WEIGHT: 136.44 LBS | SYSTOLIC BLOOD PRESSURE: 95 MMHG | HEIGHT: 66 IN | RESPIRATION RATE: 16 BRPM | DIASTOLIC BLOOD PRESSURE: 61 MMHG | BODY MASS INDEX: 21.93 KG/M2 | HEART RATE: 69 BPM

## 2019-01-16 DIAGNOSIS — R52 BODY ACHES: ICD-10-CM

## 2019-01-16 DIAGNOSIS — R10.9 FLANK PAIN: ICD-10-CM

## 2019-01-16 DIAGNOSIS — R35.0 URINARY FREQUENCY: ICD-10-CM

## 2019-01-16 DIAGNOSIS — R35.0 URINARY FREQUENCY: Primary | ICD-10-CM

## 2019-01-16 DIAGNOSIS — R23.3 EASY BRUISING: ICD-10-CM

## 2019-01-16 DIAGNOSIS — E03.9 HYPOTHYROIDISM, UNSPECIFIED TYPE: ICD-10-CM

## 2019-01-16 DIAGNOSIS — R61 NIGHT SWEATS: ICD-10-CM

## 2019-01-16 LAB
BILIRUB UR QL STRIP: NEGATIVE
CLARITY UR REFRACT.AUTO: CLEAR
COLOR UR AUTO: NORMAL
GLUCOSE UR QL STRIP: NEGATIVE
HGB UR QL STRIP: NEGATIVE
INFLUENZA A, MOLECULAR: NEGATIVE
INFLUENZA B, MOLECULAR: NEGATIVE
KETONES UR QL STRIP: NEGATIVE
LEUKOCYTE ESTERASE UR QL STRIP: NEGATIVE
NITRITE UR QL STRIP: NEGATIVE
PH UR STRIP: 6 [PH] (ref 5–8)
PROT UR QL STRIP: NEGATIVE
SP GR UR STRIP: 1 (ref 1–1.03)
SPECIMEN SOURCE: NORMAL
URN SPEC COLLECT METH UR: NORMAL

## 2019-01-16 PROCEDURE — 87086 URINE CULTURE/COLONY COUNT: CPT

## 2019-01-16 PROCEDURE — 74018 XR ABDOMEN AP 1 VIEW: ICD-10-PCS | Mod: 26,,, | Performed by: RADIOLOGY

## 2019-01-16 PROCEDURE — 3008F PR BODY MASS INDEX (BMI) DOCUMENTED: ICD-10-PCS | Mod: CPTII,S$GLB,, | Performed by: INTERNAL MEDICINE

## 2019-01-16 PROCEDURE — 99999 PR PBB SHADOW E&M-EST. PATIENT-LVL IV: ICD-10-PCS | Mod: PBBFAC,,, | Performed by: INTERNAL MEDICINE

## 2019-01-16 PROCEDURE — 87077 CULTURE AEROBIC IDENTIFY: CPT

## 2019-01-16 PROCEDURE — 81003 URINALYSIS AUTO W/O SCOPE: CPT

## 2019-01-16 PROCEDURE — 74018 RADEX ABDOMEN 1 VIEW: CPT | Mod: TC,PO

## 2019-01-16 PROCEDURE — 87502 INFLUENZA DNA AMP PROBE: CPT | Mod: PO

## 2019-01-16 PROCEDURE — 99214 OFFICE O/P EST MOD 30 MIN: CPT | Mod: S$GLB,,, | Performed by: INTERNAL MEDICINE

## 2019-01-16 PROCEDURE — 87186 SC STD MICRODIL/AGAR DIL: CPT

## 2019-01-16 PROCEDURE — 74018 RADEX ABDOMEN 1 VIEW: CPT | Mod: 26,,, | Performed by: RADIOLOGY

## 2019-01-16 PROCEDURE — 3008F BODY MASS INDEX DOCD: CPT | Mod: CPTII,S$GLB,, | Performed by: INTERNAL MEDICINE

## 2019-01-16 PROCEDURE — 87088 URINE BACTERIA CULTURE: CPT

## 2019-01-16 PROCEDURE — 99999 PR PBB SHADOW E&M-EST. PATIENT-LVL IV: CPT | Mod: PBBFAC,,, | Performed by: INTERNAL MEDICINE

## 2019-01-16 PROCEDURE — 99214 PR OFFICE/OUTPT VISIT, EST, LEVL IV, 30-39 MIN: ICD-10-PCS | Mod: S$GLB,,, | Performed by: INTERNAL MEDICINE

## 2019-01-16 RX ORDER — NITROFURANTOIN 25; 75 MG/1; MG/1
100 CAPSULE ORAL 2 TIMES DAILY
Qty: 14 CAPSULE | Refills: 0 | Status: SHIPPED | OUTPATIENT
Start: 2019-01-16 | End: 2019-01-19 | Stop reason: SDUPTHER

## 2019-01-16 NOTE — PROGRESS NOTES
Subjective:       Patient ID: Emilia Coyle is a 43 y.o. female.    Chief Complaint: Urinary Tract Infection    Patient is a 43 y.o.female who presents today for symptoms. She has felt ill for one week. Night sweats, headaches, body aches, bruising easily, pressure in her bladder, feels like she can't empty her bladder, nausea, fatigue, urgency to urinate. No fever. Mild back discomfort as well. She is drinking fluid during the day but not much at night. She had an old script of cipro at home and take 4 days of it along with pyridium prn.    Review of Systems   Constitutional: Negative for appetite change, chills, diaphoresis, fatigue and fever.   HENT: Negative for congestion, dental problem, ear discharge, ear pain, hearing loss, postnasal drip, sinus pressure and sore throat.    Eyes: Negative for discharge, redness and itching.   Respiratory: Negative for cough, chest tightness, shortness of breath and wheezing.    Cardiovascular: Negative for chest pain, palpitations and leg swelling.   Gastrointestinal: Negative for abdominal pain, constipation, diarrhea, nausea and vomiting.   Endocrine: Negative for cold intolerance and heat intolerance.   Genitourinary: Negative for difficulty urinating, frequency, hematuria and urgency.   Musculoskeletal: Negative for arthralgias, back pain, gait problem, myalgias and neck pain.   Skin: Negative for color change and rash.   Neurological: Negative for dizziness, syncope and headaches.   Hematological: Negative for adenopathy.   Psychiatric/Behavioral: Negative for behavioral problems and sleep disturbance. The patient is not nervous/anxious.        Objective:      Physical Exam   Constitutional: She is oriented to person, place, and time. She appears well-developed and well-nourished. No distress.   HENT:   Head: Normocephalic and atraumatic.   Right Ear: External ear normal.   Left Ear: External ear normal.   Nose: Nose normal.   Mouth/Throat: Oropharynx is clear and  moist. No oropharyngeal exudate.   Eyes: Conjunctivae and EOM are normal. Pupils are equal, round, and reactive to light. Right eye exhibits no discharge. Left eye exhibits no discharge. No scleral icterus.   Neck: Normal range of motion. Neck supple. No JVD present. No thyromegaly present.   Cardiovascular: Normal rate, regular rhythm, normal heart sounds and intact distal pulses. Exam reveals no gallop and no friction rub.   No murmur heard.  Pulmonary/Chest: Effort normal and breath sounds normal. No stridor. No respiratory distress. She has no wheezes. She has no rales. She exhibits no tenderness.   Abdominal: Soft. Bowel sounds are normal. She exhibits no distension. There is no tenderness. There is no rebound.   Musculoskeletal: Normal range of motion. She exhibits no edema or tenderness.   Lymphadenopathy:     She has no cervical adenopathy.   Neurological: She is alert and oriented to person, place, and time. No cranial nerve deficit.   Skin: Skin is warm and dry. No rash noted. She is not diaphoretic. No erythema.   Psychiatric: She has a normal mood and affect. Her behavior is normal.   Nursing note and vitals reviewed.      Assessment and Plan:       1. Urinary frequency    - X-Ray Abdomen AP 1 View; Future  - Comprehensive metabolic panel; Future  - CBC auto differential; Future  - Urine culture  - Urinalysis  - nitrofurantoin, macrocrystal-monohydrate, (MACROBID) 100 MG capsule; Take 1 capsule (100 mg total) by mouth 2 (two) times daily.  Dispense: 14 capsule; Refill: 0  - Vitamin D; Future    2. Flank pain    - X-Ray Abdomen AP 1 View; Future  - Comprehensive metabolic panel; Future  - CBC auto differential; Future  - Urine culture  - Urinalysis    3. Easy bruising    - Iron and TIBC; Future  - Ferritin; Future    4. Hypothyroidism, unspecified type    - TSH; Future    5. Night sweats    - Blood culture; Future    6. Body aches    - Influenza A & B by Molecular          No Follow-up on file.

## 2019-01-17 ENCOUNTER — PATIENT MESSAGE (OUTPATIENT)
Dept: INTERNAL MEDICINE | Facility: CLINIC | Age: 44
End: 2019-01-17

## 2019-01-19 ENCOUNTER — PATIENT MESSAGE (OUTPATIENT)
Dept: INTERNAL MEDICINE | Facility: CLINIC | Age: 44
End: 2019-01-19

## 2019-01-19 DIAGNOSIS — R35.0 URINARY FREQUENCY: ICD-10-CM

## 2019-01-19 LAB — BACTERIA UR CULT: NORMAL

## 2019-01-19 RX ORDER — NITROFURANTOIN 25; 75 MG/1; MG/1
100 CAPSULE ORAL 2 TIMES DAILY
Qty: 6 CAPSULE | Refills: 0 | Status: SHIPPED | OUTPATIENT
Start: 2019-01-19 | End: 2019-05-29

## 2019-01-20 ENCOUNTER — PATIENT MESSAGE (OUTPATIENT)
Dept: INTERNAL MEDICINE | Facility: CLINIC | Age: 44
End: 2019-01-20

## 2019-01-22 ENCOUNTER — PATIENT MESSAGE (OUTPATIENT)
Dept: INTERNAL MEDICINE | Facility: CLINIC | Age: 44
End: 2019-01-22

## 2019-01-24 ENCOUNTER — PATIENT MESSAGE (OUTPATIENT)
Dept: INTERNAL MEDICINE | Facility: CLINIC | Age: 44
End: 2019-01-24

## 2019-01-30 ENCOUNTER — PATIENT MESSAGE (OUTPATIENT)
Dept: INTERNAL MEDICINE | Facility: CLINIC | Age: 44
End: 2019-01-30

## 2019-01-30 ENCOUNTER — PATIENT MESSAGE (OUTPATIENT)
Dept: BARIATRICS | Facility: CLINIC | Age: 44
End: 2019-01-30

## 2019-02-05 RX ORDER — BUTALBITAL, ACETAMINOPHEN AND CAFFEINE 50; 325; 40 MG/1; MG/1; MG/1
TABLET ORAL
Qty: 30 TABLET | Refills: 0 | Status: SHIPPED | OUTPATIENT
Start: 2019-02-05 | End: 2019-03-06 | Stop reason: SDUPTHER

## 2019-02-15 ENCOUNTER — LAB VISIT (OUTPATIENT)
Dept: LAB | Facility: HOSPITAL | Age: 44
End: 2019-02-15
Attending: INTERNAL MEDICINE
Payer: COMMERCIAL

## 2019-02-15 DIAGNOSIS — E11.9 TYPE 2 DIABETES MELLITUS WITHOUT COMPLICATION: ICD-10-CM

## 2019-02-15 LAB
ALBUMIN/CREAT UR: 44.1 UG/MG
CREAT UR-MCNC: 161 MG/DL
MICROALBUMIN UR DL<=1MG/L-MCNC: 71 UG/ML

## 2019-02-15 PROCEDURE — 82043 UR ALBUMIN QUANTITATIVE: CPT

## 2019-03-01 ENCOUNTER — PATIENT MESSAGE (OUTPATIENT)
Dept: INTERNAL MEDICINE | Facility: CLINIC | Age: 44
End: 2019-03-01

## 2019-03-01 DIAGNOSIS — N30.10 INTERSTITIAL CYSTITIS: Primary | ICD-10-CM

## 2019-03-06 RX ORDER — BUTALBITAL, ACETAMINOPHEN AND CAFFEINE 50; 325; 40 MG/1; MG/1; MG/1
TABLET ORAL
Qty: 30 TABLET | Refills: 0 | Status: SHIPPED | OUTPATIENT
Start: 2019-03-06 | End: 2019-04-01 | Stop reason: SDUPTHER

## 2019-03-07 ENCOUNTER — TELEPHONE (OUTPATIENT)
Dept: UROGYNECOLOGY | Facility: CLINIC | Age: 44
End: 2019-03-07

## 2019-03-07 NOTE — TELEPHONE ENCOUNTER
Chung Matthews,    Can you please contact pt and help her schedule an appointment with Brooke for interstital cystitis.     Thank you,  Kiel

## 2019-03-07 NOTE — TELEPHONE ENCOUNTER
----- Message from Gricelda Barron sent at 3/7/2019  8:30 AM CST -----  Contact: [phone]  Good morning, pt is being referred to Dr Maloney for Interstitial cystitis. Can you please assist with scheduling?    Thanks

## 2019-03-07 NOTE — TELEPHONE ENCOUNTER
Chung Dyson,  Appointment scheduled with Brooke at [facility] for 4/9/19 at 8:30am. She did not want to be seen at [facility].      Thank you,  Cassie

## 2019-03-28 ENCOUNTER — PATIENT MESSAGE (OUTPATIENT)
Dept: BARIATRICS | Facility: CLINIC | Age: 44
End: 2019-03-28

## 2019-04-01 RX ORDER — BUTALBITAL, ACETAMINOPHEN AND CAFFEINE 50; 325; 40 MG/1; MG/1; MG/1
TABLET ORAL
Qty: 30 TABLET | Refills: 0 | Status: SHIPPED | OUTPATIENT
Start: 2019-04-01 | End: 2019-05-01 | Stop reason: SDUPTHER

## 2019-04-05 ENCOUNTER — OFFICE VISIT (OUTPATIENT)
Dept: INTERNAL MEDICINE | Facility: CLINIC | Age: 44
End: 2019-04-05
Payer: COMMERCIAL

## 2019-04-05 VITALS
RESPIRATION RATE: 16 BRPM | SYSTOLIC BLOOD PRESSURE: 102 MMHG | WEIGHT: 137.81 LBS | HEIGHT: 65 IN | TEMPERATURE: 101 F | DIASTOLIC BLOOD PRESSURE: 65 MMHG | BODY MASS INDEX: 22.96 KG/M2 | HEART RATE: 67 BPM

## 2019-04-05 DIAGNOSIS — J32.9 VIRAL SINUSITIS: Primary | ICD-10-CM

## 2019-04-05 DIAGNOSIS — J20.9 ACUTE BRONCHITIS, UNSPECIFIED ORGANISM: ICD-10-CM

## 2019-04-05 DIAGNOSIS — B97.89 VIRAL SINUSITIS: Primary | ICD-10-CM

## 2019-04-05 LAB
INFLUENZA A, MOLECULAR: NEGATIVE
INFLUENZA B, MOLECULAR: NEGATIVE
SPECIMEN SOURCE: NORMAL

## 2019-04-05 PROCEDURE — 96372 PR INJECTION,THERAP/PROPH/DIAG2ST, IM OR SUBCUT: ICD-10-PCS | Mod: S$GLB,,, | Performed by: INTERNAL MEDICINE

## 2019-04-05 PROCEDURE — 99999 PR PBB SHADOW E&M-EST. PATIENT-LVL III: ICD-10-PCS | Mod: PBBFAC,,, | Performed by: INTERNAL MEDICINE

## 2019-04-05 PROCEDURE — 3008F BODY MASS INDEX DOCD: CPT | Mod: CPTII,S$GLB,, | Performed by: INTERNAL MEDICINE

## 2019-04-05 PROCEDURE — 99214 OFFICE O/P EST MOD 30 MIN: CPT | Mod: 25,S$GLB,, | Performed by: INTERNAL MEDICINE

## 2019-04-05 PROCEDURE — 99999 PR PBB SHADOW E&M-EST. PATIENT-LVL III: CPT | Mod: PBBFAC,,, | Performed by: INTERNAL MEDICINE

## 2019-04-05 PROCEDURE — 3008F PR BODY MASS INDEX (BMI) DOCUMENTED: ICD-10-PCS | Mod: CPTII,S$GLB,, | Performed by: INTERNAL MEDICINE

## 2019-04-05 PROCEDURE — 87502 INFLUENZA DNA AMP PROBE: CPT | Mod: PO

## 2019-04-05 PROCEDURE — 96372 THER/PROPH/DIAG INJ SC/IM: CPT | Mod: S$GLB,,, | Performed by: INTERNAL MEDICINE

## 2019-04-05 PROCEDURE — 99214 PR OFFICE/OUTPT VISIT, EST, LEVL IV, 30-39 MIN: ICD-10-PCS | Mod: 25,S$GLB,, | Performed by: INTERNAL MEDICINE

## 2019-04-05 RX ORDER — CODEINE PHOSPHATE AND GUAIFENESIN 10; 100 MG/5ML; MG/5ML
5 SOLUTION ORAL 3 TIMES DAILY PRN
Qty: 236 ML | Refills: 0 | Status: SHIPPED | OUTPATIENT
Start: 2019-04-05 | End: 2019-04-15

## 2019-04-05 RX ORDER — TRIAMCINOLONE ACETONIDE 40 MG/ML
40 INJECTION, SUSPENSION INTRA-ARTICULAR; INTRAMUSCULAR
Status: COMPLETED | OUTPATIENT
Start: 2019-04-05 | End: 2019-04-05

## 2019-04-05 RX ADMIN — TRIAMCINOLONE ACETONIDE 40 MG: 40 INJECTION, SUSPENSION INTRA-ARTICULAR; INTRAMUSCULAR at 02:04

## 2019-04-05 NOTE — PROGRESS NOTES
Subjective:       Patient ID: Emilia Coyle is a 43 y.o. female.    Chief Complaint: Fever; Cough; and Sore Throat    HPI   Pt here for evaluation of 3 days of persistent sinus/chest congestion, dry cough, post nasal drip, fevers/chills, body aches, fatigue. Minimal relief with Claritin and Mucinex.   Review of Systems   Constitutional: Positive for fatigue and fever. Negative for activity change, appetite change, chills, diaphoresis and unexpected weight change.   HENT: Positive for congestion, postnasal drip, rhinorrhea, sinus pressure, sinus pain and sore throat. Negative for hearing loss, sneezing, trouble swallowing and voice change.    Eyes: Negative for discharge and visual disturbance.   Respiratory: Positive for cough. Negative for chest tightness, shortness of breath and wheezing.    Cardiovascular: Negative for chest pain, palpitations and leg swelling.   Gastrointestinal: Negative for abdominal pain, blood in stool, constipation, diarrhea, nausea and vomiting.   Endocrine: Negative for polydipsia and polyuria.   Genitourinary: Negative for difficulty urinating, dysuria, hematuria and menstrual problem.   Musculoskeletal: Negative for arthralgias, joint swelling, myalgias and neck pain.   Skin: Negative for rash and wound.   Allergic/Immunologic: Negative for environmental allergies and food allergies.   Neurological: Negative for weakness and headaches.   Hematological: Negative for adenopathy. Does not bruise/bleed easily.   Psychiatric/Behavioral: Negative for confusion and dysphoric mood.       Objective:      Physical Exam   Constitutional: She is oriented to person, place, and time. She appears well-developed and well-nourished. No distress.   HENT:   Head: Normocephalic and atraumatic.   Right Ear: External ear normal.   Left Ear: External ear normal.   Nose: Mucosal edema and rhinorrhea present.   Mouth/Throat: Oropharynx is clear and moist. No oropharyngeal exudate.   Eyes: Pupils are equal,  round, and reactive to light. Conjunctivae and EOM are normal. Right eye exhibits no discharge. Left eye exhibits no discharge. No scleral icterus.   Neck: Neck supple. No JVD present.   Cardiovascular: Normal rate, regular rhythm, normal heart sounds and intact distal pulses.   Pulmonary/Chest: Effort normal and breath sounds normal. No respiratory distress. She has no wheezes. She has no rales.   Musculoskeletal: She exhibits no edema.   Lymphadenopathy:     She has no cervical adenopathy.   Neurological: She is alert and oriented to person, place, and time.   Skin: Skin is warm and dry. No rash noted. She is not diaphoretic. No pallor.       Assessment:       1. Viral sinusitis    2. Acute bronchitis, unspecified organism        Plan:    1. Start Flonase, continue Claritin, Kenalog 40 mg IM       Screen for Influenza    2. Rx Cheratussin AC TID PRN

## 2019-05-01 RX ORDER — BUTALBITAL, ACETAMINOPHEN AND CAFFEINE 50; 325; 40 MG/1; MG/1; MG/1
TABLET ORAL
Qty: 30 TABLET | Refills: 0 | Status: SHIPPED | OUTPATIENT
Start: 2019-05-01 | End: 2019-05-29 | Stop reason: SDUPTHER

## 2019-05-13 ENCOUNTER — PATIENT MESSAGE (OUTPATIENT)
Dept: INTERNAL MEDICINE | Facility: CLINIC | Age: 44
End: 2019-05-13

## 2019-05-13 NOTE — TELEPHONE ENCOUNTER
Needs appt; bring labs to visit; she can give them to the lab staff to be drawn; come in fasting day of appt

## 2019-05-14 ENCOUNTER — PATIENT MESSAGE (OUTPATIENT)
Dept: INTERNAL MEDICINE | Facility: CLINIC | Age: 44
End: 2019-05-14

## 2019-05-14 ENCOUNTER — PATIENT MESSAGE (OUTPATIENT)
Dept: BARIATRICS | Facility: CLINIC | Age: 44
End: 2019-05-14

## 2019-05-15 ENCOUNTER — PATIENT MESSAGE (OUTPATIENT)
Dept: BARIATRICS | Facility: CLINIC | Age: 44
End: 2019-05-15

## 2019-05-29 ENCOUNTER — OFFICE VISIT (OUTPATIENT)
Dept: INTERNAL MEDICINE | Facility: CLINIC | Age: 44
End: 2019-05-29
Payer: COMMERCIAL

## 2019-05-29 ENCOUNTER — PATIENT MESSAGE (OUTPATIENT)
Dept: INTERNAL MEDICINE | Facility: CLINIC | Age: 44
End: 2019-05-29

## 2019-05-29 VITALS
SYSTOLIC BLOOD PRESSURE: 94 MMHG | TEMPERATURE: 98 F | BODY MASS INDEX: 22.89 KG/M2 | DIASTOLIC BLOOD PRESSURE: 58 MMHG | HEART RATE: 58 BPM | HEIGHT: 65 IN | WEIGHT: 137.38 LBS

## 2019-05-29 DIAGNOSIS — G43.809 OTHER MIGRAINE WITHOUT STATUS MIGRAINOSUS, NOT INTRACTABLE: ICD-10-CM

## 2019-05-29 DIAGNOSIS — K59.00 CONSTIPATION, UNSPECIFIED CONSTIPATION TYPE: ICD-10-CM

## 2019-05-29 DIAGNOSIS — E03.9 HYPOTHYROIDISM, UNSPECIFIED TYPE: Primary | ICD-10-CM

## 2019-05-29 DIAGNOSIS — F42.9 OBSESSIVE-COMPULSIVE DISORDER, UNSPECIFIED TYPE: ICD-10-CM

## 2019-05-29 PROBLEM — G43.909 MIGRAINE WITHOUT STATUS MIGRAINOSUS, NOT INTRACTABLE: Status: ACTIVE | Noted: 2019-05-29

## 2019-05-29 PROCEDURE — 99214 PR OFFICE/OUTPT VISIT, EST, LEVL IV, 30-39 MIN: ICD-10-PCS | Mod: S$GLB,,, | Performed by: INTERNAL MEDICINE

## 2019-05-29 PROCEDURE — 99999 PR PBB SHADOW E&M-EST. PATIENT-LVL III: CPT | Mod: PBBFAC,,, | Performed by: INTERNAL MEDICINE

## 2019-05-29 PROCEDURE — 99214 OFFICE O/P EST MOD 30 MIN: CPT | Mod: S$GLB,,, | Performed by: INTERNAL MEDICINE

## 2019-05-29 PROCEDURE — 99999 PR PBB SHADOW E&M-EST. PATIENT-LVL III: ICD-10-PCS | Mod: PBBFAC,,, | Performed by: INTERNAL MEDICINE

## 2019-05-29 PROCEDURE — 3008F BODY MASS INDEX DOCD: CPT | Mod: CPTII,S$GLB,, | Performed by: INTERNAL MEDICINE

## 2019-05-29 PROCEDURE — 3008F PR BODY MASS INDEX (BMI) DOCUMENTED: ICD-10-PCS | Mod: CPTII,S$GLB,, | Performed by: INTERNAL MEDICINE

## 2019-05-29 RX ORDER — BUTALBITAL, ACETAMINOPHEN AND CAFFEINE 50; 325; 40 MG/1; MG/1; MG/1
1 TABLET ORAL EVERY 6 HOURS PRN
Qty: 30 TABLET | Refills: 3 | Status: SHIPPED | OUTPATIENT
Start: 2019-05-29 | End: 2019-07-01

## 2019-05-29 NOTE — PROGRESS NOTES
Subjective:       Patient ID: Emilia Coyle is a 43 y.o. female.    Chief Complaint: Follow-up    HPI   Pt with hypothyroidism, OCD/Anxiety, constipation, celiac disease, migraines is here for f/u. Pt is requesting med refills.   Review of Systems   Constitutional: Negative for activity change, appetite change, chills, diaphoresis, fatigue, fever and unexpected weight change.   HENT: Negative for hearing loss, postnasal drip, rhinorrhea, sinus pressure, sneezing, sore throat, trouble swallowing and voice change.    Eyes: Negative for discharge and visual disturbance.   Respiratory: Negative for cough, chest tightness, shortness of breath and wheezing.    Cardiovascular: Negative for chest pain, palpitations and leg swelling.   Gastrointestinal: Positive for constipation. Negative for abdominal pain, blood in stool, diarrhea, nausea and vomiting.   Endocrine: Negative for polydipsia and polyuria.   Genitourinary: Negative for difficulty urinating, dysuria, hematuria and menstrual problem.   Musculoskeletal: Negative for arthralgias, joint swelling, myalgias and neck pain.   Skin: Negative for rash and wound.   Allergic/Immunologic: Negative for environmental allergies and food allergies.   Neurological: Positive for headaches. Negative for weakness.   Hematological: Negative for adenopathy. Does not bruise/bleed easily.   Psychiatric/Behavioral: Negative for confusion and dysphoric mood.       Objective:      Physical Exam   Constitutional: She is oriented to person, place, and time. She appears well-developed and well-nourished. No distress.   HENT:   Head: Normocephalic and atraumatic.   Eyes: Pupils are equal, round, and reactive to light. Conjunctivae and EOM are normal. Right eye exhibits no discharge. Left eye exhibits no discharge. No scleral icterus.   Neck: Neck supple. No JVD present.   Cardiovascular: Normal rate, regular rhythm, normal heart sounds and intact distal pulses.   Pulmonary/Chest: Effort  normal and breath sounds normal. No respiratory distress. She has no wheezes. She has no rales.   Musculoskeletal: She exhibits no edema.   Lymphadenopathy:     She has no cervical adenopathy.   Neurological: She is alert and oriented to person, place, and time.   Skin: Skin is warm and dry. No rash noted. She is not diaphoretic. No pallor.       Assessment:       1. Hypothyroidism, unspecified type    2. Obsessive-compulsive disorder, unspecified type    3. Other migraine without status migrainosus, not intractable    4. Constipation, unspecified constipation type        Plan:    1. Stable on Synthroid 88 mcg daily   2. Stable, CPT   3. Stable, refill Fioricet    4. Stable on Miralax

## 2019-05-30 ENCOUNTER — TELEPHONE (OUTPATIENT)
Dept: INTERNAL MEDICINE | Facility: CLINIC | Age: 44
End: 2019-05-30

## 2019-05-30 RX ORDER — BACITRACIN 500 [USP'U]/G
OINTMENT TOPICAL 2 TIMES DAILY
Qty: 28 G | Refills: 1 | Status: SHIPPED | OUTPATIENT
Start: 2019-05-30 | End: 2019-07-01

## 2019-05-30 NOTE — TELEPHONE ENCOUNTER
----- Message from John Mast sent at 5/30/2019  2:25 PM CDT -----  Contact: [facility]   Pharmacy is calling to clarify an RX.  RX name:  bacitracin 500 unit/gram ointment 28 g   What do they need to clarify:  Pharmacy states patient is stating Dr Pittman told her he would prescribe Bactroban and not Bacitracin for her   Comments:

## 2019-05-31 RX ORDER — MUPIROCIN 20 MG/G
OINTMENT TOPICAL 3 TIMES DAILY
Qty: 30 G | Refills: 1 | Status: SHIPPED | OUTPATIENT
Start: 2019-05-31 | End: 2020-06-02

## 2019-06-04 ENCOUNTER — PATIENT MESSAGE (OUTPATIENT)
Dept: INTERNAL MEDICINE | Facility: CLINIC | Age: 44
End: 2019-06-04

## 2019-06-04 DIAGNOSIS — G47.33 OSA (OBSTRUCTIVE SLEEP APNEA): ICD-10-CM

## 2019-06-04 DIAGNOSIS — E03.9 HYPOTHYROIDISM, UNSPECIFIED TYPE: Primary | ICD-10-CM

## 2019-06-04 DIAGNOSIS — E66.9 DIABETES MELLITUS TYPE 2 IN OBESE: ICD-10-CM

## 2019-06-04 DIAGNOSIS — E55.9 VITAMIN D DEFICIENCY: ICD-10-CM

## 2019-06-04 DIAGNOSIS — E11.69 DIABETES MELLITUS TYPE 2 IN OBESE: ICD-10-CM

## 2019-06-04 NOTE — TELEPHONE ENCOUNTER
Pt requesting 6 month f/u labs that weren't completed at time of 6 month f/u. She is also requesting you to add the endocrine labs which she has attached.

## 2019-06-14 ENCOUNTER — PATIENT MESSAGE (OUTPATIENT)
Dept: INTERNAL MEDICINE | Facility: CLINIC | Age: 44
End: 2019-06-14

## 2019-06-20 ENCOUNTER — LAB VISIT (OUTPATIENT)
Dept: LAB | Facility: HOSPITAL | Age: 44
End: 2019-06-20
Attending: INTERNAL MEDICINE
Payer: COMMERCIAL

## 2019-06-20 DIAGNOSIS — E03.9 HYPOTHYROIDISM, UNSPECIFIED TYPE: ICD-10-CM

## 2019-06-20 DIAGNOSIS — E55.9 VITAMIN D DEFICIENCY: ICD-10-CM

## 2019-06-20 DIAGNOSIS — E11.69 DIABETES MELLITUS TYPE 2 IN OBESE: ICD-10-CM

## 2019-06-20 DIAGNOSIS — E66.9 DIABETES MELLITUS TYPE 2 IN OBESE: ICD-10-CM

## 2019-06-20 DIAGNOSIS — G47.33 OSA (OBSTRUCTIVE SLEEP APNEA): ICD-10-CM

## 2019-06-20 LAB
25(OH)D3+25(OH)D2 SERPL-MCNC: 29 NG/ML (ref 30–96)
ALBUMIN SERPL BCP-MCNC: 3.9 G/DL (ref 3.5–5.2)
ALP SERPL-CCNC: 59 U/L (ref 55–135)
ALT SERPL W/O P-5'-P-CCNC: 12 U/L (ref 10–44)
ANION GAP SERPL CALC-SCNC: 7 MMOL/L (ref 8–16)
AST SERPL-CCNC: 17 U/L (ref 10–40)
BASOPHILS # BLD AUTO: 0.1 K/UL (ref 0–0.2)
BASOPHILS NFR BLD: 1.7 % (ref 0–1.9)
BILIRUB SERPL-MCNC: 0.5 MG/DL (ref 0.1–1)
BUN SERPL-MCNC: 20 MG/DL (ref 6–20)
CALCIUM SERPL-MCNC: 9.3 MG/DL (ref 8.7–10.5)
CHLORIDE SERPL-SCNC: 104 MMOL/L (ref 95–110)
CHOLEST SERPL-MCNC: 153 MG/DL (ref 120–199)
CHOLEST/HDLC SERPL: 2.3 {RATIO} (ref 2–5)
CO2 SERPL-SCNC: 30 MMOL/L (ref 23–29)
CREAT SERPL-MCNC: 0.7 MG/DL (ref 0.5–1.4)
DIFFERENTIAL METHOD: ABNORMAL
EOSINOPHIL # BLD AUTO: 0.1 K/UL (ref 0–0.5)
EOSINOPHIL NFR BLD: 2 % (ref 0–8)
ERYTHROCYTE [DISTWIDTH] IN BLOOD BY AUTOMATED COUNT: 13.8 % (ref 11.5–14.5)
EST. GFR  (AFRICAN AMERICAN): >60 ML/MIN/1.73 M^2
EST. GFR  (NON AFRICAN AMERICAN): >60 ML/MIN/1.73 M^2
FERRITIN SERPL-MCNC: 113 NG/ML (ref 20–300)
FOLATE SERPL-MCNC: 11.3 NG/ML (ref 4–24)
GLUCOSE SERPL-MCNC: 83 MG/DL (ref 70–110)
HCT VFR BLD AUTO: 37 % (ref 37–48.5)
HDLC SERPL-MCNC: 67 MG/DL (ref 40–75)
HDLC SERPL: 43.8 % (ref 20–50)
HGB BLD-MCNC: 11.8 G/DL (ref 12–16)
IMM GRANULOCYTES # BLD AUTO: 0.01 K/UL (ref 0–0.04)
IMM GRANULOCYTES NFR BLD AUTO: 0.2 % (ref 0–0.5)
IRON SERPL-MCNC: 99 UG/DL (ref 30–160)
LDLC SERPL CALC-MCNC: 75.8 MG/DL (ref 63–159)
LYMPHOCYTES # BLD AUTO: 2.3 K/UL (ref 1–4.8)
LYMPHOCYTES NFR BLD: 38.1 % (ref 18–48)
MCH RBC QN AUTO: 29.2 PG (ref 27–31)
MCHC RBC AUTO-ENTMCNC: 31.9 G/DL (ref 32–36)
MCV RBC AUTO: 92 FL (ref 82–98)
MONOCYTES # BLD AUTO: 0.4 K/UL (ref 0.3–1)
MONOCYTES NFR BLD: 6.4 % (ref 4–15)
NEUTROPHILS # BLD AUTO: 3.1 K/UL (ref 1.8–7.7)
NEUTROPHILS NFR BLD: 51.6 % (ref 38–73)
NONHDLC SERPL-MCNC: 86 MG/DL
NRBC BLD-RTO: 0 /100 WBC
PLATELET # BLD AUTO: 300 K/UL (ref 150–350)
PMV BLD AUTO: 10.9 FL (ref 9.2–12.9)
POTASSIUM SERPL-SCNC: 4.2 MMOL/L (ref 3.5–5.1)
PROT SERPL-MCNC: 6.8 G/DL (ref 6–8.4)
RBC # BLD AUTO: 4.04 M/UL (ref 4–5.4)
SATURATED IRON: 29 % (ref 20–50)
SODIUM SERPL-SCNC: 141 MMOL/L (ref 136–145)
T3FREE SERPL-MCNC: 2.2 PG/ML (ref 2.3–4.2)
T4 FREE SERPL-MCNC: 1.04 NG/DL (ref 0.71–1.51)
TOTAL IRON BINDING CAPACITY: 336 UG/DL (ref 250–450)
TRANSFERRIN SERPL-MCNC: 227 MG/DL (ref 200–375)
TRIGL SERPL-MCNC: 51 MG/DL (ref 30–150)
TSH SERPL DL<=0.005 MIU/L-ACNC: 0.5 UIU/ML (ref 0.4–4)
VIT B12 SERPL-MCNC: 1602 PG/ML (ref 210–950)
WBC # BLD AUTO: 5.93 K/UL (ref 3.9–12.7)

## 2019-06-20 PROCEDURE — 84481 FREE ASSAY (FT-3): CPT

## 2019-06-20 PROCEDURE — 84443 ASSAY THYROID STIM HORMONE: CPT

## 2019-06-20 PROCEDURE — 85025 COMPLETE CBC W/AUTO DIFF WBC: CPT

## 2019-06-20 PROCEDURE — 82728 ASSAY OF FERRITIN: CPT

## 2019-06-20 PROCEDURE — 84439 ASSAY OF FREE THYROXINE: CPT

## 2019-06-20 PROCEDURE — 80061 LIPID PANEL: CPT

## 2019-06-20 PROCEDURE — 36415 COLL VENOUS BLD VENIPUNCTURE: CPT | Mod: PO

## 2019-06-20 PROCEDURE — 82607 VITAMIN B-12: CPT

## 2019-06-20 PROCEDURE — 80053 COMPREHEN METABOLIC PANEL: CPT

## 2019-06-20 PROCEDURE — 82306 VITAMIN D 25 HYDROXY: CPT

## 2019-06-20 PROCEDURE — 83540 ASSAY OF IRON: CPT

## 2019-06-20 PROCEDURE — 82746 ASSAY OF FOLIC ACID SERUM: CPT

## 2019-07-01 ENCOUNTER — OFFICE VISIT (OUTPATIENT)
Dept: OBSTETRICS AND GYNECOLOGY | Facility: CLINIC | Age: 44
End: 2019-07-01
Payer: COMMERCIAL

## 2019-07-01 VITALS
WEIGHT: 137.56 LBS | HEIGHT: 65 IN | BODY MASS INDEX: 22.92 KG/M2 | DIASTOLIC BLOOD PRESSURE: 70 MMHG | SYSTOLIC BLOOD PRESSURE: 110 MMHG

## 2019-07-01 DIAGNOSIS — Z01.419 WELL WOMAN EXAM: Primary | ICD-10-CM

## 2019-07-01 DIAGNOSIS — R23.2 HOT FLASHES: ICD-10-CM

## 2019-07-01 DIAGNOSIS — Z12.39 BREAST CANCER SCREENING: ICD-10-CM

## 2019-07-01 PROCEDURE — 99999 PR PBB SHADOW E&M-EST. PATIENT-LVL III: CPT | Mod: PBBFAC,,, | Performed by: OBSTETRICS & GYNECOLOGY

## 2019-07-01 PROCEDURE — 99386 PREV VISIT NEW AGE 40-64: CPT | Mod: S$GLB,,, | Performed by: OBSTETRICS & GYNECOLOGY

## 2019-07-01 PROCEDURE — 99386 PR PREVENTIVE VISIT,NEW,40-64: ICD-10-PCS | Mod: S$GLB,,, | Performed by: OBSTETRICS & GYNECOLOGY

## 2019-07-01 PROCEDURE — 99999 PR PBB SHADOW E&M-EST. PATIENT-LVL III: ICD-10-PCS | Mod: PBBFAC,,, | Performed by: OBSTETRICS & GYNECOLOGY

## 2019-07-01 RX ORDER — ESTRADIOL 0.1 MG/D
1 PATCH TRANSDERMAL
Qty: 4 PATCH | Refills: 11 | Status: SHIPPED | OUTPATIENT
Start: 2019-07-01 | End: 2019-10-21

## 2019-07-01 NOTE — PROGRESS NOTES
"Chief Complaint: Well Woman Exam     HPI:      Emilia Coyle is a 44 y.o.  who presents today for well woman exam.  LMP: No LMP recorded. Patient has had a hysterectomy.  No issues, problems, or complaints. Specifically, patient denies abnormal vaginal bleeding, discharge, pelvic pain, urinary problems, or changes in appetite. Ms. Coyle is currently sexually active with a single male partner. Has been taking oral estrogen since her hysterectomy (for AUB), but had a gastric sleeve 2 years ago and since that time has had increasing hot flashes and impaired sleep.     Has a h/o I.C. And was having episodes of increased pressure and burning. Started following IC diet again and taking cranberry tablets and symptoms have resolved.     Previous Pap: Has had hysterectomy, no h/o abnormals prior.  Previous Mammogram: Normal per patient (2018)    Ms. Coyle confirms that she wears her seatbelt when riding in the car and does not text while driving.     OB History        1    Para   1    Term           1    AB        Living   1       SAB        TAB        Ectopic        Multiple        Live Births   1                 ROS:     GENERAL: Denies unintentional weight gain or weight loss. Feeling well overall.   SKIN: Denies rash or lesions.   HEENT: Denies headaches, or vision changes.   CARDIOVASCULAR: Denies palpitations or chest pain.   RESPIRATORY: Denies shortness of breath or dyspnea on exertion.  BREASTS: Denies pain, lumps, or nipple discharge.   ABDOMEN: Denies abdominal pain, constipation, diarrhea, nausea, vomiting, change in appetite.  URINARY: Denies frequency, dysuria, hematuria.  NEUROLOGIC: Denies syncope or weakness.   PSYCHIATRIC: Denies depression, anxiety or mood swings.    Physical Exam:      PHYSICAL EXAM:  /70   Ht 5' 5" (1.651 m)   Wt 62.4 kg (137 lb 9.1 oz)   BMI 22.89 kg/m²   Body mass index is 22.89 kg/m².     APPEARANCE: Well nourished, well developed, in no acute " distress.  PSYCH: Appropriate mood and affect.  SKIN: No acne or hirsutism  NECK: Neck symmetric without masses or thyromegaly  NODES: No inguinal, axillary, or supraclavicular lymph node enlargement  ABDOMEN: Soft.  No tenderness or masses.    CARDIOVASCULAR: No edema of peripheral extremities  BREASTS: Symmetrical, no skin changes or visible lesions.  No palpable masses or nipple discharge bilaterally.  PELVIC: Normal external genitalia without lesions.  Normal hair distribution.  Adequate perineal body, normal urethral meatus.  Vagina moist and well rugated without lesions or discharge. Normal appearing vaginal cuff. No significant cystocele or rectocele.  Bimanual exam  without midline or adnexal masses or tenderness.      Assessment/Plan:     Well woman exam         -    Pt no longer needs paps.    Breast cancer screening  -     Mammo Digital Screening Bilat w/ Ignacio; Future; Expected date: 07/01/2019    Hot flashes  -     estradiol 0.1 mg/24 hr td ptwk (ESTRADIOL TRANSDERMAL PATCH) 0.1 mg/24 hr PTWK; Place 1 patch onto the skin every 7 days.  Dispense: 4 patch; Refill: 11        -      Will switch to transdermal to see if this controls symptoms better than oral given h/o sleeve.    Counseling:     Patient was counseled today on current ASCCP pap guidelines, the recommendation for yearly pelvic exams, healthy diet and exercise routines, breast self awareness and annual mammograms.She is to see her PCP for other health maintenance.     Use of the [facility] Patient Portal discussed and encouraged during today's visit.

## 2019-07-02 ENCOUNTER — OFFICE VISIT (OUTPATIENT)
Dept: BARIATRICS | Facility: CLINIC | Age: 44
End: 2019-07-02
Payer: COMMERCIAL

## 2019-07-02 ENCOUNTER — PATIENT MESSAGE (OUTPATIENT)
Dept: INTERNAL MEDICINE | Facility: CLINIC | Age: 44
End: 2019-07-02

## 2019-07-02 VITALS
DIASTOLIC BLOOD PRESSURE: 52 MMHG | HEIGHT: 65 IN | HEART RATE: 53 BPM | SYSTOLIC BLOOD PRESSURE: 92 MMHG | BODY MASS INDEX: 22.89 KG/M2 | WEIGHT: 137.38 LBS

## 2019-07-02 DIAGNOSIS — E03.9 HYPOTHYROIDISM, UNSPECIFIED TYPE: Primary | ICD-10-CM

## 2019-07-02 DIAGNOSIS — Z98.84 S/P LAPAROSCOPIC SLEEVE GASTRECTOMY: Primary | ICD-10-CM

## 2019-07-02 DIAGNOSIS — R63.4 WEIGHT LOSS: ICD-10-CM

## 2019-07-02 DIAGNOSIS — R13.10 DYSPHAGIA, UNSPECIFIED TYPE: ICD-10-CM

## 2019-07-02 PROCEDURE — 99999 PR PBB SHADOW E&M-EST. PATIENT-LVL III: CPT | Mod: PBBFAC,,, | Performed by: NURSE PRACTITIONER

## 2019-07-02 PROCEDURE — 99213 OFFICE O/P EST LOW 20 MIN: CPT | Mod: S$GLB,,, | Performed by: NURSE PRACTITIONER

## 2019-07-02 PROCEDURE — 3008F BODY MASS INDEX DOCD: CPT | Mod: CPTII,S$GLB,, | Performed by: NURSE PRACTITIONER

## 2019-07-02 PROCEDURE — 3008F PR BODY MASS INDEX (BMI) DOCUMENTED: ICD-10-PCS | Mod: CPTII,S$GLB,, | Performed by: NURSE PRACTITIONER

## 2019-07-02 PROCEDURE — 99213 PR OFFICE/OUTPT VISIT, EST, LEVL III, 20-29 MIN: ICD-10-PCS | Mod: S$GLB,,, | Performed by: NURSE PRACTITIONER

## 2019-07-02 PROCEDURE — 99999 PR PBB SHADOW E&M-EST. PATIENT-LVL III: ICD-10-PCS | Mod: PBBFAC,,, | Performed by: NURSE PRACTITIONER

## 2019-07-02 RX ORDER — OMEPRAZOLE 40 MG/1
40 CAPSULE, DELAYED RELEASE ORAL EVERY MORNING
Qty: 30 CAPSULE | Refills: 2 | Status: SHIPPED | OUTPATIENT
Start: 2019-07-02 | End: 2019-09-16 | Stop reason: SDUPTHER

## 2019-07-02 NOTE — PROGRESS NOTES
BARIATRIC FOLLOW UP:    Chief Complaint   Patient presents with    Follow-up     2 year post op      HISTORY OF PRESENT ILLNESS: Emilia Coyle is a 44 y.o. female with a Body mass index is 22.86 kg/m². who presents for a 2 year f/u s/p Lap Sleeve with Dr. Aleman on 7/28/2017.  She is doing well and tolerating the diet without difficulty. Diabetes remains resolved. Excellent weight loss. She has no complaints. She is s/p lap yared 12/15/2017. She avoids wheat due to Celiac disease.    Sometimes feels like her stomach is plugged up, which started 4 months post op. Reports she had an EGD/Cscope at that time with Dr. Hyde, GI at [facility]. Reports normal findings. Reports symptoms have been the same. Denies any exacerbating or relieving factors. Notes she also feels burning sensation when she drinks cold water or cold liquids.     Adherent with bariatric vitamins.    DIET:  Regular Bariatric Diet.   80-90gm protein in diet    Review of Systems   Constitutional: Negative for chills, fever and malaise/fatigue.   Eyes: Negative for blurred vision and double vision.   Respiratory: Negative for cough and shortness of breath.    Cardiovascular: Negative for chest pain and palpitations.   Gastrointestinal: Positive for heartburn. Negative for abdominal pain, blood in stool, constipation, diarrhea, nausea and vomiting.   Musculoskeletal: Positive for back pain.   Skin: Negative for rash.   Neurological: Positive for headaches (migraine h/o). Negative for dizziness, tingling and weakness.   Psychiatric/Behavioral: Negative.        Vitals:    07/02/19 0844   BP: (!) 92/52   Pulse: (!) 53     Physical Exam   Constitutional: She is oriented to person, place, and time. She appears well-developed and well-nourished.   HENT:   Head: Normocephalic and atraumatic.   Cardiovascular: Normal rate and regular rhythm.   Pulmonary/Chest: Effort normal and breath sounds normal.   Abdominal: Soft. Bowel sounds are normal. She exhibits no  distension and no mass. There is no tenderness. There is no rebound and no guarding. No hernia.   WHSS   Musculoskeletal: She exhibits no edema.   Neurological: She is alert and oriented to person, place, and time.   Skin: Skin is warm and dry. No rash noted. No erythema. No pallor.   Psychiatric: She has a normal mood and affect. Her behavior is normal. Judgment and thought content normal.   Nursing note and vitals reviewed.      ASSESSMENT:  - Dysphagia  - Obesity, Body mass index is 22.86 kg/m².,  s/p sleeve gastrectomy on 7/28/2017.  - Estimated goal weight, 185 lbs, which is 50% EWL  - Co-morbidities: MIKAELA (stable), DM2 (resolved)  - Great Weight loss, 94 lbs, 104% EWL   - Good Exercise regimen  - Good Vitamin Regimen  - Good Diet    PLAN:  - Start PPI daily, if no improvement in 1 month then UGI.  - Emphasized the importance of regular exercise and adherence to bariatric diet to achieve maximum weight loss.  - Continue vitamin regimen   - No restrictions to exercise.  - Miralax daily for constipation, no fiber.  - No NSAIDs, Tylenol for pain.  - Can swallow whole pills as of 10/28/17.  - RTC in 3 months or sooner if needed.  - Call the office for any issues.  - Check labs .     15 minute visit, over 50% of time spent counseling patient face to face on diet, exercise, and weight loss.

## 2019-07-16 ENCOUNTER — PATIENT MESSAGE (OUTPATIENT)
Dept: BARIATRICS | Facility: CLINIC | Age: 44
End: 2019-07-16

## 2019-07-16 NOTE — PROGRESS NOTES
Subjective:      Patient ID: Emilia Coyle is a 44 y.o. female.    Chief Complaint:  Hypothyroidism (New patient )    History of Present Illness  Emilia Coyle presents today for Evaluation & management of hypothyroidism. This is her first visit with me.     Diagnosed in 1998.  Right lobectomy in 1998 due to cystic/solid nodule that was biopsied and resulted inconclusive.     Gastric sleeve in 2017     Current medication:  generic lt4 88 mcg daily     Takes thyroid medication properly without food first thing in the morning.     current symptoms:   Denies weight gain  + Fatigue   + Constipation- celiac disease   + Hair loss  - Brittle nails  - Mental fog   No cp, palpations or sob  Denies heat/cold intolerance     Is tolerating selenium     + Biotin usage      With regards to the vitamin d deficiency:  Calcium with Vit D in it.   No recent fractures    Ref. Range 6/20/2019 07:05   Vit D, 25-Hydroxy Latest Ref Range: 30 - 96 ng/mL 29 (L)     On estradiol patch.     Review of Systems   Constitutional: Positive for fatigue. Negative for unexpected weight change.   Eyes: Negative for visual disturbance.   Respiratory: Negative for cough and shortness of breath.    Cardiovascular: Negative for chest pain.   Gastrointestinal: Positive for constipation. Negative for abdominal pain.   Endocrine: Negative for cold intolerance, heat intolerance, polydipsia, polyphagia and polyuria.   Musculoskeletal: Negative for arthralgias.   Skin: Negative for wound.   Neurological: Negative for headaches.   Hematological: Does not bruise/bleed easily.   Psychiatric/Behavioral: Negative for sleep disturbance.     Objective:   Physical Exam   Constitutional: She appears well-developed.   HENT:   Right Ear: External ear normal.   Left Ear: External ear normal.   Nose: Nose normal.   Hearing Normal     Neck: No tracheal deviation present. No thyromegaly present.   No nodules.   Cardiovascular: Normal rate.   No murmur heard.  No edema present    Pulmonary/Chest: Effort normal and breath sounds normal.   Abdominal: Soft. She exhibits no mass. No hernia.   Neurological: She is alert. No cranial nerve deficit or sensory deficit.   Skin: No rash noted.   Psychiatric: She has a normal mood and affect. Judgment normal.   Vitals reviewed.  No palpable thyroid tissue  Scar well healed     Body mass index is 23.41 kg/m².    Lab Review:    Ref. Range 6/20/2019 07:05   TSH Latest Ref Range: 0.400 - 4.000 uIU/mL 0.502   T3, Free Latest Ref Range: 2.3 - 4.2 pg/mL 2.2 (L)   Free T4 Latest Ref Range: 0.71 - 1.51 ng/dL 1.04     Lab Results   Component Value Date    HGBA1C 5.2 08/10/2018     Lab Results   Component Value Date    CHOL 153 06/20/2019    HDL 67 06/20/2019    LDLCALC 75.8 06/20/2019    TRIG 51 06/20/2019    CHOLHDL 43.8 06/20/2019     Lab Results   Component Value Date     06/20/2019    K 4.2 06/20/2019     06/20/2019    CO2 30 (H) 06/20/2019    GLU 83 06/20/2019    BUN 20 06/20/2019    CREATININE 0.7 06/20/2019    CALCIUM 9.3 06/20/2019    PROT 6.8 06/20/2019    ALBUMIN 3.9 06/20/2019    BILITOT 0.5 06/20/2019    ALKPHOS 59 06/20/2019    AST 17 06/20/2019    ALT 12 06/20/2019    ANIONGAP 7 (L) 06/20/2019    ESTGFRAFRICA >60.0 06/20/2019    EGFRNONAA >60.0 06/20/2019    TSH 0.502 06/20/2019     Vit D, 25-Hydroxy   Date Value Ref Range Status   06/20/2019 29 (L) 30 - 96 ng/mL Final     Comment:     Vitamin D deficiency.........<10 ng/mL                              Vitamin D insufficiency......10-29 ng/mL       Vitamin D sufficiency........> or equal to 30 ng/mL  Vitamin D toxicity............>100 ng/mL       Assessment and Plan     1. Hypothyroidism, unspecified type  TSH    T4, free    Comprehensive metabolic panel   2. Vitamin D deficiency  Vitamin D     Hypothyroid  -- Clinically and biochemically euthyroid  -- Goal is a normal TSH  -- Continue current dose of lt4   -- Avoid exogenous hyperthyroidism as this can accelerate bone loss and  increase risk of CV complications.  -- Advised to take LT4 on an empty stomach with water and to wait 30-45 minutes before eating or taking other medications   -- Reviewed usual  times of thyroid hormone  changes- call if start/ stop ocps, weight changes, attempting conception and during pregnancy   -- Reviewed that symptoms of hypothyroidism may not correlate with tsh, and a normal TSH is the goal of therapy.....  symptoms are not a justification for over treatment     -- release of records signed    Vitamin D deficiency  Continue Vit D supplement daily    Follow up in about 1 year (around 7/22/2020).

## 2019-07-19 ENCOUNTER — PATIENT MESSAGE (OUTPATIENT)
Dept: OBSTETRICS AND GYNECOLOGY | Facility: CLINIC | Age: 44
End: 2019-07-19

## 2019-07-22 ENCOUNTER — OFFICE VISIT (OUTPATIENT)
Dept: ENDOCRINOLOGY | Facility: CLINIC | Age: 44
End: 2019-07-22
Payer: COMMERCIAL

## 2019-07-22 VITALS
HEART RATE: 73 BPM | HEIGHT: 65 IN | SYSTOLIC BLOOD PRESSURE: 100 MMHG | DIASTOLIC BLOOD PRESSURE: 60 MMHG | WEIGHT: 140.63 LBS | BODY MASS INDEX: 23.43 KG/M2

## 2019-07-22 DIAGNOSIS — E03.9 HYPOTHYROIDISM, UNSPECIFIED TYPE: Primary | ICD-10-CM

## 2019-07-22 DIAGNOSIS — E55.9 VITAMIN D DEFICIENCY: ICD-10-CM

## 2019-07-22 PROCEDURE — 3008F BODY MASS INDEX DOCD: CPT | Mod: CPTII,S$GLB,, | Performed by: NURSE PRACTITIONER

## 2019-07-22 PROCEDURE — 99203 OFFICE O/P NEW LOW 30 MIN: CPT | Mod: S$GLB,,, | Performed by: NURSE PRACTITIONER

## 2019-07-22 PROCEDURE — 99999 PR PBB SHADOW E&M-EST. PATIENT-LVL III: ICD-10-PCS | Mod: PBBFAC,,, | Performed by: NURSE PRACTITIONER

## 2019-07-22 PROCEDURE — 3008F PR BODY MASS INDEX (BMI) DOCUMENTED: ICD-10-PCS | Mod: CPTII,S$GLB,, | Performed by: NURSE PRACTITIONER

## 2019-07-22 PROCEDURE — 99999 PR PBB SHADOW E&M-EST. PATIENT-LVL III: CPT | Mod: PBBFAC,,, | Performed by: NURSE PRACTITIONER

## 2019-07-22 PROCEDURE — 99203 PR OFFICE/OUTPT VISIT, NEW, LEVL III, 30-44 MIN: ICD-10-PCS | Mod: S$GLB,,, | Performed by: NURSE PRACTITIONER

## 2019-07-22 NOTE — ASSESSMENT & PLAN NOTE
-- Clinically and biochemically euthyroid  -- Goal is a normal TSH  -- Continue current dose of lt4   -- Avoid exogenous hyperthyroidism as this can accelerate bone loss and increase risk of CV complications.  -- Advised to take LT4 on an empty stomach with water and to wait 30-45 minutes before eating or taking other medications   -- Reviewed usual  times of thyroid hormone  changes- call if start/ stop ocps, weight changes, attempting conception and during pregnancy   -- Reviewed that symptoms of hypothyroidism may not correlate with tsh, and a normal TSH is the goal of therapy.....  symptoms are not a justification for over treatment     -- release of records signed

## 2019-07-22 NOTE — LETTER
July 22, 2019      [facility] [place]  [address]  [facility] [address]  Phone: [phone]       Patient: Emilia Coyle   YOB: 1975  Date of Visit: 07/22/2019    To Whom It May Concern:    Leona Coyle  was at [facility] on 07/22/2019.   She will be return today     at work with  restrictions. If you have any questions or concerns, or if I can be of further     assistance, please do not hesitate to contact me.    Sincerely,    Marisel Pompa NP FNP -C APRN.       Yes

## 2019-07-22 NOTE — LETTER
July 22, 2019      Rosanna Benitez,   [address] [facility] [place] [address]           [facility] - Endocrinology 6th [place]  [address]  [place] [address]  Phone: [phone]          Patient: Emilia Coyle   MR Number: [id number]   YOB: 1975   Date of Visit: 7/22/2019       Dear Dr. Rosanna Benitez:    Thank you for referring Emilia Coyle to me for evaluation. Attached you will find relevant portions of my assessment and plan of care.    If you have questions, please do not hesitate to call me. I look forward to following Emilia Coyle along with you.    Sincerely,    Marisel Pompa, CARMELINA    Enclosure  CC:  No Recipients    If you would like to receive this communication electronically, please contact [email or web address] or [phone] to request more information on [employer] Link access.    For providers and/or their staff who would like to refer a patient to [facility], please contact us through our one-stop-shop provider referral line, [facility], at [phone].    If you feel you have received this communication in error or would no longer like to receive these types of communications, please e-mail [email or web address]

## 2019-07-23 ENCOUNTER — TELEPHONE (OUTPATIENT)
Dept: ENDOCRINOLOGY | Facility: CLINIC | Age: 44
End: 2019-07-23

## 2019-07-26 RX ORDER — BUTALBITAL, ACETAMINOPHEN AND CAFFEINE 50; 325; 40 MG/1; MG/1; MG/1
TABLET ORAL
Qty: 30 TABLET | Refills: 3 | Status: SHIPPED | OUTPATIENT
Start: 2019-07-26 | End: 2019-12-06 | Stop reason: SDUPTHER

## 2019-07-31 ENCOUNTER — PATIENT MESSAGE (OUTPATIENT)
Dept: BARIATRICS | Facility: CLINIC | Age: 44
End: 2019-07-31

## 2019-08-05 ENCOUNTER — PATIENT MESSAGE (OUTPATIENT)
Dept: INTERNAL MEDICINE | Facility: CLINIC | Age: 44
End: 2019-08-05

## 2019-08-05 DIAGNOSIS — M79.643 PAIN OF HAND, UNSPECIFIED LATERALITY: Primary | ICD-10-CM

## 2019-08-19 ENCOUNTER — OFFICE VISIT (OUTPATIENT)
Dept: INTERNAL MEDICINE | Facility: CLINIC | Age: 44
End: 2019-08-19
Payer: COMMERCIAL

## 2019-08-19 ENCOUNTER — LAB VISIT (OUTPATIENT)
Dept: LAB | Facility: HOSPITAL | Age: 44
End: 2019-08-19
Attending: INTERNAL MEDICINE
Payer: COMMERCIAL

## 2019-08-19 ENCOUNTER — TELEPHONE (OUTPATIENT)
Dept: INTERNAL MEDICINE | Facility: CLINIC | Age: 44
End: 2019-08-19

## 2019-08-19 VITALS
TEMPERATURE: 98 F | HEIGHT: 66 IN | BODY MASS INDEX: 22.89 KG/M2 | SYSTOLIC BLOOD PRESSURE: 100 MMHG | HEART RATE: 56 BPM | DIASTOLIC BLOOD PRESSURE: 64 MMHG | RESPIRATION RATE: 16 BRPM | WEIGHT: 142.44 LBS

## 2019-08-19 DIAGNOSIS — Z01.818 PRE-OP EXAM: ICD-10-CM

## 2019-08-19 DIAGNOSIS — D36.13 FOOT NEUROMA: ICD-10-CM

## 2019-08-19 DIAGNOSIS — Z01.818 PRE-OP EXAM: Primary | ICD-10-CM

## 2019-08-19 LAB
ANION GAP SERPL CALC-SCNC: 9 MMOL/L (ref 8–16)
BASOPHILS # BLD AUTO: 0.09 K/UL (ref 0–0.2)
BASOPHILS NFR BLD: 1.6 % (ref 0–1.9)
BUN SERPL-MCNC: 22 MG/DL (ref 6–20)
CALCIUM SERPL-MCNC: 9.3 MG/DL (ref 8.7–10.5)
CHLORIDE SERPL-SCNC: 105 MMOL/L (ref 95–110)
CO2 SERPL-SCNC: 26 MMOL/L (ref 23–29)
CREAT SERPL-MCNC: 0.7 MG/DL (ref 0.5–1.4)
DIFFERENTIAL METHOD: ABNORMAL
EOSINOPHIL # BLD AUTO: 0.2 K/UL (ref 0–0.5)
EOSINOPHIL NFR BLD: 2.8 % (ref 0–8)
ERYTHROCYTE [DISTWIDTH] IN BLOOD BY AUTOMATED COUNT: 13.8 % (ref 11.5–14.5)
EST. GFR  (AFRICAN AMERICAN): >60 ML/MIN/1.73 M^2
EST. GFR  (NON AFRICAN AMERICAN): >60 ML/MIN/1.73 M^2
GLUCOSE SERPL-MCNC: 79 MG/DL (ref 70–110)
HCT VFR BLD AUTO: 36.4 % (ref 37–48.5)
HGB BLD-MCNC: 11.6 G/DL (ref 12–16)
IMM GRANULOCYTES # BLD AUTO: 0.01 K/UL (ref 0–0.04)
IMM GRANULOCYTES NFR BLD AUTO: 0.2 % (ref 0–0.5)
LYMPHOCYTES # BLD AUTO: 2.5 K/UL (ref 1–4.8)
LYMPHOCYTES NFR BLD: 43.7 % (ref 18–48)
MCH RBC QN AUTO: 28.9 PG (ref 27–31)
MCHC RBC AUTO-ENTMCNC: 31.9 G/DL (ref 32–36)
MCV RBC AUTO: 91 FL (ref 82–98)
MONOCYTES # BLD AUTO: 0.4 K/UL (ref 0.3–1)
MONOCYTES NFR BLD: 6.8 % (ref 4–15)
NEUTROPHILS # BLD AUTO: 2.6 K/UL (ref 1.8–7.7)
NEUTROPHILS NFR BLD: 44.9 % (ref 38–73)
NRBC BLD-RTO: 0 /100 WBC
PLATELET # BLD AUTO: 286 K/UL (ref 150–350)
PMV BLD AUTO: 10.9 FL (ref 9.2–12.9)
POTASSIUM SERPL-SCNC: 3.7 MMOL/L (ref 3.5–5.1)
RBC # BLD AUTO: 4.02 M/UL (ref 4–5.4)
SODIUM SERPL-SCNC: 140 MMOL/L (ref 136–145)
WBC # BLD AUTO: 5.7 K/UL (ref 3.9–12.7)

## 2019-08-19 PROCEDURE — 93005 ELECTROCARDIOGRAM TRACING: CPT | Mod: S$GLB,,, | Performed by: INTERNAL MEDICINE

## 2019-08-19 PROCEDURE — 99214 OFFICE O/P EST MOD 30 MIN: CPT | Mod: S$GLB,,, | Performed by: INTERNAL MEDICINE

## 2019-08-19 PROCEDURE — 93010 ELECTROCARDIOGRAM REPORT: CPT | Mod: S$GLB,,, | Performed by: INTERNAL MEDICINE

## 2019-08-19 PROCEDURE — 99999 PR PBB SHADOW E&M-EST. PATIENT-LVL III: CPT | Mod: PBBFAC,,, | Performed by: INTERNAL MEDICINE

## 2019-08-19 PROCEDURE — 3008F PR BODY MASS INDEX (BMI) DOCUMENTED: ICD-10-PCS | Mod: CPTII,S$GLB,, | Performed by: INTERNAL MEDICINE

## 2019-08-19 PROCEDURE — 3008F BODY MASS INDEX DOCD: CPT | Mod: CPTII,S$GLB,, | Performed by: INTERNAL MEDICINE

## 2019-08-19 PROCEDURE — 80048 BASIC METABOLIC PNL TOTAL CA: CPT

## 2019-08-19 PROCEDURE — 85025 COMPLETE CBC W/AUTO DIFF WBC: CPT

## 2019-08-19 PROCEDURE — 93010 EKG 12-LEAD: ICD-10-PCS | Mod: S$GLB,,, | Performed by: INTERNAL MEDICINE

## 2019-08-19 PROCEDURE — 99214 PR OFFICE/OUTPT VISIT, EST, LEVL IV, 30-39 MIN: ICD-10-PCS | Mod: S$GLB,,, | Performed by: INTERNAL MEDICINE

## 2019-08-19 PROCEDURE — 99999 PR PBB SHADOW E&M-EST. PATIENT-LVL III: ICD-10-PCS | Mod: PBBFAC,,, | Performed by: INTERNAL MEDICINE

## 2019-08-19 PROCEDURE — 36415 COLL VENOUS BLD VENIPUNCTURE: CPT | Mod: PO

## 2019-08-19 PROCEDURE — 93005 EKG 12-LEAD: ICD-10-PCS | Mod: S$GLB,,, | Performed by: INTERNAL MEDICINE

## 2019-08-19 NOTE — PROGRESS NOTES
Subjective:       Patient ID: Emilia Coyle is a 44 y.o. female.    Chief Complaint: Pre-op Exam    HPI   Pt here for pre-op evaluation for left foot surgery 2/2 painful neuroma scheduled for 8/30/19 by Dr. Whitfield. Pt has failed conservative therapy for pain management. Pt denies any hx of reactions to anesthesia, CAD/MI/CVA or any acute cardiopulmonary complaints today.   Review of Systems   Constitutional: Negative for activity change, appetite change, chills, diaphoresis, fatigue, fever and unexpected weight change.   HENT: Negative for congestion, hearing loss, mouth sores, postnasal drip, rhinorrhea, sinus pressure, sneezing, sore throat, trouble swallowing and voice change.    Eyes: Negative for pain, discharge and visual disturbance.   Respiratory: Negative for cough, chest tightness, shortness of breath and wheezing.    Cardiovascular: Negative for chest pain, palpitations and leg swelling.   Gastrointestinal: Negative for abdominal pain, blood in stool, constipation, diarrhea, nausea and vomiting.   Endocrine: Negative for cold intolerance, heat intolerance, polydipsia and polyuria.   Genitourinary: Negative for difficulty urinating, dysuria, frequency, hematuria, menstrual problem and urgency.   Musculoskeletal: Negative for arthralgias, joint swelling, myalgias and neck pain.   Skin: Negative for rash and wound.   Allergic/Immunologic: Negative for environmental allergies and food allergies.   Neurological: Negative for dizziness, tremors, seizures, syncope, weakness, light-headedness and headaches.   Hematological: Negative for adenopathy. Does not bruise/bleed easily.   Psychiatric/Behavioral: Negative for confusion, dysphoric mood and sleep disturbance. The patient is not nervous/anxious.        Objective:      Physical Exam   Constitutional: She is oriented to person, place, and time. She appears well-developed and well-nourished. No distress.   HENT:   Head: Normocephalic and atraumatic.   Right Ear:  External ear normal.   Left Ear: External ear normal.   Nose: Nose normal.   Mouth/Throat: Oropharynx is clear and moist. No oropharyngeal exudate.   Eyes: Pupils are equal, round, and reactive to light. Conjunctivae and EOM are normal. Right eye exhibits no discharge. Left eye exhibits no discharge. No scleral icterus.   Neck: Neck supple. No JVD present. No thyromegaly present.   Cardiovascular: Normal rate, regular rhythm, normal heart sounds and intact distal pulses.   No murmur heard.  Pulmonary/Chest: Effort normal and breath sounds normal. No respiratory distress. She has no wheezes. She has no rales. She exhibits no tenderness.   Abdominal: Soft. Bowel sounds are normal. She exhibits no distension. There is no tenderness. There is no guarding.   Musculoskeletal: She exhibits no edema.        Left foot: There is tenderness.   Lymphadenopathy:     She has no cervical adenopathy.   Neurological: She is alert and oriented to person, place, and time.   Skin: Skin is warm and dry. No rash noted. She is not diaphoretic. No pallor.   Psychiatric: She has a normal mood and affect. Judgment normal.   Nursing note and vitals reviewed.      Assessment:       1. Pre-op exam    2. Foot neuroma        Plan:    1. CBC, BMP, CXR/EKG, UA   2. Surgery scheduled for 8/30/19    Pt has no clinical predictors. She is going for a low risk procedure. Has good   functional Capacity at > 4 mets. At this time there are no contraindications to foot surgery.

## 2019-08-19 NOTE — TELEPHONE ENCOUNTER
----- Message from Asiya Callejas sent at 8/19/2019  7:26 AM CDT -----  Contact: SELF  Pt states she is running late for her 7:20am appt because her neighbor fell this morning and she had to help them up. She should be there in about 10 minutes or less.

## 2019-08-23 ENCOUNTER — APPOINTMENT (OUTPATIENT)
Dept: RADIOLOGY | Facility: OTHER | Age: 44
End: 2019-08-23
Attending: OBSTETRICS & GYNECOLOGY
Payer: COMMERCIAL

## 2019-08-23 VITALS — HEIGHT: 66 IN | WEIGHT: 142 LBS | BODY MASS INDEX: 22.82 KG/M2

## 2019-08-23 DIAGNOSIS — Z12.39 BREAST CANCER SCREENING: ICD-10-CM

## 2019-08-23 PROCEDURE — 77063 MAMMO DIGITAL SCREENING BILAT WITH TOMOSYNTHESIS_CAD: ICD-10-PCS | Mod: 26,,, | Performed by: INTERNAL MEDICINE

## 2019-08-23 PROCEDURE — 77067 SCR MAMMO BI INCL CAD: CPT | Mod: 26,,, | Performed by: INTERNAL MEDICINE

## 2019-08-23 PROCEDURE — 77067 SCR MAMMO BI INCL CAD: CPT | Mod: TC,PN

## 2019-08-23 PROCEDURE — 77067 MAMMO DIGITAL SCREENING BILAT WITH TOMOSYNTHESIS_CAD: ICD-10-PCS | Mod: 26,,, | Performed by: INTERNAL MEDICINE

## 2019-08-23 PROCEDURE — 77063 BREAST TOMOSYNTHESIS BI: CPT | Mod: 26,,, | Performed by: INTERNAL MEDICINE

## 2019-08-28 ENCOUNTER — PATIENT MESSAGE (OUTPATIENT)
Dept: OBSTETRICS AND GYNECOLOGY | Facility: CLINIC | Age: 44
End: 2019-08-28

## 2019-09-03 ENCOUNTER — HOSPITAL ENCOUNTER (OUTPATIENT)
Dept: RADIOLOGY | Facility: HOSPITAL | Age: 44
Discharge: HOME | End: 2019-09-03
Attending: ORTHOPAEDIC SURGERY
Payer: COMMERCIAL

## 2019-09-03 ENCOUNTER — OFFICE VISIT (OUTPATIENT)
Dept: ORTHOPEDICS | Facility: CLINIC | Age: 44
End: 2019-09-03
Payer: COMMERCIAL

## 2019-09-03 ENCOUNTER — TELEPHONE (OUTPATIENT)
Dept: ORTHOPEDICS | Facility: CLINIC | Age: 44
End: 2019-09-03

## 2019-09-03 DIAGNOSIS — M79.642 LEFT HAND PAIN: ICD-10-CM

## 2019-09-03 DIAGNOSIS — M79.642 LEFT HAND PAIN: Primary | ICD-10-CM

## 2019-09-03 DIAGNOSIS — G56.02 CARPAL TUNNEL SYNDROME OF LEFT WRIST: Primary | ICD-10-CM

## 2019-09-03 PROCEDURE — 99999 PR PBB SHADOW E&M-EST. PATIENT-LVL I: ICD-10-PCS | Mod: PBBFAC,,, | Performed by: ORTHOPAEDIC SURGERY

## 2019-09-03 PROCEDURE — 73130 X-RAY EXAM OF HAND: CPT | Mod: TC,LT

## 2019-09-03 PROCEDURE — 73130 XR HAND COMPLETE 3 VIEW LEFT: ICD-10-PCS | Mod: 26,LT,, | Performed by: RADIOLOGY

## 2019-09-03 PROCEDURE — 99999 PR PBB SHADOW E&M-EST. PATIENT-LVL I: CPT | Mod: PBBFAC,,, | Performed by: ORTHOPAEDIC SURGERY

## 2019-09-03 PROCEDURE — 99203 PR OFFICE/OUTPT VISIT, NEW, LEVL III, 30-44 MIN: ICD-10-PCS | Mod: S$GLB,,, | Performed by: ORTHOPAEDIC SURGERY

## 2019-09-03 PROCEDURE — 99203 OFFICE O/P NEW LOW 30 MIN: CPT | Mod: S$GLB,,, | Performed by: ORTHOPAEDIC SURGERY

## 2019-09-03 PROCEDURE — 73130 X-RAY EXAM OF HAND: CPT | Mod: 26,LT,, | Performed by: RADIOLOGY

## 2019-09-03 NOTE — PROGRESS NOTES
[facility]  History & Physical  Orthopedics    SUBJECTIVE:      Chief Complaint:  Left hand pain, numbness and tingling    Referring Provider: Rosanna Benitez DO     History of Present Illness:  Patient is a 44 y.o. left hand dominant female who presents today with complaints of left greater than right hand pain with some associated numbness and tingling to the thumb index and long fingers.  This occurs nocturnally.  It has been present for several months and she has tried nocturnal carpal tunnel splinting to no avail as well as NSAID medications which have not helped significantly.  There is no history of trauma or injuries and she presents for initial evaluation with no other complete.     The patient is a/an works for [employer].    Onset of symptoms/DOI was several months ago.    Symptoms are aggravated by activity and at night.    Symptoms are alleviated by rest and during the day.    Symptoms consist of pain and Numbness and tingling.    The patient rates their pain as moderate    Attempted treatment(s) and/or interventions include rest, activity modification, anti-inflammatory medications and immobilization.     The patient denies any fevers, chills, N/V, D/C and presents for evaluation.       Past Medical History:   Diagnosis Date    Celiac disease     Diabetes mellitus     High triglycerides 7/12/2017    Hypothyroidism     MIKAELA (obstructive sleep apnea)     Sleep apnea in adult     Thyroid disease      Past Surgical History:   Procedure Laterality Date    ABDOMINOPLASTY      ADENOIDECTOMY      BLADDER SUSPENSION      CHOLANGIOGRAM N/A 12/15/2017    Per[facility] at [facility] OR 2ND FLR    CHOLECYSTECTOMY  12/2017    CHOLECYSTECTOMY-LAPAROSCOPIC N/A 12/15/2017    [facility] at [facility] OR 2ND FLR    COLONOSCOPY  10/27/2017    Normal   (Rtn 10 yrs, Chas hCeng)     GASTRECTOMY  07/2017     "GASTRECTOMY-SLEEVE-LAPAROSCOPIC-04127 with intraop EGD N/A 7/28/2017    [facility] at [facility] OR [address] [place] [address]    HYSTERECTOMY      TONSILLECTOMY      TOTAL REDUCTION MAMMOPLASTY      TOTAL THYROIDECTOMY Right     TUBAL LIGATION      TYMPANOSTOMY TUBE PLACEMENT      UPPER GASTROINTESTINAL ENDOSCOPY  10/27/2017    Gastritis Biopsied, Benign      Review of patient's allergies indicates:   Allergen Reactions    Rizatriptan Photosensitivity, Nausea Only and Other (See Comments)     "Feels like my brain is on fire."    Sumatriptan Photosensitivity, Nausea Only and Other (See Comments)     "Feels like my brain is on fire."    Zolmitriptan Photosensitivity, Nausea Only and Other (See Comments)     "Feels like my brain is on fire."    Gluten protein Hives, Diarrhea, Itching, Nausea And Vomiting, Swelling, Anxiety and Dermatitis    Latex, natural rubber Dermatitis     Latex gloves with Powder     Social History     Social History Narrative    Not on file     Family History   Problem Relation Age of Onset    No Known Problems Mother     Hyperlipidemia Father     Obesity Brother     Hyperlipidemia Brother     Hyperlipidemia Daughter     Kidney failure Daughter     Kidney failure Maternal Aunt     Hypothyroidism Maternal Aunt     Hyperlipidemia Paternal Uncle     Hypertension Paternal Uncle     Heart disease Paternal Uncle     Cancer Maternal Grandmother     Diabetes Maternal Grandmother     Depression Maternal Grandmother     Heart disease Maternal Grandmother     Hypertension Maternal Grandmother     Sleep apnea Maternal Grandmother     Obesity Maternal Grandmother     Melanoma Maternal Grandmother     Cancer Maternal Grandfather     Hyperlipidemia Maternal Grandfather     Hypertension Maternal Grandfather     Heart disease Maternal Grandfather     Kidney failure Maternal Grandfather     Stroke Maternal Grandfather     Hyperlipidemia Paternal Grandmother     " Hypertension Paternal Grandmother     Diabetes Paternal Grandmother     Obesity Paternal Grandmother     Cancer Paternal Grandfather     Hyperlipidemia Paternal Grandfather     Diabetes Paternal Grandfather     Hypertension Paternal Grandfather     Heart disease Paternal Grandfather     Kidney failure Paternal Grandfather     Obesity Paternal Grandfather     Sleep apnea Paternal Grandfather     Stroke Paternal Grandfather          Current Outpatient Medications:     ALPRAZolam (XANAX) 1 MG tablet, Take 1 mg by mouth 2 (two) times daily., Disp: , Rfl: 1    biotin 10,000 mcg Cap, , Disp: , Rfl:     butalbital-acetaminophen-caffeine -40 mg (FIORICET, ESGIC) -40 mg per tablet, TAKE ONE TABLET BY MOUTH EVERY 6 HOURS AS NEEDED, Disp: 30 tablet, Rfl: 3    estradiol 0.1 mg/24 hr td ptwk (ESTRADIOL TRANSDERMAL PATCH) 0.1 mg/24 hr PTWK, Place 1 patch onto the skin every 7 days., Disp: 4 patch, Rfl: 11    levothyroxine (SYNTHROID) 88 MCG tablet, Take 1 tablet (88 mcg total) by mouth once daily., Disp: 30 tablet, Rfl: 11    multivitamin (ONE DAILY MULTIVITAMIN) per tablet, Take 1 tablet by mouth 2 (two) times daily. , Disp: , Rfl:     mupirocin (BACTROBAN) 2 % ointment, Apply topically 3 (three) times daily., Disp: 30 g, Rfl: 1    omeprazole (PRILOSEC) 40 MG capsule, Take 1 capsule (40 mg total) by mouth every morning., Disp: 30 capsule, Rfl: 2    polyethylene glycol (GLYCOLAX) 17 gram PwPk, MIX ONE PACKET IN LIQUID AND TAKE DAILY AS DIRECTED, Disp: , Rfl: 3    sertraline (ZOLOFT) 100 MG tablet, 100 mg. , Disp: , Rfl:       Review of Systems:  Constitutional: no fever or chills  Eyes: no visual changes  ENT: no nasal congestion or sore throat  Respiratory: no cough or shortness of breath  Cardiovascular: no chest pain  Gastrointestinal: no nausea or vomiting, tolerating diet  Musculoskeletal: pain and numbness/tingling    OBJECTIVE:      Vital Signs (Most Recent):  There were no vitals filed for  this visit.  There is no height or weight on file to calculate BMI.      Physical Exam:  Constitutional: The patient appears well-developed and well-nourished. No distress.   Head: Normocephalic and atraumatic.   Nose: Nose normal.   Eyes: Conjunctivae and EOM are normal.   Neck: No tracheal deviation present.   Cardiovascular: Normal rate and intact distal pulses.    Pulmonary/Chest: Effort normal. No respiratory distress.   Abdominal: There is no guarding.   Neurological: The patient is alert.   Psychiatric: The patient has a normal mood and affect.     Bilateral Hand/Wrist Examination:    Observation/Inspection:  Swelling  none    Deformity  none  Discoloration  none     Scars   none    Atrophy  none    HAND/WRIST EXAMINATION:  Finkelstein's Test   Neg  WHAT Test    Neg  Snuff box tenderness   Neg  Corral's Test    Neg  Hook of Hamate Tenderness  Neg  CMC grind    Neg  Circumduction test   Neg    Neurovascular Exam:  Digits WWP, brisk CR < 3s throughout  NVI motor/LTS to M/R/U nerves, radial pulse 2+  Tinel's Test - Carpal Tunnel  positive left greater than right  Tinel's Test - Cubital Tunnel  Neg  Phalen's Test    positive left greater than right  Median Nerve Compression Test positive left greater than right    ROM hand/wrist/elbow full, painless  Diagnostic Results:     Xray -  x-rays left hand demonstrate no acute fractures or dislocations or significant degenerative changes  EMG - none    ASSESSMENT/PLAN:      44 y.o. yo female with bilateral, left greater than right carpal tunnel syndrome  Plan:  Patient has not responded to NSAIDs and nocturnal carpal tunnel splinting.  We will proceed with an EMG of bilateral upper extremities to evaluate for carpal tunnel syndrome have the patient return after the study for re-evaluation.          Ryan Lorenzo M.D.     Please be aware that this note has been generated with the assistance of [employer] voice-to-text.  Please excuse any spelling or grammatical errors.

## 2019-09-03 NOTE — LETTER
September 3, 2019      Rosanna Benitez, DO  [address] [facility] [place] [address]           [facility] - Orthopedics  [address] 5th [address]  [facility] [address]  Phone: [phone]          Patient: Emilia Coyle   MR Number: [id number]   YOB: 1975   Date of Visit: 9/3/2019       Dear Dr. Rosanna Benitez:    Thank you for referring Emilia Coyle to me for evaluation. Attached you will find relevant portions of my assessment and plan of care.    If you have questions, please do not hesitate to call me. I look forward to following Emilia Coyle along with you.    Sincerely,    Ryan Lorenzo MD    Enclosure  CC:  No Recipients    If you would like to receive this communication electronically, please contact [email or web address] or [phone] to request more information on [employer] Link access.    For providers and/or their staff who would like to refer a patient to [facility], please contact us through our one-stop-shop provider referral line, [facility], at [phone].    If you feel you have received this communication in error or would no longer like to receive these types of communications, please e-mail [email or web address]

## 2019-09-09 ENCOUNTER — PATIENT MESSAGE (OUTPATIENT)
Dept: INTERNAL MEDICINE | Facility: CLINIC | Age: 44
End: 2019-09-09

## 2019-09-13 ENCOUNTER — OFFICE VISIT (OUTPATIENT)
Dept: INTERNAL MEDICINE | Facility: CLINIC | Age: 44
End: 2019-09-13
Payer: COMMERCIAL

## 2019-09-13 VITALS
HEART RATE: 64 BPM | TEMPERATURE: 98 F | SYSTOLIC BLOOD PRESSURE: 90 MMHG | DIASTOLIC BLOOD PRESSURE: 60 MMHG | WEIGHT: 143.06 LBS | HEIGHT: 66 IN | BODY MASS INDEX: 22.99 KG/M2

## 2019-09-13 DIAGNOSIS — B35.9 TINEA: Primary | ICD-10-CM

## 2019-09-13 PROCEDURE — 3008F PR BODY MASS INDEX (BMI) DOCUMENTED: ICD-10-PCS | Mod: CPTII,S$GLB,, | Performed by: INTERNAL MEDICINE

## 2019-09-13 PROCEDURE — 99213 OFFICE O/P EST LOW 20 MIN: CPT | Mod: S$GLB,,, | Performed by: INTERNAL MEDICINE

## 2019-09-13 PROCEDURE — 99999 PR PBB SHADOW E&M-EST. PATIENT-LVL III: ICD-10-PCS | Mod: PBBFAC,,, | Performed by: INTERNAL MEDICINE

## 2019-09-13 PROCEDURE — 3008F BODY MASS INDEX DOCD: CPT | Mod: CPTII,S$GLB,, | Performed by: INTERNAL MEDICINE

## 2019-09-13 PROCEDURE — 99213 PR OFFICE/OUTPT VISIT, EST, LEVL III, 20-29 MIN: ICD-10-PCS | Mod: S$GLB,,, | Performed by: INTERNAL MEDICINE

## 2019-09-13 PROCEDURE — 99999 PR PBB SHADOW E&M-EST. PATIENT-LVL III: CPT | Mod: PBBFAC,,, | Performed by: INTERNAL MEDICINE

## 2019-09-13 RX ORDER — CLOTRIMAZOLE AND BETAMETHASONE DIPROPIONATE 10; .64 MG/G; MG/G
CREAM TOPICAL 2 TIMES DAILY
Qty: 45 G | Refills: 0 | Status: SHIPPED | OUTPATIENT
Start: 2019-09-13 | End: 2020-06-02

## 2019-09-13 NOTE — PROGRESS NOTES
Subjective:       Patient ID: Emilia Coyle is a 44 y.o. female.    Chief Complaint: Tailbone Pain and Itching    HPI   Pt here for evaluation of some dry skin with erythema of her tailbone area which has been getting slightly worse over the last 2 wks. Pt recently had foot surgery and admits to prolonged sitting lately. No open wounds, fevers/chills.    Review of Systems   Constitutional: Negative for activity change, appetite change, chills, diaphoresis, fatigue, fever and unexpected weight change.   HENT: Negative for hearing loss, postnasal drip, rhinorrhea, sinus pressure, sneezing, sore throat, trouble swallowing and voice change.    Eyes: Negative for discharge and visual disturbance.   Respiratory: Negative for cough, chest tightness, shortness of breath and wheezing.    Cardiovascular: Negative for chest pain, palpitations and leg swelling.   Gastrointestinal: Negative for abdominal pain, blood in stool, constipation, diarrhea, nausea and vomiting.   Endocrine: Negative for polydipsia and polyuria.   Genitourinary: Negative for difficulty urinating, dysuria, hematuria and menstrual problem.   Musculoskeletal: Negative for arthralgias, joint swelling, myalgias and neck pain.   Skin: Positive for rash. Negative for wound.   Allergic/Immunologic: Negative for environmental allergies and food allergies.   Neurological: Negative for weakness and headaches.   Hematological: Negative for adenopathy. Does not bruise/bleed easily.   Psychiatric/Behavioral: Negative for confusion and dysphoric mood.       Objective:      Physical Exam   Constitutional: She is oriented to person, place, and time. She appears well-developed and well-nourished. No distress.   HENT:   Head: Normocephalic and atraumatic.   Eyes: Pupils are equal, round, and reactive to light. Conjunctivae and EOM are normal. Right eye exhibits no discharge. Left eye exhibits no discharge. No scleral icterus.   Neck: Neck supple. No JVD present.    Cardiovascular: Normal rate, regular rhythm, normal heart sounds and intact distal pulses.   Pulmonary/Chest: Effort normal and breath sounds normal. No respiratory distress. She has no wheezes. She has no rales.   Genitourinary:         Musculoskeletal: She exhibits no edema.   Lymphadenopathy:     She has no cervical adenopathy.   Neurological: She is alert and oriented to person, place, and time.   Skin: Skin is warm and dry. No rash noted. She is not diaphoretic. No pallor.       Assessment:       1. Tinea        Plan:    1. Rx Lotrisone cream BID

## 2019-09-16 ENCOUNTER — PATIENT MESSAGE (OUTPATIENT)
Dept: BARIATRICS | Facility: CLINIC | Age: 44
End: 2019-09-16

## 2019-09-16 RX ORDER — OMEPRAZOLE 40 MG/1
40 CAPSULE, DELAYED RELEASE ORAL EVERY OTHER DAY
Qty: 15 CAPSULE | Refills: 0 | Status: SHIPPED | OUTPATIENT
Start: 2019-09-16 | End: 2020-06-30

## 2019-09-20 ENCOUNTER — PATIENT MESSAGE (OUTPATIENT)
Dept: INTERNAL MEDICINE | Facility: CLINIC | Age: 44
End: 2019-09-20

## 2019-10-09 ENCOUNTER — TELEPHONE (OUTPATIENT)
Dept: ENDOCRINOLOGY | Facility: CLINIC | Age: 44
End: 2019-10-09

## 2019-10-09 NOTE — TELEPHONE ENCOUNTER
----- Message from Hollis Cade sent at 10/9/2019 10:49 AM CDT -----  Contact: patient  Type:  Patient Returning Call    Who Called:  patient  Who Left Message for Patient:  Patricia  Does the patient know what this is regarding?:  yes  Best Call Back Number:  [phone] ask for Sophia  Additional Information:  Requesting a call back

## 2019-10-09 NOTE — TELEPHONE ENCOUNTER
Spoke to pt and adv Dr Tiwari is booked through April 2020. Pt does not want to be seen on the [facility] anyway and will contact the [place] [facility].

## 2019-10-17 ENCOUNTER — PATIENT MESSAGE (OUTPATIENT)
Dept: OBSTETRICS AND GYNECOLOGY | Facility: CLINIC | Age: 44
End: 2019-10-17

## 2019-10-17 ENCOUNTER — PATIENT MESSAGE (OUTPATIENT)
Dept: INTERNAL MEDICINE | Facility: CLINIC | Age: 44
End: 2019-10-17

## 2019-10-21 RX ORDER — ESTRADIOL 1 MG/G
1 GEL TOPICAL DAILY
Qty: 30 G | Refills: 11 | Status: SHIPPED | OUTPATIENT
Start: 2019-10-21 | End: 2020-09-17

## 2019-11-08 ENCOUNTER — TELEPHONE (OUTPATIENT)
Dept: NEUROLOGY | Facility: CLINIC | Age: 44
End: 2019-11-08

## 2019-11-09 NOTE — TELEPHONE ENCOUNTER
----- Message from Rob Grijalva sent at 11/8/2019  8:57 AM CST -----  Contact: pt @ [phone]  Pt is asking for a call back once EMG machine is working again. Pt declined to reschedule EMG on 11/12/19 to another clinic, pt said she wants to do it at [place].

## 2019-11-14 ENCOUNTER — PROCEDURE VISIT (OUTPATIENT)
Dept: NEUROLOGY | Facility: CLINIC | Age: 44
End: 2019-11-14
Payer: COMMERCIAL

## 2019-11-14 DIAGNOSIS — G56.02 CARPAL TUNNEL SYNDROME OF LEFT WRIST: ICD-10-CM

## 2019-11-14 PROCEDURE — 95910 PR NERVE CONDUCTION STUDY; 7-8 STUDIES: ICD-10-PCS | Mod: S$GLB,,, | Performed by: NEUROMUSCULOSKELETAL MEDICINE & OMM

## 2019-11-14 PROCEDURE — 95886 PR EMG COMPLETE, W/ NERVE CONDUCTION STUDIES, 5+ MUSCLES: ICD-10-PCS | Mod: S$GLB,,, | Performed by: NEUROMUSCULOSKELETAL MEDICINE & OMM

## 2019-11-14 PROCEDURE — 95910 NRV CNDJ TEST 7-8 STUDIES: CPT | Mod: S$GLB,,, | Performed by: NEUROMUSCULOSKELETAL MEDICINE & OMM

## 2019-11-14 PROCEDURE — 95886 MUSC TEST DONE W/N TEST COMP: CPT | Mod: S$GLB,,, | Performed by: NEUROMUSCULOSKELETAL MEDICINE & OMM

## 2019-12-06 RX ORDER — BUTALBITAL, ACETAMINOPHEN AND CAFFEINE 50; 325; 40 MG/1; MG/1; MG/1
TABLET ORAL
Qty: 30 TABLET | Refills: 1 | Status: SHIPPED | OUTPATIENT
Start: 2019-12-06 | End: 2020-01-13 | Stop reason: SDUPTHER

## 2020-01-06 ENCOUNTER — PATIENT MESSAGE (OUTPATIENT)
Dept: NEUROLOGY | Facility: CLINIC | Age: 45
End: 2020-01-06

## 2020-01-06 ENCOUNTER — PATIENT MESSAGE (OUTPATIENT)
Dept: ORTHOPEDICS | Facility: CLINIC | Age: 45
End: 2020-01-06

## 2020-01-07 ENCOUNTER — PATIENT MESSAGE (OUTPATIENT)
Dept: BARIATRICS | Facility: CLINIC | Age: 45
End: 2020-01-07

## 2020-01-07 DIAGNOSIS — Z00.00 ANNUAL PHYSICAL EXAM: Primary | ICD-10-CM

## 2020-01-13 ENCOUNTER — LAB VISIT (OUTPATIENT)
Dept: LAB | Facility: HOSPITAL | Age: 45
End: 2020-01-13
Attending: INTERNAL MEDICINE
Payer: COMMERCIAL

## 2020-01-13 ENCOUNTER — OFFICE VISIT (OUTPATIENT)
Dept: INTERNAL MEDICINE | Facility: CLINIC | Age: 45
End: 2020-01-13
Payer: COMMERCIAL

## 2020-01-13 VITALS
WEIGHT: 146.63 LBS | HEIGHT: 66 IN | BODY MASS INDEX: 23.57 KG/M2 | DIASTOLIC BLOOD PRESSURE: 60 MMHG | SYSTOLIC BLOOD PRESSURE: 98 MMHG | TEMPERATURE: 98 F | HEART RATE: 58 BPM

## 2020-01-13 DIAGNOSIS — Z00.00 ANNUAL PHYSICAL EXAM: Primary | ICD-10-CM

## 2020-01-13 DIAGNOSIS — F42.9 OBSESSIVE-COMPULSIVE DISORDER, UNSPECIFIED TYPE: ICD-10-CM

## 2020-01-13 DIAGNOSIS — E03.9 HYPOTHYROIDISM, UNSPECIFIED TYPE: ICD-10-CM

## 2020-01-13 DIAGNOSIS — Z00.00 ANNUAL PHYSICAL EXAM: ICD-10-CM

## 2020-01-13 DIAGNOSIS — G43.809 OTHER MIGRAINE WITHOUT STATUS MIGRAINOSUS, NOT INTRACTABLE: ICD-10-CM

## 2020-01-13 LAB
ALBUMIN SERPL BCP-MCNC: 3.9 G/DL (ref 3.5–5.2)
ALP SERPL-CCNC: 65 U/L (ref 55–135)
ALT SERPL W/O P-5'-P-CCNC: 12 U/L (ref 10–44)
ANION GAP SERPL CALC-SCNC: 7 MMOL/L (ref 8–16)
AST SERPL-CCNC: 18 U/L (ref 10–40)
BASOPHILS # BLD AUTO: 0.09 K/UL (ref 0–0.2)
BASOPHILS NFR BLD: 1.6 % (ref 0–1.9)
BILIRUB SERPL-MCNC: 0.4 MG/DL (ref 0.1–1)
BUN SERPL-MCNC: 19 MG/DL (ref 6–20)
CALCIUM SERPL-MCNC: 9.4 MG/DL (ref 8.7–10.5)
CHLORIDE SERPL-SCNC: 107 MMOL/L (ref 95–110)
CHOLEST SERPL-MCNC: 168 MG/DL (ref 120–199)
CHOLEST/HDLC SERPL: 2.6 {RATIO} (ref 2–5)
CO2 SERPL-SCNC: 28 MMOL/L (ref 23–29)
CREAT SERPL-MCNC: 0.8 MG/DL (ref 0.5–1.4)
DIFFERENTIAL METHOD: ABNORMAL
EOSINOPHIL # BLD AUTO: 0.2 K/UL (ref 0–0.5)
EOSINOPHIL NFR BLD: 3.1 % (ref 0–8)
ERYTHROCYTE [DISTWIDTH] IN BLOOD BY AUTOMATED COUNT: 13.5 % (ref 11.5–14.5)
EST. GFR  (AFRICAN AMERICAN): >60 ML/MIN/1.73 M^2
EST. GFR  (NON AFRICAN AMERICAN): >60 ML/MIN/1.73 M^2
GLUCOSE SERPL-MCNC: 82 MG/DL (ref 70–110)
HCT VFR BLD AUTO: 35.6 % (ref 37–48.5)
HDLC SERPL-MCNC: 64 MG/DL (ref 40–75)
HDLC SERPL: 38.1 % (ref 20–50)
HGB BLD-MCNC: 11 G/DL (ref 12–16)
IMM GRANULOCYTES # BLD AUTO: 0.01 K/UL (ref 0–0.04)
IMM GRANULOCYTES NFR BLD AUTO: 0.2 % (ref 0–0.5)
LDLC SERPL CALC-MCNC: 92.6 MG/DL (ref 63–159)
LYMPHOCYTES # BLD AUTO: 2.2 K/UL (ref 1–4.8)
LYMPHOCYTES NFR BLD: 39.7 % (ref 18–48)
MCH RBC QN AUTO: 28.6 PG (ref 27–31)
MCHC RBC AUTO-ENTMCNC: 30.9 G/DL (ref 32–36)
MCV RBC AUTO: 93 FL (ref 82–98)
MONOCYTES # BLD AUTO: 0.5 K/UL (ref 0.3–1)
MONOCYTES NFR BLD: 8.2 % (ref 4–15)
NEUTROPHILS # BLD AUTO: 2.6 K/UL (ref 1.8–7.7)
NEUTROPHILS NFR BLD: 47.2 % (ref 38–73)
NONHDLC SERPL-MCNC: 104 MG/DL
NRBC BLD-RTO: 0 /100 WBC
PLATELET # BLD AUTO: 265 K/UL (ref 150–350)
PMV BLD AUTO: 10.7 FL (ref 9.2–12.9)
POTASSIUM SERPL-SCNC: 4.2 MMOL/L (ref 3.5–5.1)
PROT SERPL-MCNC: 6.6 G/DL (ref 6–8.4)
RBC # BLD AUTO: 3.85 M/UL (ref 4–5.4)
SODIUM SERPL-SCNC: 142 MMOL/L (ref 136–145)
T4 FREE SERPL-MCNC: 0.86 NG/DL (ref 0.71–1.51)
TRIGL SERPL-MCNC: 57 MG/DL (ref 30–150)
TSH SERPL DL<=0.005 MIU/L-ACNC: 0.49 UIU/ML (ref 0.4–4)
WBC # BLD AUTO: 5.49 K/UL (ref 3.9–12.7)

## 2020-01-13 PROCEDURE — 99396 PR PREVENTIVE VISIT,EST,40-64: ICD-10-PCS | Mod: 25,S$GLB,, | Performed by: INTERNAL MEDICINE

## 2020-01-13 PROCEDURE — 84443 ASSAY THYROID STIM HORMONE: CPT

## 2020-01-13 PROCEDURE — 90714 TD VACCINE GREATER THAN OR EQUAL TO 7YO PRESERVATIVE FREE IM: ICD-10-PCS | Mod: S$GLB,,, | Performed by: INTERNAL MEDICINE

## 2020-01-13 PROCEDURE — 90471 IMMUNIZATION ADMIN: CPT | Mod: S$GLB,,, | Performed by: INTERNAL MEDICINE

## 2020-01-13 PROCEDURE — 99999 PR PBB SHADOW E&M-EST. PATIENT-LVL III: CPT | Mod: PBBFAC,,, | Performed by: INTERNAL MEDICINE

## 2020-01-13 PROCEDURE — 84439 ASSAY OF FREE THYROXINE: CPT

## 2020-01-13 PROCEDURE — 99999 PR PBB SHADOW E&M-EST. PATIENT-LVL III: ICD-10-PCS | Mod: PBBFAC,,, | Performed by: INTERNAL MEDICINE

## 2020-01-13 PROCEDURE — 90471 TD VACCINE GREATER THAN OR EQUAL TO 7YO PRESERVATIVE FREE IM: ICD-10-PCS | Mod: S$GLB,,, | Performed by: INTERNAL MEDICINE

## 2020-01-13 PROCEDURE — 85025 COMPLETE CBC W/AUTO DIFF WBC: CPT

## 2020-01-13 PROCEDURE — 80053 COMPREHEN METABOLIC PANEL: CPT

## 2020-01-13 PROCEDURE — 90714 TD VACC NO PRESV 7 YRS+ IM: CPT | Mod: S$GLB,,, | Performed by: INTERNAL MEDICINE

## 2020-01-13 PROCEDURE — 80061 LIPID PANEL: CPT

## 2020-01-13 PROCEDURE — 99396 PREV VISIT EST AGE 40-64: CPT | Mod: 25,S$GLB,, | Performed by: INTERNAL MEDICINE

## 2020-01-13 PROCEDURE — 36415 COLL VENOUS BLD VENIPUNCTURE: CPT | Mod: PO

## 2020-01-13 RX ORDER — LISDEXAMFETAMINE DIMESYLATE 70 MG/1
70 CAPSULE ORAL EVERY MORNING
COMMUNITY
End: 2021-12-09

## 2020-01-13 RX ORDER — SERTRALINE HYDROCHLORIDE 100 MG/1
200 TABLET, FILM COATED ORAL DAILY
COMMUNITY
End: 2020-08-31

## 2020-01-13 RX ORDER — BUTALBITAL, ACETAMINOPHEN AND CAFFEINE 50; 325; 40 MG/1; MG/1; MG/1
1 TABLET ORAL EVERY 6 HOURS PRN
Qty: 30 TABLET | Refills: 3 | Status: SHIPPED | OUTPATIENT
Start: 2020-01-13 | End: 2020-04-16 | Stop reason: SDUPTHER

## 2020-01-13 NOTE — PROGRESS NOTES
Subjective:       Patient ID: Emilia Coyle is a 44 y.o. female.    Chief Complaint: Annual Exam    HPI   44 y.o. Female here for annual exam.     Vaccines: Influenza (2019); Tetanus (2019)  Eye exam: 2019  Mammogram: 8/19  Gyn exam: 7/19    Exercise: walks daily  Diet: regular  LMP: s/p hysterectomy     Past Medical History:  No date: Celiac disease  No date: Diabetes mellitus  7/12/2017: High triglycerides  No date: Hypothyroidism  No date: MIKAELA (obstructive sleep apnea)  No date: Sleep apnea in adult  No date: Thyroid disease  Past Surgical History:  No date: ABDOMINOPLASTY  No date: ADENOIDECTOMY  No date: BLADDER SUSPENSION  12/2017: CHOLECYSTECTOMY  10/27/2017: COLONOSCOPY      Comment:  Normal   (Rtn 10 yrs, Chas Cheng)   07/2017: GASTRECTOMY  No date: HYSTERECTOMY  No date: TONSILLECTOMY  No date: TOTAL REDUCTION MAMMOPLASTY  No date: TOTAL THYROIDECTOMY; Right  No date: TUBAL LIGATION  No date: TYMPANOSTOMY TUBE PLACEMENT  10/27/2017: UPPER GASTROINTESTINAL ENDOSCOPY      Comment:  Gastritis Biopsied, Benign   Social History    Socioeconomic History      Marital status:       Spouse name: Not on file      Number of children: Not on file      Years of education: Not on file      Highest education level: Not on file    Occupational History      Not on file    Social Needs      Financial resource strain: Not hard at all      Food insecurity:        Worry: Never true        Inability: Never true      Transportation needs:        Medical: No        Non-medical: No    Tobacco Use      Smoking status: Never Smoker      Smokeless tobacco: Never Used    Substance and Sexual Activity      Alcohol use: No        Frequency: Never        Drinks per session: Patient refused        Binge frequency: Never      Drug use: No      Sexual activity: Yes        Partners: Male    Lifestyle      Physical activity:        Days per week: 4 days        Minutes per session: 60 min      Stress: Not at all    Relationships     "  Social connections:        Talks on phone: More than three times a week        Gets together: More than three times a week        Attends Islam service: Not on file        Active member of club or organization: No        Attends meetings of clubs or organizations: Patient refused        Relationship status:     Other Topics      Concerns:        Are you pregnant or think you may be?: Not Asked        Breast-feeding: Not Asked    Social History Narrative      Not on file    Review of patient's allergies indicates:   -- Rizatriptan -- Photosensitivity, Nausea Only and                            Other (See Comments)    --  "Feels like my brain is on fire."   -- Sumatriptan -- Photosensitivity, Nausea Only and                            Other (See Comments)    --  "Feels like my brain is on fire."   -- Zolmitriptan -- Photosensitivity, Nausea Only and                            Other (See Comments)    --  "Feels like my brain is on fire."   -- Gluten protein -- Hives, Diarrhea, Itching, Nausea                            And Vomiting, Swelling, Anxiety and                           Dermatitis   -- Latex, natural rubber -- Dermatitis    --  Latex gloves with Powder  Sophia Coyle had no medications administered during this visit.    Review of Systems   Constitutional: Negative for activity change, appetite change, chills, diaphoresis, fatigue, fever and unexpected weight change.   HENT: Negative for congestion, hearing loss, mouth sores, postnasal drip, rhinorrhea, sinus pressure, sneezing, sore throat, trouble swallowing and voice change.    Eyes: Negative for pain, discharge and visual disturbance.   Respiratory: Negative for cough, chest tightness, shortness of breath and wheezing.    Cardiovascular: Negative for chest pain, palpitations and leg swelling.   Gastrointestinal: Negative for abdominal pain, blood in stool, constipation, diarrhea, nausea and vomiting.   Endocrine: Negative for cold intolerance, " heat intolerance, polydipsia and polyuria.   Genitourinary: Negative for difficulty urinating, dysuria, frequency, hematuria, menstrual problem and urgency.   Musculoskeletal: Positive for arthralgias. Negative for joint swelling, myalgias and neck pain.   Skin: Negative for rash and wound.   Allergic/Immunologic: Negative for environmental allergies and food allergies.   Neurological: Positive for headaches. Negative for dizziness, tremors, seizures, syncope, weakness and light-headedness.   Hematological: Negative for adenopathy. Does not bruise/bleed easily.   Psychiatric/Behavioral: Negative for confusion, dysphoric mood and sleep disturbance. The patient is nervous/anxious.        Objective:      Physical Exam   Constitutional: She is oriented to person, place, and time. She appears well-developed and well-nourished. No distress.   HENT:   Head: Normocephalic and atraumatic.   Right Ear: External ear normal.   Left Ear: External ear normal.   Nose: Nose normal.   Mouth/Throat: Oropharynx is clear and moist. No oropharyngeal exudate.   Eyes: Pupils are equal, round, and reactive to light. Conjunctivae and EOM are normal. Right eye exhibits no discharge. Left eye exhibits no discharge. No scleral icterus.   Neck: Neck supple. No JVD present. No thyromegaly present.   Cardiovascular: Normal rate, regular rhythm, normal heart sounds and intact distal pulses.   No murmur heard.  Pulmonary/Chest: Effort normal and breath sounds normal. No respiratory distress. She has no wheezes. She has no rales. She exhibits no tenderness.   Abdominal: Soft. Bowel sounds are normal. She exhibits no distension. There is no tenderness. There is no guarding.   Musculoskeletal: She exhibits no edema.   Lymphadenopathy:     She has no cervical adenopathy.   Neurological: She is alert and oriented to person, place, and time. No cranial nerve deficit. Coordination normal.   Skin: Skin is warm and dry. No rash noted. She is not  diaphoretic. No pallor.   Psychiatric: She has a normal mood and affect. Judgment normal.   Nursing note and vitals reviewed.      Assessment:       1. Annual physical exam    2. Hypothyroidism, unspecified type    3. Obsessive-compulsive disorder, unspecified type    4. Other migraine without status migrainosus, not intractable        Plan:    1. Blood work ordered       Vaccines: Influenza (2019); Tetanus (2019)       Eye exam: 2019       Mammogram: 8/19       Gyn exam: 7/19   2. Stable on Synthroid   3. Controlled on current meds, followed by Ps[facility]   4. Stable on Fioricet PRN

## 2020-02-12 ENCOUNTER — TELEPHONE (OUTPATIENT)
Dept: INTERNAL MEDICINE | Facility: CLINIC | Age: 45
End: 2020-02-12

## 2020-02-12 RX ORDER — LEVOTHYROXINE SODIUM 88 UG/1
88 TABLET ORAL DAILY
Qty: 90 TABLET | Refills: 3 | Status: SHIPPED | OUTPATIENT
Start: 2020-02-12 | End: 2020-10-23

## 2020-02-12 NOTE — TELEPHONE ENCOUNTER
----- Message from Taniya Bennett sent at 2/12/2020  3:17 PM CST -----  Contact: [facility] [phone]  Is this a refill or new RX:  Refill    RX name and strength: levothyroxine (SYNTHROID) 88 MCG tablet     Pharmacy name and phone # [facility] [place] [address] [place] [address] [phone] (Phone) [phone] (Fax)

## 2020-04-15 ENCOUNTER — PATIENT MESSAGE (OUTPATIENT)
Dept: ENDOCRINOLOGY | Facility: CLINIC | Age: 45
End: 2020-04-15

## 2020-04-15 ENCOUNTER — PATIENT MESSAGE (OUTPATIENT)
Dept: OBSTETRICS AND GYNECOLOGY | Facility: CLINIC | Age: 45
End: 2020-04-15

## 2020-04-15 ENCOUNTER — PATIENT MESSAGE (OUTPATIENT)
Dept: INTERNAL MEDICINE | Facility: CLINIC | Age: 45
End: 2020-04-15

## 2020-04-16 ENCOUNTER — TELEPHONE (OUTPATIENT)
Dept: INTERNAL MEDICINE | Facility: CLINIC | Age: 45
End: 2020-04-16

## 2020-04-16 ENCOUNTER — PATIENT MESSAGE (OUTPATIENT)
Dept: INTERNAL MEDICINE | Facility: CLINIC | Age: 45
End: 2020-04-16

## 2020-04-16 ENCOUNTER — OFFICE VISIT (OUTPATIENT)
Dept: INTERNAL MEDICINE | Facility: CLINIC | Age: 45
End: 2020-04-16
Payer: COMMERCIAL

## 2020-04-16 DIAGNOSIS — R51.9 SINUS HEADACHE: Primary | ICD-10-CM

## 2020-04-16 PROCEDURE — 99213 PR OFFICE/OUTPT VISIT, EST, LEVL III, 20-29 MIN: ICD-10-PCS | Mod: 95,,, | Performed by: INTERNAL MEDICINE

## 2020-04-16 PROCEDURE — 99213 OFFICE O/P EST LOW 20 MIN: CPT | Mod: 95,,, | Performed by: INTERNAL MEDICINE

## 2020-04-16 RX ORDER — AZITHROMYCIN 250 MG/1
TABLET, FILM COATED ORAL
Qty: 6 TABLET | Refills: 0 | Status: SHIPPED | OUTPATIENT
Start: 2020-04-16 | End: 2020-04-21

## 2020-04-16 RX ORDER — METHYLPREDNISOLONE 4 MG/1
TABLET ORAL
Qty: 1 PACKAGE | Refills: 0 | Status: SHIPPED | OUTPATIENT
Start: 2020-04-16 | End: 2020-04-21

## 2020-04-16 RX ORDER — BUTALBITAL, ACETAMINOPHEN AND CAFFEINE 50; 325; 40 MG/1; MG/1; MG/1
1 TABLET ORAL EVERY 6 HOURS PRN
Qty: 30 TABLET | Refills: 3 | Status: SHIPPED | OUTPATIENT
Start: 2020-04-16 | End: 2020-05-23

## 2020-04-16 NOTE — PROGRESS NOTES
Subjective:       Patient ID: Emilia Coyle is a 44 y.o. female.    Chief Complaint: No chief complaint on file.    Patient is a 44 y.o.female who presents today for headache    The patient location is: home  The chief complaint leading to consultation is: headache  Visit type: audiovisual  Total time spent with patient: 15 min  Each patient to whom he or she provides medical services by telemedicine is:  (1) informed of the relationship between the physician and patient and the respective role of any other health care provider with respect to management of the patient; and (2) notified that he or she may decline to receive medical services by telemedicine and may withdraw from such care at any time.    Notes:     - dealing with bad headache for a couple of weeks; located frontal and radiates to her occiput at times; pressure in her sinuses as well; this it is allergy/ sinus related  - no fever; no diarrhea; on and off stomach cramping  + nausea but no vomiting    Review of Systems   Constitutional: Negative for appetite change, chills, diaphoresis and fever.   HENT: Positive for sinus pressure and sinus pain. Negative for congestion, ear discharge, ear pain, postnasal drip, tinnitus, trouble swallowing and voice change.    Eyes: Negative for discharge, redness and itching.   Respiratory: Negative for cough, chest tightness, shortness of breath and wheezing.    Cardiovascular: Negative for chest pain, palpitations and leg swelling.   Gastrointestinal: Negative for abdominal pain, constipation, diarrhea, nausea and vomiting.   Endocrine: Negative for cold intolerance and heat intolerance.   Genitourinary: Negative for difficulty urinating, flank pain, hematuria and urgency.   Musculoskeletal: Negative for arthralgias, gait problem, myalgias and neck stiffness.   Skin: Negative for color change and rash.   Neurological: Positive for headaches. Negative for dizziness, seizures and syncope.   Hematological: Negative for  adenopathy.   Psychiatric/Behavioral: Negative for agitation, behavioral problems, confusion and sleep disturbance.       Objective:      Physical Exam   Constitutional: She is oriented to person, place, and time. She appears well-developed and well-nourished. No distress.   HENT:   Head: Normocephalic and atraumatic.   Right Ear: External ear normal.   Left Ear: External ear normal.   Eyes: Pupils are equal, round, and reactive to light. Conjunctivae are normal. Right eye exhibits no discharge. Left eye exhibits no discharge. No scleral icterus.   Neck: Normal range of motion. Neck supple.   Pulmonary/Chest: Effort normal. No stridor.   Neurological: She is alert and oriented to person, place, and time.   Skin: She is not diaphoretic.   Psychiatric: She has a normal mood and affect. Her behavior is normal. Judgment and thought content normal.       Assessment and Plan:       1. Sinus headache  - etiology is sinus vs migraine; but symptoms don't sound consistent with her previous migraines  - trial of zpak and medrol maci  Notify clinic if symptoms change, worsen or do not improve  - discussed safe isolation practices with covid and taking medrol maci  - butalbital-acetaminophen-caffeine -40 mg (FIORICET, ESGIC) -40 mg per tablet; Take 1 tablet by mouth every 6 (six) hours as needed.  Dispense: 30 tablet; Refill: 3  - azithromycin (ZITHROMAX Z-MACI) 250 MG tablet; Take 2 tabs the first day and 1 tab qd thereafter.  Dispense: 6 tablet; Refill: 0  - methylPREDNISolone (MEDROL DOSEPACK) 4 mg tablet; Take as directed  Dispense: 1 Package; Refill: 0          No follow-ups on file.

## 2020-04-16 NOTE — TELEPHONE ENCOUNTER
Would recommend office visit given her back pain so it can be evaluated. If she prefers not to come into clinic, can do virtual instead

## 2020-04-20 ENCOUNTER — PATIENT MESSAGE (OUTPATIENT)
Dept: INTERNAL MEDICINE | Facility: CLINIC | Age: 45
End: 2020-04-20

## 2020-04-21 ENCOUNTER — OFFICE VISIT (OUTPATIENT)
Dept: INTERNAL MEDICINE | Facility: CLINIC | Age: 45
End: 2020-04-21
Payer: COMMERCIAL

## 2020-04-21 VITALS
TEMPERATURE: 98 F | DIASTOLIC BLOOD PRESSURE: 62 MMHG | HEIGHT: 66 IN | BODY MASS INDEX: 23.53 KG/M2 | SYSTOLIC BLOOD PRESSURE: 102 MMHG | HEART RATE: 57 BPM | WEIGHT: 146.38 LBS | RESPIRATION RATE: 16 BRPM

## 2020-04-21 DIAGNOSIS — G43.809 OTHER MIGRAINE WITHOUT STATUS MIGRAINOSUS, NOT INTRACTABLE: Primary | ICD-10-CM

## 2020-04-21 PROCEDURE — 3008F BODY MASS INDEX DOCD: CPT | Mod: CPTII,S$GLB,, | Performed by: INTERNAL MEDICINE

## 2020-04-21 PROCEDURE — 99999 PR PBB SHADOW E&M-EST. PATIENT-LVL III: ICD-10-PCS | Mod: PBBFAC,,, | Performed by: INTERNAL MEDICINE

## 2020-04-21 PROCEDURE — 99213 OFFICE O/P EST LOW 20 MIN: CPT | Mod: 25,S$GLB,, | Performed by: INTERNAL MEDICINE

## 2020-04-21 PROCEDURE — 3008F PR BODY MASS INDEX (BMI) DOCUMENTED: ICD-10-PCS | Mod: CPTII,S$GLB,, | Performed by: INTERNAL MEDICINE

## 2020-04-21 PROCEDURE — 96372 THER/PROPH/DIAG INJ SC/IM: CPT | Mod: S$GLB,,, | Performed by: INTERNAL MEDICINE

## 2020-04-21 PROCEDURE — 99213 PR OFFICE/OUTPT VISIT, EST, LEVL III, 20-29 MIN: ICD-10-PCS | Mod: 25,S$GLB,, | Performed by: INTERNAL MEDICINE

## 2020-04-21 PROCEDURE — 96372 PR INJECTION,THERAP/PROPH/DIAG2ST, IM OR SUBCUT: ICD-10-PCS | Mod: S$GLB,,, | Performed by: INTERNAL MEDICINE

## 2020-04-21 PROCEDURE — 99999 PR PBB SHADOW E&M-EST. PATIENT-LVL III: CPT | Mod: PBBFAC,,, | Performed by: INTERNAL MEDICINE

## 2020-04-21 RX ORDER — TRIAMCINOLONE ACETONIDE 40 MG/ML
40 INJECTION, SUSPENSION INTRA-ARTICULAR; INTRAMUSCULAR
Status: COMPLETED | OUTPATIENT
Start: 2020-04-21 | End: 2020-04-21

## 2020-04-21 RX ORDER — KETOROLAC TROMETHAMINE 30 MG/ML
60 INJECTION, SOLUTION INTRAMUSCULAR; INTRAVENOUS
Status: COMPLETED | OUTPATIENT
Start: 2020-04-21 | End: 2020-04-21

## 2020-04-21 RX ADMIN — TRIAMCINOLONE ACETONIDE 40 MG: 40 INJECTION, SUSPENSION INTRA-ARTICULAR; INTRAMUSCULAR at 09:04

## 2020-04-21 RX ADMIN — KETOROLAC TROMETHAMINE 60 MG: 30 INJECTION, SOLUTION INTRAMUSCULAR; INTRAVENOUS at 09:04

## 2020-04-21 NOTE — PROGRESS NOTES
Subjective:       Patient ID: Emilia Coyle is a 44 y.o. female.    Chief Complaint: Follow-up (joint pain); Headache (sinus pressure); and Fatigue    HPI   Pt here for evaluation of worsening Migraines since late last month located on the front which can radiate to the back of the skull. A/s of sinus pressure. She was prescribed a Z-pack which has not helped much and also Fioricet.   Review of Systems   Constitutional: Positive for activity change. Negative for appetite change, chills, diaphoresis, fatigue, fever and unexpected weight change.   HENT: Positive for rhinorrhea. Negative for hearing loss, postnasal drip, sinus pressure, sneezing, sore throat, trouble swallowing and voice change.    Eyes: Negative for discharge and visual disturbance.   Respiratory: Negative for cough, chest tightness, shortness of breath and wheezing.    Cardiovascular: Negative for chest pain, palpitations and leg swelling.   Gastrointestinal: Negative for abdominal pain, blood in stool, constipation, diarrhea, nausea and vomiting.   Endocrine: Negative for polydipsia and polyuria.   Genitourinary: Negative for difficulty urinating, dysuria, hematuria and menstrual problem.   Musculoskeletal: Positive for arthralgias and neck pain. Negative for joint swelling and myalgias.   Skin: Negative for rash and wound.   Allergic/Immunologic: Negative for environmental allergies and food allergies.   Neurological: Positive for headaches. Negative for weakness.   Hematological: Negative for adenopathy. Does not bruise/bleed easily.   Psychiatric/Behavioral: Negative for confusion and dysphoric mood.       Objective:      Physical Exam   Constitutional: She is oriented to person, place, and time. She appears well-developed and well-nourished. No distress.   HENT:   Head: Normocephalic and atraumatic.   Right Ear: External ear normal.   Left Ear: External ear normal.   Nose: Nose normal.   Mouth/Throat: Oropharynx is clear and moist. No  oropharyngeal exudate.   Eyes: Pupils are equal, round, and reactive to light. Conjunctivae and EOM are normal. Right eye exhibits no discharge. Left eye exhibits no discharge. No scleral icterus.   Neck: Neck supple. No JVD present.   Cardiovascular: Normal rate, regular rhythm, normal heart sounds and intact distal pulses.   Pulmonary/Chest: Effort normal and breath sounds normal. No respiratory distress. She has no wheezes. She has no rales.   Musculoskeletal: She exhibits no edema.   Lymphadenopathy:     She has no cervical adenopathy.   Neurological: She is alert and oriented to person, place, and time.   Skin: Skin is warm and dry. No rash noted. She is not diaphoretic. No pallor.       Assessment:       1. Other migraine without status migrainosus, not intractable        Plan:    1. Toradol 60 mg/Kenalog 40 mg IM and continue Fioricet PRN

## 2020-05-14 ENCOUNTER — PATIENT MESSAGE (OUTPATIENT)
Dept: INTERNAL MEDICINE | Facility: CLINIC | Age: 45
End: 2020-05-14

## 2020-05-15 ENCOUNTER — PATIENT MESSAGE (OUTPATIENT)
Dept: INTERNAL MEDICINE | Facility: CLINIC | Age: 45
End: 2020-05-15

## 2020-05-16 ENCOUNTER — OFFICE VISIT (OUTPATIENT)
Dept: INTERNAL MEDICINE | Facility: CLINIC | Age: 45
End: 2020-05-16
Payer: COMMERCIAL

## 2020-05-16 DIAGNOSIS — M19.90 ARTHRITIS: Primary | ICD-10-CM

## 2020-05-16 DIAGNOSIS — R51.9 NONINTRACTABLE HEADACHE, UNSPECIFIED CHRONICITY PATTERN, UNSPECIFIED HEADACHE TYPE: ICD-10-CM

## 2020-05-16 DIAGNOSIS — T14.8XXA BRUISING: ICD-10-CM

## 2020-05-16 PROCEDURE — 99213 OFFICE O/P EST LOW 20 MIN: CPT | Mod: 95,,, | Performed by: INTERNAL MEDICINE

## 2020-05-16 PROCEDURE — 99213 PR OFFICE/OUTPT VISIT, EST, LEVL III, 20-29 MIN: ICD-10-PCS | Mod: 95,,, | Performed by: INTERNAL MEDICINE

## 2020-05-16 NOTE — PROGRESS NOTES
Subjective:       Patient ID: Emilia Coyle is a 44 y.o. female.    Chief Complaint: Arthritis and Bleeding/Bruising    HPI     The patient location is: home   The chief complaint leading to consultation is: easy bruising and arthritis.  Total time spent with patient: 10 minutes  Visit type: Virtual visit with synchronous audio only and video  Each patient to whom he or she provides medical services by telemedicine is: (1) informed of the relationship between the physician and patient and the respective role of any other health care provider with respect to management of the patient; and (2) notified that he or she may decline to receive medical services by telemedicine and may withdraw from such care at any time.    43 yo female here for evaluation of easy bruising and arthritis.  She reports that she saw Dr. Pittman a few weeks ago for headaches.  She has had bruising that she cannot explain.  They resolve, then more bruises appear.  She has noted the bruising in the last couple of weeks.  She has ascribed the bruising to gardening or something she has done prior.  Then, she noted random bruising on her arms that she cannot explain.  She has celiac disease.  She is not eating anything that would set off her celiac.  The joint pain started around the same time as the bruising.  The arthritis is everywhere.  She reports that the bruising varies from nickel to silver dollar sized.    Her headaches are getting worse since she saw Dr. Pittman.  It seems like a sinus pressure headache.  She has floaters with the headache - amoeba or strains of light.  The kenalog and toradol injections helped her sinuses.  These did not help her headaches.    Review of Systems    Objective:      Physical Exam   Constitutional: She appears well-developed and well-nourished. No distress.   HENT:   Head: Normocephalic and atraumatic.   Right Ear: External ear normal.   Left Ear: External ear normal.   Neck: Normal range of motion.    Skin: She is not diaphoretic.       Assessment:       1. Arthritis    2. Bruising    3. Nonintractable headache, unspecified chronicity pattern, unspecified headache type        Plan:       1.  Check CBC, CMP, CRP, ESR, CCP, RF, GWEN  2.  Check CBC, CMP, INR/PT, PTT  3.  Refer to neurology.

## 2020-05-18 ENCOUNTER — LAB VISIT (OUTPATIENT)
Dept: LAB | Facility: HOSPITAL | Age: 45
End: 2020-05-18
Attending: INTERNAL MEDICINE
Payer: COMMERCIAL

## 2020-05-18 DIAGNOSIS — M19.90 ARTHRITIS: ICD-10-CM

## 2020-05-18 DIAGNOSIS — T14.8XXA BRUISING: ICD-10-CM

## 2020-05-18 LAB
ALBUMIN SERPL BCP-MCNC: 4.1 G/DL (ref 3.5–5.2)
ALP SERPL-CCNC: 75 U/L (ref 55–135)
ALT SERPL W/O P-5'-P-CCNC: 10 U/L (ref 10–44)
ANION GAP SERPL CALC-SCNC: 7 MMOL/L (ref 8–16)
APTT BLDCRRT: 27.1 SEC (ref 21–32)
AST SERPL-CCNC: 15 U/L (ref 10–40)
BASOPHILS # BLD AUTO: 0.07 K/UL (ref 0–0.2)
BASOPHILS NFR BLD: 1.4 % (ref 0–1.9)
BILIRUB SERPL-MCNC: 0.4 MG/DL (ref 0.1–1)
BUN SERPL-MCNC: 13 MG/DL (ref 6–20)
CALCIUM SERPL-MCNC: 9.3 MG/DL (ref 8.7–10.5)
CCP AB SER IA-ACNC: 0.9 U/ML
CHLORIDE SERPL-SCNC: 104 MMOL/L (ref 95–110)
CO2 SERPL-SCNC: 30 MMOL/L (ref 23–29)
CREAT SERPL-MCNC: 0.8 MG/DL (ref 0.5–1.4)
CRP SERPL-MCNC: 1.5 MG/L (ref 0–8.2)
DIFFERENTIAL METHOD: ABNORMAL
EOSINOPHIL # BLD AUTO: 0.1 K/UL (ref 0–0.5)
EOSINOPHIL NFR BLD: 2 % (ref 0–8)
ERYTHROCYTE [DISTWIDTH] IN BLOOD BY AUTOMATED COUNT: 13.7 % (ref 11.5–14.5)
ERYTHROCYTE [SEDIMENTATION RATE] IN BLOOD BY WESTERGREN METHOD: 4 MM/HR (ref 0–36)
EST. GFR  (AFRICAN AMERICAN): >60 ML/MIN/1.73 M^2
EST. GFR  (NON AFRICAN AMERICAN): >60 ML/MIN/1.73 M^2
GLUCOSE SERPL-MCNC: 86 MG/DL (ref 70–110)
HCT VFR BLD AUTO: 36.9 % (ref 37–48.5)
HGB BLD-MCNC: 11.1 G/DL (ref 12–16)
IMM GRANULOCYTES # BLD AUTO: 0.01 K/UL (ref 0–0.04)
IMM GRANULOCYTES NFR BLD AUTO: 0.2 % (ref 0–0.5)
INR PPP: 1 (ref 0.8–1.2)
LYMPHOCYTES # BLD AUTO: 1.5 K/UL (ref 1–4.8)
LYMPHOCYTES NFR BLD: 30.1 % (ref 18–48)
MCH RBC QN AUTO: 27.9 PG (ref 27–31)
MCHC RBC AUTO-ENTMCNC: 30.1 G/DL (ref 32–36)
MCV RBC AUTO: 93 FL (ref 82–98)
MONOCYTES # BLD AUTO: 0.3 K/UL (ref 0.3–1)
MONOCYTES NFR BLD: 6.1 % (ref 4–15)
NEUTROPHILS # BLD AUTO: 3 K/UL (ref 1.8–7.7)
NEUTROPHILS NFR BLD: 60.2 % (ref 38–73)
NRBC BLD-RTO: 0 /100 WBC
PLATELET # BLD AUTO: 268 K/UL (ref 150–350)
PMV BLD AUTO: 10.5 FL (ref 9.2–12.9)
POTASSIUM SERPL-SCNC: 4.1 MMOL/L (ref 3.5–5.1)
PROT SERPL-MCNC: 7 G/DL (ref 6–8.4)
PROTHROMBIN TIME: 10.3 SEC (ref 9–12.5)
RBC # BLD AUTO: 3.98 M/UL (ref 4–5.4)
RHEUMATOID FACT SERPL-ACNC: 12 IU/ML (ref 0–15)
SODIUM SERPL-SCNC: 141 MMOL/L (ref 136–145)
WBC # BLD AUTO: 4.92 K/UL (ref 3.9–12.7)

## 2020-05-18 PROCEDURE — 85025 COMPLETE CBC W/AUTO DIFF WBC: CPT

## 2020-05-18 PROCEDURE — 86038 ANTINUCLEAR ANTIBODIES: CPT

## 2020-05-18 PROCEDURE — 85730 THROMBOPLASTIN TIME PARTIAL: CPT

## 2020-05-18 PROCEDURE — 86140 C-REACTIVE PROTEIN: CPT

## 2020-05-18 PROCEDURE — 80053 COMPREHEN METABOLIC PANEL: CPT

## 2020-05-18 PROCEDURE — 86039 ANTINUCLEAR ANTIBODIES (ANA): CPT

## 2020-05-18 PROCEDURE — 86200 CCP ANTIBODY: CPT

## 2020-05-18 PROCEDURE — 36415 COLL VENOUS BLD VENIPUNCTURE: CPT | Mod: PO

## 2020-05-18 PROCEDURE — 86431 RHEUMATOID FACTOR QUANT: CPT

## 2020-05-18 PROCEDURE — 85610 PROTHROMBIN TIME: CPT

## 2020-05-18 PROCEDURE — 86235 NUCLEAR ANTIGEN ANTIBODY: CPT | Mod: 59

## 2020-05-18 PROCEDURE — 85652 RBC SED RATE AUTOMATED: CPT

## 2020-05-19 ENCOUNTER — PATIENT MESSAGE (OUTPATIENT)
Dept: INTERNAL MEDICINE | Facility: CLINIC | Age: 45
End: 2020-05-19

## 2020-05-19 ENCOUNTER — TELEPHONE (OUTPATIENT)
Dept: INTERNAL MEDICINE | Facility: CLINIC | Age: 45
End: 2020-05-19

## 2020-05-19 DIAGNOSIS — M19.90 ARTHRITIS: Primary | ICD-10-CM

## 2020-05-19 NOTE — TELEPHONE ENCOUNTER
Your ccp antibody and rhuematoid factor are normal along with your inflammatory proteins.  This indicates that the arthritis is unlikely to be rheumatoid arthritis.  Your clotting factors are normal.  Your blood counts are stable.  Your electrolytes, kidney/liver function are normal.  Let's have you follow-up with rheumatology about the arthritis.  Released to patient portal.

## 2020-05-19 NOTE — TELEPHONE ENCOUNTER
Spoke with pt to advise of results and MD recommendations.    Verbalized understanding and states that she has already scheduled with Rheum and has already viewed her lab results.

## 2020-05-20 LAB
ANA PATTERN 1: NORMAL
ANA SER QL IF: POSITIVE
ANA TITR SER IF: NORMAL {TITER}

## 2020-05-21 LAB
ANTI SM ANTIBODY: 0.09 RATIO (ref 0–0.99)
ANTI SM/RNP ANTIBODY: 0.07 RATIO (ref 0–0.99)
ANTI-SM INTERPRETATION: NEGATIVE
ANTI-SM/RNP INTERPRETATION: NEGATIVE
ANTI-SSA ANTIBODY: 0.07 RATIO (ref 0–0.99)
ANTI-SSA INTERPRETATION: NEGATIVE
ANTI-SSB ANTIBODY: 0.08 RATIO (ref 0–0.99)
ANTI-SSB INTERPRETATION: NEGATIVE
DSDNA AB SER-ACNC: NORMAL [IU]/ML

## 2020-05-23 DIAGNOSIS — R51.9 SINUS HEADACHE: ICD-10-CM

## 2020-05-23 RX ORDER — BUTALBITAL, ACETAMINOPHEN AND CAFFEINE 50; 325; 40 MG/1; MG/1; MG/1
TABLET ORAL
Qty: 30 TABLET | Refills: 3 | Status: SHIPPED | OUTPATIENT
Start: 2020-05-23 | End: 2020-09-17

## 2020-05-27 ENCOUNTER — PATIENT MESSAGE (OUTPATIENT)
Dept: INTERNAL MEDICINE | Facility: CLINIC | Age: 45
End: 2020-05-27

## 2020-06-02 ENCOUNTER — OFFICE VISIT (OUTPATIENT)
Dept: NEUROLOGY | Facility: CLINIC | Age: 45
End: 2020-06-02
Payer: COMMERCIAL

## 2020-06-02 VITALS
BODY MASS INDEX: 24.08 KG/M2 | DIASTOLIC BLOOD PRESSURE: 68 MMHG | SYSTOLIC BLOOD PRESSURE: 94 MMHG | WEIGHT: 149.81 LBS | HEIGHT: 66 IN | HEART RATE: 57 BPM

## 2020-06-02 DIAGNOSIS — G44.40 MEDICATION OVERUSE HEADACHE: ICD-10-CM

## 2020-06-02 DIAGNOSIS — Z98.84 S/P LAPAROSCOPIC SLEEVE GASTRECTOMY: ICD-10-CM

## 2020-06-02 DIAGNOSIS — G43.019 INTRACTABLE MIGRAINE WITHOUT AURA AND WITHOUT STATUS MIGRAINOSUS: ICD-10-CM

## 2020-06-02 DIAGNOSIS — F41.9 ANXIETY: ICD-10-CM

## 2020-06-02 DIAGNOSIS — F42.9 OBSESSIVE-COMPULSIVE DISORDER, UNSPECIFIED TYPE: ICD-10-CM

## 2020-06-02 PROCEDURE — 99204 PR OFFICE/OUTPT VISIT, NEW, LEVL IV, 45-59 MIN: ICD-10-PCS | Mod: S$GLB,,, | Performed by: NURSE PRACTITIONER

## 2020-06-02 PROCEDURE — 99999 PR PBB SHADOW E&M-EST. PATIENT-LVL III: ICD-10-PCS | Mod: PBBFAC,,, | Performed by: NURSE PRACTITIONER

## 2020-06-02 PROCEDURE — 3008F BODY MASS INDEX DOCD: CPT | Mod: CPTII,S$GLB,, | Performed by: NURSE PRACTITIONER

## 2020-06-02 PROCEDURE — 99999 PR PBB SHADOW E&M-EST. PATIENT-LVL III: CPT | Mod: PBBFAC,,, | Performed by: NURSE PRACTITIONER

## 2020-06-02 PROCEDURE — 3008F PR BODY MASS INDEX (BMI) DOCUMENTED: ICD-10-PCS | Mod: CPTII,S$GLB,, | Performed by: NURSE PRACTITIONER

## 2020-06-02 PROCEDURE — 99204 OFFICE O/P NEW MOD 45 MIN: CPT | Mod: S$GLB,,, | Performed by: NURSE PRACTITIONER

## 2020-06-02 RX ORDER — ONDANSETRON 8 MG/1
8 TABLET, ORALLY DISINTEGRATING ORAL EVERY 8 HOURS PRN
Qty: 25 TABLET | Refills: 2 | Status: SHIPPED | OUTPATIENT
Start: 2020-06-02 | End: 2021-12-09 | Stop reason: SDUPTHER

## 2020-06-02 RX ORDER — PROMETHAZINE HYDROCHLORIDE 25 MG/1
25 SUPPOSITORY RECTAL EVERY 6 HOURS PRN
Qty: 12 SUPPOSITORY | Refills: 2 | Status: SHIPPED | OUTPATIENT
Start: 2020-06-02 | End: 2021-12-09 | Stop reason: SDUPTHER

## 2020-06-02 NOTE — PATIENT INSTRUCTIONS
To activate [employer] savings card either:   visit [email or web address]   OR   text [phone] to [phone]

## 2020-06-02 NOTE — PROGRESS NOTES
"NEW PATIENT CONSULT  SUBJECTIVE:  Patient ID: Emilia Coyle   MRN: [id number]  Referred By: Dr. Nathaniel Brandt  Chief Complaint: Consult    History of Present Illness:   44 y.o. female with chronic migraine, OCD, anxiety, hypothyroidism, and s/p sleeve gastrectomy, who presents to clinic alone for evaluation of headaches.  Migraines initially began at 8 yo, however have become more frequent since January/February 2020, currently experiencing headaches/migraines on at least 4-5 days per week.  Headaches are described as a moderate to severe, pressure, pounding, throbbing pain located mid-frontal extending to the top of her head, can be occipitally located as well.  Headaches can last anywhere from "a couple of hours" , but can last up to 2 weeks in duration.  Pain can be rated anywhere from 2 to 10 out 10, intensifying to peak over 1-2 hours.  Associated symptoms include scalp allodynia, sinus pressure, nausea, vomiting (only on 1/10 headaches), photophobia, phonophobia, light headed.  When she gets a migraine she has to lie down in a cold, dark, quiet room.  Migraines more often than not begin during the daytime, bu thave woken her from sleep.  Currently experiencing some sort of headache on 23/30 days, with migraines occurring on 12-15/30 days.  Migraines can be proceeded by aura symptoms.  She has seen two Neurologists in the past, she has tried topiramate, gabapentin, magnesium, and vitamin b2 for migraine prevention none of which she felt had much effect.  She has tried sumatriptan, maxalt, and zomig for migraine abortive which caused intolerable side effects.  She is currently treating her migraines with Fioricet, which is somewhat effective, felt fiorinal was more effective in the past, however can no longer take fiorinal due to aspirin and is s/p sleeve gastrectomy.   Triggers - certain smells (gardenias)   Aggravating Factors - smell, noise, movement   Alleviating Factors - sleep, fioricet, sitting in the sun   Head " "Trauma? Infection? Fever? Cancer? Pregnancy? <-- denies    Recent Changes - denies   Sleep - typically sleep 5-7 hours per night, only sometimes feels rested in the morning, sleep pattern    Family Hx of Migraines <-- mom and dad, maternal aunt    Positives in bold: Hx of Kidney Stones, asthma, GI bleed, osteoporosis, CAD/MI, CVA/TIA, DM     Treatments Tried and Response  Sumatriptan - ineffective   Maxalt/zomig - "makes me feel like my head is on fire"  Fioricet   Topiramate - took for about 9 months, no improvement   Vitamin b2   Magnesium   Sertraline   Gabapentin   Will avoid use of all anti-hypertensive medications as patients BP and HR run low  Zofran   Phenergan suppository   Emgality - recommended today   Nurtec - given today     Social History - reviewed     Current Medications:    ALPRAZolam (XANAX) 1 MG tablet, Take 1 mg by mouth 2 (two) times daily., Disp: , Rfl: 1    biotin 10,000 mcg Cap, , Disp: , Rfl:     butalbital-acetaminophen-caffeine -40 mg (FIORICET, ESGIC) -40 mg per tablet, TAKE ONE TABLET BY MOUTH EVERY 6 HOURS AS NEEDED, Disp: 30 tablet, Rfl: 3    estradiol (DIVIGEL) 1 mg/gram (0.1 %) topical gel, Place 1 g onto the skin once daily., Disp: 30 g, Rfl: 11    levothyroxine (SYNTHROID) 88 MCG tablet, Take 1 tablet (88 mcg total) by mouth once daily., Disp: 90 tablet, Rfl: 3    lisdexamfetamine (VYVANSE) 70 MG capsule, Take 70 mg by mouth every morning., Disp: , Rfl:     multivitamin (ONE DAILY MULTIVITAMIN) per tablet, Take 1 tablet by mouth 2 (two) times daily. , Disp: , Rfl:     omeprazole (PRILOSEC) 40 MG capsule, Take 1 capsule (40 mg total) by mouth every other day., Disp: 15 capsule, Rfl: 0    polyethylene glycol (GLYCOLAX) 17 gram PwPk, MIX ONE PACKET IN LIQUID AND TAKE DAILY AS DIRECTED, Disp: , Rfl: 3    sertraline (ZOLOFT) 100 MG tablet, Take 200 mg by mouth once daily., Disp: , Rfl:     galcanezumab-gnlm 120 mg/mL PnIj, Inject 240 mg into the skin once. for 1 " "dose, Disp: 2 mL, Rfl: 0    galcanezumab-gnlm 120 mg/mL PnIj, Inject 120 mg into the skin every 28 days. Begin 28 days after loading dose., Disp: 1 Syringe, Rfl: 10    ondansetron (ZOFRAN-ODT) 8 MG TbDL, Take 1 tablet (8 mg total) by mouth every 8 (eight) hours as needed (nausea)., Disp: 25 tablet, Rfl: 2    promethazine (PHENERGAN) 25 MG suppository, Place 1 suppository (25 mg total) rectally every 6 (six) hours as needed for Nausea., Disp: 12 suppository, Rfl: 2    rimegepant (NURTEC) ODT 75 mg, Take 1 tablet (75 mg total) by mouth daily as needed for Migraine. Place ODT tablet on the tongue; alternatively the ODT tablet may be placed under the tongue, Disp: 8 tablet, Rfl: 2    Review of Systems - as per HPI, otherwise a balanced 10 systems review is negative.    OBJECTIVE:  Vitals:  BP 94/68 (BP Location: Right arm, Patient Position: Sitting)   Pulse (!) 57   Ht 5' 5.5" (1.664 m)   Wt 67.9 kg (149 lb 12.8 oz)   BMI 24.55 kg/m²      Physical Exam:  Constitutional: she appears well-developed and well-nourished. she is well groomed. NAD  HENT:    Head: Normocephalic and atraumatic  Eyes: Eyelids normal.  Conjunctivae and EOM are normal.  Neck: Neck supple. Full passive ROM without pain.   Resp: Respiratory effort is normal.   Musculoskeletal: Normal range of motion. No joint stiffness.   Skin: Skin is warm and dry.  Psychiatric: Normal mood and affect.  Neuro: Patient is alert and oriented to person, place and time.  Speech is clear and fluent. Recent and remote memory intact.  Moves all 4 extremities against gravity. Gait and station normal.      Review of Data:   Notes from internal medicine, bariatric medicine, endocrinology reviewed   LA  reviewed   Labs:  Lab Visit on 05/18/2020   Component Date Value Ref Range Status    WBC 05/18/2020 4.92  3.90 - 12.70 K/uL Final    RBC 05/18/2020 3.98* 4.00 - 5.40 M/uL Final    Hemoglobin 05/18/2020 11.1* 12.0 - 16.0 g/dL Final    Hematocrit 05/18/2020 36.9* " 37.0 - 48.5 % Final    Mean Corpuscular Volume 05/18/2020 93  82 - 98 fL Final    Mean Corpuscular Hemoglobin 05/18/2020 27.9  27.0 - 31.0 pg Final    Mean Corpuscular Hemoglobin Conc 05/18/2020 30.1* 32.0 - 36.0 g/dL Final    RDW 05/18/2020 13.7  11.5 - 14.5 % Final    Platelets 05/18/2020 268  150 - 350 K/uL Final    MPV 05/18/2020 10.5  9.2 - 12.9 fL Final    Immature Granulocytes 05/18/2020 0.2  0.0 - 0.5 % Final    Gran # (ANC) 05/18/2020 3.0  1.8 - 7.7 K/uL Final    Immature Grans (Abs) 05/18/2020 0.01  0.00 - 0.04 K/uL Final    Lymph # 05/18/2020 1.5  1.0 - 4.8 K/uL Final    Mono # 05/18/2020 0.3  0.3 - 1.0 K/uL Final    Eos # 05/18/2020 0.1  0.0 - 0.5 K/uL Final    Baso # 05/18/2020 0.07  0.00 - 0.20 K/uL Final    nRBC 05/18/2020 0  0 /100 WBC Final    Gran% 05/18/2020 60.2  38.0 - 73.0 % Final    Lymph% 05/18/2020 30.1  18.0 - 48.0 % Final    Mono% 05/18/2020 6.1  4.0 - 15.0 % Final    Eosinophil% 05/18/2020 2.0  0.0 - 8.0 % Final    Basophil% 05/18/2020 1.4  0.0 - 1.9 % Final    Differential Method 05/18/2020 Automated   Final    Sodium 05/18/2020 141  136 - 145 mmol/L Final    Potassium 05/18/2020 4.1  3.5 - 5.1 mmol/L Final    Chloride 05/18/2020 104  95 - 110 mmol/L Final    CO2 05/18/2020 30* 23 - 29 mmol/L Final    Glucose 05/18/2020 86  70 - 110 mg/dL Final    BUN, Bld 05/18/2020 13  6 - 20 mg/dL Final    Creatinine 05/18/2020 0.8  0.5 - 1.4 mg/dL Final    Calcium 05/18/2020 9.3  8.7 - 10.5 mg/dL Final    Total Protein 05/18/2020 7.0  6.0 - 8.4 g/dL Final    Albumin 05/18/2020 4.1  3.5 - 5.2 g/dL Final    Total Bilirubin 05/18/2020 0.4  0.1 - 1.0 mg/dL Final    Alkaline Phosphatase 05/18/2020 75  55 - 135 U/L Final    AST 05/18/2020 15  10 - 40 U/L Final    ALT 05/18/2020 10  10 - 44 U/L Final    Anion Gap 05/18/2020 7* 8 - 16 mmol/L Final    eGFR if African American 05/18/2020 >60.0  >60 mL/min/1.73 m^2 Final    eGFR if non African American 05/18/2020 >60.0  >60  mL/min/1.73 m^2 Final    Prothrombin Time 05/18/2020 10.3  9.0 - 12.5 sec Final    INR 05/18/2020 1.0  0.8 - 1.2 Final    aPTT 05/18/2020 27.1  21.0 - 32.0 sec Final    Sed Rate 05/18/2020 4  0 - 36 mm/Hr Final    CRP 05/18/2020 1.5  0.0 - 8.2 mg/L Final    CCP Antibodies 05/18/2020 0.9  <5.0 U/mL Final    GWEN Screen 05/18/2020 Positive* Negative <1:80 Final    Rheumatoid Factor 05/18/2020 12.0  0.0 - 15.0 IU/mL Final    Anti Sm Antibody 05/18/2020 0.09  0.00 - 0.99 Ratio Final    Anti-Sm Interpretation 05/18/2020 Negative  Negative Final    Anti-SSA Antibody 05/18/2020 0.07  0.00 - 0.99 Ratio Final    Anti-SSA Interpretation 05/18/2020 Negative  Negative Final    Anti-SSB Antibody 05/18/2020 0.08  0.00 - 0.99 Ratio Final    Anti-SSB Interpretation 05/18/2020 Negative  Negative Final    ds DNA Ab 05/18/2020 Negative 1:10  Negative 1:10 Final    Anti Sm/RNP Antibody 05/18/2020 0.07  0.00 - 0.99 Ratio Final    Anti-Sm/RNP Interpretation 05/18/2020 Negative  Negative Final    GWEN PATTERN 1 05/18/2020 Speckled   Final    GWEN Titer 1 05/18/2020 1:160   Final     Imaging:  No results found for this or any previous visit.  Note: I have independently reviewed any/all imaging/labs/tests and agree with the report (s) as documented.  Any discrepancies will be as noted/demarcated by free text.  MANUEL MURILLO 6/2/2020    ASSESSMENT:  1. Chronic migraine    2. Intractable migraine without aura and without status migrainosus    3. Medication overuse headache    4. Obsessive-compulsive disorder, unspecified type    5. Anxiety    6. S/P laparoscopic sleeve gastrectomy      Medical Decision Making:  BOTOX  The patient has chronic migraines (G43.719) and suffers from headaches more than 15 days a month lasting more than 4 hours a day with no relief of symptoms despite trying multiple medications including but not limited to topiramate, gabapentin, magnesium, vitamin b2, sumatriptan, rizatriptan, zomig, fioricet,  fiorinal, sertraline, emgality, nurtec.  Unable to use any anti-depressants as patients OCD/Anxiety stable on sertraline.  Unable to use anti-hypertensive medications as patients BP and HR are chronically low.  The patient will be an ideal candidate for Botox. We are planning for 3 treatments 12 weeks apart and aiming for at least 50% improvement in the symptoms. If we see no improvement after 3 treatments, we will discontinue the injections.    PLAN:  - Discussed symptoms appear to be consistent with chronic migraine complicated by analgesic overuse (fioricet), discussed treatment options and patient agreed with the following plan:  - Seek approval for Botox for chronic migraine  - Start Emgality 240 mg SQ loading dose followed by 120 mg SQ monthly injections.   - MOH - frequent use of Fioricet contributing to increased frequency of headaches/migraine as causing a rebound headache, recommend she discontinue using fioricet, if a dose of fioricet is needed, she is to limit use of fioricet to no more than 2 days per week (ideally no more than 1 day/week)  - For migraine abortive - trial nurtec 75 mg odt   - Could consider restarting topiramate in the future if needed   - For nausea - given zofran 8 mg odt and phenergan 25 mg suppository   - Start tracking headaches via [facility] [place] [facility] on phone   - Risks, benefits, and potential side effects of emgality, botox discussed  - Alternative treatment options offered   - OCD/Anxiety - stable on zoloft 100 mg daily with prn xanax available, management per psychiatry, will avoid use of any anti-depressant medications as ocd/anxiety are stable on current regimen  - Will avoid use of anti-hypertensive medications as BP/HR are chronically low   - S/p sleeve gastrectomy - will avoid use nsaids   - RTC in 1 month to begin Botox for chronic migraine      Orders Placed This Encounter    rimegepant (NURTEC) ODT 75 mg    galcanezumab-gnlm 120 mg/mL PnIj    galcanezumab-gnlm 120  mg/mL PnIj    ondansetron (ZOFRAN-ODT) 8 MG TbDL    promethazine (PHENERGAN) 25 MG suppository     I have discussed realistic goals of care with patient at length as well as medication options, and need for lifestyle adjustment. I have explained that treatment will take time. We have agreed that the goal will be to reduce frequency/intensity/quantity of HA, not to be completely HA free. I have explained my non narcotic policy regarding headache treatment.    Patient to track frequency of headaches.  Patient agreeable to work on lifestyle adjustments.    Questions and concerns were sought and answered to the patient's stated verbal satisfaction.  The patient verbalizes understanding and agreement with the above stated treatment plan.     CC: DO Zofia Yoder, FNP-C  [facility]  [phone]    Dr. Elaine was available during today's encounter.

## 2020-06-03 ENCOUNTER — PATIENT MESSAGE (OUTPATIENT)
Dept: NEUROLOGY | Facility: CLINIC | Age: 45
End: 2020-06-03

## 2020-06-03 ENCOUNTER — TELEPHONE (OUTPATIENT)
Dept: PHARMACY | Facility: CLINIC | Age: 45
End: 2020-06-03

## 2020-06-03 DIAGNOSIS — G43.019 INTRACTABLE MIGRAINE WITHOUT AURA AND WITHOUT STATUS MIGRAINOSUS: ICD-10-CM

## 2020-06-23 ENCOUNTER — PATIENT MESSAGE (OUTPATIENT)
Dept: BARIATRICS | Facility: CLINIC | Age: 45
End: 2020-06-23

## 2020-06-23 NOTE — TELEPHONE ENCOUNTER
Emgality PA denied due to Medication Overuse Headache and possible concurrent use of Botox. inBasket sent to inform provider that appeal will be completed. Phoned patient and informed her of the same.

## 2020-06-24 ENCOUNTER — TELEPHONE (OUTPATIENT)
Dept: NEUROLOGY | Facility: CLINIC | Age: 45
End: 2020-06-24

## 2020-06-24 NOTE — TELEPHONE ENCOUNTER
spoke with patient was not aware that Argelia is not back yet. Patient stated she will call back after sept to make her appt because she only wants to see Argelia.

## 2020-06-24 NOTE — TELEPHONE ENCOUNTER
----- Message from Trudy Gaona PharmD sent at 6/23/2020  6:47 PM CDT -----  Regarding: Emgaltiy PA denied due to Botox; MOH  Good Evening,     The PA for Ms. Cyole's Emgality was denied due to her medication overuse headaches and possible concurrent use of Botox.     I see from the chart that she has not yet started Botox and I can use that in the appeal. If appropriate, can you address the medication overuse headache in a chart note if you have been able to rule it out? If not, we can appeal regardless. Please let us know before we submit. Thank you.     Regards,   Trudy Gaona PharmD  Specialty Pharmacy Clinical Pharmacist  [facility]  P: [phone]

## 2020-06-25 ENCOUNTER — PATIENT MESSAGE (OUTPATIENT)
Dept: NEUROLOGY | Facility: CLINIC | Age: 45
End: 2020-06-25

## 2020-06-25 DIAGNOSIS — G43.019 INTRACTABLE MIGRAINE WITHOUT AURA AND WITHOUT STATUS MIGRAINOSUS: Primary | ICD-10-CM

## 2020-06-25 RX ORDER — METHYLPREDNISOLONE 4 MG/1
TABLET ORAL
Qty: 1 PACKAGE | Refills: 0 | Status: SHIPPED | OUTPATIENT
Start: 2020-06-25 | End: 2020-08-31

## 2020-06-27 ENCOUNTER — PATIENT MESSAGE (OUTPATIENT)
Dept: INTERNAL MEDICINE | Facility: CLINIC | Age: 45
End: 2020-06-27

## 2020-06-27 DIAGNOSIS — J06.9 UPPER RESPIRATORY TRACT INFECTION, UNSPECIFIED TYPE: ICD-10-CM

## 2020-06-27 DIAGNOSIS — N30.90 CYSTITIS: Primary | ICD-10-CM

## 2020-06-27 DIAGNOSIS — Z20.822 CLOSE EXPOSURE TO 2019 NOVEL CORONAVIRUS: ICD-10-CM

## 2020-06-29 ENCOUNTER — PATIENT MESSAGE (OUTPATIENT)
Dept: INTERNAL MEDICINE | Facility: CLINIC | Age: 45
End: 2020-06-29

## 2020-06-29 NOTE — PROGRESS NOTES
Subjective:      Patient ID: Emilia Coyle is a 45 y.o. female.    Chief Complaint:  Follow up for hypothyroidism     History of Present Illness  Emilia Coyle presents today for follow up of hypothyroidism. Previous patient of SARAH Pompa NP. Last visit in July 2019. This is her first visit with me.     The patient location is: [place]   The chief complaint leading to consultation is: hypothyroidism  Visit type: Virtual visit with synchronous audio and video  Total time spent with patient: 16 minutes  Each patient to whom he or she provides medical services by telemedicine is:  (1) informed of the relationship between the physician and patient and the respective role of any other health care provider with respect to management of the patient; and (2) notified that he or she may decline to receive medical services by telemedicine and may withdraw from such care at any time.    Reports that since her last visit, she complained of fatigue and joint pains to her PCP/GYN. She had some blood work done and did not find cause. She reports her migraines have improved. Had botox yesterday for migraines.     With regards to hypothyroidism,     Family history of hypothyroidism in maternal aunt  Diagnosed in 1998.  Right lobectomy in 1998 due to cystic/solid nodule that was biopsied and resulted inconclusive.     Gastric sleeve in 2017     Current medication:  Generic lt4 88 mcg daily   Denies missing doses.   Takes thyroid medication properly without food first thing in the morning.     Current symptoms:   + weight gain  + Fatigue   + Constipation- celiac disease   + Hair loss  + Brittle nails  - Mental fog   No cp, palpations or sob  Denies heat/cold intolerace     She is not on selenium  She is going for walks. She is trying to lose weight-states that she does not always have an appetite and drinks protein shakes.    + Biotin usage      With regards to the vitamin d deficiency:    She has not been taking Calcium and Vit D.    No recent fractures    Ref. Range 6/20/2019 07:05   Vit D, 25-Hydroxy Latest Ref Range: 30 - 96 ng/mL 29 (L)     On estradiol patch.     Remote history of Type 2 DM in the past-will check periodic A1c    Review of Systems   Constitutional: Positive for fatigue. Negative for unexpected weight change.   Eyes: Negative for visual disturbance.   Respiratory: Negative for cough and shortness of breath.    Cardiovascular: Negative for chest pain.   Gastrointestinal: Positive for constipation. Negative for abdominal pain.   Endocrine: Positive for cold intolerance. Negative for heat intolerance, polydipsia, polyphagia and polyuria.   Musculoskeletal: Positive for arthralgias.   Skin: Negative for wound.   Neurological: Negative for headaches.   Hematological: Does not bruise/bleed easily.   Psychiatric/Behavioral: Negative for sleep disturbance.     Objective:   No physical exam -virtual visit.   She is a well-developed female.    There is no height or weight on file to calculate BMI.    Lab Review:    Ref. Range 6/20/2019 07:05   TSH Latest Ref Range: 0.400 - 4.000 uIU/mL 0.502   T3, Free Latest Ref Range: 2.3 - 4.2 pg/mL 2.2 (L)   Free T4 Latest Ref Range: 0.71 - 1.51 ng/dL 1.04     Lab Results   Component Value Date    HGBA1C 5.2 08/10/2018     Lab Results   Component Value Date    CHOL 168 01/13/2020    HDL 64 01/13/2020    LDLCALC 92.6 01/13/2020    TRIG 57 01/13/2020    CHOLHDL 38.1 01/13/2020     Lab Results   Component Value Date     05/18/2020    K 4.1 05/18/2020     05/18/2020    CO2 30 (H) 05/18/2020    GLU 86 05/18/2020    BUN 13 05/18/2020    CREATININE 0.8 05/18/2020    CALCIUM 9.3 05/18/2020    PROT 7.0 05/18/2020    ALBUMIN 4.1 05/18/2020    BILITOT 0.4 05/18/2020    ALKPHOS 75 05/18/2020    AST 15 05/18/2020    ALT 10 05/18/2020    ANIONGAP 7 (L) 05/18/2020    ESTGFRAFRICA >60.0 05/18/2020    EGFRNONAA >60.0 05/18/2020    TSH 0.489 01/13/2020     Vit D, 25-Hydroxy   Date Value Ref Range  Status   06/20/2019 29 (L) 30 - 96 ng/mL Final     Comment:     Vitamin D deficiency.........<10 ng/mL                              Vitamin D insufficiency......10-29 ng/mL       Vitamin D sufficiency........> or equal to 30 ng/mL  Vitamin D toxicity............>100 ng/mL       Assessment and Plan     1. Hypothyroidism, unspecified type  TSH   2. Vitamin D deficiency  Vitamin D   3. Fatigue, unspecified type       Hypothyroid  -- Goal is a normal TSH  -- Continue current dose of lt4 88 mcg daily  -- Avoid exogenous hyperthyroidism as this can accelerate bone loss and increase risk of CV complications.  -- Advised to take LT4 on an empty stomach with water and to wait 30-45 minutes before eating or taking other medications   -- Reviewed that symptoms of hypothyroidism may not correlate with tsh, and a normal TSH is the goal of therapy.....  symptoms are not a justification for over treatment   -- Start Selenium 100 mg daily     Vitamin D deficiency  Check Vit D     Fatigue  -- Check Vit D and TSH today  -- Remote history of sleep apnea and DM prior to sleeve in 2017. She lost ~100 pounds. Reports 5-7 pound weight gain. Consider evaluation if other labs do not support cause of fatigue.    Follow up in about 1 year (around 7/1/2021).    I spent 16 minutes face-to-face with the patient, over half of the visit was spent on counseling and/or coordinating the care of the patient.    Counseling includes:  Diagnostic results, impressions, recommendations   Prognosis   Risk and benefits of management/treatment options   Instructions for management treatment and or follow-up   Importance of compliance with management   Risk factor reduction   Patient education

## 2020-06-30 ENCOUNTER — PROCEDURE VISIT (OUTPATIENT)
Dept: NEUROLOGY | Facility: CLINIC | Age: 45
End: 2020-06-30
Payer: COMMERCIAL

## 2020-06-30 ENCOUNTER — LAB VISIT (OUTPATIENT)
Dept: INTERNAL MEDICINE | Facility: CLINIC | Age: 45
End: 2020-06-30
Payer: COMMERCIAL

## 2020-06-30 VITALS
SYSTOLIC BLOOD PRESSURE: 93 MMHG | DIASTOLIC BLOOD PRESSURE: 60 MMHG | HEIGHT: 66 IN | WEIGHT: 147.5 LBS | BODY MASS INDEX: 23.7 KG/M2 | HEART RATE: 66 BPM

## 2020-06-30 DIAGNOSIS — Z20.822 CLOSE EXPOSURE TO 2019 NOVEL CORONAVIRUS: ICD-10-CM

## 2020-06-30 DIAGNOSIS — J06.9 UPPER RESPIRATORY TRACT INFECTION, UNSPECIFIED TYPE: ICD-10-CM

## 2020-06-30 DIAGNOSIS — F42.9 OBSESSIVE-COMPULSIVE DISORDER, UNSPECIFIED TYPE: ICD-10-CM

## 2020-06-30 DIAGNOSIS — F41.9 ANXIETY: ICD-10-CM

## 2020-06-30 DIAGNOSIS — G43.019 INTRACTABLE MIGRAINE WITHOUT AURA AND WITHOUT STATUS MIGRAINOSUS: ICD-10-CM

## 2020-06-30 PROCEDURE — 99499 NO LOS: ICD-10-PCS | Mod: S$GLB,,, | Performed by: NURSE PRACTITIONER

## 2020-06-30 PROCEDURE — 64615 PR CHEMODENERVATION OF MUSCLE FOR CHRONIC MIGRAINE: ICD-10-PCS | Mod: S$GLB,,, | Performed by: NURSE PRACTITIONER

## 2020-06-30 PROCEDURE — 99499 UNLISTED E&M SERVICE: CPT | Mod: S$GLB,,, | Performed by: NURSE PRACTITIONER

## 2020-06-30 PROCEDURE — U0003 INFECTIOUS AGENT DETECTION BY NUCLEIC ACID (DNA OR RNA); SEVERE ACUTE RESPIRATORY SYNDROME CORONAVIRUS 2 (SARS-COV-2) (CORONAVIRUS DISEASE [COVID-19]), AMPLIFIED PROBE TECHNIQUE, MAKING USE OF HIGH THROUGHPUT TECHNOLOGIES AS DESCRIBED BY CMS-2020-01-R: HCPCS

## 2020-06-30 PROCEDURE — 64615 CHEMODENERV MUSC MIGRAINE: CPT | Mod: S$GLB,,, | Performed by: NURSE PRACTITIONER

## 2020-06-30 RX ORDER — NARATRIPTAN 2.5 MG/1
TABLET ORAL
Qty: 9 TABLET | Refills: 5 | Status: SHIPPED | OUTPATIENT
Start: 2020-06-30 | End: 2020-12-10 | Stop reason: SDUPTHER

## 2020-06-30 NOTE — PROCEDURES
"SUBJECTIVE:  Patient ID: Emilia Coyle  Chief Complaint: Follow-up and Botulinum Toxin Injection    History of Present Illness:  Emilia Coyle is a 45 y.o. female who presents to clinic alone for follow-up of headaches and Botox injections.     Recommendations made at last Office Visit on 6/2/2020:  - Discussed symptoms appear to be consistent with chronic migraine complicated by analgesic overuse (fioricet), discussed treatment options and patient agreed with the following plan:  - Seek approval for Botox for chronic migraine  - Start Emgality 240 mg SQ loading dose followed by 120 mg SQ monthly injections.   - MOH - frequent use of Fioricet contributing to increased frequency of headaches/migraine as causing a rebound headache, recommend she discontinue using fioricet, if a dose of fioricet is needed, she is to limit use of fioricet to no more than 2 days per week (ideally no more than 1 day/week)  - For migraine abortive - trial nurtec 75 mg odt   - Could consider restarting topiramate in the future if needed   - For nausea - given zofran 8 mg odt and phenergan 25 mg suppository   - Start tracking headaches via [facility] on phone   - Risks, benefits, and potential side effects of emgality, botox discussed  - Alternative treatment options offered   - OCD/Anxiety - stable on zoloft 100 mg daily with prn xanax available, management per psychiatry, will avoid use of any anti-depressant medications as ocd/anxiety are stable on current regimen  - Will avoid use of anti-hypertensive medications as BP/HR are chronically low   - S/p sleeve gastrectomy - will avoid use nsaids   - RTC in 1 month to begin Botox for chronic migraine      06/30/2020- Interval History:  She has continued to suffer with near daily headaches with full blown migraines at least 3-4 days per week.  Though yesterday was a "good day", only a slight headache.  Spoke with Dr. Kendrick her psychiatrist, plans to switch from Zoloft 200 mg to " "Cymbalta, will gradually taper off zoloft and titrate cymbalta from 60 mg to 120 mg.  Was unable to get Emgality approved due to concomitant Botox.  She has not taken any Fioricet in about a week.  She did not feel nurtec was effective.    Treatment plan was discussed with the patient.  Careful procedural explanation of the risks, benefits and alternative methods of treatment were discussed. The details of the technical aspects of the procedure were discussed. All the patients questions were answered to the patients satisfaction. The patient understood and wished to proceed with initiating Botox injections for chronic migraine.    Otherwise, information below is still accurate and current.     History of Present Illness:   44 y.o. female with chronic migraine, OCD, anxiety, hypothyroidism, and s/p sleeve gastrectomy, who presents to clinic alone for evaluation of headaches.  Migraines initially began at 6 yo, however have become more frequent since January/February 2020, currently experiencing headaches/migraines on at least 4-5 days per week.  Headaches are described as a moderate to severe, pressure, pounding, throbbing pain located mid-frontal extending to the top of her head, can be occipitally located as well.  Headaches can last anywhere from "a couple of hours" , but can last up to 2 weeks in duration.  Pain can be rated anywhere from 2 to 10 out 10, intensifying to peak over 1-2 hours.  Associated symptoms include scalp allodynia, sinus pressure, nausea, vomiting (only on 1/10 headaches), photophobia, phonophobia, light headed.  When she gets a migraine she has to lie down in a cold, dark, quiet room.  Migraines more often than not begin during the daytime, bu thave woken her from sleep.  Currently experiencing some sort of headache on 23/30 days, with migraines occurring on 12-15/30 days.  Migraines can be proceeded by aura symptoms.  She has seen two Neurologists in the past, she has tried topiramate, " "gabapentin, magnesium, and vitamin b2 for migraine prevention none of which she felt had much effect.  She has tried sumatriptan, maxalt, and zomig for migraine abortive which caused intolerable side effects.  She is currently treating her migraines with Fioricet, which is somewhat effective, felt fiorinal was more effective in the past, however can no longer take fiorinal due to aspirin and is s/p sleeve gastrectomy.   Triggers - certain smells (gardenias)   Aggravating Factors - smell, noise, movement   Alleviating Factors - sleep, fioricet, sitting in the sun   Head Trauma? Infection? Fever? Cancer? Pregnancy? <-- denies    Recent Changes - denies   Sleep - typically sleep 5-7 hours per night, only sometimes feels rested in the morning, sleep pattern    Family Hx of Migraines <-- mom and dad, maternal aunt    Positives in bold: Hx of Kidney Stones, asthma, GI bleed, osteoporosis, CAD/MI, CVA/TIA, DM      Treatments Tried and Response  Sumatriptan - ineffective   Maxalt/zomig - "makes me feel like my head is on fire"  Fioricet   Atenolol   Topiramate - took for about 9 months, no improvement   Vitamin b2   Magnesium   Sertraline   Gabapentin   Will avoid use of all anti-hypertensive medications as patients BP and HR run low  Zofran   Phenergan suppository   Nurtec - no improvement   Medrol dosepack - unsure if it has had any effect   Botox - started today     Current Medications:    ALPRAZolam (XANAX) 1 MG tablet, Take 1 mg by mouth 2 (two) times daily., Disp: , Rfl: 1    biotin 10,000 mcg Cap, , Disp: , Rfl:     butalbital-acetaminophen-caffeine -40 mg (FIORICET, ESGIC) -40 mg per tablet, TAKE ONE TABLET BY MOUTH EVERY 6 HOURS AS NEEDED, Disp: 30 tablet, Rfl: 3    estradiol (DIVIGEL) 1 mg/gram (0.1 %) topical gel, Place 1 g onto the skin once daily., Disp: 30 g, Rfl: 11    levothyroxine (SYNTHROID) 88 MCG tablet, Take 1 tablet (88 mcg total) by mouth once daily., Disp: 90 tablet, Rfl: 3    " "lisdexamfetamine (VYVANSE) 70 MG capsule, Take 70 mg by mouth every morning., Disp: , Rfl:     methylPREDNISolone (MEDROL DOSEPACK) 4 mg tablet, use as directed, Disp: 1 Package, Rfl: 0    multivitamin (ONE DAILY MULTIVITAMIN) per tablet, Take 1 tablet by mouth 2 (two) times daily. , Disp: , Rfl:     ondansetron (ZOFRAN-ODT) 8 MG TbDL, Take 1 tablet (8 mg total) by mouth every 8 (eight) hours as needed (nausea)., Disp: 25 tablet, Rfl: 2    polyethylene glycol (GLYCOLAX) 17 gram PwPk, MIX ONE PACKET IN LIQUID AND TAKE DAILY AS DIRECTED, Disp: , Rfl: 3    promethazine (PHENERGAN) 25 MG suppository, Place 1 suppository (25 mg total) rectally every 6 (six) hours as needed for Nausea., Disp: 12 suppository, Rfl: 2    sertraline (ZOLOFT) 100 MG tablet, Take 200 mg by mouth once daily., Disp: , Rfl:     naratriptan (AMERGE) 2.5 MG tablet, 2.5 mg at onset of headache, may repeat in 4 hours if needed. Max 2 tabs/day., Disp: 9 tablet, Rfl: 5    rimegepant (NURTEC) ODT 75 mg, Take 1 tablet (75 mg total) by mouth daily as needed for Migraine. Place ODT tablet on the tongue; alternatively the ODT tablet may be placed under the tongue, Disp: 8 tablet, Rfl: 2    Current Facility-Administered Medications:     onabotulinumtoxina injection 200 Units, 200 Units, Intramuscular, Q90 Days, Zofia Bradley, ONESIMO, 200 Units at 06/30/20 1051    Review of Systems - as per HPI, otherwise a balanced 10 systems review is negative.    OBJECTIVE:  Vitals:  BP 93/60 (BP Location: Right arm, Patient Position: Sitting, BP Method: Large (Automatic))   Pulse 66   Ht 5' 5.5" (1.664 m)   Wt 66.9 kg (147 lb 7.8 oz)   BMI 24.17 kg/m²     Physical Exam:  Constitutional: she appears well-developed and well-nourished. she is well groomed. NAD   HENT:    Head: Normocephalic and atraumatic  Eyes: Conjunctivae and EOM are normal  Musculoskeletal: Normal range of motion. No joint stiffness.   Skin: Skin is warm and dry.  Psychiatric: Mood is " anxious  Neuro: Patient is alert and oriented to person, place, and time. Language is intact and fluent.  Recent and remote memory are intact.  Normal attention and concentration.  Facial movement is symmetric.  Gait is normal.     Review of Data:   Notes from internal medicine, bariatrics, and OBGYN reviewed.   Labs:  Lab Visit on 05/18/2020   Component Date Value Ref Range Status    WBC 05/18/2020 4.92  3.90 - 12.70 K/uL Final    RBC 05/18/2020 3.98* 4.00 - 5.40 M/uL Final    Hemoglobin 05/18/2020 11.1* 12.0 - 16.0 g/dL Final    Hematocrit 05/18/2020 36.9* 37.0 - 48.5 % Final    Mean Corpuscular Volume 05/18/2020 93  82 - 98 fL Final    Mean Corpuscular Hemoglobin 05/18/2020 27.9  27.0 - 31.0 pg Final    Mean Corpuscular Hemoglobin Conc 05/18/2020 30.1* 32.0 - 36.0 g/dL Final    RDW 05/18/2020 13.7  11.5 - 14.5 % Final    Platelets 05/18/2020 268  150 - 350 K/uL Final    MPV 05/18/2020 10.5  9.2 - 12.9 fL Final    Immature Granulocytes 05/18/2020 0.2  0.0 - 0.5 % Final    Gran # (ANC) 05/18/2020 3.0  1.8 - 7.7 K/uL Final    Immature Grans (Abs) 05/18/2020 0.01  0.00 - 0.04 K/uL Final    Lymph # 05/18/2020 1.5  1.0 - 4.8 K/uL Final    Mono # 05/18/2020 0.3  0.3 - 1.0 K/uL Final    Eos # 05/18/2020 0.1  0.0 - 0.5 K/uL Final    Baso # 05/18/2020 0.07  0.00 - 0.20 K/uL Final    nRBC 05/18/2020 0  0 /100 WBC Final    Gran% 05/18/2020 60.2  38.0 - 73.0 % Final    Lymph% 05/18/2020 30.1  18.0 - 48.0 % Final    Mono% 05/18/2020 6.1  4.0 - 15.0 % Final    Eosinophil% 05/18/2020 2.0  0.0 - 8.0 % Final    Basophil% 05/18/2020 1.4  0.0 - 1.9 % Final    Differential Method 05/18/2020 Automated   Final    Sodium 05/18/2020 141  136 - 145 mmol/L Final    Potassium 05/18/2020 4.1  3.5 - 5.1 mmol/L Final    Chloride 05/18/2020 104  95 - 110 mmol/L Final    CO2 05/18/2020 30* 23 - 29 mmol/L Final    Glucose 05/18/2020 86  70 - 110 mg/dL Final    BUN, Bld 05/18/2020 13  6 - 20 mg/dL Final    Creatinine  05/18/2020 0.8  0.5 - 1.4 mg/dL Final    Calcium 05/18/2020 9.3  8.7 - 10.5 mg/dL Final    Total Protein 05/18/2020 7.0  6.0 - 8.4 g/dL Final    Albumin 05/18/2020 4.1  3.5 - 5.2 g/dL Final    Total Bilirubin 05/18/2020 0.4  0.1 - 1.0 mg/dL Final    Alkaline Phosphatase 05/18/2020 75  55 - 135 U/L Final    AST 05/18/2020 15  10 - 40 U/L Final    ALT 05/18/2020 10  10 - 44 U/L Final    Anion Gap 05/18/2020 7* 8 - 16 mmol/L Final    eGFR if African American 05/18/2020 >60.0  >60 mL/min/1.73 m^2 Final    eGFR if non African American 05/18/2020 >60.0  >60 mL/min/1.73 m^2 Final    Prothrombin Time 05/18/2020 10.3  9.0 - 12.5 sec Final    INR 05/18/2020 1.0  0.8 - 1.2 Final    aPTT 05/18/2020 27.1  21.0 - 32.0 sec Final    Sed Rate 05/18/2020 4  0 - 36 mm/Hr Final    CRP 05/18/2020 1.5  0.0 - 8.2 mg/L Final    CCP Antibodies 05/18/2020 0.9  <5.0 U/mL Final    GWEN Screen 05/18/2020 Positive* Negative <1:80 Final    Rheumatoid Factor 05/18/2020 12.0  0.0 - 15.0 IU/mL Final    Anti Sm Antibody 05/18/2020 0.09  0.00 - 0.99 Ratio Final    Anti-Sm Interpretation 05/18/2020 Negative  Negative Final    Anti-SSA Antibody 05/18/2020 0.07  0.00 - 0.99 Ratio Final    Anti-SSA Interpretation 05/18/2020 Negative  Negative Final    Anti-SSB Antibody 05/18/2020 0.08  0.00 - 0.99 Ratio Final    Anti-SSB Interpretation 05/18/2020 Negative  Negative Final    ds DNA Ab 05/18/2020 Negative 1:10  Negative 1:10 Final    Anti Sm/RNP Antibody 05/18/2020 0.07  0.00 - 0.99 Ratio Final    Anti-Sm/RNP Interpretation 05/18/2020 Negative  Negative Final    GWEN PATTERN 1 05/18/2020 Speckled   Final    GWEN Titer 1 05/18/2020 1:160   Final     Imaging:  No results found for this or any previous visit.    Note: I have independently reviewed any/all imaging/labs/tests and agree with the report (s) as documented.  Any discrepancies will be as noted/demarcated by free text.  MANUEL MURILLO 6/30/2020    ASSESSMENT:  1. Chronic migraine     2. Intractable migraine without aura and without status migrainosus    3. Obsessive-compulsive disorder, unspecified type    4. Anxiety      PLAN:  - Botox administered in clinic for Chronic Migraine (see below)   - For migraine abortive - d/c nurtec, will try naratriptan 2.5 mg tabs  - Avoid use of fioricet   - For nausea - has zofran and phenergan available   - Given information on Botox savings program   - OCD/Anxiety - psychiatrist plans to taper off zoloft while gradually starting cymbalta 60 mg daily for dual benefit of migraine prevention and anxiety/ocd   - Could consider restarting topiramate in the future if needed   - RTC in 12 weeks for repeat Botox injections or sooner if needed     Orders Placed This Encounter    naratriptan (AMERGE) 2.5 MG tablet     All questions and concerns addressed.  Patient verbalizes understanding and is agreeable with the above stated treatment plan.      PROCEDURE NOTE:  BOTOX was performed as an indicated therapy for intractable chronic migraine headaches given that the patient failed more than 2 headache medications    A time out was conducted just before the start of the procedure to verify the correct patient and procedure, procedure location, and all relevant critical information.     Botulinum Toxin Injection Procedure     PROCEDURE PERFORMED: Botulinum toxin injection (57090)  CLINICAL INDICATION: G43.719  After risks and benefits were explained including bleeding, infection, worsening of pain, damage to the areas being injected, weakness of muscles, loss of muscle control, dysphagia if injecting the head or neck, facial droop if injecting the facial area, painful injection, allergic or other reaction to the medications being injected, and the failure of the procedure to help the problem, a signed consent was obtained.   The patient was placed in a comfortable area and the sites to be treated were identified.The area to be treated was prepped three times with  alcohol and the alcohol allowed to dry. Next, a 30 gauge needle was used to inject the medication in the area to be treated.      Total Botox used: 155 Units   Unavoidable waste: 45 Units     Injection sites:    muscle bilaterally ( a total of 10 units divided into 2 sites)   Procerus muscle (5 units)   Frontalis muscle bilaterally (a total of 20 units divided into 4 sites)   Temporalis muscle bilaterally (a total of 40 units divided into 8 sites)   Occipitalis muscle bilaterally (a total of 30 units divided into 6 sites)   Cervical paraspinal muscles (a total of 20 units divided into 4 sites)   Trapezius muscle bilaterally (a total of 30 units divided into 6 sites)   Complications: none   RTC for the next Botox injection: 12 weeks     CC: Angele S Lafleur, DO Elizabeth C Vulevich, FNP-C [facility]   [phone]

## 2020-06-30 NOTE — ASSESSMENT & PLAN NOTE
-- Goal is a normal TSH  -- Continue current dose of lt4 88 mcg daily  -- Avoid exogenous hyperthyroidism as this can accelerate bone loss and increase risk of CV complications.  -- Advised to take LT4 on an empty stomach with water and to wait 30-45 minutes before eating or taking other medications   -- Reviewed that symptoms of hypothyroidism may not correlate with tsh, and a normal TSH is the goal of therapy.....  symptoms are not a justification for over treatment   -- Start Selenium 100 mg daily

## 2020-07-01 ENCOUNTER — PATIENT MESSAGE (OUTPATIENT)
Dept: OBSTETRICS AND GYNECOLOGY | Facility: CLINIC | Age: 45
End: 2020-07-01

## 2020-07-01 ENCOUNTER — OFFICE VISIT (OUTPATIENT)
Dept: ENDOCRINOLOGY | Facility: CLINIC | Age: 45
End: 2020-07-01
Payer: COMMERCIAL

## 2020-07-01 ENCOUNTER — TELEPHONE (OUTPATIENT)
Dept: INTERNAL MEDICINE | Facility: CLINIC | Age: 45
End: 2020-07-01

## 2020-07-01 DIAGNOSIS — N30.00 ACUTE CYSTITIS WITHOUT HEMATURIA: Primary | ICD-10-CM

## 2020-07-01 DIAGNOSIS — E55.9 VITAMIN D DEFICIENCY: ICD-10-CM

## 2020-07-01 DIAGNOSIS — R39.89 SENSATION OF PRESSURE IN BLADDER AREA: ICD-10-CM

## 2020-07-01 DIAGNOSIS — R53.83 FATIGUE, UNSPECIFIED TYPE: ICD-10-CM

## 2020-07-01 DIAGNOSIS — E03.9 HYPOTHYROIDISM, UNSPECIFIED TYPE: ICD-10-CM

## 2020-07-01 LAB — SARS-COV-2 RNA RESP QL NAA+PROBE: NOT DETECTED

## 2020-07-01 PROCEDURE — 99214 OFFICE O/P EST MOD 30 MIN: CPT | Mod: 95,,, | Performed by: NURSE PRACTITIONER

## 2020-07-01 PROCEDURE — 99214 PR OFFICE/OUTPT VISIT, EST, LEVL IV, 30-39 MIN: ICD-10-PCS | Mod: 95,,, | Performed by: NURSE PRACTITIONER

## 2020-07-01 NOTE — PATIENT INSTRUCTIONS
Start Selenium 100 mg daily   Continue LT4 88 mcg daily   We will check labs when you can get them and message you on my portal with results.

## 2020-07-01 NOTE — ASSESSMENT & PLAN NOTE
-- Check Vit D and TSH today  -- Remote history of sleep apnea and DM prior to sleeve in 2017. She lost ~100 pounds. Reports 5-7 pound weight gain. Consider evaluation if other labs do not support cause of fatigue.

## 2020-07-02 ENCOUNTER — LAB VISIT (OUTPATIENT)
Dept: LAB | Facility: HOSPITAL | Age: 45
End: 2020-07-02
Attending: OBSTETRICS & GYNECOLOGY
Payer: COMMERCIAL

## 2020-07-02 ENCOUNTER — PATIENT MESSAGE (OUTPATIENT)
Dept: ENDOCRINOLOGY | Facility: CLINIC | Age: 45
End: 2020-07-02

## 2020-07-02 DIAGNOSIS — R39.89 SENSATION OF PRESSURE IN BLADDER AREA: ICD-10-CM

## 2020-07-02 DIAGNOSIS — E55.9 VITAMIN D DEFICIENCY: ICD-10-CM

## 2020-07-02 DIAGNOSIS — E03.9 HYPOTHYROIDISM, UNSPECIFIED TYPE: ICD-10-CM

## 2020-07-02 LAB
25(OH)D3+25(OH)D2 SERPL-MCNC: 27 NG/ML (ref 30–96)
T4 FREE SERPL-MCNC: 0.9 NG/DL (ref 0.71–1.51)
TSH SERPL DL<=0.005 MIU/L-ACNC: 0.29 UIU/ML (ref 0.4–4)

## 2020-07-02 PROCEDURE — 87077 CULTURE AEROBIC IDENTIFY: CPT

## 2020-07-02 PROCEDURE — 87086 URINE CULTURE/COLONY COUNT: CPT

## 2020-07-02 PROCEDURE — 84443 ASSAY THYROID STIM HORMONE: CPT

## 2020-07-02 PROCEDURE — 87186 SC STD MICRODIL/AGAR DIL: CPT

## 2020-07-02 PROCEDURE — 82306 VITAMIN D 25 HYDROXY: CPT

## 2020-07-02 PROCEDURE — 87088 URINE BACTERIA CULTURE: CPT

## 2020-07-02 PROCEDURE — 84439 ASSAY OF FREE THYROXINE: CPT

## 2020-07-02 NOTE — TELEPHONE ENCOUNTER
Patient c/o pressure in her bladder. She dropped off a urine sample today and it is still in process. Is there anything she can take over the weekend that can help with the pain?

## 2020-07-05 LAB — BACTERIA UR CULT: ABNORMAL

## 2020-07-05 RX ORDER — NITROFURANTOIN 25; 75 MG/1; MG/1
100 CAPSULE ORAL 2 TIMES DAILY
Qty: 14 CAPSULE | Refills: 0 | Status: SHIPPED | OUTPATIENT
Start: 2020-07-05 | End: 2020-07-12

## 2020-07-15 ENCOUNTER — PATIENT MESSAGE (OUTPATIENT)
Dept: INTERNAL MEDICINE | Facility: CLINIC | Age: 45
End: 2020-07-15

## 2020-07-15 DIAGNOSIS — L98.9 SKIN LESION: Primary | ICD-10-CM

## 2020-08-13 ENCOUNTER — LAB VISIT (OUTPATIENT)
Dept: LAB | Facility: HOSPITAL | Age: 45
End: 2020-08-13
Attending: INTERNAL MEDICINE
Payer: COMMERCIAL

## 2020-08-13 DIAGNOSIS — E03.9 HYPOTHYROIDISM, UNSPECIFIED TYPE: ICD-10-CM

## 2020-08-13 LAB
T4 FREE SERPL-MCNC: 1.08 NG/DL (ref 0.71–1.51)
TSH SERPL DL<=0.005 MIU/L-ACNC: 0.29 UIU/ML (ref 0.4–4)

## 2020-08-13 PROCEDURE — 84443 ASSAY THYROID STIM HORMONE: CPT

## 2020-08-13 PROCEDURE — 84439 ASSAY OF FREE THYROXINE: CPT

## 2020-08-27 ENCOUNTER — PATIENT MESSAGE (OUTPATIENT)
Dept: PHYSICAL MEDICINE AND REHAB | Facility: CLINIC | Age: 45
End: 2020-08-27

## 2020-08-31 ENCOUNTER — OFFICE VISIT (OUTPATIENT)
Dept: OBSTETRICS AND GYNECOLOGY | Facility: CLINIC | Age: 45
End: 2020-08-31
Payer: COMMERCIAL

## 2020-08-31 ENCOUNTER — APPOINTMENT (OUTPATIENT)
Dept: RADIOLOGY | Facility: OTHER | Age: 45
End: 2020-08-31
Attending: OBSTETRICS & GYNECOLOGY
Payer: COMMERCIAL

## 2020-08-31 VITALS
HEIGHT: 66 IN | WEIGHT: 147.5 LBS | HEIGHT: 65 IN | DIASTOLIC BLOOD PRESSURE: 70 MMHG | SYSTOLIC BLOOD PRESSURE: 100 MMHG | BODY MASS INDEX: 23.7 KG/M2 | WEIGHT: 149.81 LBS | BODY MASS INDEX: 24.96 KG/M2

## 2020-08-31 DIAGNOSIS — N95.2 VAGINAL ATROPHY: ICD-10-CM

## 2020-08-31 DIAGNOSIS — R23.2 HOT FLASHES: ICD-10-CM

## 2020-08-31 DIAGNOSIS — Z01.419 ENCOUNTER FOR ANNUAL ROUTINE GYNECOLOGICAL EXAMINATION: Primary | ICD-10-CM

## 2020-08-31 DIAGNOSIS — Z12.31 ENCOUNTER FOR SCREENING MAMMOGRAM FOR BREAST CANCER: ICD-10-CM

## 2020-08-31 DIAGNOSIS — R68.82 DECREASED LIBIDO: ICD-10-CM

## 2020-08-31 PROCEDURE — 3008F PR BODY MASS INDEX (BMI) DOCUMENTED: ICD-10-PCS | Mod: CPTII,S$GLB,, | Performed by: OBSTETRICS & GYNECOLOGY

## 2020-08-31 PROCEDURE — 77067 SCR MAMMO BI INCL CAD: CPT | Mod: TC,PN

## 2020-08-31 PROCEDURE — 77067 MAMMO DIGITAL SCREENING BILAT WITH TOMOSYNTHESIS_CAD: ICD-10-PCS | Mod: 26,,, | Performed by: RADIOLOGY

## 2020-08-31 PROCEDURE — 99999 PR PBB SHADOW E&M-EST. PATIENT-LVL III: CPT | Mod: PBBFAC,,, | Performed by: OBSTETRICS & GYNECOLOGY

## 2020-08-31 PROCEDURE — 77063 BREAST TOMOSYNTHESIS BI: CPT | Mod: 26,,, | Performed by: RADIOLOGY

## 2020-08-31 PROCEDURE — 99999 PR PBB SHADOW E&M-EST. PATIENT-LVL III: ICD-10-PCS | Mod: PBBFAC,,, | Performed by: OBSTETRICS & GYNECOLOGY

## 2020-08-31 PROCEDURE — 77067 SCR MAMMO BI INCL CAD: CPT | Mod: 26,,, | Performed by: RADIOLOGY

## 2020-08-31 PROCEDURE — 99396 PREV VISIT EST AGE 40-64: CPT | Mod: S$GLB,,, | Performed by: OBSTETRICS & GYNECOLOGY

## 2020-08-31 PROCEDURE — 99396 PR PREVENTIVE VISIT,EST,40-64: ICD-10-PCS | Mod: S$GLB,,, | Performed by: OBSTETRICS & GYNECOLOGY

## 2020-08-31 PROCEDURE — 3008F BODY MASS INDEX DOCD: CPT | Mod: CPTII,S$GLB,, | Performed by: OBSTETRICS & GYNECOLOGY

## 2020-08-31 PROCEDURE — 77063 MAMMO DIGITAL SCREENING BILAT WITH TOMOSYNTHESIS_CAD: ICD-10-PCS | Mod: 26,,, | Performed by: RADIOLOGY

## 2020-08-31 RX ORDER — PROGESTERONE 100 MG/1
200 CAPSULE ORAL NIGHTLY
Qty: 90 CAPSULE | Refills: 3 | Status: SHIPPED | OUTPATIENT
Start: 2020-08-31 | End: 2020-12-10

## 2020-08-31 RX ORDER — PRASTERONE 6.5 MG/1
6.5 INSERT VAGINAL NIGHTLY
Qty: 84 EACH | Refills: 3 | Status: SHIPPED | OUTPATIENT
Start: 2020-08-31 | End: 2021-12-09

## 2020-08-31 RX ORDER — BUSPIRONE HYDROCHLORIDE 5 MG/1
5 TABLET ORAL 2 TIMES DAILY
Qty: 180 TABLET | Refills: 3 | Status: SHIPPED | OUTPATIENT
Start: 2020-08-31 | End: 2020-12-10

## 2020-08-31 NOTE — PROGRESS NOTES
"  Chief Complaint: Well Woman Exam, Decreased Libido     HPI:      Emilia Coyle is a 45 y.o.  who presents for annual exam. She is currently complaining of decreased libido. Has been using divigel for HRT 2/2 surgical oophorectomy but does not like how long it takes to dry. Had tried transdermal patches but found they didn't stick well. Has had a gastric sleeve and was having suboptimal control of symptoms on oral estrogen. Her main complaints are night sweats and sleep disruption.     She is also c/o decreased libido. Is on celexa currently, was previously on zoloft. Wants her psychiatrist to switch her back to zoloft because she felt like symptoms were more manageable then. Has never been on buspar in addition.     Ms. Coyle is currently sexually active with a single male partner. She declines STD screening today. No LMP recorded. Patient has had a hysterectomy.     Previous Pap: s/p hysterectomy for benign indications.  Previous Mammogram: BiRads: 1 T-C Score: 13.57% (20)      Ms. Coyle confirms that she wears her seatbelt when riding in the car and does not text while driving.     OB History        2    Para   2    Term   1       1    AB        Living   1       SAB        TAB        Ectopic        Multiple        Live Births   1                 ROS:     GENERAL: Denies unintentional weight gain or weight loss. Feeling well overall.   SKIN: Denies rash or lesions.   HEENT: Denies headaches, or vision changes.   CARDIOVASCULAR: Denies palpitations or chest pain.   RESPIRATORY: Denies shortness of breath or dyspnea on exertion.  BREASTS: Denies pain, lumps, or nipple discharge.   ABDOMEN: Denies abdominal pain, constipation, diarrhea, nausea, vomiting, or change in appetite.   URINARY: Denies frequency, dysuria, hematuria.  NEUROLOGIC: Denies syncope or weakness.   PSYCHIATRIC: Denies depression, anxiety or mood swings.    Physical Exam:      PHYSICAL EXAM:  /70   Ht 5' 5" (1.651 " m)   Wt 67.9 kg (149 lb 12.8 oz)   BMI 24.93 kg/m²   Body mass index is 24.93 kg/m².     APPEARANCE: Well nourished, well developed, in no acute distress.  PSYCH: Appropriate mood and affect.  SKIN: No acne or hirsutism  NECK: Neck symmetric without masses or thyromegaly  NODES: No inguinal, axillary, or supraclavicular lymph node enlargement  CHEST: Normal respiratory effort.  ABDOMEN: Soft.  No tenderness or masses.   BREASTS: Symmetrical, no skin changes or visible lesions.  No palpable masses or nipple discharge bilaterally.  PELVIC: Normal external genitalia without lesions.  Normal hair distribution.  Adequate perineal body, normal urethral meatus.  Vagina moist and well rugated without lesions or discharge.  Normal appearing vaginal cuff.  No significant cystocele or rectocele.  Bimanual exam shows no midline or adnexal masses or tenderness.    EXTREMITIES: No edema.    Assessment/Plan:     Encounter for annual routine gynecological examination    Hot flashes  -     progesterone (PROMETRIUM) 100 MG capsule; Take 2 capsules (200 mg total) by mouth nightly.  Dispense: 90 capsule; Refill: 3    Vaginal atrophy  -     prasterone, dhea, (INTRAROSA) 6.5 mg Inst; Place 6.5 mg vaginally every evening.  Dispense: 84 each; Refill: 3    Decreased libido  -     busPIRone (BUSPAR) 5 MG Tab; Take 1 tablet (5 mg total) by mouth 2 (two) times daily.  Dispense: 180 tablet; Refill: 3        Follow up in about 1 year (around 8/31/2021) for Annual Exam.    Counseling:     Patient was counseled today on current ASCCP pap guidelines, the recommendation for yearly pelvic exams, healthy diet and exercise routines, breast self awareness. She is to see her PCP for other health maintenance.       Use of the [employer] Patient Portal discussed and encouraged during today's visit.

## 2020-09-15 NOTE — TELEPHONE ENCOUNTER
----- Message from Alexey Boyle sent at 8/4/2017 11:24 AM CDT -----  Pt is returning your call. Please call pt at [phone]   Pt is ready for d/c today. Called [facility]  [phone] to let them know pt will be d/c'd home today. Dtr requests [facility] to transport pt home. They are scheduled to  pt at 5:30pm today. PCS form completed.   Spoke to pt's dtr Emilia Hogan

## 2020-09-17 ENCOUNTER — PATIENT OUTREACH (OUTPATIENT)
Dept: ADMINISTRATIVE | Facility: OTHER | Age: 45
End: 2020-09-17

## 2020-09-17 ENCOUNTER — TELEPHONE (OUTPATIENT)
Dept: BARIATRICS | Facility: CLINIC | Age: 45
End: 2020-09-17

## 2020-09-17 ENCOUNTER — PATIENT MESSAGE (OUTPATIENT)
Dept: BARIATRICS | Facility: CLINIC | Age: 45
End: 2020-09-17

## 2020-09-17 ENCOUNTER — OFFICE VISIT (OUTPATIENT)
Dept: PHYSICAL MEDICINE AND REHAB | Facility: CLINIC | Age: 45
End: 2020-09-17
Payer: COMMERCIAL

## 2020-09-17 VITALS
BODY MASS INDEX: 24.93 KG/M2 | HEART RATE: 73 BPM | DIASTOLIC BLOOD PRESSURE: 70 MMHG | SYSTOLIC BLOOD PRESSURE: 104 MMHG | HEIGHT: 65 IN

## 2020-09-17 DIAGNOSIS — Z71.3 DIETARY COUNSELING: ICD-10-CM

## 2020-09-17 DIAGNOSIS — F41.9 ANXIETY: ICD-10-CM

## 2020-09-17 DIAGNOSIS — F42.9 OBSESSIVE-COMPULSIVE DISORDER, UNSPECIFIED TYPE: ICD-10-CM

## 2020-09-17 DIAGNOSIS — Z98.84 S/P LAPAROSCOPIC SLEEVE GASTRECTOMY: Primary | ICD-10-CM

## 2020-09-17 DIAGNOSIS — G43.019 INTRACTABLE MIGRAINE WITHOUT AURA AND WITHOUT STATUS MIGRAINOSUS: ICD-10-CM

## 2020-09-17 PROBLEM — G43.919 INTRACTABLE MIGRAINE WITHOUT STATUS MIGRAINOSUS: Status: ACTIVE | Noted: 2019-05-29

## 2020-09-17 PROCEDURE — 3008F PR BODY MASS INDEX (BMI) DOCUMENTED: ICD-10-PCS | Mod: CPTII,S$GLB,, | Performed by: NURSE PRACTITIONER

## 2020-09-17 PROCEDURE — 99499 NO LOS: ICD-10-PCS | Mod: S$GLB,,, | Performed by: NURSE PRACTITIONER

## 2020-09-17 PROCEDURE — 64615 CHEMODENERV MUSC MIGRAINE: CPT | Mod: S$GLB,,, | Performed by: NURSE PRACTITIONER

## 2020-09-17 PROCEDURE — 99999 PR PBB SHADOW E&M-EST. PATIENT-LVL IV: CPT | Mod: PBBFAC,,, | Performed by: NURSE PRACTITIONER

## 2020-09-17 PROCEDURE — 99499 UNLISTED E&M SERVICE: CPT | Mod: S$GLB,,, | Performed by: NURSE PRACTITIONER

## 2020-09-17 PROCEDURE — 99999 PR PBB SHADOW E&M-EST. PATIENT-LVL IV: ICD-10-PCS | Mod: PBBFAC,,, | Performed by: NURSE PRACTITIONER

## 2020-09-17 PROCEDURE — 3008F BODY MASS INDEX DOCD: CPT | Mod: CPTII,S$GLB,, | Performed by: NURSE PRACTITIONER

## 2020-09-17 PROCEDURE — 64615 PR CHEMODENERVATION OF MUSCLE FOR CHRONIC MIGRAINE: ICD-10-PCS | Mod: S$GLB,,, | Performed by: NURSE PRACTITIONER

## 2020-09-17 RX ORDER — SERTRALINE HYDROCHLORIDE 100 MG/1
200 TABLET, FILM COATED ORAL DAILY
COMMUNITY
Start: 2020-09-02 | End: 2021-12-09

## 2020-09-17 NOTE — PROGRESS NOTES
SUBJECTIVE:  Patient ID: Emilia Coyle  Chief Complaint: Follow-up and Botulinum Toxin Injection    History of Present Illness:  Emilia Coyle is a 45 y.o. female who presents to clinic alone for follow-up of headaches and Botox injections.     Recommendations made at last Office Visit on 6/30/2020:  - Botox administered in clinic for Chronic Migraine (see below)   - For migraine abortive - d/c nurtec, will try naratriptan 2.5 mg tabs  - Avoid use of fioricet   - For nausea - has zofran and phenergan available   - Given information on Botox savings program   - OCD/Anxiety - psychiatrist plans to taper off zoloft while gradually starting cymbalta 60 mg daily for dual benefit of migraine prevention and anxiety/ocd   - Could consider restarting topiramate in the future if needed   - RTC in 12 weeks for repeat Botox injections or sooner if needed     09/17/2020- Interval History:  Sophia presents today for second round of Botox for chronic migraine, reports following first round of Botox she has already seen an improvement in the frequency and intensity of her migraines.  Prior to Botox, was suffering with headaches on 30/30 days with full blown migraines on 12-16 days per month.  She is no longer experiencing a headache daily!  Tracked 23/30 headache days in July, 19/30 headache days in August and so far 12 headache days in September.  She feels 50% of headache days are migrainous.  She does feel naratriptan has been effective for migraine abortive, rarely has to repeat dose to get full relief from her migraine.  Started cymbalta, however experienced intolerable side effects of increased bruising and headaches, psychiatrist recommended she d/c cymbalta and restart zoloft.  She denies presence of side effects from Botox injections and is very encouraged based on the results after first round and wishes to continue with Botox.      Otherwise, information below is still accurate and current.     06/30/2020- Interval  "History:  She has continued to suffer with near daily headaches with full blown migraines at least 3-4 days per week.  Though yesterday was a "good day", only a slight headache.  Spoke with Dr. Kendrick her psychiatrist, plans to switch from Zoloft 200 mg to Cymbalta, will gradually taper off zoloft and titrate cymbalta from 60 mg to 120 mg.  Was unable to get [facility] approved due to concomitant Botox.  She has not taken any Fioricet in about a week.  She did not feel nurtec was effective.    Treatment plan was discussed with the patient.  Careful procedural explanation of the risks, benefits and alternative methods of treatment were discussed. The details of the technical aspects of the procedure were discussed. All the patients questions were answered to the patients satisfaction. The patient understood and wished to proceed with initiating Botox injections for chronic migraine.     Otherwise, information below is still accurate and current.      History of Present Illness:   44 y.o. female with chronic migraine, OCD, anxiety, hypothyroidism, and s/p sleeve gastrectomy, who presents to clinic alone for evaluation of headaches.  Migraines initially began at 8 yo, however have become more frequent since January/February 2020, currently experiencing headaches/migraines on at least 4-5 days per week.  Headaches are described as a moderate to severe, pressure, pounding, throbbing pain located mid-frontal extending to the top of her head, can be occipitally located as well.  Headaches can last anywhere from "a couple of hours" , but can last up to 2 weeks in duration.  Pain can be rated anywhere from 2 to 10 out 10, intensifying to peak over 1-2 hours.  Associated symptoms include scalp allodynia, sinus pressure, nausea, vomiting (only on 1/10 headaches), photophobia, phonophobia, light headed.  When she gets a migraine she has to lie down in a cold, dark, quiet room.  Migraines more often than not begin during the " "daytime, bu oli woken her from sleep.  Currently experiencing some sort of headache on 23/30 days, with migraines occurring on 12-15/30 days.  Migraines can be proceeded by aura symptoms.  She has seen two Neurologists in the past, she has tried topiramate, gabapentin, magnesium, and vitamin b2 for migraine prevention none of which she felt had much effect.  She has tried sumatriptan, maxalt, and zomig for migraine abortive which caused intolerable side effects.  She is currently treating her migraines with Fioricet, which is somewhat effective, felt fiorinal was more effective in the past, however can no longer take fiorinal due to aspirin and is s/p sleeve gastrectomy.   Triggers - certain smells (gardenias)   Aggravating Factors - smell, noise, movement   Alleviating Factors - sleep, fioricet, sitting in the sun   Head Trauma? Infection? Fever? Cancer? Pregnancy? <-- denies    Recent Changes - denies   Sleep - typically sleep 5-7 hours per night, only sometimes feels rested in the morning, sleep pattern    Family Hx of Migraines <-- mom and dad, maternal aunt    Positives in bold: Hx of Kidney Stones, asthma, GI bleed, osteoporosis, CAD/MI, CVA/TIA, DM      Treatments Tried and Response  Sumatriptan - ineffective   Maxalt/zomig - "makes me feel like my head is on fire"  Fioricet   Atenolol   Topiramate - took for about 9 months, no improvement   Vitamin b2   Magnesium   Sertraline   Gabapentin   Will avoid use of all anti-hypertensive medications as patients BP and HR run low  Zofran   Phenergan suppository   Nurtec - no improvement   Medrol dosepack - unsure if it has had any effect   cymbalta - caused intolerable side effects   Botox - helping after 1st round   Naratriptan - helps     Current Medications:    ALPRAZolam (XANAX) 1 MG tablet, Take 1 mg by mouth 2 (two) times daily., Disp: , Rfl: 1    biotin 10,000 mcg Cap, , Disp: , Rfl:     busPIRone (BUSPAR) 5 MG Tab, Take 1 tablet (5 mg total) by mouth 2 " "(two) times daily., Disp: 180 tablet, Rfl: 3    levothyroxine (SYNTHROID) 88 MCG tablet, Take 1 tablet (88 mcg total) by mouth once daily., Disp: 90 tablet, Rfl: 3    lisdexamfetamine (VYVANSE) 70 MG capsule, Take 70 mg by mouth every morning., Disp: , Rfl:     multivitamin (ONE DAILY MULTIVITAMIN) per tablet, Take 1 tablet by mouth 2 (two) times daily. , Disp: , Rfl:     naratriptan (AMERGE) 2.5 MG tablet, 2.5 mg at onset of headache, may repeat in 4 hours if needed. Max 2 tabs/day., Disp: 9 tablet, Rfl: 5    ondansetron (ZOFRAN-ODT) 8 MG TbDL, Take 1 tablet (8 mg total) by mouth every 8 (eight) hours as needed (nausea)., Disp: 25 tablet, Rfl: 2    polyethylene glycol (GLYCOLAX) 17 gram PwPk, MIX ONE PACKET IN LIQUID AND TAKE DAILY AS DIRECTED, Disp: , Rfl: 3    prasterone, dhea, (INTRAROSA) 6.5 mg Inst, Place 6.5 mg vaginally every evening., Disp: 84 each, Rfl: 3    progesterone (PROMETRIUM) 100 MG capsule, Take 2 capsules (200 mg total) by mouth nightly., Disp: 90 capsule, Rfl: 3    promethazine (PHENERGAN) 25 MG suppository, Place 1 suppository (25 mg total) rectally every 6 (six) hours as needed for Nausea., Disp: 12 suppository, Rfl: 2    sertraline (ZOLOFT) 100 MG tablet, Take 200 mg by mouth once daily., Disp: , Rfl:     Current Facility-Administered Medications:     onabotulinumtoxina injection 200 Units, 200 Units, Intramuscular, Q90 Days, Zofia Bradley, FNP, 200 Units at 09/17/20 1453      Review of Systems - as per HPI, otherwise a balanced 10 systems review is negative.    OBJECTIVE:  Vitals:  /70 (BP Location: Left arm, Patient Position: Sitting, BP Method: Large (Automatic))   Pulse 73   Ht 5' 5" (1.651 m)   BMI 24.93 kg/m²     Physical Exam:  Constitutional: she appears well-developed and well-nourished. she is well groomed. NAD   HENT:    Head: Normocephalic and atraumatic  Eyes: Conjunctivae and EOM are normal  Musculoskeletal: Normal range of motion. No joint stiffness. "   Skin: Skin is warm and dry.  Psychiatric: Mood and affect are normal    Neuro: Patient is alert and oriented to person, place, and time. Language is intact and fluent.  Recent and remote memory are intact.  Normal attention and concentration.  Facial movement is symmetric.  Gait is normal.     Review of Data:   Notes from internal medicine, OBGYN, endocrinology reviewed.   Labs:  Lab Visit on 08/13/2020   Component Date Value Ref Range Status    TSH 08/13/2020 0.291* 0.400 - 4.000 uIU/mL Final    Free T4 08/13/2020 1.08  0.71 - 1.51 ng/dL Final   Lab Visit on 07/02/2020   Component Date Value Ref Range Status    Urine Culture, Routine 07/02/2020 *  Final                    Value:ESCHERICHIA COLI  >100,000 cfu/ml     Lab Visit on 07/02/2020   Component Date Value Ref Range Status    TSH 07/02/2020 0.293* 0.400 - 4.000 uIU/mL Final    Vit D, 25-Hydroxy 07/02/2020 27* 30 - 96 ng/mL Final    Free T4 07/02/2020 0.90  0.71 - 1.51 ng/dL Final   Lab Visit on 06/30/2020   Component Date Value Ref Range Status    SARS-CoV2 (COVID-19) Qualitative P* 06/30/2020 Not Detected  Not Detected Final   Results for FELICE ZAPATA (MRN [id number]) as of 9/17/2020 22:18   Ref. Range 5/18/2020 09:12   WBC Latest Ref Range: 3.90 - 12.70 K/uL 4.92   RBC Latest Ref Range: 4.00 - 5.40 M/uL 3.98 (L)   Hemoglobin Latest Ref Range: 12.0 - 16.0 g/dL 11.1 (L)   Hematocrit Latest Ref Range: 37.0 - 48.5 % 36.9 (L)   MCV Latest Ref Range: 82 - 98 fL 93   MCH Latest Ref Range: 27.0 - 31.0 pg 27.9   MCHC Latest Ref Range: 32.0 - 36.0 g/dL 30.1 (L)   RDW Latest Ref Range: 11.5 - 14.5 % 13.7   Platelets Latest Ref Range: 150 - 350 K/uL 268   MPV Latest Ref Range: 9.2 - 12.9 fL 10.5   Gran% Latest Ref Range: 38.0 - 73.0 % 60.2   Gran # (ANC) Latest Ref Range: 1.8 - 7.7 K/uL 3.0   Lymph% Latest Ref Range: 18.0 - 48.0 % 30.1   Lymph # Latest Ref Range: 1.0 - 4.8 K/uL 1.5   Mono% Latest Ref Range: 4.0 - 15.0 % 6.1   Mono # Latest Ref Range: 0.3 - 1.0  K/uL 0.3   Eosinophil% Latest Ref Range: 0.0 - 8.0 % 2.0   Eos # Latest Ref Range: 0.0 - 0.5 K/uL 0.1   Basophil% Latest Ref Range: 0.0 - 1.9 % 1.4   Baso # Latest Ref Range: 0.00 - 0.20 K/uL 0.07   nRBC Latest Ref Range: 0 /100 WBC 0   Differential Method Unknown Automated   Immature Grans (Abs) Latest Ref Range: 0.00 - 0.04 K/uL 0.01   Immature Granulocytes Latest Ref Range: 0.0 - 0.5 % 0.2   Sed Rate Latest Ref Range: 0 - 36 mm/Hr 4   Protime Latest Ref Range: 9.0 - 12.5 sec 10.3   INR Latest Ref Range: 0.8 - 1.2  1.0   aPTT Latest Ref Range: 21.0 - 32.0 sec 27.1   Sodium Latest Ref Range: 136 - 145 mmol/L 141   Potassium Latest Ref Range: 3.5 - 5.1 mmol/L 4.1   Chloride Latest Ref Range: 95 - 110 mmol/L 104   CO2 Latest Ref Range: 23 - 29 mmol/L 30 (H)   Anion Gap Latest Ref Range: 8 - 16 mmol/L 7 (L)   BUN, Bld Latest Ref Range: 6 - 20 mg/dL 13   Creatinine Latest Ref Range: 0.5 - 1.4 mg/dL 0.8   eGFR if non African American Latest Ref Range: >60 mL/min/1.73 m^2 >60.0   eGFR if African American Latest Ref Range: >60 mL/min/1.73 m^2 >60.0   Glucose Latest Ref Range: 70 - 110 mg/dL 86   Calcium Latest Ref Range: 8.7 - 10.5 mg/dL 9.3   Alkaline Phosphatase Latest Ref Range: 55 - 135 U/L 75   PROTEIN TOTAL Latest Ref Range: 6.0 - 8.4 g/dL 7.0   Albumin Latest Ref Range: 3.5 - 5.2 g/dL 4.1   BILIRUBIN TOTAL Latest Ref Range: 0.1 - 1.0 mg/dL 0.4   AST Latest Ref Range: 10 - 40 U/L 15   ALT Latest Ref Range: 10 - 44 U/L 10   CRP Latest Ref Range: 0.0 - 8.2 mg/L 1.5   GWEN Screen Latest Ref Range: Negative <1:80  Positive (A)   GWEN Titer 1 Unknown 1:160   GWEN PATTERN 1 Unknown Speckled   ds DNA Ab Latest Ref Range: Negative 1:10  Negative 1:10   Anti-SSA Antibody Latest Ref Range: 0.00 - 0.99 Ratio 0.07   Anti-SSA Interpretation Latest Ref Range: Negative  Negative   Anti-SSB Antibody Latest Ref Range: 0.00 - 0.99 Ratio 0.08   Anti-SSB Interpretation Latest Ref Range: Negative  Negative   Anti Sm Antibody Latest Ref  Range: 0.00 - 0.99 Ratio 0.09   Anti-Sm Interpretation Latest Ref Range: Negative  Negative   Anti Sm/RNP Antibody Latest Ref Range: 0.00 - 0.99 Ratio 0.07   Anti-Sm/RNP Interpretation Latest Ref Range: Negative  Negative   CCP Antibodies Latest Ref Range: <5.0 U/mL 0.9   Rheumatoid Factor Latest Ref Range: 0.0 - 15.0 IU/mL 12.0     Imaging:  No results found for this or any previous visit.    Note: I have independently reviewed any/all imaging/labs/tests and agree with the report (s) as documented.  Any discrepancies will be as noted/demarcated by free text.  MANUEL MURILLO 9/17/2020    ASSESSMENT:  1. Chronic migraine    2. Intractable migraine without aura and without status migrainosus    3. Obsessive-compulsive disorder, unspecified type    4. Anxiety      PLAN:  - Botox administered in clinic for Chronic Migraine (see below)   - For migraine abortive - has naratriptan 2.5 mg tabs available   - For nausea - has zofran 8 mg odt and phenergan 25 mg suppository available   - OCD/Anxiety - stopped cymbalta due to intolerable side effects, restarted zoloft 200 mg daily, continue regular f/up with psychiatrist for management   - RTC in 12 weeks for repeat Botox injections or sooner if needed          All questions and concerns addressed.  Patient verbalizes understanding and is agreeable with the above stated treatment plan.      PROCEDURE NOTE:  BOTOX was performed as an indicated therapy for intractable chronic migraine headaches given that the patient failed more than 2 headache medications    A time out was conducted just before the start of the procedure to verify the correct patient and procedure, procedure location, and all relevant critical information.     Botulinum Toxin Injection Procedure     PROCEDURE PERFORMED: Botulinum toxin injection (57961)  CLINICAL INDICATION: G43.719  After risks and benefits were explained including bleeding, infection, worsening of pain, damage to the areas being injected, weakness  of muscles, loss of muscle control, dysphagia if injecting the head or neck, facial droop if injecting the facial area, painful injection, allergic or other reaction to the medications being injected, and the failure of the procedure to help the problem, a signed consent was obtained.   The patient was placed in a comfortable area and the sites to be treated were identified.The area to be treated was prepped three times with alcohol and the alcohol allowed to dry. Next, a 30 gauge needle was used to inject the medication in the area to be treated.      Total Botox used: 155 Units   Unavoidable waste: 45 Units     Injection sites:    muscle bilaterally ( a total of 10 units divided into 2 sites)   Procerus muscle (5 units)   Frontalis muscle bilaterally (a total of 20 units divided into 4 sites)   Temporalis muscle bilaterally (a total of 40 units divided into 8 sites)   Occipitalis muscle bilaterally (a total of 30 units divided into 6 sites)   Cervical paraspinal muscles (a total of 20 units divided into 4 sites)   Trapezius muscle bilaterally (a total of 30 units divided into 6 sites)   Complications: none   RTC for the next Botox injection: 12 weeks     CC: Angele S Lafleur, DO Elizabeth C Vulevich, FNP-C [facility]   [phone]

## 2020-09-17 NOTE — TELEPHONE ENCOUNTER
I called the Pt. To schedule labs and follow appiontment. Both were scheduled successfully for the Pt. Preferred time and date. Pt verbalized understanding.

## 2020-09-24 ENCOUNTER — PATIENT MESSAGE (OUTPATIENT)
Dept: INTERNAL MEDICINE | Facility: CLINIC | Age: 45
End: 2020-09-24

## 2020-09-24 ENCOUNTER — PATIENT MESSAGE (OUTPATIENT)
Dept: PHYSICAL MEDICINE AND REHAB | Facility: CLINIC | Age: 45
End: 2020-09-24

## 2020-09-24 ENCOUNTER — OFFICE VISIT (OUTPATIENT)
Dept: DERMATOLOGY | Facility: CLINIC | Age: 45
End: 2020-09-24
Payer: COMMERCIAL

## 2020-09-24 DIAGNOSIS — D23.9 DERMATOFIBROMA: Primary | ICD-10-CM

## 2020-09-24 DIAGNOSIS — G43.019 INTRACTABLE MIGRAINE WITHOUT AURA AND WITHOUT STATUS MIGRAINOSUS: Primary | ICD-10-CM

## 2020-09-24 DIAGNOSIS — D48.5 NEOPLASM OF UNCERTAIN BEHAVIOR OF SKIN: ICD-10-CM

## 2020-09-24 PROCEDURE — 88305 TISSUE EXAM BY PATHOLOGIST: CPT | Performed by: PATHOLOGY

## 2020-09-24 PROCEDURE — 11104 PR PUNCH BIOPSY, SKIN, SINGLE LESION: ICD-10-PCS | Mod: S$GLB,,, | Performed by: DERMATOLOGY

## 2020-09-24 PROCEDURE — 88305 TISSUE EXAM BY PATHOLOGIST: CPT | Mod: 26,,, | Performed by: PATHOLOGY

## 2020-09-24 PROCEDURE — 88305 TISSUE EXAM BY PATHOLOGIST: ICD-10-PCS | Mod: 26,,, | Performed by: PATHOLOGY

## 2020-09-24 PROCEDURE — 99213 OFFICE O/P EST LOW 20 MIN: CPT | Mod: 25,S$GLB,, | Performed by: DERMATOLOGY

## 2020-09-24 PROCEDURE — 99213 PR OFFICE/OUTPT VISIT, EST, LEVL III, 20-29 MIN: ICD-10-PCS | Mod: 25,S$GLB,, | Performed by: DERMATOLOGY

## 2020-09-24 PROCEDURE — 11104 PUNCH BX SKIN SINGLE LESION: CPT | Mod: S$GLB,,, | Performed by: DERMATOLOGY

## 2020-09-24 PROCEDURE — 99999 PR PBB SHADOW E&M-EST. PATIENT-LVL IV: CPT | Mod: PBBFAC,,, | Performed by: DERMATOLOGY

## 2020-09-24 PROCEDURE — 99999 PR PBB SHADOW E&M-EST. PATIENT-LVL IV: ICD-10-PCS | Mod: PBBFAC,,, | Performed by: DERMATOLOGY

## 2020-09-24 RX ORDER — HYDROXYZINE PAMOATE 50 MG/1
50 CAPSULE ORAL EVERY 8 HOURS PRN
Qty: 12 CAPSULE | Refills: 0 | Status: SHIPPED | OUTPATIENT
Start: 2020-09-24 | End: 2021-02-25

## 2020-09-24 NOTE — PROGRESS NOTES
Subjective:       Patient ID:  Emilia Coyle is a 45 y.o. female who presents for   Chief Complaint   Patient presents with    Lesion     Pt c/o lesion to L post thigh x 4-5 years. Growing in size. Itching. Irritating to the pt.   C/o dark lesions on shin and left ankle.  Started after bites. Not growing or irritating. No tx.     Lesion        Review of Systems   Skin: Positive for itching, daily sunscreen use and activity-related sunscreen use. Negative for tendency to form keloidal scars.   Hematologic/Lymphatic: Does not bruise/bleed easily.        Objective:    Physical Exam   Constitutional: She appears well-developed and well-nourished. No distress.   Neurological: She is alert and oriented to person, place, and time. She is not disoriented.   Psychiatric: She has a normal mood and affect.   Skin:   Areas Examined (abnormalities noted in diagram):   RLE Inspected  LLE Inspection Performed              Diagram Legend     Erythematous scaling macule/papule c/w actinic keratosis       Vascular papule c/w angioma      Pigmented verrucoid papule/plaque c/w seborrheic keratosis      Yellow umbilicated papule c/w sebaceous hyperplasia      Irregularly shaped tan macule c/w lentigo     1-2 mm smooth white papules consistent with Milia      Movable subcutaneous cyst with punctum c/w epidermal inclusion cyst      Subcutaneous movable cyst c/w pilar cyst      Firm pink to brown papule c/w dermatofibroma      Pedunculated fleshy papule(s) c/w skin tag(s)      Evenly pigmented macule c/w junctional nevus     Mildly variegated pigmented, slightly irregular-bordered macule c/w mildly atypical nevus      Flesh colored to evenly pigmented papule c/w intradermal nevus       Pink pearly papule/plaque c/w basal cell carcinoma      Erythematous hyperkeratotic cursted plaque c/w SCC      Surgical scar with no sign of skin cancer recurrence      Open and closed comedones      Inflammatory papules and pustules      Verrucoid  papule consistent consistent with wart     Erythematous eczematous patches and plaques     Dystrophic onycholytic nail with subungual debris c/w onychomycosis     Umbilicated papule    Erythematous-base heme-crusted tan verrucoid plaque consistent with inflamed seborrheic keratosis     Erythematous Silvery Scaling Plaque c/w Psoriasis     See annotation          Assessment / Plan:      Pathology Orders:     Normal Orders This Visit    Specimen to Pathology, Dermatology     Questions:    Procedure Type: Dermatology and skin neoplasms    Number of Specimens: 1    ------------------------: -------------------------    Spec 1 Procedure: Biopsy    Spec 1 Clinical Impression: r/o inflamed dermatofibroma v lsc    Spec 1 Source: left posterior thigh        Dermatofibroma  Reassurance given to patient. No treatment is necessary.   Treatment of benign, asymptomatic lesions may be considered cosmetic.    Neoplasm of uncertain behavior of skin  Punch biopsy procedure note:  Punch biopsy performed after verbal consent obtained. Area marked and prepped with alcohol. Approximately 1cc of 1% lidocaine with epinephrine injected. 8 mm disposable punch used to remove lesion. Hemostasis obtained and biopsy site closed with 1 - 2 Prolene sutures. Wound care instructions reviewed with patient and handout given.    -     Ambulatory referral/consult to Dermatology  -     Specimen to Pathology, Dermatology             Follow up in about 2 weeks (around 10/8/2020).

## 2020-09-24 NOTE — LETTER
September 24, 2020      Joe Pittman DO  [address]  [place] [address]           [place] - Dermatology  [address]  [place] [address]  Phone: [phone]  Fax: [phone]          Patient: Emilia Coyle   MR Number: [id number]   YOB: 1975   Date of Visit: 9/24/2020       Dear Dr. Joe Pittman:    Thank you for referring Emilia Coyle to me for evaluation. Attached you will find relevant portions of my assessment and plan of care.    If you have questions, please do not hesitate to call me. I look forward to following Emilia Coyle along with you.    Sincerely,    Sakshi Clark MD    Enclosure  CC:  No Recipients    If you would like to receive this communication electronically, please contact [email or web address] or [phone] to request more information on Epic[employer]ledge Link access.    For providers and/or their staff who would like to refer a patient to [facility], please contact us through our one-stop-shop provider referral line, [facility], [place], at [phone].    If you feel you have received this communication in error or would no longer like to receive these types of communications, please e-mail [email or web address]

## 2020-09-25 ENCOUNTER — LAB VISIT (OUTPATIENT)
Dept: LAB | Facility: HOSPITAL | Age: 45
End: 2020-09-25
Attending: NURSE PRACTITIONER
Payer: COMMERCIAL

## 2020-09-25 DIAGNOSIS — Z98.84 S/P LAPAROSCOPIC SLEEVE GASTRECTOMY: ICD-10-CM

## 2020-09-25 DIAGNOSIS — E03.9 HYPOTHYROIDISM, UNSPECIFIED TYPE: ICD-10-CM

## 2020-09-25 DIAGNOSIS — Z71.3 DIETARY COUNSELING: ICD-10-CM

## 2020-09-25 LAB
ALBUMIN SERPL BCP-MCNC: 4.1 G/DL (ref 3.5–5.2)
ALP SERPL-CCNC: 78 U/L (ref 55–135)
ALT SERPL W/O P-5'-P-CCNC: 14 U/L (ref 10–44)
ANION GAP SERPL CALC-SCNC: 11 MMOL/L (ref 8–16)
AST SERPL-CCNC: 19 U/L (ref 10–40)
BASOPHILS # BLD AUTO: 0.1 K/UL (ref 0–0.2)
BASOPHILS NFR BLD: 1.6 % (ref 0–1.9)
BILIRUB SERPL-MCNC: 0.5 MG/DL (ref 0.1–1)
BUN SERPL-MCNC: 10 MG/DL (ref 6–20)
CALCIUM SERPL-MCNC: 9.7 MG/DL (ref 8.7–10.5)
CHLORIDE SERPL-SCNC: 104 MMOL/L (ref 95–110)
CHOLEST SERPL-MCNC: 198 MG/DL (ref 120–199)
CHOLEST/HDLC SERPL: 2.9 {RATIO} (ref 2–5)
CO2 SERPL-SCNC: 28 MMOL/L (ref 23–29)
CREAT SERPL-MCNC: 0.8 MG/DL (ref 0.5–1.4)
DIFFERENTIAL METHOD: ABNORMAL
EOSINOPHIL # BLD AUTO: 0.1 K/UL (ref 0–0.5)
EOSINOPHIL NFR BLD: 1.6 % (ref 0–8)
ERYTHROCYTE [DISTWIDTH] IN BLOOD BY AUTOMATED COUNT: 13.9 % (ref 11.5–14.5)
EST. GFR  (AFRICAN AMERICAN): >60 ML/MIN/1.73 M^2
EST. GFR  (NON AFRICAN AMERICAN): >60 ML/MIN/1.73 M^2
GLUCOSE SERPL-MCNC: 71 MG/DL (ref 70–110)
HCT VFR BLD AUTO: 37.9 % (ref 37–48.5)
HDLC SERPL-MCNC: 69 MG/DL (ref 40–75)
HDLC SERPL: 34.8 % (ref 20–50)
HGB BLD-MCNC: 11.7 G/DL (ref 12–16)
IMM GRANULOCYTES # BLD AUTO: 0.01 K/UL (ref 0–0.04)
IMM GRANULOCYTES NFR BLD AUTO: 0.2 % (ref 0–0.5)
IRON SERPL-MCNC: 97 UG/DL (ref 30–160)
LDLC SERPL CALC-MCNC: 111 MG/DL (ref 63–159)
LYMPHOCYTES # BLD AUTO: 2 K/UL (ref 1–4.8)
LYMPHOCYTES NFR BLD: 31.1 % (ref 18–48)
MCH RBC QN AUTO: 28.3 PG (ref 27–31)
MCHC RBC AUTO-ENTMCNC: 30.9 G/DL (ref 32–36)
MCV RBC AUTO: 92 FL (ref 82–98)
MONOCYTES # BLD AUTO: 0.4 K/UL (ref 0.3–1)
MONOCYTES NFR BLD: 5.5 % (ref 4–15)
NEUTROPHILS # BLD AUTO: 3.8 K/UL (ref 1.8–7.7)
NEUTROPHILS NFR BLD: 60 % (ref 38–73)
NONHDLC SERPL-MCNC: 129 MG/DL
NRBC BLD-RTO: 0 /100 WBC
PLATELET # BLD AUTO: 339 K/UL (ref 150–350)
PMV BLD AUTO: 10.9 FL (ref 9.2–12.9)
POTASSIUM SERPL-SCNC: 4.4 MMOL/L (ref 3.5–5.1)
PROT SERPL-MCNC: 7.3 G/DL (ref 6–8.4)
RBC # BLD AUTO: 4.14 M/UL (ref 4–5.4)
SATURATED IRON: 25 % (ref 20–50)
SODIUM SERPL-SCNC: 143 MMOL/L (ref 136–145)
TOTAL IRON BINDING CAPACITY: 388 UG/DL (ref 250–450)
TRANSFERRIN SERPL-MCNC: 262 MG/DL (ref 200–375)
TRIGL SERPL-MCNC: 90 MG/DL (ref 30–150)
TSH SERPL DL<=0.005 MIU/L-ACNC: 0.76 UIU/ML (ref 0.4–4)
VIT B12 SERPL-MCNC: 294 PG/ML (ref 210–950)
WBC # BLD AUTO: 6.33 K/UL (ref 3.9–12.7)

## 2020-09-25 PROCEDURE — 80053 COMPREHEN METABOLIC PANEL: CPT

## 2020-09-25 PROCEDURE — 82306 VITAMIN D 25 HYDROXY: CPT

## 2020-09-25 PROCEDURE — 82607 VITAMIN B-12: CPT

## 2020-09-25 PROCEDURE — 83540 ASSAY OF IRON: CPT

## 2020-09-25 PROCEDURE — 85025 COMPLETE CBC W/AUTO DIFF WBC: CPT

## 2020-09-25 PROCEDURE — 80061 LIPID PANEL: CPT

## 2020-09-25 PROCEDURE — 84425 ASSAY OF VITAMIN B-1: CPT

## 2020-09-25 PROCEDURE — 84443 ASSAY THYROID STIM HORMONE: CPT

## 2020-09-26 LAB — 25(OH)D3+25(OH)D2 SERPL-MCNC: 24 NG/ML (ref 30–96)

## 2020-09-28 ENCOUNTER — PATIENT MESSAGE (OUTPATIENT)
Dept: ENDOCRINOLOGY | Facility: CLINIC | Age: 45
End: 2020-09-28

## 2020-09-28 LAB
FINAL PATHOLOGIC DIAGNOSIS: NORMAL
GROSS: NORMAL

## 2020-09-29 ENCOUNTER — PATIENT MESSAGE (OUTPATIENT)
Dept: DERMATOLOGY | Facility: CLINIC | Age: 45
End: 2020-09-29

## 2020-09-29 LAB — VIT B1 BLD-MCNC: 38 UG/L (ref 38–122)

## 2020-09-30 ENCOUNTER — PATIENT MESSAGE (OUTPATIENT)
Dept: PHYSICAL MEDICINE AND REHAB | Facility: CLINIC | Age: 45
End: 2020-09-30

## 2020-10-19 ENCOUNTER — PATIENT MESSAGE (OUTPATIENT)
Dept: PHYSICAL MEDICINE AND REHAB | Facility: CLINIC | Age: 45
End: 2020-10-19

## 2020-10-23 ENCOUNTER — PATIENT MESSAGE (OUTPATIENT)
Dept: PHYSICAL MEDICINE AND REHAB | Facility: CLINIC | Age: 45
End: 2020-10-23

## 2020-11-09 ENCOUNTER — PATIENT MESSAGE (OUTPATIENT)
Dept: PHYSICAL MEDICINE AND REHAB | Facility: CLINIC | Age: 45
End: 2020-11-09

## 2020-11-09 ENCOUNTER — PATIENT MESSAGE (OUTPATIENT)
Dept: INTERNAL MEDICINE | Facility: CLINIC | Age: 45
End: 2020-11-09

## 2020-11-09 DIAGNOSIS — M67.439 GANGLION OF WRIST, UNSPECIFIED LATERALITY: Primary | ICD-10-CM

## 2020-11-10 ENCOUNTER — TELEPHONE (OUTPATIENT)
Dept: INTERNAL MEDICINE | Facility: CLINIC | Age: 45
End: 2020-11-10

## 2020-11-10 DIAGNOSIS — Z00.00 ANNUAL PHYSICAL EXAM: Primary | ICD-10-CM

## 2020-11-10 NOTE — TELEPHONE ENCOUNTER
----- Message from Molly Reyes MA sent at 11/10/2020  2:21 PM CST -----  Contact: self  [phone]  Pt states she receive a call stating she will need a follow up appt.  Please call and advise.  [phone] (W) pt states she will be at work until 4 pm and please call her work [employer]

## 2020-11-11 ENCOUNTER — TELEPHONE (OUTPATIENT)
Dept: INTERNAL MEDICINE | Facility: CLINIC | Age: 45
End: 2020-11-11

## 2020-11-11 ENCOUNTER — TELEPHONE (OUTPATIENT)
Dept: BARIATRICS | Facility: CLINIC | Age: 45
End: 2020-11-11

## 2020-11-11 NOTE — TELEPHONE ENCOUNTER
Spoke to pt and booked annual. Labs ordered and linked to apt. Pt requested HA1C due to family hx of DM. She also requested full thyroid panel and female hormones to check menopause. Labs ordered per Dr. Louie.

## 2020-11-11 NOTE — TELEPHONE ENCOUNTER
----- Message from Caprice Jack sent at 11/11/2020  9:13 AM CST -----  Regarding: RESCHEDULE  Contact: self  Pt ask to reschedule her appointment to 11/18/2020 either before or after her schedule appointment w/ Ortho - Spine for 9a Pt ask for a call      Contact info  [phone] work to 3:30p      [phone]

## 2020-11-12 ENCOUNTER — PATIENT MESSAGE (OUTPATIENT)
Dept: ORTHOPEDICS | Facility: CLINIC | Age: 45
End: 2020-11-12

## 2020-11-12 ENCOUNTER — PATIENT MESSAGE (OUTPATIENT)
Dept: OBSTETRICS AND GYNECOLOGY | Facility: CLINIC | Age: 45
End: 2020-11-12

## 2020-11-12 ENCOUNTER — TELEPHONE (OUTPATIENT)
Dept: ORTHOPEDICS | Facility: CLINIC | Age: 45
End: 2020-11-12

## 2020-11-12 DIAGNOSIS — M51.36 DDD (DEGENERATIVE DISC DISEASE), LUMBAR: Primary | ICD-10-CM

## 2020-11-16 ENCOUNTER — HOSPITAL ENCOUNTER (OUTPATIENT)
Dept: RADIOLOGY | Facility: OTHER | Age: 45
Discharge: HOME | End: 2020-11-16
Attending: ORTHOPAEDIC SURGERY
Payer: COMMERCIAL

## 2020-11-16 ENCOUNTER — OFFICE VISIT (OUTPATIENT)
Dept: ORTHOPEDICS | Facility: CLINIC | Age: 45
End: 2020-11-16
Payer: COMMERCIAL

## 2020-11-16 VITALS — WEIGHT: 149 LBS | HEIGHT: 65 IN | BODY MASS INDEX: 24.83 KG/M2

## 2020-11-16 DIAGNOSIS — M79.642 LEFT HAND PAIN: Primary | ICD-10-CM

## 2020-11-16 DIAGNOSIS — M79.642 LEFT HAND PAIN: ICD-10-CM

## 2020-11-16 DIAGNOSIS — M67.432 GANGLION CYST OF VOLAR ASPECT OF LEFT WRIST: Primary | ICD-10-CM

## 2020-11-16 DIAGNOSIS — Z01.818 PREOP TESTING: Primary | ICD-10-CM

## 2020-11-16 PROCEDURE — 1125F PR PAIN SEVERITY QUANTIFIED, PAIN PRESENT: ICD-10-PCS | Mod: S$GLB,,, | Performed by: ORTHOPAEDIC SURGERY

## 2020-11-16 PROCEDURE — 99214 PR OFFICE/OUTPT VISIT, EST, LEVL IV, 30-39 MIN: ICD-10-PCS | Mod: S$GLB,,, | Performed by: ORTHOPAEDIC SURGERY

## 2020-11-16 PROCEDURE — 99999 PR PBB SHADOW E&M-EST. PATIENT-LVL IV: ICD-10-PCS | Mod: PBBFAC,,, | Performed by: ORTHOPAEDIC SURGERY

## 2020-11-16 PROCEDURE — 73110 XR WRIST COMPLETE 3 VIEWS LEFT: ICD-10-PCS | Mod: 26,LT,, | Performed by: RADIOLOGY

## 2020-11-16 PROCEDURE — 1125F AMNT PAIN NOTED PAIN PRSNT: CPT | Mod: S$GLB,,, | Performed by: ORTHOPAEDIC SURGERY

## 2020-11-16 PROCEDURE — 3008F BODY MASS INDEX DOCD: CPT | Mod: CPTII,S$GLB,, | Performed by: ORTHOPAEDIC SURGERY

## 2020-11-16 PROCEDURE — 99214 OFFICE O/P EST MOD 30 MIN: CPT | Mod: S$GLB,,, | Performed by: ORTHOPAEDIC SURGERY

## 2020-11-16 PROCEDURE — 3008F PR BODY MASS INDEX (BMI) DOCUMENTED: ICD-10-PCS | Mod: CPTII,S$GLB,, | Performed by: ORTHOPAEDIC SURGERY

## 2020-11-16 PROCEDURE — 73110 X-RAY EXAM OF WRIST: CPT | Mod: 26,LT,, | Performed by: RADIOLOGY

## 2020-11-16 PROCEDURE — 73110 X-RAY EXAM OF WRIST: CPT | Mod: TC,FY,LT

## 2020-11-16 PROCEDURE — 99999 PR PBB SHADOW E&M-EST. PATIENT-LVL IV: CPT | Mod: PBBFAC,,, | Performed by: ORTHOPAEDIC SURGERY

## 2020-11-16 RX ORDER — MUPIROCIN 20 MG/G
OINTMENT TOPICAL
Status: CANCELLED | OUTPATIENT
Start: 2020-11-16

## 2020-11-16 NOTE — H&P (VIEW-ONLY)
Attestation signed by Ryan Lorenzo MD at 11/16/2020 9:45 AM   I have seen the patient, reviewed the Resident's history and physical. I have personally interviewed and examined the patient at bedside and agree with the findings.     Left volar ganglion.  Discussed that this may not relieve her N/T but recent EMG neg for CTS.     The patient has not responded to adequate non operative treatment at this time and/or non operative treatment is not indicated. Thus, the risks, benefits and alternatives to surgery were discussed with the patient in detail.  Specific risks include but are not limited to bleeding, infection, vessel and/or nerve damage, pain, numbness, tingling, complex regional pain syndrome, compartment syndrome, failure to return to pre-injury and/or preoperative functional status, scar sensitivity, delayed healing, inability to return to work, pulley injury, tendon injury, bowstringing, partial and/or incomplete relief of symptoms, weakness, persistence of and/or worsening of symptoms, surgical failure, osteomyelitis, amputation, loss of function, stiffness, functional debility, dysfunction, decreased  strength, need for prolonged postoperative rehabilitation, need for further surgery, deep venous thrombosis, pulmonary embolism, arthritis and death.  The patient states an understanding and wishes to proceed with surgery.   All questions were answered.  No guarantees were implied or stated.  Written informed consent was obtained.           Ryan Lorenzo MD  [facility], [place], operated by [facility]\""[address]      Expand All Collapse All      []Hide copied text    []Hover for details  [facility]  History & Physical  Orthopedics     SUBJECTIVE:       COVID-19 attestation:  This patient was treated during the COVID-19 pandemic.  This was discussed with the patient, they are aware of our current policies and procedures, were given the option of delaying their visit and or switching to a virtual visit,  delaying their surgery when applicable, and they elect to proceed.     Chief Complaint: left ganglion cyst     Referring Provider: N/A      History of Present Illness:  Patient is a 45 y.o. right hand dominant female who presents today with complaints of left volar radial ganglion cyst. The patient reports a little over a week ago she noticed a fluid filled mass on the volar radial aspect of her left hand. The mass was initially much larger in size, but decreased in size over the next few days. The patient reports the mass is painful and tender. It was initially 9/10 in pain, but is now around 7/10. The pain radiates proximally up her arm and into her fingers. She reports associated N/T in all of her finger tips which she reports she first noticed a few days prior to the mass appearing. She denies hx of trauma or prior cysts. She has tried anti-inflammatory with mild relief.       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     Onset of symptoms/DOI was around 1 week ago.     Symptoms are aggravated by bumping into hard surfaces.     Symptoms are alleviated by rest.     Symptoms consist of pain and swelling and N/T.     The patient rates their pain as a 7/10.     Attempted treatment(s) and/or interventions include rest and anti-inflammatory medications.     The patient denies any fevers, chills, N/V, D/C and presents for evaluation.             Past Medical History:   Diagnosis Date    Celiac disease      Diabetes mellitus      Headache      High triglycerides 7/12/2017    Hypothyroidism      MIKAELA (obstructive sleep apnea)      Sleep apnea in adult      Thyroid disease              Past Surgical History:   Procedure Laterality Date    ABDOMINOPLASTY        ADENOIDECTOMY        BLADDER SUSPENSION        BREAST SURGERY   2008    CHOLECYSTECTOMY   12/2017    COLONOSCOPY   10/27/2017     Normal   (Rtn 10 yrs, Chasmady Cheng)     COSMETIC SURGERY   2008     tummy tuck, breast  "reduction, lipo    GASTRECTOMY   07/2017    HYSTERECTOMY        TONSILLECTOMY        TOTAL REDUCTION MAMMOPLASTY        TOTAL THYROIDECTOMY Right      TUBAL LIGATION        TYMPANOSTOMY TUBE PLACEMENT        UPPER GASTROINTESTINAL ENDOSCOPY   10/27/2017     Gastritis Biopsied, Benign             Review of patient's allergies indicates:   Allergen Reactions    Rizatriptan Photosensitivity, Nausea Only and Other (See Comments)       "Feels like my brain is on fire."    Sumatriptan Photosensitivity, Nausea Only and Other (See Comments)       "Feels like my brain is on fire."    Zolmitriptan Photosensitivity, Nausea Only and Other (See Comments)       "Feels like my brain is on fire."    Gluten protein Hives, Diarrhea, Itching, Nausea And Vomiting, Swelling, Anxiety and Dermatitis    Latex, natural rubber Dermatitis       Latex gloves with Powder      Social History          Social History Narrative    Not on file            Family History   Problem Relation Age of Onset    Asthma Mother      Migraines Mother      Hyperlipidemia Father      Arthritis Father      Asthma Father      Migraines Father      Obesity Brother      Hyperlipidemia Brother      Migraines Brother      Hyperlipidemia Daughter      Kidney failure Daughter      Kidney failure Maternal Aunt      Hypothyroidism Maternal Aunt      Hyperlipidemia Paternal Uncle      Hypertension Paternal Uncle      Heart disease Paternal Uncle      Cancer Maternal Grandmother      Diabetes Maternal Grandmother      Depression Maternal Grandmother      Heart disease Maternal Grandmother      Hypertension Maternal Grandmother      Sleep apnea Maternal Grandmother      Obesity Maternal Grandmother      Melanoma Maternal Grandmother      Arthritis Maternal Grandmother      Asthma Maternal Grandmother      Hyperlipidemia Maternal Grandmother      Kidney disease Maternal Grandmother      Breast cancer Maternal Grandmother      Cancer " Maternal Grandfather      Hyperlipidemia Maternal Grandfather      Hypertension Maternal Grandfather      Heart disease Maternal Grandfather      Kidney failure Maternal Grandfather      Stroke Maternal Grandfather      Hyperlipidemia Paternal Grandmother      Hypertension Paternal Grandmother      Diabetes Paternal Grandmother           diabetes, kidney failure, obesity    Obesity Paternal Grandmother      Arthritis Paternal Grandmother      Kidney disease Paternal Grandmother      Cancer Paternal Grandfather      Hyperlipidemia Paternal Grandfather      Diabetes Paternal Grandfather      Hypertension Paternal Grandfather      Heart disease Paternal Grandfather      Kidney failure Paternal Grandfather      Obesity Paternal Grandfather      Sleep apnea Paternal Grandfather      Stroke Paternal Grandfather      Drug abuse Paternal Grandfather           drug abuse in his 60's    Asthma Daughter      Kidney disease Daughter      Kidney disease Maternal Aunt      Migraines Maternal Aunt              Current Outpatient Medications:     ALPRAZolam (XANAX) 1 MG tablet, Take 1 mg by mouth 2 (two) times daily., Disp: , Rfl: 1    biotin 10,000 mcg Cap, , Disp: , Rfl:     busPIRone (BUSPAR) 5 MG Tab, Take 1 tablet (5 mg total) by mouth 2 (two) times daily., Disp: 180 tablet, Rfl: 3    hydrOXYzine pamoate (VISTARIL) 50 MG Cap, Take 1 capsule (50 mg total) by mouth every 8 (eight) hours as needed., Disp: 12 capsule, Rfl: 0    levothyroxine (SYNTHROID) 88 MCG tablet, TAKE ONE TABLET BY MOUTH EVERY DAY, Disp: 90 tablet, Rfl: 3    lisdexamfetamine (VYVANSE) 70 MG capsule, Take 70 mg by mouth every morning., Disp: , Rfl:     multivitamin (ONE DAILY MULTIVITAMIN) per tablet, Take 1 tablet by mouth 2 (two) times daily. , Disp: , Rfl:     naratriptan (AMERGE) 2.5 MG tablet, 2.5 mg at onset of headache, may repeat in 4 hours if needed. Max 2 tabs/day., Disp: 9 tablet, Rfl: 5    ondansetron (ZOFRAN-ODT)  "8 MG TbDL, Take 1 tablet (8 mg total) by mouth every 8 (eight) hours as needed (nausea)., Disp: 25 tablet, Rfl: 2    polyethylene glycol (GLYCOLAX) 17 gram PwPk, MIX ONE PACKET IN LIQUID AND TAKE DAILY AS DIRECTED, Disp: , Rfl: 3    prasterone, dhea, (INTRAROSA) 6.5 mg Inst, Place 6.5 mg vaginally every evening., Disp: 84 each, Rfl: 3    progesterone (PROMETRIUM) 100 MG capsule, Take 2 capsules (200 mg total) by mouth nightly., Disp: 90 capsule, Rfl: 3    promethazine (PHENERGAN) 25 MG suppository, Place 1 suppository (25 mg total) rectally every 6 (six) hours as needed for Nausea., Disp: 12 suppository, Rfl: 2    sertraline (ZOLOFT) 100 MG tablet, Take 200 mg by mouth once daily., Disp: , Rfl:      Current Facility-Administered Medications:     onabotulinumtoxina injection 200 Units, 200 Units, Intramuscular, Q90 Days, ONESIMO Frederick, 200 Units at 09/17/20 1453        Review of Systems:  Constitutional: no fever or chills  Eyes: no visual changes  ENT: no nasal congestion or sore throat  Respiratory: no cough or shortness of breath  Cardiovascular: no chest pain  Gastrointestinal: no nausea or vomiting, tolerating diet  Musculoskeletal: pain     OBJECTIVE:       Vital Signs (Most Recent):  Vitals       Vitals:     11/16/20 0856   Weight: 67.6 kg (149 lb)   Height: 5' 5" (1.651 m)        Body mass index is 24.79 kg/m².        Physical Exam:  Constitutional: The patient appears well-developed and well-nourished. No distress.   Head: Normocephalic and atraumatic.   Nose: Nose normal.   Eyes: Conjunctivae and EOM are normal.   Neck: No tracheal deviation present.   Cardiovascular: Normal rate and intact distal pulses.    Pulmonary/Chest: Effort normal. No respiratory distress.   Abdominal: There is no guarding.   Neurological: The patient is alert.   Psychiatric: The patient has a normal mood and affect.      Left Hand/Wrist Examination:     Observation/Inspection:  Swelling                       Left " volar radial fluid filled cyst 2cm in size                  Deformity                     none  Discoloration               none                  Scars                           none                  Atrophy                        none     HAND/WRIST EXAMINATION:  Finkelstein's Test                                Neg  WHAT Test                                         Neg  Snuff box tenderness                          Neg  Corral's Test                                     Neg  Hook of Hamate Tenderness              Neg  CMC grind                                           Neg  Circumduction test                              Neg  TTP over left volar radial ganglion cyst     Neurovascular Exam:  Digits WWP, brisk CR < 3s throughout  NVI motor/LTS to M/R/U nerves, radial pulse 2+  Tinel's Test - Carpal Tunnel                Neg  Tinel's Test - Cubital Tunnel               Neg  Phalen's Test                                      Neg  Median Nerve Compression Test       Neg     ROM hand/wrist/elbow full, painless     RRR  CTAB  Abd S/NT/ND +BS     Diagnostic Results:     Xray - XR of L wrist showing no acute fractures or abnormalities        ASSESSMENT/PLAN:       45 y.o. yo female with left volar radial ganglion cyst.  Plan: Discussed operative and non-operative treatment options in detail with the patient. The patient would like to move forward with surgery at this time. We will plan for OR on Dec. 4 for left volar radial ganglion cyst removal.          Ryan Lorenzo M.D.     · Please be aware that this note has been generated with the assistance of bill voice-to-text.  Please excuse any spelling or grammatical errors.

## 2020-11-16 NOTE — PROGRESS NOTES
[facility]  History & Physical  Orthopedics    SUBJECTIVE:      COVID-19 attestation:  This patient was treated during the COVID-19 pandemic.  This was discussed with the patient, they are aware of our current policies and procedures, were given the option of delaying their visit and or switching to a virtual visit, delaying their surgery when applicable, and they elect to proceed.    Chief Complaint: left ganglion cyst    Referring Provider: N/A     History of Present Illness:  Patient is a 45 y.o. right hand dominant female who presents today with complaints of left volar radial ganglion cyst. The patient reports a little over a week ago she noticed a fluid filled mass on the volar radial aspect of her left hand. The mass was initially much larger in size, but decreased in size over the next few days. The patient reports the mass is painful and tender. It was initially 9/10 in pain, but is now around 7/10. The pain radiates proximally up her arm and into her fingers. She reports associated N/T in all of her finger tips which she reports she first noticed a few days prior to the mass appearing. She denies hx of trauma or prior cysts. She has tried anti-inflammatory with mild relief.      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    Onset of symptoms/DOI was around 1 week ago.    Symptoms are aggravated by bumping into hard surfaces.    Symptoms are alleviated by rest.    Symptoms consist of pain and swelling and N/T.    The patient rates their pain as a 7/10.    Attempted treatment(s) and/or interventions include rest and anti-inflammatory medications.     The patient denies any fevers, chills, N/V, D/C and presents for evaluation.       Past Medical History:   Diagnosis Date    Celiac disease     Diabetes mellitus     Headache     High triglycerides 7/12/2017    Hypothyroidism     MIKAELA (obstructive sleep apnea)     Sleep apnea in adult     Thyroid disease   "    Past Surgical History:   Procedure Laterality Date    ABDOMINOPLASTY      ADENOIDECTOMY      BLADDER SUSPENSION      BREAST SURGERY  2008    CHOLECYSTECTOMY  12/2017    COLONOSCOPY  10/27/2017    Normal   (Rtn 10 yrs, Chas Skylar)     COSMETIC SURGERY  2008    tummy tuck, breast reduction, lipo    GASTRECTOMY  07/2017    HYSTERECTOMY      TONSILLECTOMY      TOTAL REDUCTION MAMMOPLASTY      TOTAL THYROIDECTOMY Right     TUBAL LIGATION      TYMPANOSTOMY TUBE PLACEMENT      UPPER GASTROINTESTINAL ENDOSCOPY  10/27/2017    Gastritis Biopsied, Benign      Review of patient's allergies indicates:   Allergen Reactions    Rizatriptan Photosensitivity, Nausea Only and Other (See Comments)     "Feels like my brain is on fire."    Sumatriptan Photosensitivity, Nausea Only and Other (See Comments)     "Feels like my brain is on fire."    Zolmitriptan Photosensitivity, Nausea Only and Other (See Comments)     "Feels like my brain is on fire."    Gluten protein Hives, Diarrhea, Itching, Nausea And Vomiting, Swelling, Anxiety and Dermatitis    Latex, natural rubber Dermatitis     Latex gloves with Powder     Social History     Social History Narrative    Not on file     Family History   Problem Relation Age of Onset    Asthma Mother     Migraines Mother     Hyperlipidemia Father     Arthritis Father     Asthma Father     Migraines Father     Obesity Brother     Hyperlipidemia Brother     Migraines Brother     Hyperlipidemia Daughter     Kidney failure Daughter     Kidney failure Maternal Aunt     Hypothyroidism Maternal Aunt     Hyperlipidemia Paternal Uncle     Hypertension Paternal Uncle     Heart disease Paternal Uncle     Cancer Maternal Grandmother     Diabetes Maternal Grandmother     Depression Maternal Grandmother     Heart disease Maternal Grandmother     Hypertension Maternal Grandmother     Sleep apnea Maternal Grandmother     Obesity Maternal Grandmother     Melanoma " Maternal Grandmother     Arthritis Maternal Grandmother     Asthma Maternal Grandmother     Hyperlipidemia Maternal Grandmother     Kidney disease Maternal Grandmother     Breast cancer Maternal Grandmother     Cancer Maternal Grandfather     Hyperlipidemia Maternal Grandfather     Hypertension Maternal Grandfather     Heart disease Maternal Grandfather     Kidney failure Maternal Grandfather     Stroke Maternal Grandfather     Hyperlipidemia Paternal Grandmother     Hypertension Paternal Grandmother     Diabetes Paternal Grandmother         diabetes, kidney failure, obesity    Obesity Paternal Grandmother     Arthritis Paternal Grandmother     Kidney disease Paternal Grandmother     Cancer Paternal Grandfather     Hyperlipidemia Paternal Grandfather     Diabetes Paternal Grandfather     Hypertension Paternal Grandfather     Heart disease Paternal Grandfather     Kidney failure Paternal Grandfather     Obesity Paternal Grandfather     Sleep apnea Paternal Grandfather     Stroke Paternal Grandfather     Drug abuse Paternal Grandfather         drug abuse in his 60's    Asthma Daughter     Kidney disease Daughter     Kidney disease Maternal Aunt     Migraines Maternal Aunt          Current Outpatient Medications:     ALPRAZolam (XANAX) 1 MG tablet, Take 1 mg by mouth 2 (two) times daily., Disp: , Rfl: 1    biotin 10,000 mcg Cap, , Disp: , Rfl:     busPIRone (BUSPAR) 5 MG Tab, Take 1 tablet (5 mg total) by mouth 2 (two) times daily., Disp: 180 tablet, Rfl: 3    hydrOXYzine pamoate (VISTARIL) 50 MG Cap, Take 1 capsule (50 mg total) by mouth every 8 (eight) hours as needed., Disp: 12 capsule, Rfl: 0    levothyroxine (SYNTHROID) 88 MCG tablet, TAKE ONE TABLET BY MOUTH EVERY DAY, Disp: 90 tablet, Rfl: 3    lisdexamfetamine (VYVANSE) 70 MG capsule, Take 70 mg by mouth every morning., Disp: , Rfl:     multivitamin (ONE DAILY MULTIVITAMIN) per tablet, Take 1 tablet by mouth 2 (two) times  "daily. , Disp: , Rfl:     naratriptan (AMERGE) 2.5 MG tablet, 2.5 mg at onset of headache, may repeat in 4 hours if needed. Max 2 tabs/day., Disp: 9 tablet, Rfl: 5    ondansetron (ZOFRAN-ODT) 8 MG TbDL, Take 1 tablet (8 mg total) by mouth every 8 (eight) hours as needed (nausea)., Disp: 25 tablet, Rfl: 2    polyethylene glycol (GLYCOLAX) 17 gram PwPk, MIX ONE PACKET IN LIQUID AND TAKE DAILY AS DIRECTED, Disp: , Rfl: 3    prasterone, dhea, (INTRAROSA) 6.5 mg Inst, Place 6.5 mg vaginally every evening., Disp: 84 each, Rfl: 3    progesterone (PROMETRIUM) 100 MG capsule, Take 2 capsules (200 mg total) by mouth nightly., Disp: 90 capsule, Rfl: 3    promethazine (PHENERGAN) 25 MG suppository, Place 1 suppository (25 mg total) rectally every 6 (six) hours as needed for Nausea., Disp: 12 suppository, Rfl: 2    sertraline (ZOLOFT) 100 MG tablet, Take 200 mg by mouth once daily., Disp: , Rfl:     Current Facility-Administered Medications:     onabotulinumtoxina injection 200 Units, 200 Units, Intramuscular, Q90 Days, ONESIMO Frederick, 200 Units at 09/17/20 1453      Review of Systems:  Constitutional: no fever or chills  Eyes: no visual changes  ENT: no nasal congestion or sore throat  Respiratory: no cough or shortness of breath  Cardiovascular: no chest pain  Gastrointestinal: no nausea or vomiting, tolerating diet  Musculoskeletal: pain    OBJECTIVE:      Vital Signs (Most Recent):  Vitals:    11/16/20 0856   Weight: 67.6 kg (149 lb)   Height: 5' 5" (1.651 m)     Body mass index is 24.79 kg/m².      Physical Exam:  Constitutional: The patient appears well-developed and well-nourished. No distress.   Head: Normocephalic and atraumatic.   Nose: Nose normal.   Eyes: Conjunctivae and EOM are normal.   Neck: No tracheal deviation present.   Cardiovascular: Normal rate and intact distal pulses.    Pulmonary/Chest: Effort normal. No respiratory distress.   Abdominal: There is no guarding.   Neurological: The " patient is alert.   Psychiatric: The patient has a normal mood and affect.     Left Hand/Wrist Examination:    Observation/Inspection:  Swelling  Left volar radial fluid filled cyst 2cm in size    Deformity  none  Discoloration  none     Scars   none    Atrophy  none    HAND/WRIST EXAMINATION:  Finkelstein's Test   Neg  WHAT Test    Neg  Snuff box tenderness   Neg  Corral's Test    Neg  Hook of Hamate Tenderness  Neg  CMC grind    Neg  Circumduction test   Neg  TTP over left volar radial ganglion cyst    Neurovascular Exam:  Digits WWP, brisk CR < 3s throughout  NVI motor/LTS to M/R/U nerves, radial pulse 2+  Tinel's Test - Carpal Tunnel  Neg  Tinel's Test - Cubital Tunnel  Neg  Phalen's Test    Neg  Median Nerve Compression Test Neg    ROM hand/wrist/elbow full, painless    RRR  CTAB  Abd S/NT/ND +BS    Diagnostic Results:     Xray - XR of L wrist showing no acute fractures or abnormalities      ASSESSMENT/PLAN:      45 y.o. yo female with left volar radial ganglion cyst.  Plan: Discussed operative and non-operative treatment options in detail with the patient. The patient would like to move forward with surgery at this time. We will plan for OR on Dec. 4 for left volar radial ganglion cyst removal.        Ryan Lorenzo M.D.     Please be aware that this note has been generated with the assistance of bill voice-to-text.  Please excuse any spelling or grammatical errors.

## 2020-11-16 NOTE — H&P
Attestation signed by Ryan Lorenzo MD at 11/16/2020 9:45 AM   I have seen the patient, reviewed the Resident's history and physical. I have personally interviewed and examined the patient at bedside and agree with the findings.     Left volar ganglion.  Discussed that this may not relieve her N/T but recent EMG neg for CTS.     The patient has not responded to adequate non operative treatment at this time and/or non operative treatment is not indicated. Thus, the risks, benefits and alternatives to surgery were discussed with the patient in detail.  Specific risks include but are not limited to bleeding, infection, vessel and/or nerve damage, pain, numbness, tingling, complex regional pain syndrome, compartment syndrome, failure to return to pre-injury and/or preoperative functional status, scar sensitivity, delayed healing, inability to return to work, pulley injury, tendon injury, bowstringing, partial and/or incomplete relief of symptoms, weakness, persistence of and/or worsening of symptoms, surgical failure, osteomyelitis, amputation, loss of function, stiffness, functional debility, dysfunction, decreased  strength, need for prolonged postoperative rehabilitation, need for further surgery, deep venous thrombosis, pulmonary embolism, arthritis and death.  The patient states an understanding and wishes to proceed with surgery.   All questions were answered.  No guarantees were implied or stated.  Written informed consent was obtained.           Ryan Lorenzo MD  [facility]      Expand All Collapse All      []Hide copied text    []Hover for details  [facility]  History & Physical  Orthopedics     SUBJECTIVE:       COVID-19 attestation:  This patient was treated during the COVID-19 pandemic.  This was discussed with the patient, they are aware of our current policies and procedures, were given the option of delaying their visit and or switching to a virtual visit,  delaying their surgery when applicable, and they elect to proceed.     Chief Complaint: left ganglion cyst     Referring Provider: N/A      History of Present Illness:  Patient is a 45 y.o. right hand dominant female who presents today with complaints of left volar radial ganglion cyst. The patient reports a little over a week ago she noticed a fluid filled mass on the volar radial aspect of her left hand. The mass was initially much larger in size, but decreased in size over the next few days. The patient reports the mass is painful and tender. It was initially 9/10 in pain, but is now around 7/10. The pain radiates proximally up her arm and into her fingers. She reports associated N/T in all of her finger tips which she reports she first noticed a few days prior to the mass appearing. She denies hx of trauma or prior cysts. She has tried anti-inflammatory with mild relief.       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     Onset of symptoms/DOI was around 1 week ago.     Symptoms are aggravated by bumping into hard surfaces.     Symptoms are alleviated by rest.     Symptoms consist of pain and swelling and N/T.     The patient rates their pain as a 7/10.     Attempted treatment(s) and/or interventions include rest and anti-inflammatory medications.     The patient denies any fevers, chills, N/V, D/C and presents for evaluation.             Past Medical History:   Diagnosis Date    Celiac disease      Diabetes mellitus      Headache      High triglycerides 7/12/2017    Hypothyroidism      MIKAELA (obstructive sleep apnea)      Sleep apnea in adult      Thyroid disease              Past Surgical History:   Procedure Laterality Date    ABDOMINOPLASTY        ADENOIDECTOMY        BLADDER SUSPENSION        BREAST SURGERY   2008    CHOLECYSTECTOMY   12/2017    COLONOSCOPY   10/27/2017     Normal   (Rtn 10 yrs, Chasmady Cheng)     COSMETIC SURGERY   2008     tummy tuck, breast  "reduction, lipo    GASTRECTOMY   07/2017    HYSTERECTOMY        TONSILLECTOMY        TOTAL REDUCTION MAMMOPLASTY        TOTAL THYROIDECTOMY Right      TUBAL LIGATION        TYMPANOSTOMY TUBE PLACEMENT        UPPER GASTROINTESTINAL ENDOSCOPY   10/27/2017     Gastritis Biopsied, Benign             Review of patient's allergies indicates:   Allergen Reactions    Rizatriptan Photosensitivity, Nausea Only and Other (See Comments)       "Feels like my brain is on fire."    Sumatriptan Photosensitivity, Nausea Only and Other (See Comments)       "Feels like my brain is on fire."    Zolmitriptan Photosensitivity, Nausea Only and Other (See Comments)       "Feels like my brain is on fire."    Gluten protein Hives, Diarrhea, Itching, Nausea And Vomiting, Swelling, Anxiety and Dermatitis    Latex, natural rubber Dermatitis       Latex gloves with Powder      Social History          Social History Narrative    Not on file            Family History   Problem Relation Age of Onset    Asthma Mother      Migraines Mother      Hyperlipidemia Father      Arthritis Father      Asthma Father      Migraines Father      Obesity Brother      Hyperlipidemia Brother      Migraines Brother      Hyperlipidemia Daughter      Kidney failure Daughter      Kidney failure Maternal Aunt      Hypothyroidism Maternal Aunt      Hyperlipidemia Paternal Uncle      Hypertension Paternal Uncle      Heart disease Paternal Uncle      Cancer Maternal Grandmother      Diabetes Maternal Grandmother      Depression Maternal Grandmother      Heart disease Maternal Grandmother      Hypertension Maternal Grandmother      Sleep apnea Maternal Grandmother      Obesity Maternal Grandmother      Melanoma Maternal Grandmother      Arthritis Maternal Grandmother      Asthma Maternal Grandmother      Hyperlipidemia Maternal Grandmother      Kidney disease Maternal Grandmother      Breast cancer Maternal Grandmother      Cancer " Maternal Grandfather      Hyperlipidemia Maternal Grandfather      Hypertension Maternal Grandfather      Heart disease Maternal Grandfather      Kidney failure Maternal Grandfather      Stroke Maternal Grandfather      Hyperlipidemia Paternal Grandmother      Hypertension Paternal Grandmother      Diabetes Paternal Grandmother           diabetes, kidney failure, obesity    Obesity Paternal Grandmother      Arthritis Paternal Grandmother      Kidney disease Paternal Grandmother      Cancer Paternal Grandfather      Hyperlipidemia Paternal Grandfather      Diabetes Paternal Grandfather      Hypertension Paternal Grandfather      Heart disease Paternal Grandfather      Kidney failure Paternal Grandfather      Obesity Paternal Grandfather      Sleep apnea Paternal Grandfather      Stroke Paternal Grandfather      Drug abuse Paternal Grandfather           drug abuse in his 60's    Asthma Daughter      Kidney disease Daughter      Kidney disease Maternal Aunt      Migraines Maternal Aunt              Current Outpatient Medications:     ALPRAZolam (XANAX) 1 MG tablet, Take 1 mg by mouth 2 (two) times daily., Disp: , Rfl: 1    biotin 10,000 mcg Cap, , Disp: , Rfl:     busPIRone (BUSPAR) 5 MG Tab, Take 1 tablet (5 mg total) by mouth 2 (two) times daily., Disp: 180 tablet, Rfl: 3    hydrOXYzine pamoate (VISTARIL) 50 MG Cap, Take 1 capsule (50 mg total) by mouth every 8 (eight) hours as needed., Disp: 12 capsule, Rfl: 0    levothyroxine (SYNTHROID) 88 MCG tablet, TAKE ONE TABLET BY MOUTH EVERY DAY, Disp: 90 tablet, Rfl: 3    lisdexamfetamine (VYVANSE) 70 MG capsule, Take 70 mg by mouth every morning., Disp: , Rfl:     multivitamin (ONE DAILY MULTIVITAMIN) per tablet, Take 1 tablet by mouth 2 (two) times daily. , Disp: , Rfl:     naratriptan (AMERGE) 2.5 MG tablet, 2.5 mg at onset of headache, may repeat in 4 hours if needed. Max 2 tabs/day., Disp: 9 tablet, Rfl: 5    ondansetron (ZOFRAN-ODT)  "8 MG TbDL, Take 1 tablet (8 mg total) by mouth every 8 (eight) hours as needed (nausea)., Disp: 25 tablet, Rfl: 2    polyethylene glycol (GLYCOLAX) 17 gram PwPk, MIX ONE PACKET IN LIQUID AND TAKE DAILY AS DIRECTED, Disp: , Rfl: 3    prasterone, dhea, (INTRAROSA) 6.5 mg Inst, Place 6.5 mg vaginally every evening., Disp: 84 each, Rfl: 3    progesterone (PROMETRIUM) 100 MG capsule, Take 2 capsules (200 mg total) by mouth nightly., Disp: 90 capsule, Rfl: 3    promethazine (PHENERGAN) 25 MG suppository, Place 1 suppository (25 mg total) rectally every 6 (six) hours as needed for Nausea., Disp: 12 suppository, Rfl: 2    sertraline (ZOLOFT) 100 MG tablet, Take 200 mg by mouth once daily., Disp: , Rfl:      Current Facility-Administered Medications:     onabotulinumtoxina injection 200 Units, 200 Units, Intramuscular, Q90 Days, ONESIMO Frederick, 200 Units at 09/17/20 1453        Review of Systems:  Constitutional: no fever or chills  Eyes: no visual changes  ENT: no nasal congestion or sore throat  Respiratory: no cough or shortness of breath  Cardiovascular: no chest pain  Gastrointestinal: no nausea or vomiting, tolerating diet  Musculoskeletal: pain     OBJECTIVE:       Vital Signs (Most Recent):  Vitals       Vitals:     11/16/20 0856   Weight: 67.6 kg (149 lb)   Height: 5' 5" (1.651 m)        Body mass index is 24.79 kg/m².        Physical Exam:  Constitutional: The patient appears well-developed and well-nourished. No distress.   Head: Normocephalic and atraumatic.   Nose: Nose normal.   Eyes: Conjunctivae and EOM are normal.   Neck: No tracheal deviation present.   Cardiovascular: Normal rate and intact distal pulses.    Pulmonary/Chest: Effort normal. No respiratory distress.   Abdominal: There is no guarding.   Neurological: The patient is alert.   Psychiatric: The patient has a normal mood and affect.      Left Hand/Wrist Examination:     Observation/Inspection:  Swelling                       Left " volar radial fluid filled cyst 2cm in size                  Deformity                     none  Discoloration               none                  Scars                           none                  Atrophy                        none     HAND/WRIST EXAMINATION:  Finkelstein's Test                                Neg  WHAT Test                                         Neg  Snuff box tenderness                          Neg  Corral's Test                                     Neg  Hook of Hamate Tenderness              Neg  CMC grind                                           Neg  Circumduction test                              Neg  TTP over left volar radial ganglion cyst     Neurovascular Exam:  Digits WWP, brisk CR < 3s throughout  NVI motor/LTS to M/R/U nerves, radial pulse 2+  Tinel's Test - Carpal Tunnel                Neg  Tinel's Test - Cubital Tunnel               Neg  Phalen's Test                                      Neg  Median Nerve Compression Test       Neg     ROM hand/wrist/elbow full, painless     RRR  CTAB  Abd S/NT/ND +BS     Diagnostic Results:     Xray - XR of L wrist showing no acute fractures or abnormalities        ASSESSMENT/PLAN:       45 y.o. yo female with left volar radial ganglion cyst.  Plan: Discussed operative and non-operative treatment options in detail with the patient. The patient would like to move forward with surgery at this time. We will plan for OR on Dec. 4 for left volar radial ganglion cyst removal.          Ryan Lorenzo M.D.     · Please be aware that this note has been generated with the assistance of bill voice-to-text.  Please excuse any spelling or grammatical errors.

## 2020-11-17 ENCOUNTER — OFFICE VISIT (OUTPATIENT)
Dept: BARIATRICS | Facility: CLINIC | Age: 45
End: 2020-11-17
Payer: COMMERCIAL

## 2020-11-17 ENCOUNTER — LAB VISIT (OUTPATIENT)
Dept: URGENT CARE | Facility: CLINIC | Age: 45
End: 2020-11-17
Payer: COMMERCIAL

## 2020-11-17 VITALS
HEART RATE: 79 BPM | RESPIRATION RATE: 16 BRPM | TEMPERATURE: 98 F | OXYGEN SATURATION: 98 % | HEIGHT: 65 IN | BODY MASS INDEX: 25.64 KG/M2 | WEIGHT: 153.88 LBS | DIASTOLIC BLOOD PRESSURE: 50 MMHG | SYSTOLIC BLOOD PRESSURE: 100 MMHG

## 2020-11-17 DIAGNOSIS — R63.5 WEIGHT GAIN: Primary | ICD-10-CM

## 2020-11-17 DIAGNOSIS — Z01.818 PREOP TESTING: ICD-10-CM

## 2020-11-17 DIAGNOSIS — Z98.84 S/P LAPAROSCOPIC SLEEVE GASTRECTOMY: ICD-10-CM

## 2020-11-17 PROCEDURE — U0003 INFECTIOUS AGENT DETECTION BY NUCLEIC ACID (DNA OR RNA); SEVERE ACUTE RESPIRATORY SYNDROME CORONAVIRUS 2 (SARS-COV-2) (CORONAVIRUS DISEASE [COVID-19]), AMPLIFIED PROBE TECHNIQUE, MAKING USE OF HIGH THROUGHPUT TECHNOLOGIES AS DESCRIBED BY CMS-2020-01-R: HCPCS

## 2020-11-17 PROCEDURE — 99213 PR OFFICE/OUTPT VISIT, EST, LEVL III, 20-29 MIN: ICD-10-PCS | Mod: S$GLB,,, | Performed by: PHYSICIAN ASSISTANT

## 2020-11-17 PROCEDURE — 3008F BODY MASS INDEX DOCD: CPT | Mod: CPTII,S$GLB,, | Performed by: PHYSICIAN ASSISTANT

## 2020-11-17 PROCEDURE — 99999 PR PBB SHADOW E&M-EST. PATIENT-LVL IV: ICD-10-PCS | Mod: PBBFAC,,, | Performed by: PHYSICIAN ASSISTANT

## 2020-11-17 PROCEDURE — 99213 OFFICE O/P EST LOW 20 MIN: CPT | Mod: S$GLB,,, | Performed by: PHYSICIAN ASSISTANT

## 2020-11-17 PROCEDURE — 3008F PR BODY MASS INDEX (BMI) DOCUMENTED: ICD-10-PCS | Mod: CPTII,S$GLB,, | Performed by: PHYSICIAN ASSISTANT

## 2020-11-17 PROCEDURE — 99999 PR PBB SHADOW E&M-EST. PATIENT-LVL IV: CPT | Mod: PBBFAC,,, | Performed by: PHYSICIAN ASSISTANT

## 2020-11-17 NOTE — PATIENT INSTRUCTIONS
Helpful tips to survive the holidays:  - Dont save yourself for the meal: Make sure you continue to eat high protein small meals every 3-4 hours to ensure to do not become over-hungry. Avoid temptation by not showing up to a holiday party or gathering hungry.   - Plan ahead. Bring a dish to a party if you know there may not be an appropriate option.   - Choose sugar-free drinks: Stick to water or other sugar-free beverages and make sure you are getting 6-8 cups of fluid each day (but not with meals!). Avoid alcohol, carbonated beverages, and high-fat/high-sugar beverages like hot chocolate and eggnog. Try sugar-free hot cocoa made with skim milk or water, or sugar-free spiced tea to add some holiday flair to your beverage (see sugar-free mulled cider recipe below)  - Take your time: Eat mindfully. Dont graze on food throughout the day. Sit down to enjoy your small meals. Chew slowly and thoroughly. Cut your food into small pieces before eating.  - Listen to your body: Stop eating as soon as you feel full. Do not feel pressured to try certain (or all) foods or to eat all of the food on your plate. Listen to your hunger cues.   - REMEMBER: Make your holidays about spending time with family and friends instead of focusing gatherings around food.  - Keep up your exercise routine: Make sure you continue to get regular exercise throughout the holiday season. Encourage friends and family to be active by taking a walk together after a meal, to look at holiday lights, or to window-shop.    Good Holiday Meal Options:  - Roasted Turkey, NO skin. Use low sodium broth instead of gravy.   - Stuffed Bell Peppers made WITHOUT bread crumbs or Rice. Try using parmesan cheese instead  - Gumbo, NO rice. Try picking out mostly the meat/seafood and vegetables with little broth.   - Green Bean Casserole made with 98% fat free cream of mushroom soup and crushed almonds/pecans instead of fried onions  - Side salad w/ low fat dressing.  Try a different kind of salad maybe use Kale or spinach.   - Roasted non-starchy vegetables like brussel sprouts, broccoli, green beans, zucchini, butternut squash, cauliflower  - Cauliflower Mash (steam or roast cauliflower, puree w/ low fat cheese, dash of fat free milk and 2-3 sprays of I cant believe its not butter spray. Add garlic powder and black pepper to season). Use Low sodium broth instead of gravy.   - Try Loaded Cauliflower Mash (Make cauliflower like above cauliflower mash. Top with diced turkey adams, ¼ cup low fat cheddar cheese and bake @ 350* F for 5-10 minutes, until cheese is melted. Top with minced chives, black pepper and garlic to taste).   - Homemade cranberry sauce using Splenda or another alternative sweetener. Boil fresh cranberries and add splenda to taste. Boil until cranberries break open and then simmer until it reaches the consistency you want (less time for more watery sauce and simmer for longer to create a thicker sauce).   - Deviled eggs: make using low fat wynne, mustard, DILL relish (not sweet relish).   - Vegetable tray w/ Greek yogurt Ranch Dip. Mix 1 packet of [place] ranch dip mix w/ 16 oz low fat plain greek yogurt.     Good Holiday Dessert Options:  - High protein Pumpkin Cheesecake (see recipe below)  - Pumpkin Whip (see recipe below)  - Quest Apple Pie or Cinnamon Roll flavored protein bar (warm in microwave for 10-15 seconds)  - Eggnog Protein shake (see recipe below)  - Fresh fruit w/ low fat cheese  - Sugar-free Jello Parfaits. Layer Red and Green sugar-free jello in cups and top w/ 2 tbsp Sugar-free cool-whip    Pumpkin Cheesecake    8 ounces fat free cream cheese, softened   2 scoops of vanilla protein powder (<4 g sugar per serving)   ¼ tsp Fine salt   2 eggs, at room temperature   1/3 cup fat free sour cream  1/3 cup fat free half and half  1 15 -ounce can pure pumpkin puree   1 tablespoon pumpkin pie spice, plus more for dusting   Unsalted nuts,  crushed  *Add splenda to taste    Directions     1. Preheat the oven to 300 degrees F. Line 18 muffin cups with paper liners. Sprinkle 1 tsp crushed unsalted nuts at the bottom of each of muffin cup liner.     2. In a large bowl, beat the cream cheese, vanilla protein powder and 1/4 teaspoon fine salt on medium-high speed until smooth and creamy, 2 to 3 minutes. Scrape down the sides, reduce speed to low and beat in the eggs, 1 at a time, until combined. Beat in 1/3 cup fat free sour cream and fat free half and half. Stir in the pumpkin puree and pumpkin pie spice until smooth. Divide evenly among cookie-lined paper cups, filling almost all the way to the top.     3. Bake until the filling is just set, 40 to 45 minutes. A sharp knife inserted into the center will come out moist, but clean. Cool completely in tins on a wire rack. Refrigerate until cold, 4 hours, or overnight. Top with a dusting of pumpkin pie spice.    Recipe altered from the following recipe: http://www.[employer]/recipes/food-network-robert/mini-pumpkin-cheesecakes-recipe.print.html?oc=linkback    Pumpkin Whip    Box of sugar-free vanilla pudding  Can of pumpkin puree  Pumpkin Pie spice (sprinkle to taste)  ½ cup of sugar-free Cool Whip    Directions:  Make sugar-free pudding according to package directions using fat free or 1% milk. Stir in pumpkin and cool whip. Add pumpkin pie spice to taste.     Egg Nog Protein shake    8 oz skim or 1% milk  1 scoop vanilla protein powder  1 tbsp sugar-free vanilla pudding mix  ½ tsp butter flavor extract  ½ tsp rum extract  ½ tsp cinnamon     Shake together or blend with ice and serve.     Sugar-Free Mulled Cider    3 oz diet cran-apple juice  6 oz water  1 packet sugar-free apple cider mix  ½ tsp apple pie spice  ½ tsp butter flavor extract  1 tbsp Sugar-free Syrup    Mix together. Warm if needed and serve w/ orange wedge and cinnamon stick.

## 2020-11-17 NOTE — PROGRESS NOTES
BARIATRIC FOLLOW UP:    Chief Complaint   Patient presents with    Weight Gain     HISTORY OF PRESENT ILLNESS: Emilia Coyle is a 45 y.o. female with a Body mass index is 25.61 kg/m². who presents for a 2 year f/u s/p Lap Sleeve with Dr. Aleman on 7/28/2017.  She is doing well and tolerating the diet without difficulty. Diabetes remains resolved. Excellent weight loss. She has no complaints. She is s/p lap yared 12/15/2017. She avoids wheat due to Celiac disease.      Pt states that she has gained weight since the past year. States that she feels better at 132- 140 lbs. States that she lost a lot of people during the pandemic.       Adherent with bariatric vitamins.  Walking the neighborhood and prn at store ( constantly on feet)   DIET:  Regular Bariatric Diet.   Am: 8 oz cup of coffee with protein shake  Lunch: grilled chicken diet lemonade   Dinner: latasha soup ( meat and broth) burger without bun     One to two soft drinks a week   Intermittent sweets    Review of Systems   Constitutional: Negative for chills, fever and malaise/fatigue.   Eyes: Negative for blurred vision and double vision.   Respiratory: Negative for cough and shortness of breath.    Cardiovascular: Negative for chest pain and palpitations.   Gastrointestinal: Positive for heartburn. Negative for abdominal pain, blood in stool, constipation, diarrhea, nausea and vomiting.   Musculoskeletal: Positive for back pain.   Skin: Negative for rash.   Neurological: Positive for headaches (migraine h/o). Negative for dizziness, tingling and weakness.   Psychiatric/Behavioral: Negative.        There were no vitals filed for this visit.  Physical Exam  Vitals signs and nursing note reviewed.   Constitutional:       Appearance: She is well-developed.   HENT:      Head: Normocephalic and atraumatic.   Cardiovascular:      Rate and Rhythm: Normal rate and regular rhythm.   Pulmonary:      Effort: Pulmonary effort is normal.      Breath sounds: Normal breath  sounds.   Abdominal:      General: Bowel sounds are normal. There is no distension.      Palpations: Abdomen is soft. There is no mass.      Tenderness: There is no abdominal tenderness. There is no guarding or rebound.      Hernia: No hernia is present.      Comments: [facility]   Skin:     General: Skin is warm and dry.      Coloration: Skin is not pale.      Findings: No erythema or rash.   Neurological:      Mental Status: She is alert and oriented to person, place, and time.   Psychiatric:         Behavior: Behavior normal.         Thought Content: Thought content normal.         Judgment: Judgment normal.         ASSESSMENT:  - Dysphagia  - Obesity, Body mass index is 25.61 kg/m².,  s/p sleeve gastrectomy on 7/28/2017.  - Estimated goal weight, 185 lbs, which is 50% EWL  - Co-morbidities: MIKAELA (stable), DM2 (resolved)  - weight gain 78 lbs, 87% EWL   - Good Exercise regimen  - Good Vitamin Regimen  - Good Diet    PLAN:  - dietician visit x 3   - bariatric medicine  - Emphasized the importance of regular exercise and adherence to bariatric diet to achieve maximum weight loss.  - Continue vitamin regimen   - No restrictions to exercise.  - Miralax daily for constipation, no fiber.  - No NSAIDs, Tylenol for pain.  - Can swallow whole pills as of 10/28/17.  - RTC in 5 months   - Call the office for any issues.  - Check labs .     15 minute visit, over 50% of time spent counseling patient face to face on diet, exercise, and weight loss.

## 2020-11-18 ENCOUNTER — OFFICE VISIT (OUTPATIENT)
Dept: ORTHOPEDICS | Facility: CLINIC | Age: 45
End: 2020-11-18
Payer: COMMERCIAL

## 2020-11-18 ENCOUNTER — HOSPITAL ENCOUNTER (OUTPATIENT)
Dept: RADIOLOGY | Facility: HOSPITAL | Age: 45
Discharge: HOME | End: 2020-11-18
Attending: ORTHOPAEDIC SURGERY
Payer: COMMERCIAL

## 2020-11-18 VITALS — HEIGHT: 65 IN | BODY MASS INDEX: 25.16 KG/M2 | WEIGHT: 151 LBS

## 2020-11-18 DIAGNOSIS — M51.36 DDD (DEGENERATIVE DISC DISEASE), LUMBAR: ICD-10-CM

## 2020-11-18 DIAGNOSIS — M54.16 LUMBAR RADICULOPATHY: Primary | ICD-10-CM

## 2020-11-18 PROCEDURE — 1125F PR PAIN SEVERITY QUANTIFIED, PAIN PRESENT: ICD-10-PCS | Mod: S$GLB,,, | Performed by: ORTHOPAEDIC SURGERY

## 2020-11-18 PROCEDURE — 72100 X-RAY EXAM L-S SPINE 2/3 VWS: CPT | Mod: 26,,, | Performed by: RADIOLOGY

## 2020-11-18 PROCEDURE — 99999 PR PBB SHADOW E&M-EST. PATIENT-LVL III: ICD-10-PCS | Mod: PBBFAC,,, | Performed by: ORTHOPAEDIC SURGERY

## 2020-11-18 PROCEDURE — 72100 X-RAY EXAM L-S SPINE 2/3 VWS: CPT | Mod: TC

## 2020-11-18 PROCEDURE — 72100 XR LUMBAR SPINE AP AND LAT WITH FLEX/EXT: ICD-10-PCS | Mod: 26,,, | Performed by: RADIOLOGY

## 2020-11-18 PROCEDURE — 1125F AMNT PAIN NOTED PAIN PRSNT: CPT | Mod: S$GLB,,, | Performed by: ORTHOPAEDIC SURGERY

## 2020-11-18 PROCEDURE — 3008F PR BODY MASS INDEX (BMI) DOCUMENTED: ICD-10-PCS | Mod: CPTII,S$GLB,, | Performed by: ORTHOPAEDIC SURGERY

## 2020-11-18 PROCEDURE — 3008F BODY MASS INDEX DOCD: CPT | Mod: CPTII,S$GLB,, | Performed by: ORTHOPAEDIC SURGERY

## 2020-11-18 PROCEDURE — 99999 PR PBB SHADOW E&M-EST. PATIENT-LVL III: CPT | Mod: PBBFAC,,, | Performed by: ORTHOPAEDIC SURGERY

## 2020-11-18 PROCEDURE — 72120 XR LUMBAR SPINE AP AND LAT WITH FLEX/EXT: ICD-10-PCS | Mod: 26,,, | Performed by: RADIOLOGY

## 2020-11-18 PROCEDURE — 99204 OFFICE O/P NEW MOD 45 MIN: CPT | Mod: S$GLB,,, | Performed by: ORTHOPAEDIC SURGERY

## 2020-11-18 PROCEDURE — 99204 PR OFFICE/OUTPT VISIT, NEW, LEVL IV, 45-59 MIN: ICD-10-PCS | Mod: S$GLB,,, | Performed by: ORTHOPAEDIC SURGERY

## 2020-11-18 PROCEDURE — 72120 X-RAY BEND ONLY L-S SPINE: CPT | Mod: 26,,, | Performed by: RADIOLOGY

## 2020-11-18 RX ORDER — MELOXICAM 15 MG/1
15 TABLET ORAL DAILY
Qty: 60 TABLET | Refills: 0 | Status: CANCELLED | OUTPATIENT
Start: 2020-11-18

## 2020-11-18 RX ORDER — MELOXICAM 15 MG/1
15 TABLET ORAL DAILY
Qty: 30 TABLET | Refills: 0 | Status: SHIPPED | OUTPATIENT
Start: 2020-11-18 | End: 2021-02-25

## 2020-11-18 RX ORDER — PREGABALIN 75 MG/1
75 CAPSULE ORAL 2 TIMES DAILY
Qty: 60 CAPSULE | Refills: 6 | Status: SHIPPED | OUTPATIENT
Start: 2020-11-18 | End: 2021-02-25

## 2020-11-18 NOTE — PROGRESS NOTES
DATE: 11/18/2020  PATIENT: Emilia Coyle    Supervising Physician: Kunal Chase M.D.    CHIEF COMPLAINT: low back and R leg pain    HISTORY:  Emilia Cyole is a 45 y.o. female here for initial evaluation of low back and R leg pain (Back - 7, Leg - 7).  The pain in the R buttock/hip is what bothers her most.  The pain has been present for about a year, worsening in the past few months. The patient describes the pain as sharp/stabbing in her lower back with shooting pain down the back of her R leg. The pain is worse with laying down and sitting and improved by standing/walking.  She says if she is moving around and doing things her back/leg feel fine, but once she sits or lays down she experiences the pain. There is positive associated numbness and tingling in R legs and both feet. There is negative subjective weakness. Prior treatments have included biofreeze, TENS unit, home treatment from sister in law who is a PT, tylenol/OTC NSAIDs, but no formal PT, ESIs, surgery. Pt reports a family hx of back pain/surgeries.    The patient denies myelopathic symptoms such as handwriting changes or difficulty with buttons/coins/keys. Denies perineal paresthesias, bowel/bladder dysfunction.    PAST MEDICAL/SURGICAL HISTORY:  Past Medical History:   Diagnosis Date    Celiac disease     Diabetes mellitus     Headache     High triglycerides 7/12/2017    Hypothyroidism     MIKAELA (obstructive sleep apnea)     Sleep apnea in adult     Thyroid disease      Past Surgical History:   Procedure Laterality Date    ABDOMINOPLASTY      ADENOIDECTOMY      BLADDER SUSPENSION      BREAST SURGERY  2008    CHOLECYSTECTOMY  12/2017    COLONOSCOPY  10/27/2017    Normal   (Rtn 10 yrs, Chas Skylar)     COSMETIC SURGERY  2008    tummy tuck, breast reduction, lipo    GASTRECTOMY  07/2017    HYSTERECTOMY      TONSILLECTOMY      TOTAL REDUCTION MAMMOPLASTY      TOTAL THYROIDECTOMY Right     TUBAL LIGATION      TYMPANOSTOMY  TUBE PLACEMENT      UPPER GASTROINTESTINAL ENDOSCOPY  10/27/2017    Gastritis Biopsied, Benign        Medications:   Current Outpatient Medications on File Prior to Visit   Medication Sig Dispense Refill    ALPRAZolam (XANAX) 1 MG tablet Take 1 mg by mouth 2 (two) times daily.  1    biotin 10,000 mcg Cap       hydrOXYzine pamoate (VISTARIL) 50 MG Cap Take 1 capsule (50 mg total) by mouth every 8 (eight) hours as needed. 12 capsule 0    levothyroxine (SYNTHROID) 88 MCG tablet TAKE ONE TABLET BY MOUTH EVERY DAY 90 tablet 3    lisdexamfetamine (VYVANSE) 70 MG capsule Take 70 mg by mouth every morning.      multivitamin (ONE DAILY MULTIVITAMIN) per tablet Take 1 tablet by mouth 2 (two) times daily.       naratriptan (AMERGE) 2.5 MG tablet 2.5 mg at onset of headache, may repeat in 4 hours if needed. Max 2 tabs/day. 9 tablet 5    ondansetron (ZOFRAN-ODT) 8 MG TbDL Take 1 tablet (8 mg total) by mouth every 8 (eight) hours as needed (nausea). 25 tablet 2    polyethylene glycol (GLYCOLAX) 17 gram PwPk MIX ONE PACKET IN LIQUID AND TAKE DAILY AS DIRECTED  3    prasterone, dhea, (INTRAROSA) 6.5 mg Inst Place 6.5 mg vaginally every evening. 84 each 3    promethazine (PHENERGAN) 25 MG suppository Place 1 suppository (25 mg total) rectally every 6 (six) hours as needed for Nausea. 12 suppository 2    sertraline (ZOLOFT) 100 MG tablet Take 200 mg by mouth once daily.      busPIRone (BUSPAR) 5 MG Tab Take 1 tablet (5 mg total) by mouth 2 (two) times daily. 180 tablet 3    progesterone (PROMETRIUM) 100 MG capsule Take 2 capsules (200 mg total) by mouth nightly. 90 capsule 3     Current Facility-Administered Medications on File Prior to Visit   Medication Dose Route Frequency Provider Last Rate Last Dose    onabotulinumtoxina injection 200 Units  200 Units Intramuscular Q90 Days ONESIMO Frederick   200 Units at 09/17/20 1893       Social History:   Social History     Socioeconomic History    Marital status:       Spouse name: Not on file    Number of children: Not on file    Years of education: Not on file    Highest education level: Not on file   Occupational History    Not on file   Social Needs    Financial resource strain: Not hard at all    Food insecurity     Worry: Never true     Inability: Never true    Transportation needs     Medical: No     Non-medical: No   Tobacco Use    Smoking status: Never Smoker    Smokeless tobacco: Never Used   Substance and Sexual Activity    Alcohol use: No     Frequency: Never     Drinks per session: Patient refused     Binge frequency: Never    Drug use: No    Sexual activity: Yes     Partners: Male     Birth control/protection: See Surgical Hx   Lifestyle    Physical activity     Days per week: 3 days     Minutes per session: 60 min    Stress: Only a little   Relationships    Social connections     Talks on phone: More than three times a week     Gets together: Once a week     Attends [employer] service: Not on file     Active member of club or organization: No     Attends meetings of clubs or organizations: Never     Relationship status:    Other Topics Concern    Are you pregnant or think you may be? Not Asked    Breast-feeding Not Asked   Social History Narrative    Not on file       REVIEW OF SYSTEMS:  Constitution: Negative. Negative for chills, fever and night sweats.   Cardiovascular: Negative for chest pain and syncope.   Respiratory: Negative for cough and shortness of breath.   Gastrointestinal: See HPI. Negative for nausea/vomiting. Negative for abdominal pain.  Genitourinary: See HPI. Negative for discoloration or dysuria.  Skin: Negative for dry skin, itching and rash.   Hematologic/Lymphatic: Negative for bleeding problem. Does not bruise/bleed easily.   Musculoskeletal: Negative for falls and muscle weakness.   Neurological: See HPI. No seizures.   Endocrine: Negative for polydipsia, polyphagia and polyuria.   Allergic/Immunologic:  "Negative for hives and persistent infections.     EXAM:  Ht 5' 5" (1.651 m)   Wt 68.5 kg (151 lb)   BMI 25.13 kg/m²     General: The patient is a very pleasant 45 y.o. female in no apparent distress, the patient is oriented to person, place and time.  Psych: Normal mood and affect  HEENT: Vision grossly intact, hearing intact to the spoken word.  Lungs: Respirations unlabored.  Gait: Normal station and gait, no difficulty with toe or heel walk.   Skin: Dorsal lumbar skin negative for rashes, lesions, hairy patches and surgical scars. There is positive generalized lumbar tenderness to palpation.  Range of motion: Lumbar range of motion is acceptable.  Spinal Balance: Global saggital and coronal spinal balance acceptable, not significant for scoliosis and kyphosis.  Musculoskeletal: Pain with the range of motion of the R hip, no pain with ROM of L hip. No trochanteric tenderness to palpation.  Vascular: Bilateral lower extremities warm and well perfused, dorsalis pedis pulses 2+ bilaterally.  Neurological: Normal strength and tone in all major motor groups in the bilateral lower extremities. Normal sensation to light touch in the L2-S1 dermatomes bilaterally.  Deep tendon reflexes symmetric 2+ in the bilateral lower extremities.  Negative Babinski bilaterally. Straight leg raise negative bilaterally.    IMAGING:      Today I personally reviewed AP, Lat and Flex/Ex  upright L-spine films that demonstrate mild degenerative changes at L4-L5 and L5-S1, no spondylolisthesis noted.      Body mass index is 25.13 kg/m².    Hemoglobin A1C   Date Value Ref Range Status   08/10/2018 5.2 4.0 - 5.6 % Final     Comment:     ADA Screening Guidelines:  5.7-6.4%  Consistent with prediabetes  >or=6.5%  Consistent with diabetes  High levels of fetal hemoglobin interfere with the HbA1C  assay. Heterozygous hemoglobin variants (HbS, HgC, etc)do  not significantly interfere with this assay.   However, presence of multiple variants may " affect accuracy.     07/05/2017 5.6 4.0 - 5.6 % Final     Comment:     According to ADA guidelines, hemoglobin A1c <7.0% represents  optimal control in non-pregnant diabetic patients. Different  metrics may apply to specific patient populations.   Standards of Medical Care in Diabetes-2016.  For the purpose of screening for the presence of diabetes:  <5.7%     Consistent with the absence of diabetes  5.7-6.4%  Consistent with increasing risk for diabetes   (prediabetes)  >or=6.5%  Consistent with diabetes  Currently, no consensus exists for use of hemoglobin A1c  for diagnosis of diabetes for children.  This Hemoglobin A1c assay has significant interference with fetal   hemoglobin   (HbF). The results are invalid for patients with abnormal amounts of   HbF,   including those with known Hereditary Persistence   of Fetal Hemoglobin. Heterozygous hemoglobin variants (HbAS, HbAC,   HbAD, HbAE, HbA2) do not significantly interfere with this assay;   however, presence of multiple variants in a sample may impact the %   interference.     03/25/2017 5.8 4.5 - 6.2 % Final     Comment:     According to ADA guidelines, hemoglobin A1C <7.0% represents  optimal control in non-pregnant diabetic patients.  Different  metrics may apply to specific populations.   Standards of Medical Care in Diabetes - 2016.  For the purpose of screening for the presence of diabetes:  <5.7%     Consistent with the absence of diabetes  5.7-6.4%  Consistent with increasing risk for diabetes   (prediabetes)  >or=6.5%  Consistent with diabetes  Currently no consensus exists for use of hemoglobin A1C  for diagnosis of diabetes for children.             ASSESSMENT/PLAN:    There are no diagnoses linked to this encounter.    Today we discussed at length all of the different treatment options including anti-inflammatories, acetaminophen, rest, ice, heat, physical therapy including strengthening and stretching exercises, home exercises, ROM, aerobic  conditioning, aqua therapy, other modalities including ultrasound, massage, and dry needling, epidural steroid injections and finally surgical intervention.      Sent prescriptions for gabapentin and mobic to pt's pharmacy and provided her with home exercises. She is not interested in formal PT at this time, will f/u in clinic in a few weeks if pain does not subside and we will obtain lumbar MRI and discuss possible JENNI.

## 2020-11-19 ENCOUNTER — TELEPHONE (OUTPATIENT)
Dept: ORTHOPEDICS | Facility: CLINIC | Age: 45
End: 2020-11-19

## 2020-11-19 ENCOUNTER — ANESTHESIA EVENT (OUTPATIENT)
Dept: SURGERY | Facility: HOSPITAL | Age: 45
End: 2020-11-19
Payer: COMMERCIAL

## 2020-11-19 LAB — SARS-COV-2 RNA RESP QL NAA+PROBE: NOT DETECTED

## 2020-11-19 RX ORDER — HYDROCODONE BITARTRATE AND ACETAMINOPHEN 5; 325 MG/1; MG/1
1 TABLET ORAL EVERY 4 HOURS PRN
Qty: 20 TABLET | Refills: 0 | Status: SHIPPED | OUTPATIENT
Start: 2020-11-19 | End: 2021-02-25

## 2020-11-19 NOTE — TELEPHONE ENCOUNTER
Spoke with the patient. Notified of 545 AM arrival time to the [facility]. Notified of negative COVID test. Informed of current visitor policy.  Reminded of NPO. Patient verbalized understanding to all.    Johana Peng MS, OT  Orthopedic Clinical Assistant to Dr. Ross Dunbar [facility]

## 2020-11-20 ENCOUNTER — PATIENT MESSAGE (OUTPATIENT)
Dept: BARIATRICS | Facility: CLINIC | Age: 45
End: 2020-11-20

## 2020-11-20 ENCOUNTER — ANESTHESIA (OUTPATIENT)
Dept: SURGERY | Facility: HOSPITAL | Age: 45
End: 2020-11-20
Payer: COMMERCIAL

## 2020-11-20 ENCOUNTER — HOSPITAL ENCOUNTER (OUTPATIENT)
Facility: HOSPITAL | Age: 45
Discharge: HOME | End: 2020-11-20
Attending: ORTHOPAEDIC SURGERY | Admitting: ORTHOPAEDIC SURGERY
Payer: COMMERCIAL

## 2020-11-20 DIAGNOSIS — M67.432 GANGLION CYST OF VOLAR ASPECT OF LEFT WRIST: Primary | ICD-10-CM

## 2020-11-20 PROCEDURE — 71000033 HC RECOVERY, INTIAL HOUR: Performed by: ORTHOPAEDIC SURGERY

## 2020-11-20 PROCEDURE — 25000003 PHARM REV CODE 250: Performed by: NURSE ANESTHETIST, CERTIFIED REGISTERED

## 2020-11-20 PROCEDURE — D9220A PRA ANESTHESIA: ICD-10-PCS | Mod: ,,, | Performed by: ANESTHESIOLOGY

## 2020-11-20 PROCEDURE — 88304 PR  SURG PATH,LEVEL III: ICD-10-PCS | Mod: 26,,, | Performed by: PATHOLOGY

## 2020-11-20 PROCEDURE — 36000706: Performed by: ORTHOPAEDIC SURGERY

## 2020-11-20 PROCEDURE — 27201423 OPTIME MED/SURG SUP & DEVICES STERILE SUPPLY: Performed by: ORTHOPAEDIC SURGERY

## 2020-11-20 PROCEDURE — 76942 ECHO GUIDE FOR BIOPSY: CPT | Performed by: ANESTHESIOLOGY

## 2020-11-20 PROCEDURE — 63600175 PHARM REV CODE 636 W HCPCS: Performed by: NURSE ANESTHETIST, CERTIFIED REGISTERED

## 2020-11-20 PROCEDURE — 94761 N-INVAS EAR/PLS OXIMETRY MLT: CPT

## 2020-11-20 PROCEDURE — 63600175 PHARM REV CODE 636 W HCPCS: Performed by: STUDENT IN AN ORGANIZED HEALTH CARE EDUCATION/TRAINING PROGRAM

## 2020-11-20 PROCEDURE — D9220A PRA ANESTHESIA: ICD-10-PCS | Mod: ,,, | Performed by: NURSE ANESTHETIST, CERTIFIED REGISTERED

## 2020-11-20 PROCEDURE — 94770 HC EXHALED C02 TEST: CPT

## 2020-11-20 PROCEDURE — 99900035 HC TECH TIME PER 15 MIN (STAT)

## 2020-11-20 PROCEDURE — 88304 TISSUE EXAM BY PATHOLOGIST: CPT | Performed by: PATHOLOGY

## 2020-11-20 PROCEDURE — 71000015 HC POSTOP RECOV 1ST HR: Performed by: ORTHOPAEDIC SURGERY

## 2020-11-20 PROCEDURE — 25111 PR EXCIS PRIMARY GANGLION WRIST: ICD-10-PCS | Mod: LT,,, | Performed by: ORTHOPAEDIC SURGERY

## 2020-11-20 PROCEDURE — 36000707: Performed by: ORTHOPAEDIC SURGERY

## 2020-11-20 PROCEDURE — 37000008 HC ANESTHESIA 1ST 15 MINUTES: Performed by: ORTHOPAEDIC SURGERY

## 2020-11-20 PROCEDURE — D9220A PRA ANESTHESIA: Mod: ,,, | Performed by: ANESTHESIOLOGY

## 2020-11-20 PROCEDURE — 25000003 PHARM REV CODE 250: Performed by: ORTHOPAEDIC SURGERY

## 2020-11-20 PROCEDURE — 25111 REMOVE WRIST TENDON LESION: CPT | Mod: LT,,, | Performed by: ORTHOPAEDIC SURGERY

## 2020-11-20 PROCEDURE — 88304 TISSUE EXAM BY PATHOLOGIST: CPT | Mod: 26,,, | Performed by: PATHOLOGY

## 2020-11-20 PROCEDURE — 25000003 PHARM REV CODE 250: Performed by: STUDENT IN AN ORGANIZED HEALTH CARE EDUCATION/TRAINING PROGRAM

## 2020-11-20 PROCEDURE — D9220A PRA ANESTHESIA: Mod: ,,, | Performed by: NURSE ANESTHETIST, CERTIFIED REGISTERED

## 2020-11-20 PROCEDURE — 37000009 HC ANESTHESIA EA ADD 15 MINS: Performed by: ORTHOPAEDIC SURGERY

## 2020-11-20 RX ORDER — CLONIDINE 100 UG/ML
INJECTION, SOLUTION EPIDURAL
Status: DISCONTINUED
Start: 2020-11-20 | End: 2020-11-20 | Stop reason: HOSPADM

## 2020-11-20 RX ORDER — EPINEPHRINE 1 MG/ML
INJECTION, SOLUTION INTRACARDIAC; INTRAMUSCULAR; INTRAVENOUS; SUBCUTANEOUS
Status: DISCONTINUED
Start: 2020-11-20 | End: 2020-11-20 | Stop reason: HOSPADM

## 2020-11-20 RX ORDER — FENTANYL CITRATE 50 UG/ML
25 INJECTION, SOLUTION INTRAMUSCULAR; INTRAVENOUS EVERY 5 MIN PRN
Status: DISCONTINUED | OUTPATIENT
Start: 2020-11-20 | End: 2020-11-20 | Stop reason: HOSPADM

## 2020-11-20 RX ORDER — LIDOCAINE HYDROCHLORIDE 20 MG/ML
INJECTION INTRAVENOUS
Status: DISCONTINUED | OUTPATIENT
Start: 2020-11-20 | End: 2020-11-20

## 2020-11-20 RX ORDER — LIDOCAINE HYDROCHLORIDE 10 MG/ML
INJECTION INFILTRATION; PERINEURAL
Status: DISCONTINUED | OUTPATIENT
Start: 2020-11-20 | End: 2020-11-20 | Stop reason: HOSPADM

## 2020-11-20 RX ORDER — SODIUM CHLORIDE 9 MG/ML
INJECTION, SOLUTION INTRAVENOUS CONTINUOUS PRN
Status: DISCONTINUED | OUTPATIENT
Start: 2020-11-20 | End: 2020-11-20

## 2020-11-20 RX ORDER — MUPIROCIN 20 MG/G
OINTMENT TOPICAL 2 TIMES DAILY
Status: DISCONTINUED | OUTPATIENT
Start: 2020-11-20 | End: 2020-11-20 | Stop reason: HOSPADM

## 2020-11-20 RX ORDER — MUPIROCIN 20 MG/G
OINTMENT TOPICAL
Status: DISCONTINUED | OUTPATIENT
Start: 2020-11-20 | End: 2020-11-20 | Stop reason: HOSPADM

## 2020-11-20 RX ORDER — LIDOCAINE HYDROCHLORIDE 20 MG/ML
INJECTION, SOLUTION EPIDURAL; INFILTRATION; INTRACAUDAL; PERINEURAL
Status: DISCONTINUED
Start: 2020-11-20 | End: 2020-11-20 | Stop reason: HOSPADM

## 2020-11-20 RX ORDER — FENTANYL CITRATE 50 UG/ML
INJECTION, SOLUTION INTRAMUSCULAR; INTRAVENOUS
Status: DISCONTINUED | OUTPATIENT
Start: 2020-11-20 | End: 2020-11-20

## 2020-11-20 RX ORDER — MIDAZOLAM HYDROCHLORIDE 1 MG/ML
INJECTION, SOLUTION INTRAMUSCULAR; INTRAVENOUS
Status: DISCONTINUED | OUTPATIENT
Start: 2020-11-20 | End: 2020-11-20

## 2020-11-20 RX ORDER — DEXAMETHASONE SODIUM PHOSPHATE 10 MG/ML
INJECTION INTRAMUSCULAR; INTRAVENOUS
Status: DISCONTINUED
Start: 2020-11-20 | End: 2020-11-20 | Stop reason: HOSPADM

## 2020-11-20 RX ORDER — MIDAZOLAM HYDROCHLORIDE 1 MG/ML
0.5 INJECTION INTRAMUSCULAR; INTRAVENOUS
Status: DISCONTINUED | OUTPATIENT
Start: 2020-11-20 | End: 2020-11-20 | Stop reason: HOSPADM

## 2020-11-20 RX ORDER — CEFAZOLIN SODIUM 1 G/3ML
2 INJECTION, POWDER, FOR SOLUTION INTRAMUSCULAR; INTRAVENOUS
Status: COMPLETED | OUTPATIENT
Start: 2020-11-20 | End: 2020-11-20

## 2020-11-20 RX ORDER — HYDROCODONE BITARTRATE AND ACETAMINOPHEN 5; 325 MG/1; MG/1
1 TABLET ORAL EVERY 4 HOURS PRN
Status: DISCONTINUED | OUTPATIENT
Start: 2020-11-20 | End: 2020-11-20 | Stop reason: HOSPADM

## 2020-11-20 RX ORDER — ACETAMINOPHEN 500 MG
1000 TABLET ORAL ONCE
Status: DISCONTINUED | OUTPATIENT
Start: 2020-11-20 | End: 2020-11-20

## 2020-11-20 RX ORDER — ACETAMINOPHEN 500 MG
1000 TABLET ORAL ONCE
Status: COMPLETED | OUTPATIENT
Start: 2020-11-20 | End: 2020-11-20

## 2020-11-20 RX ORDER — PROPOFOL 10 MG/ML
VIAL (ML) INTRAVENOUS CONTINUOUS PRN
Status: DISCONTINUED | OUTPATIENT
Start: 2020-11-20 | End: 2020-11-20

## 2020-11-20 RX ADMIN — MIDAZOLAM HYDROCHLORIDE 1 MG: 1 INJECTION, SOLUTION INTRAMUSCULAR; INTRAVENOUS at 06:11

## 2020-11-20 RX ADMIN — SODIUM CHLORIDE: 0.9 INJECTION, SOLUTION INTRAVENOUS at 06:11

## 2020-11-20 RX ADMIN — LIDOCAINE HYDROCHLORIDE 100 MG: 20 INJECTION, SOLUTION INTRAVENOUS at 07:11

## 2020-11-20 RX ADMIN — MUPIROCIN: 20 OINTMENT TOPICAL at 06:11

## 2020-11-20 RX ADMIN — MIDAZOLAM HYDROCHLORIDE 1 MG: 1 INJECTION, SOLUTION INTRAMUSCULAR; INTRAVENOUS at 07:11

## 2020-11-20 RX ADMIN — MIDAZOLAM HYDROCHLORIDE 2 MG: 1 INJECTION, SOLUTION INTRAMUSCULAR; INTRAVENOUS at 06:11

## 2020-11-20 RX ADMIN — FENTANYL CITRATE 50 MCG: 50 INJECTION, SOLUTION INTRAMUSCULAR; INTRAVENOUS at 07:11

## 2020-11-20 RX ADMIN — FENTANYL CITRATE 50 MCG: 50 INJECTION INTRAMUSCULAR; INTRAVENOUS at 06:11

## 2020-11-20 RX ADMIN — PROPOFOL 100 MCG/KG/MIN: 10 INJECTION, EMULSION INTRAVENOUS at 07:11

## 2020-11-20 RX ADMIN — ACETAMINOPHEN 1000 MG: 500 TABLET ORAL at 06:11

## 2020-11-20 RX ADMIN — CEFAZOLIN 2 G: 330 INJECTION, POWDER, FOR SOLUTION INTRAMUSCULAR; INTRAVENOUS at 07:11

## 2020-11-20 NOTE — OP NOTE
DATE OF PROCEDURE: 11/20/2020     COVID-19 attestation:  Due to the nature of this patient's condition and/or the presence of pain, debilitty, and/or dysfunction, proceeding with surgery was indicated.  Furthermore, this patient was treated during the COVID-19 pandemic.  This was discussed with the patient, they are aware of our current policies and procedures, were given the option of delaying their surgery when applicable and if acceptable, and they elect to proceed.  Delaying this patient's surgery greater than 30 days would be detrimental to their care and functional outcome and/or cause additional suffering, pain, discomfort, and/or dysfunction/debility.  This was confirmed and we thus proceeded.             SERVICE:  Orthopedic Surgery.     SURGEON:  Ryan Lorenzo M.D.     FIRST ASSISTANT: SMA Ginette     PREOPERATIVE DIAGNOSIS:    1. Volar ganglion cyst left wrist     POSTOPERATIVE DIAGNOSIS:    2. Same     PROCEDURE(S) PERFORMED:    1. Excisional biopsy left wrist volar ganglion cyst     TOURNIQUET TIME:  10 min at 250mmHg.     ESTIMATED BLOOD LOSS:   15 mL.     IMPLANTS:   none     COMPLICATIONS:  None.     PACKS AND DRAINS:  None.     PATHOLOGIC SPECIMENS:   the excised cyst was sent for pathologic specimen.     ANESTHESIA:  Regional mac     IV FLUIDS: Crystalloid.     CONDITION:  Stable.     MICROBIOLOGY: None.     Indications for Procedure:      The patient has not responded to adequate non operative treatment at this time and/or non operative treatment is not indicated. Thus, the risks, benefits and alternatives to surgery were discussed with the patient in detail.  Specific risks include but are not limited to recurrence, persistent pain, bleeding, infection, vessel and/or nerve damage, pain, numbness, tingling, complex regional pain syndrome, compartment syndrome, failure to return to pre-injury and/or preoperative functional status, scar sensitivity, delayed healing, inability to return to work,  pulley injury, tendon injury, bowstringing, partial and/or incomplete relief of symptoms, weakness, persistence of and/or worsening of symptoms, surgical failure, osteomyelitis, amputation, loss of function, stiffness, functional debility, dysfunction, decreased  strength, need for prolonged postoperative rehabilitation, need for further surgery, deep venous thrombosis, pulmonary embolism, arthritis and death.  The patient states an understanding and wishes to proceed with surgery.   All questions were answered.  No guarantees were implied or stated.  Written informed consent was obtained.     Procedure in detail:     On the date of the operative intervention the patient was evaluated the preoperative holding area.  With her participation the cyst to the left volar wrist was identified and marked as the operative site. This was circled with her participation.  SHe was administered regional anesthesia in taken the operating room and left upper extremities placed on hand table with the patient on OR table. left upper extremity was prepped and draped in usual sterile fashion and time-out was taken and confirmed by all present to confirm the correct patient site procedure administration preoperative antibiotics.  All in agreement so proceeded.  I marked out an approximately 3cm incision overlying the cyst to the left volar wrist with extension into a modified fuentes.  Turn Esmarch was utilized to exsanguinate left upper extremity and tourniquet was raised to 250 mm of mercury.  Skin incision made sharply with a scalpel dissection was carried down through skin and subcutaneous tissues.  The cystic mass in question was readily identified. It was dissected free from all adjacent neurovascular and tendinous structures including the radial artery without any difficulty.  It did have a stalk communicating more deeply to the level the volar wrist capsule which was followed deeply.  The cyst was then circumferentially  isolated and transected deeply where it communicated with the wrist.  The stalk was cauterized to attempt and prevent recurrence.  The cyst was then removed in its entirety and passed off the field for pathology specimen.  The wound was then explored and there was no evidence of any residual or additional cystic or other masses.  Tourniquet was then deflated at which point brisk capillary refill to throughout the left  upper extremity.  Hemostasis was achieved with bipolar electrocautery. Radial artery was intact.  The wound was then irrigated copiously with sterile saline and closed in layered fashion with 4 0 Vicryl followed by 4 0 nylon.  Sterile dressings were then applied consisting of Xeroform 4 x 4 gauze and a wrist brace to left upper extremity.  The patient was then awakened manage she has returned to post anesthesia care in stable condition.  There were no complications and I performed the entire procedure  · Please be aware that this note has been generated with the assistance of [facility] voice-to-text.  Please excuse any spelling or grammatical errors.

## 2020-11-20 NOTE — TRANSFER OF CARE
"Anesthesia Transfer of Care Note    Patient: Emilia Coyle    Procedure(s) Performed: Procedure(s) (LRB):  EXCISION, GANGLION CYST, HAND- LEFT VOLAR RADIAL - hand table (Left)    Patient location: PACU    Anesthesia Type: general    Transport from OR: Transported from OR on 2-3 L/min O2 by NC with adequate spontaneous ventilation    Post pain: adequate analgesia    Post assessment: no apparent anesthetic complications and tolerated procedure well    Post vital signs: stable    Level of consciousness: awake, alert and oriented    Nausea/Vomiting: no nausea/vomiting    Complications: none    Transfer of care protocol was followed      Last vitals:   Visit Vitals  BP (!) 144/60 (BP Location: Right arm, Patient Position: Lying)   Pulse 77   Temp 36.7 °C (98.1 °F) (Oral)   Resp 18   Ht 5' 5" (1.651 m)   Wt 68.5 kg (151 lb)   SpO2 100%   Breastfeeding No   BMI 25.13 kg/m²     "

## 2020-11-20 NOTE — PLAN OF CARE
VSS. Pt able to tolerate oral liquids. Pt denies c/o pain. Dressing intact. Spouse received home meds per bedside delivery. No distress noted. Pt states she is ready for D/C. D/C instructions reviewed with pt and spouse, verbalized understanding. All questions addressed and answered.

## 2020-11-20 NOTE — INTERVAL H&P NOTE
The patient has been examined and the H&P has been reviewed:    I concur with the findings and no changes have occurred since H&P was written.    Surgery risks, benefits and alternative options discussed and understood by patient/family.          Active Hospital Problems    Diagnosis  POA    Ganglion cyst of volar aspect of left wrist [M67.432]  Yes      Resolved Hospital Problems   No resolved problems to display.

## 2020-11-20 NOTE — ANESTHESIA PROCEDURE NOTES
Peripheral Block    Patient location during procedure: pre-op    Reason for block: primary anesthetic   Diagnosis: Left hand surgery   Start time: 11/20/2020 7:40 AM  Timeout: 11/20/2020 7:40 AM   End time: 11/20/2020 7:50 AM    Staffing  Authorizing Provider: Hubert Singh MD  Performing Provider: Hubert Singh MD    Preanesthetic Checklist  Completed: patient identified, site marked, surgical consent, pre-op evaluation, timeout performed, IV checked, risks and benefits discussed and monitors and equipment checked  Peripheral Block  Patient position: supine  Prep: ChloraPrep  Patient monitoring: heart rate, cardiac monitor, continuous pulse ox, continuous capnometry and frequent blood pressure checks  Block type: infraclavicular  Laterality: left  Injection technique: single shot  Needle  Needle type: Stimuplex   Needle gauge: 21 G  Needle length: 4 in  Needle localization: ultrasound guidance   -ultrasound image captured on disc.  Assessment  Injection assessment: local visualized surrounding nerve, negative parasthesia and negative aspiration  Paresthesia pain: none  Heart rate change: no  Slow fractionated injection: yes  Additional Notes  Mepivacaine 1.5% 10 cc, Bupivacaine 0.5% 10cc, Epi 1:200,000, Decadron 1 mg, Clonidine 30 mcg.  Pt tolerated the procedure well.  No radicular pain on paresthesias on needle placement or injection.

## 2020-11-20 NOTE — PROGRESS NOTES
Pre op completed. All concerns addressed. Patient belongings to be placed in locker. Bed in lowest position. Call light within reach.

## 2020-11-20 NOTE — BRIEF OP NOTE
[facility]  Brief Operative Note    Surgery Date: 11/20/2020     Surgeon(s) and Role:     * Ryan Lorenzo MD - Primary    Assisting Surgeon: None    Pre-op Diagnosis:  Ganglion cyst of volar aspect of left wrist [M67.432]    Post-op Diagnosis:  Post-Op Diagnosis Codes:     * Ganglion cyst of volar aspect of left wrist [M67.432]    Procedure(s) (LRB):  EXCISION, GANGLION CYST, HAND- LEFT VOLAR RADIAL - hand table (Left)    Anesthesia: Regional    Description of the findings of the procedure(s): same    Estimated Blood Loss: see op note         Specimens:   Specimen (12h ago, onward)    None            Discharge Note    OUTCOME: Patient tolerated treatment/procedure well without complication and is now ready for discharge.    DISPOSITION: Home or Self Care    FINAL DIAGNOSIS:  Ganglion cyst of volar aspect of left wrist    FOLLOWUP: In clinic    DISCHARGE INSTRUCTIONS:    Discharge Procedure Orders   Diet general     Call MD for:  temperature >100.4     Call MD for:  persistent nausea and vomiting     Call MD for:  severe uncontrolled pain     Call MD for:  difficulty breathing, headache or visual disturbances     Call MD for:  redness, tenderness, or signs of infection (pain, swelling, redness, odor or green/yellow discharge around incision site)     Call MD for:  hives     Call MD for:  persistent dizziness or light-headedness     Call MD for:  extreme fatigue     Remove dressing in 72 hours

## 2020-11-20 NOTE — ANESTHESIA POSTPROCEDURE EVALUATION
Anesthesia Post Evaluation    Patient: Emilia Coyle    Procedure(s) Performed: Procedure(s) (LRB):  EXCISION, GANGLION CYST, HAND- LEFT VOLAR RADIAL - hand table (Left)    Final Anesthesia Type: general    Patient location during evaluation: PACU  Patient participation: Yes- Able to Participate  Level of consciousness: awake and alert and oriented  Post-procedure vital signs: reviewed and stable  Pain management: adequate  Airway patency: patent    PONV status at discharge: No PONV  Anesthetic complications: no      Cardiovascular status: stable  Respiratory status: unassisted  Hydration status: euvolemic  Follow-up not needed.          Vitals Value Taken Time   /71 11/20/20 0815   Temp 36.5 °C (97.7 °F) 11/20/20 0815   Pulse 65 11/20/20 0815   Resp 29 11/20/20 0815   SpO2 99 % 11/20/20 0815         Event Time   Out of Recovery 08:15:09         Pain/Stella Score: Pain Rating Prior to Med Admin: 7 (11/20/2020  6:40 AM)  Stella Score: 10 (11/20/2020  8:00 AM)

## 2020-11-20 NOTE — ANESTHESIA PREPROCEDURE EVALUATION
"[facility]  Anesthesia Pre-Operative Evaluation         Patient Name: Emilia Coyle  YOB: 1975  MRN: [id number]    SUBJECTIVE:             Prev airway:   Lap Sleeve Gastrectomy 7/28/2017  Placement Date: 07/28/17; Placement Time: 0746; Method of Intubation: Direct laryngoscopy; Inserted by: Anesthesia Resident; Airway Device: Endotracheal Tube; Mask Ventilation: Easy; Intubated: Postinduction; Blade: Byron #3; Airway Device Size: 7.0; Style: Cuffed; Cuff Inflation: Minimal occlusive pressure; Inflation Amount: 6; Placement Verified By: Auscultation, Capnometry, ETT Condensation; Grade: Grade I; Complicating Factors: Overbite, Short neck, Anterior larynx; Intubation Findings: Positive EtCO2, Bilateral breath sounds, Atraumatic/Condition of teeth unchanged;  Depth of Insertion: 21; Securment: Lips; Complications: None; Breath Sounds: Equal Bilateral; Insertion Attempts: 1; Removal Date: 07/28/17;  Removal Time: 0946    Oxygen/Ventilation Requirements:  On room air       Current LDA:   None documented.       Current Drips:  None documented.      Patient Active Problem List   Diagnosis    Celiac disease    S/P laparoscopic sleeve gastrectomy    Hypothyroid    Obsessive-compulsive disorder    Intractable migraine without status migrainosus    Vitamin D deficiency    Chronic migraine    Fatigue    Ganglion cyst of volar aspect of left wrist       Review of patient's allergies indicates:   Allergen Reactions    Gluten protein Hives, Diarrhea, Itching, Nausea And Vomiting, Swelling, Anxiety and Dermatitis    Latex, natural rubber Dermatitis     Latex gloves with Powder    Rizatriptan Photosensitivity, Nausea Only and Other (See Comments)     "Feels like my brain is on fire."    Sumatriptan Photosensitivity, Nausea Only and Other (See Comments)     "Feels like my brain is on fire."    Zolmitriptan Photosensitivity, Nausea Only and Other (See Comments)     "Feels like my " "brain is on fire."       Outpatient Medications:  Current Facility-Administered Medications on File Prior to Visit   Medication Dose Route Frequency Provider Last Rate Last Dose    acetaminophen tablet 1,000 mg  1,000 mg Oral Once Caren Perez MD        ceFAZolin injection 2 g  2 g Intravenous On Call Procedure Parish Yun MD        cloNIDine 1,000 mcg/10 mL (100 mcg/mL) injection             dexAMETHasone sodium phos (PF) 10 mg/mL injection             EPINEPHrine (ADRENALIN) 1 mg/mL (1 mL) injection             fentaNYL injection 25 mcg  25 mcg Intravenous Q5 Min PRN Caren Perez MD        midazolam (VERSED) 1 mg/mL injection 0.5 mg  0.5 mg Intravenous PRN Caren Perez MD        mupirocin 2 % ointment   Nasal On Call Procedure Parish Yun MD         Current Outpatient Medications on File Prior to Visit   Medication Sig Dispense Refill    ALPRAZolam (XANAX) 1 MG tablet Take 1 mg by mouth 2 (two) times daily.  1    biotin 10,000 mcg Cap       busPIRone (BUSPAR) 5 MG Tab Take 1 tablet (5 mg total) by mouth 2 (two) times daily. 180 tablet 3    HYDROcodone-acetaminophen (NORCO) 5-325 mg per tablet Take 1 tablet by mouth every 4 (four) hours as needed for Pain. 20 tablet 0    hydrOXYzine pamoate (VISTARIL) 50 MG Cap Take 1 capsule (50 mg total) by mouth every 8 (eight) hours as needed. 12 capsule 0    levothyroxine (SYNTHROID) 88 MCG tablet TAKE ONE TABLET BY MOUTH EVERY DAY 90 tablet 3    lisdexamfetamine (VYVANSE) 70 MG capsule Take 70 mg by mouth every morning.      meloxicam (MOBIC) 15 MG tablet Take 1 tablet (15 mg total) by mouth once daily. 30 tablet 0    multivitamin (ONE DAILY MULTIVITAMIN) per tablet Take 1 tablet by mouth 2 (two) times daily.       naratriptan (AMERGE) 2.5 MG tablet 2.5 mg at onset of headache, may repeat in 4 hours if needed. Max 2 tabs/day. 9 tablet 5    ondansetron (ZOFRAN-ODT) 8 MG TbDL Take 1 tablet (8 mg total) by mouth every 8 (eight) " hours as needed (nausea). 25 tablet 2    polyethylene glycol (GLYCOLAX) 17 gram PwPk MIX ONE PACKET IN LIQUID AND TAKE DAILY AS DIRECTED  3    prasterone, dhea, (INTRAROSA) 6.5 mg Inst Place 6.5 mg vaginally every evening. 84 each 3    pregabalin (LYRICA) 75 MG capsule Take 1 capsule (75 mg total) by mouth 2 (two) times daily. 60 capsule 6    progesterone (PROMETRIUM) 100 MG capsule Take 2 capsules (200 mg total) by mouth nightly. 90 capsule 3    promethazine (PHENERGAN) 25 MG suppository Place 1 suppository (25 mg total) rectally every 6 (six) hours as needed for Nausea. 12 suppository 2    sertraline (ZOLOFT) 100 MG tablet Take 200 mg by mouth once daily.          Current Inpatient Medications:      Past Surgical History:   Procedure Laterality Date    ABDOMINOPLASTY      ADENOIDECTOMY      BLADDER SUSPENSION      BREAST SURGERY  2008    CHOLECYSTECTOMY  12/2017    COLONOSCOPY  10/27/2017    Normal   (Rtn 10 yrs, Chas Skylar)     COSMETIC SURGERY  2008    tummy tuck, breast reduction, lipo    GASTRECTOMY  07/2017    HYSTERECTOMY      TONSILLECTOMY      TOTAL REDUCTION MAMMOPLASTY      TOTAL THYROIDECTOMY Right     TUBAL LIGATION      TYMPANOSTOMY TUBE PLACEMENT      UPPER GASTROINTESTINAL ENDOSCOPY  10/27/2017    Gastritis Biopsied, Benign        Social History     Socioeconomic History    Marital status:      Spouse name: Not on file    Number of children: Not on file    Years of education: Not on file    Highest education level: Not on file   Occupational History    Not on file   Social Needs    Financial resource strain: Not hard at all    Food insecurity     Worry: Never true     Inability: Never true    Transportation needs     Medical: No     Non-medical: No   Tobacco Use    Smoking status: Never Smoker    Smokeless tobacco: Never Used   Substance and Sexual Activity    Alcohol use: No     Frequency: Never     Drinks per session: Patient refused     Binge frequency:  Never    Drug use: No    Sexual activity: Yes     Partners: Male     Birth control/protection: See Surgical Hx   Lifestyle    Physical activity     Days per week: 3 days     Minutes per session: 60 min    Stress: Only a little   Relationships    Social connections     Talks on phone: More than three times a week     Gets together: Once a week     Attends Orthodox service: Not on file     Active member of club or organization: No     Attends meetings of clubs or organizations: Never     Relationship status:    Other Topics Concern    Are you pregnant or think you may be? Not Asked    Breast-feeding Not Asked   Social History Narrative    Not on file       OBJECTIVE:   Weight:  Wt Readings from Last 4 Encounters:   20 68.5 kg (151 lb)   20 69.8 kg (153 lb 14.1 oz)   20 67.6 kg (149 lb)   20 67.9 kg (149 lb 12.8 oz)       Vital Signs Range (Last 24H):         CBC:   No results for input(s): WBC, RBC, HGB, HCT, PLT, MCV, MCH, MCHC in the last 72 hours.    CMP: No results for input(s): NA, K, CL, CO2, BUN, CREATININE, GLU, MG, PHOS, CALCIUM, ALBUMIN, PROT, ALKPHOS, ALT, AST, BILITOT in the last 72 hours.    INR:  No results for input(s): INR, PROTIME, APTT in the last 72 hours.    Diagnostic Studies:  HIDA scan 2017  1.No evidence of acute cholecystitis.  2.No evidence of chronic cholecystitis.  3.The common bile duct and the cystic duct are patent.    EK2017  Vent. Rate : 058 BPM     Atrial Rate : 058 BPM     P-R Int : 162 ms          QRS Dur : 086 ms      QT Int : 418 ms       P-R-T Axes : 009 002 -09 degrees     QTc Int : 410 ms    Sinus bradycardia with sinus arrhythmia  Otherwise normal ECG  When compared with ECG of 2017 08:31,  Previous ECG has undetermined rhythm, needs review  Inverted T waves have replaced nonspecific T wave abnormality in Inferior  leads  T wave inversion now evident in Anterior leads     2D Echo:  No results found for this or any  previous visit.    Dobutamine Stress Test 4/12/2017  1. The EKG portion of this study is negative for ischemia at a peak heart rate of 146 bpm (86% of predicted).   2. Blood pressure remained stable throughout the protocol  (Presenting BP: 102/66 Peak BP: 136/60).   3. No significant arrhythmias were present.   4. There were no symptoms of chest discomfort or significant dyspnea throughout the protocol.     1 - Normal left ventricular systolic function (EF 55-60%).     2 - Normal left ventricular diastolic function.     3 - Normal right ventricular systolic function .     No evidence of stress induced myocardial ischemia.       ASSESSMENT/PLAN:         Pre-op Assessment    I have reviewed the Patient Summary Reports.     I have reviewed the Nursing Notes.    I have reviewed the Medications.     Review of Systems  Anesthesia Hx:  History of prior surgery of interest to airway management or planning: Denies Family Hx of Anesthesia complications.   Denies Personal Hx of Anesthesia complications.   Social:  Non-Smoker    Cardiovascular:   Denies MI.  Denies CAD.    Denies CABG/stent.   Denies Angina.    Pulmonary:   Denies Pneumonia Denies COPD. Sleep Apnea    Hepatic/GI:   GERD, well controlled    Endocrine:   Diabetes, well controlled Hypothyroidism        Physical Exam  General:  Obesity    Airway/Jaw/Neck:  Airway Findings: Mouth Opening: Normal Tongue: Normal  General Airway Assessment: Adult  Mallampati: I  Improves to I with phonation.  TM Distance: Normal, at least 6 cm     Eyes/Ears/Nose:  EYES/EARS/NOSE FINDINGS: Normal   Dental:  Dental Findings: In tact   Chest/Lungs:  Chest/Lungs Findings: Clear to auscultation, Normal Respiratory Rate     Heart/Vascular:  Heart Findings: Rate: Normal  Rhythm: Regular Rhythm        Mental Status:  Mental Status Findings: Normal        Anesthesia Plan  Type of Anesthesia, risks & benefits discussed:  Anesthesia Type:  general, MAC, regional  Patient's Preference:   Intra-op  Monitoring Plan: standard ASA monitors  Intra-op Monitoring Plan Comments:   Post Op Pain Control Plan:   Post Op Pain Control Plan Comments:   Induction:   IV  Beta Blocker:  Patient is not currently on a Beta-Blocker (No further documentation required).       Informed Consent: Patient understands risks and agrees with Anesthesia plan.  Questions answered. Anesthesia consent signed with patient.  ASA Score: 3     Day of Surgery Review of History & Physical:    H&P update referred to the surgeon.         Ready For Surgery From Anesthesia Perspective.

## 2020-11-22 ENCOUNTER — PATIENT MESSAGE (OUTPATIENT)
Dept: ADMINISTRATIVE | Facility: OTHER | Age: 45
End: 2020-11-22

## 2020-11-23 VITALS
HEART RATE: 65 BPM | OXYGEN SATURATION: 99 % | WEIGHT: 151 LBS | HEIGHT: 65 IN | TEMPERATURE: 98 F | DIASTOLIC BLOOD PRESSURE: 71 MMHG | BODY MASS INDEX: 25.16 KG/M2 | RESPIRATION RATE: 29 BRPM | SYSTOLIC BLOOD PRESSURE: 118 MMHG

## 2020-11-24 ENCOUNTER — CLINICAL SUPPORT (OUTPATIENT)
Dept: BARIATRICS | Facility: CLINIC | Age: 45
End: 2020-11-24
Payer: COMMERCIAL

## 2020-11-24 ENCOUNTER — PATIENT MESSAGE (OUTPATIENT)
Dept: BARIATRICS | Facility: CLINIC | Age: 45
End: 2020-11-24

## 2020-11-24 VITALS — WEIGHT: 151 LBS | BODY MASS INDEX: 25.13 KG/M2

## 2020-11-24 DIAGNOSIS — Z71.3 DIETARY COUNSELING: ICD-10-CM

## 2020-11-24 DIAGNOSIS — Z98.84 S/P LAPAROSCOPIC SLEEVE GASTRECTOMY: ICD-10-CM

## 2020-11-24 PROCEDURE — 99499 UNLISTED E&M SERVICE: CPT | Mod: 95,,, | Performed by: DIETITIAN, REGISTERED

## 2020-11-24 PROCEDURE — 99499 NO LOS: ICD-10-PCS | Mod: 95,,, | Performed by: DIETITIAN, REGISTERED

## 2020-11-24 PROCEDURE — 97803 MED NUTRITION INDIV SUBSEQ: CPT | Mod: 95,,, | Performed by: DIETITIAN, REGISTERED

## 2020-11-24 PROCEDURE — 97803 PR MED NUTR THER, SUBSQ, INDIV, EA 15 MIN: ICD-10-PCS | Mod: 95,,, | Performed by: DIETITIAN, REGISTERED

## 2020-11-24 NOTE — PROGRESS NOTES
The patient location is: work ([place])  The chief complaint leading to consultation is: s/p bariatric surgery    Visit type: audiovisual    Face to Face time with patient: 60 min  60 minutes of total time spent on the encounter, which includes face to face time and non-face to face time preparing to see the patient (eg, review of tests), Obtaining and/or reviewing separately obtained history, Documenting clinical information in the electronic or other health record, Independently interpreting results (not separately reported) and communicating results to the patient/family/caregiver, or Care coordination (not separately reported).         Each patient to whom he or she provides medical services by telemedicine is:  (1) informed of the relationship between the physician and patient and the respective role of any other health care provider with respect to management of the patient; and (2) notified that he or she may decline to receive medical services by telemedicine and may withdraw from such care at any time.    NUTRITION NOTE    Referring Physician: Laith Aleman M.D.  Reason for MNT Referral: Established pt follow-up with nutrition questions s/p Gastric Sleeve    Patient presents for follow up 3 years s/p Gastric Sleeve. States she is unhappy with 10-15 lbs recent weight gain, feeling bloated, clothes fitting tighter. States she is still on track with bariatric diet plan 90% of the time. Occ sips on regular or diet soda, once it goes flat. Otherwise she drinks water, diet tea with Splenda, coffee with protein shake added. Still trying to meet her protein needs each day. Includes 1-2 Premier shakes daily. Focuses on lean protein, veg and fruits. Eats starchy CHO 2-3 times/week (slice of bread, 3-4 fries). Eats ice cream twice/week (mochi 110 rich and 12g sugar).     Admits she has not been on her regular exercise routine and not sleeping well at night. Not drinking enough water some days. She admits her ,  who had gastric bypass surgery, is way off track with his diet. She states that she this does not affect her and she will continue to do what she needs to do.    States her happy weight is 140 lbs. States she does not want to keep gaining weight back.    CLINICAL DATA:  45 y.o. female. 5 ft. 5 in.    Current Weight: 151 lbs  Total weight loss: 78 lbs  Ideal Body Weight: 141 lbs  Body mass index is 25.13 kg/m².   EWL: 87%    NUTRITIONAL NEEDS: Bariatric Surgery  800-1000 Calories    Grams Protein    CURRENT DIET:  Bariatric diet. Celiac.  Diet Recall: Food records are not present.    - coffee with Premier shake  L: [place] restaurant: 2oz pork chop, a few slices fried plantains (no rice, saved beans and sausage for tomorrow)  - 2 kiwis  - 2-3 floz soda  - decaf coffee with Splenda and powder creamer  - 1oz cheese  - 7pm: 2 eggs, 2 slices adams, 1 tbsp grated cheese   - 8:30pm: finished omelette    Diet Includes:   Meal Pattern:   Protein Supplements: 1-2 per day. Premier shakes.  Snacking: Adequate. Snacks include healthy choices.  Vegetables: Likes a variety. Eats almost daily.  Fruits: Likes a variety. Eats daily.  Beverages: water, diet soda, diet tea and coffee without sugar  Dining Out: 1-2 times Weekly. Mostly fast food, restaurants and take-out. (meat, fish, beans, vegetables)  Cooking at home: Daily. Weekly. Mostly baked, grilled and smothered meat, fish and vegetables.  Starchy CHO: 0-2 serv/week (1/2 sandwich)  Sweets: 2/week. My mochi frozen ice cream treat.    CURRENT EXERCISE:  Usual routine: walking 45 min 3-4 times/week, bike riding, yard work, [facility] 9,000-12,000 steps/day.    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  Alcohol: None.  Smoking: None.    ASSESSMENT:  Doing well.  Exceeded expected weight loss goal with bariatric surgery (87% EWL)  Adequate protein intake most days.  Inadequate fluid intake some days.  Following diet inappropriately some days.  Decreased exercise.  Vit/min    Barriers to Education:  none  Stage of Change:  action    NUTRITION DIAGNOSIS:  Obesity related to Excessive carbohydrate intake as evidence by BMI.  Status: Resolved    PLAN:  Weight maintenance.  Focus on clothes fitting comfortably by replacing fat with lean muscle.     Diet: Maintain bariatric diet plan.  - Limit/avoid bread, french fries, ice cream, soda to maximize weight loss    Vit/Min:  - needs additional 2000 IU Vit D for moderate deficiency Sept 2020  - assure taking super b-complex with 50mg thiamine  - increase B12 sub to 3 times/week    Exercise: Resume routine as murtaza with arm in cast  - Goal 4 days/week, 1 hr/day. Cardio + weights.    Behavior Modification: Begin to document food & activity logs daily. [facility].    Discussed with pt that we would like to schedule a f/u visit with her  if he is interested, to help him get back on track with his eating habits.     Pt requests to f/u in 3 weeks for support in achieving goals set.    Communicated nutrition plan with bariatric team.    SESSION TIME:  60 minutes

## 2020-11-27 ENCOUNTER — PATIENT MESSAGE (OUTPATIENT)
Dept: ADMINISTRATIVE | Facility: OTHER | Age: 45
End: 2020-11-27

## 2020-11-27 LAB
FINAL PATHOLOGIC DIAGNOSIS: NORMAL
GROSS: NORMAL
Lab: NORMAL

## 2020-12-07 ENCOUNTER — OFFICE VISIT (OUTPATIENT)
Dept: ORTHOPEDICS | Facility: CLINIC | Age: 45
End: 2020-12-07
Payer: COMMERCIAL

## 2020-12-07 VITALS
HEART RATE: 70 BPM | DIASTOLIC BLOOD PRESSURE: 65 MMHG | BODY MASS INDEX: 25.16 KG/M2 | SYSTOLIC BLOOD PRESSURE: 95 MMHG | WEIGHT: 151 LBS | HEIGHT: 65 IN

## 2020-12-07 DIAGNOSIS — M67.432 GANGLION CYST OF VOLAR ASPECT OF LEFT WRIST: Primary | ICD-10-CM

## 2020-12-07 PROCEDURE — 1126F AMNT PAIN NOTED NONE PRSNT: CPT | Mod: S$GLB,,, | Performed by: PHYSICIAN ASSISTANT

## 2020-12-07 PROCEDURE — 99999 PR PBB SHADOW E&M-EST. PATIENT-LVL V: CPT | Mod: PBBFAC,,, | Performed by: PHYSICIAN ASSISTANT

## 2020-12-07 PROCEDURE — 1126F PR PAIN SEVERITY QUANTIFIED, NO PAIN PRESENT: ICD-10-PCS | Mod: S$GLB,,, | Performed by: PHYSICIAN ASSISTANT

## 2020-12-07 PROCEDURE — 3008F BODY MASS INDEX DOCD: CPT | Mod: CPTII,S$GLB,, | Performed by: PHYSICIAN ASSISTANT

## 2020-12-07 PROCEDURE — 99999 PR PBB SHADOW E&M-EST. PATIENT-LVL V: ICD-10-PCS | Mod: PBBFAC,,, | Performed by: PHYSICIAN ASSISTANT

## 2020-12-07 PROCEDURE — 99024 PR POST-OP FOLLOW-UP VISIT: ICD-10-PCS | Mod: S$GLB,,, | Performed by: PHYSICIAN ASSISTANT

## 2020-12-07 PROCEDURE — 99024 POSTOP FOLLOW-UP VISIT: CPT | Mod: S$GLB,,, | Performed by: PHYSICIAN ASSISTANT

## 2020-12-07 PROCEDURE — 3008F PR BODY MASS INDEX (BMI) DOCUMENTED: ICD-10-PCS | Mod: CPTII,S$GLB,, | Performed by: PHYSICIAN ASSISTANT

## 2020-12-07 NOTE — PATIENT INSTRUCTIONS
POST OPERATIVE VISIT INSTRUCTIONS    - no soaking the incision for at least 7-10 days, you may get it wet in the shower and use regular soap and water 1-2 days after suture removal    To avoid a hard, painful scar, we recommend you use Mederma and/or Silicone Scar patches. You may also use Cecelia Butter or Vitamin E oil.    You may start this 1 week after stitches are removed.   - Mederma scar cream : scar massage over incision 1-2 times per day. May use topical lotion or Vitamin E oil for massage an additional few times a day to help the scar become soft and less sensitive  - Silicone Scar Patches: cut and place over the incision, then massage over the patch  to help with scarring and sensitivity. Can be reused up to 4 days if you rinse it clean, let it dry out, then reapply    -Brands:  Mepiform Silicone Scar Sheets (Recommended), Scar Away,    Target Brand or Generic Pharmacy Brand ([facility], [facility], [facility])      - Continue Range of motion exercises as instructed     - May take Tylenol 500 mg and/or Ibuprofen 400mg every 4-6 hours with food for pain as tolerated as long as you do not have an allergy or medical condition that prevents you from taking them    - Therapy recommended: 1 visit/week    - Immobilization: regular use of the brace, use for activity - will wean out    - Lifting Restrictions: Limit of 2-5 lbs x 2 weeks, gradually increase    - Follow up: 4 weeks    -contact us immediately at [phone] if you have any concerns about infection. Please let them know that you are a post operative patient.

## 2020-12-07 NOTE — PROGRESS NOTES
"Ms. Coyle is here today for a post-operative visit.  She is 17 days status post Excisional biopsy left wrist volar ganglion cyst by Dr. Lorenzo on 11/20/2020. She reports that she is doing well.  Pain is mild.  She is not taking pain medication.  She denies fever, chills, and sweats since the time of the surgery.     PATHOLOGY:  1. Soft tissue, left wrist, cyst, excision:  - Ganglion cyst    Physical exam:    Vitals:    12/07/20 1047   BP: 95/65   Pulse: 70   Weight: 68.5 kg (151 lb)   Height: 5' 5" (1.651 m)   PainSc: 0-No pain     Vital signs are stable, patient is afebrile.  Patient is well dressed and well groomed, no acute distress.  Alert and oriented to person, place, and time.  Post op dressing taken down.  Incision is clean, dry, and intact.  There is no erythema or exudate.  There is no sign of any infection. She is NVI. Sutures removed without difficulty.  Continue bracing.    Assessment: 17 days status post Excisional biopsy left wrist volar ganglion cyst    Plan:  Emilia was seen today for post-op evaluation.    Diagnoses and all orders for this visit:    Ganglion cyst of volar aspect of left wrist    Other orders  -     Ambulatory referral/consult to Physical/Occupational Therapy        - PO instruction reviewed and provided to patient  - Orders for OT  - Discussed use of brace  - Discussed lifting limitations  - Pathology report was reviewed with the patient and the patient received a copy of the report  - Follow up in 4 weeks  - Call with questions or concerns      "

## 2020-12-10 ENCOUNTER — OFFICE VISIT (OUTPATIENT)
Dept: PHYSICAL MEDICINE AND REHAB | Facility: CLINIC | Age: 45
End: 2020-12-10
Payer: COMMERCIAL

## 2020-12-10 DIAGNOSIS — F41.9 ANXIETY: ICD-10-CM

## 2020-12-10 DIAGNOSIS — G43.019 INTRACTABLE MIGRAINE WITHOUT AURA AND WITHOUT STATUS MIGRAINOSUS: ICD-10-CM

## 2020-12-10 DIAGNOSIS — M54.16 LUMBAR RADICULOPATHY: ICD-10-CM

## 2020-12-10 DIAGNOSIS — Z98.84 S/P LAPAROSCOPIC SLEEVE GASTRECTOMY: ICD-10-CM

## 2020-12-10 PROCEDURE — 64615 PR CHEMODENERVATION OF MUSCLE FOR CHRONIC MIGRAINE: ICD-10-PCS | Mod: S$GLB,,, | Performed by: NURSE PRACTITIONER

## 2020-12-10 PROCEDURE — 99999 PR PBB SHADOW E&M-EST. PATIENT-LVL II: CPT | Mod: PBBFAC,,, | Performed by: NURSE PRACTITIONER

## 2020-12-10 PROCEDURE — 99999 PR PBB SHADOW E&M-EST. PATIENT-LVL II: ICD-10-PCS | Mod: PBBFAC,,, | Performed by: NURSE PRACTITIONER

## 2020-12-10 PROCEDURE — 99499 UNLISTED E&M SERVICE: CPT | Mod: S$GLB,,, | Performed by: NURSE PRACTITIONER

## 2020-12-10 PROCEDURE — 99499 NO LOS: ICD-10-PCS | Mod: S$GLB,,, | Performed by: NURSE PRACTITIONER

## 2020-12-10 PROCEDURE — 64615 CHEMODENERV MUSC MIGRAINE: CPT | Mod: S$GLB,,, | Performed by: NURSE PRACTITIONER

## 2020-12-10 RX ORDER — NARATRIPTAN 2.5 MG/1
TABLET ORAL
Qty: 9 TABLET | Refills: 11 | Status: SHIPPED | OUTPATIENT
Start: 2020-12-10 | End: 2021-08-11

## 2020-12-10 NOTE — PROGRESS NOTES
SUBJECTIVE:  Patient ID: Emilia Coyle  Chief Complaint: Follow-up and Botulinum Toxin Injection    History of Present Illness:  Emilia Coyle is a 45 y.o. female who presents to clinic alone for follow-up of headaches and Botox injections.     Recommendations made at last Office Visit on 9/17/2020:  - Botox administered in clinic for Chronic Migraine (see below)   - For migraine abortive - has naratriptan 2.5 mg tabs available   - For nausea - has zofran 8 mg odt and phenergan 25 mg suppository available   - OCD/Anxiety - stopped cymbalta due to intolerable side effects, restarted zoloft 200 mg daily, continue regular f/up with psychiatrist for management   - RTC in 12 weeks for repeat Botox injections or sooner if needed      12/10/2020- Interval History:  Sophia presents to clinic for 3rd round of Botox today, she has continued to experience a significant improvement in her migraines.  Currently experiencing 12 total headache days per month with only 6 moderately intense migraines.  She is able to manage the majority of her headaches with tylenol, only occasionally will need naratriptan for more intense migraines.  Reports migraines began to occur more frequently about 10 days ago.  She is very pleased with the improvement in her migraines since starting botox and does wish to continue with injections today.     Otherwise, information below is still accurate and current.     09/17/2020- Interval History:  Sophia presents today for second round of Botox for chronic migraine, reports following first round of Botox she has already seen an improvement in the frequency and intensity of her migraines.  Prior to Botox, was suffering with headaches on 30/30 days with full blown migraines on 12-16 days per month.  She is no longer experiencing a headache daily!  Tracked 23/30 headache days in July, 19/30 headache days in August and so far 12 headache days in September.  She feels 50% of headache days are migrainous.  She does  "feel naratriptan has been effective for migraine abortive, rarely has to repeat dose to get full relief from her migraine.  Started cymbalta, however experienced intolerable side effects of increased bruising and headaches, psychiatrist recommended she d/c cymbalta and restart zoloft.  She denies presence of side effects from Botox injections and is very encouraged based on the results after first round and wishes to continue with Botox.       Otherwise, information below is still accurate and current.      06/30/2020- Interval History:  She has continued to suffer with near daily headaches with full blown migraines at least 3-4 days per week.  Though yesterday was a "good day", only a slight headache.  Spoke with Dr. Kendrick her psychiatrist, plans to switch from Zoloft 200 mg to Cymbalta, will gradually taper off zoloft and titrate cymbalta from 60 mg to 120 mg.  Was unable to get [employer] approved due to concomitant Botox.  She has not taken any Fioricet in about a week.  She did not feel nurtec was effective.    Treatment plan was discussed with the patient.  Careful procedural explanation of the risks, benefits and alternative methods of treatment were discussed. The details of the technical aspects of the procedure were discussed. All the patients questions were answered to the patients satisfaction. The patient understood and wished to proceed with initiating Botox injections for chronic migraine.     Otherwise, information below is still accurate and current.      History of Present Illness:   44 y.o. female with chronic migraine, OCD, anxiety, hypothyroidism, and s/p sleeve gastrectomy, who presents to clinic alone for evaluation of headaches.  Migraines initially began at 6 yo, however have become more frequent since January/February 2020, currently experiencing headaches/migraines on at least 4-5 days per week.  Headaches are described as a moderate to severe, pressure, pounding, throbbing pain located " "mid-frontal extending to the top of her head, can be occipitally located as well.  Headaches can last anywhere from "a couple of hours" , but can last up to 2 weeks in duration.  Pain can be rated anywhere from 2 to 10 out 10, intensifying to peak over 1-2 hours.  Associated symptoms include scalp allodynia, sinus pressure, nausea, vomiting (only on 1/10 headaches), photophobia, phonophobia, light headed.  When she gets a migraine she has to lie down in a cold, dark, quiet room.  Migraines more often than not begin during the daytime, bu oli woken her from sleep.  Currently experiencing some sort of headache on 23/30 days, with migraines occurring on 12-15/30 days.  Migraines can be proceeded by aura symptoms.  She has seen two Neurologists in the past, she has tried topiramate, gabapentin, magnesium, and vitamin b2 for migraine prevention none of which she felt had much effect.  She has tried sumatriptan, maxalt, and zomig for migraine abortive which caused intolerable side effects.  She is currently treating her migraines with Fioricet, which is somewhat effective, felt fiorinal was more effective in the past, however can no longer take fiorinal due to aspirin and is s/p sleeve gastrectomy.   Triggers - certain smells (gardenias)   Aggravating Factors - smell, noise, movement   Alleviating Factors - sleep, fioricet, sitting in the sun   Head Trauma? Infection? Fever? Cancer? Pregnancy? <-- denies    Recent Changes - denies   Sleep - typically sleep 5-7 hours per night, only sometimes feels rested in the morning, sleep pattern    Family Hx of Migraines <-- mom and dad, maternal aunt    Positives in bold: Hx of Kidney Stones, asthma, GI bleed, osteoporosis, CAD/MI, CVA/TIA, DM      Treatments Tried and Response  Sumatriptan - ineffective   Maxalt/zomig - "makes me feel like my head is on fire"  Fioricet   Atenolol   Topiramate - took for about 9 months, no improvement   Vitamin b2   Magnesium   Sertraline "   Gabapentin   Will avoid use of all anti-hypertensive medications as patients BP and HR run low  Zofran   Phenergan suppository   Nurtec - no improvement   Medrol dosepack - unsure if it has had any effect   cymbalta - caused intolerable side effects   Botox - effective   Naratriptan - helps     Current Medications:    ALPRAZolam (XANAX) 1 MG tablet, Take 1 mg by mouth 2 (two) times daily., Disp: , Rfl: 1    biotin 10,000 mcg Cap, , Disp: , Rfl:     busPIRone (BUSPAR) 5 MG Tab, Take 1 tablet (5 mg total) by mouth 2 (two) times daily., Disp: 180 tablet, Rfl: 3    HYDROcodone-acetaminophen (NORCO) 5-325 mg per tablet, Take 1 tablet by mouth every 4 (four) hours as needed for Pain., Disp: 20 tablet, Rfl: 0    hydrOXYzine pamoate (VISTARIL) 50 MG Cap, Take 1 capsule (50 mg total) by mouth every 8 (eight) hours as needed., Disp: 12 capsule, Rfl: 0    levothyroxine (SYNTHROID) 88 MCG tablet, TAKE ONE TABLET BY MOUTH EVERY DAY, Disp: 90 tablet, Rfl: 3    lisdexamfetamine (VYVANSE) 70 MG capsule, Take 70 mg by mouth every morning., Disp: , Rfl:     meloxicam (MOBIC) 15 MG tablet, Take 1 tablet (15 mg total) by mouth once daily., Disp: 30 tablet, Rfl: 0    multivitamin (ONE DAILY MULTIVITAMIN) per tablet, Take 1 tablet by mouth 2 (two) times daily. , Disp: , Rfl:     naratriptan (AMERGE) 2.5 MG tablet, 2.5 mg at onset of headache, may repeat in 4 hours if needed. Max 2 tabs/day., Disp: 9 tablet, Rfl: 5    ondansetron (ZOFRAN-ODT) 8 MG TbDL, Take 1 tablet (8 mg total) by mouth every 8 (eight) hours as needed (nausea)., Disp: 25 tablet, Rfl: 2    polyethylene glycol (GLYCOLAX) 17 gram PwPk, MIX ONE PACKET IN LIQUID AND TAKE DAILY AS DIRECTED, Disp: , Rfl: 3    prasterone, dhea, (INTRAROSA) 6.5 mg Inst, Place 6.5 mg vaginally every evening., Disp: 84 each, Rfl: 3    pregabalin (LYRICA) 75 MG capsule, Take 1 capsule (75 mg total) by mouth 2 (two) times daily., Disp: 60 capsule, Rfl: 6    progesterone (PROMETRIUM)  "100 MG capsule, Take 2 capsules (200 mg total) by mouth nightly., Disp: 90 capsule, Rfl: 3    promethazine (PHENERGAN) 25 MG suppository, Place 1 suppository (25 mg total) rectally every 6 (six) hours as needed for Nausea., Disp: 12 suppository, Rfl: 2    sertraline (ZOLOFT) 100 MG tablet, Take 200 mg by mouth once daily., Disp: , Rfl:     Current Facility-Administered Medications:     onabotulinumtoxina injection 200 Units, 200 Units, Intramuscular, Q90 Days, Zofiatristin Bradley, FNP, 200 Units at 09/17/20 1453    Review of Systems - A review of 10+ systems was conducted with pertinent positive and negative findings documented in HPI with all other systems reviewed and negative.    PFSH: Past medical, family, and social history reviewed as documented in chart with pertinent positive medical, family, and social history detailed in HPI.    OBJECTIVE:  Vitals:  There were no vitals taken for this visit.    Physical Exam:  Constitutional: she appears well-developed and well-nourished. she is well groomed. NAD     Review of Data:   Notes from bariatrics, orthopedics reviewed.   Labs:  Admission on 11/20/2020, Discharged on 11/20/2020   Component Date Value Ref Range Status    Final Pathologic Diagnosis 11/20/2020    Final                    Value:1. Soft tissue, left wrist, cyst, excision:  - Ganglion cyst      Gross 11/20/2020    Final                    Value:Patient label MRN [id number]. Pathology label #ELS-. Specimen source  "ganglion left wrist "  Received in formalin are 4 fragments of tissue measuring 0.5 x 0.4 x 0.2 up  to 1.4 x 0.6 x 0.3 cm.  Tissue is tan firm fibrous with minimal amount of  yellow adipose tisue.  All submitted in cassette 1A ELS--1-NORA Hurtado      Disclaimer 11/20/2020    Final                    Value:Unless the case is a 'gross only' or additional testing only, the final  diagnosis for each specimen is based on a microscopic examination of  appropriate tissue " sections.     Lab Visit on 11/17/2020   Component Date Value Ref Range Status    SARS-CoV2 (COVID-19) Qualitative P* 11/17/2020 Not Detected  Not Detected Final   Lab Visit on 09/25/2020   Component Date Value Ref Range Status    TSH 09/25/2020 0.764  0.400 - 4.000 uIU/mL Final    WBC 09/25/2020 6.33  3.90 - 12.70 K/uL Final    RBC 09/25/2020 4.14  4.00 - 5.40 M/uL Final    Hemoglobin 09/25/2020 11.7* 12.0 - 16.0 g/dL Final    Hematocrit 09/25/2020 37.9  37.0 - 48.5 % Final    MCV 09/25/2020 92  82 - 98 fL Final    MCH 09/25/2020 28.3  27.0 - 31.0 pg Final    MCHC 09/25/2020 30.9* 32.0 - 36.0 g/dL Final    RDW 09/25/2020 13.9  11.5 - 14.5 % Final    Platelets 09/25/2020 339  150 - 350 K/uL Final    MPV 09/25/2020 10.9  9.2 - 12.9 fL Final    Immature Granulocytes 09/25/2020 0.2  0.0 - 0.5 % Final    Gran # (ANC) 09/25/2020 3.8  1.8 - 7.7 K/uL Final    Immature Grans (Abs) 09/25/2020 0.01  0.00 - 0.04 K/uL Final    Lymph # 09/25/2020 2.0  1.0 - 4.8 K/uL Final    Mono # 09/25/2020 0.4  0.3 - 1.0 K/uL Final    Eos # 09/25/2020 0.1  0.0 - 0.5 K/uL Final    Baso # 09/25/2020 0.10  0.00 - 0.20 K/uL Final    nRBC 09/25/2020 0  0 /100 WBC Final    Gran % 09/25/2020 60.0  38.0 - 73.0 % Final    Lymph % 09/25/2020 31.1  18.0 - 48.0 % Final    Mono % 09/25/2020 5.5  4.0 - 15.0 % Final    Eosinophil % 09/25/2020 1.6  0.0 - 8.0 % Final    Basophil % 09/25/2020 1.6  0.0 - 1.9 % Final    Differential Method 09/25/2020 Automated   Final    Sodium 09/25/2020 143  136 - 145 mmol/L Final    Potassium 09/25/2020 4.4  3.5 - 5.1 mmol/L Final    Chloride 09/25/2020 104  95 - 110 mmol/L Final    CO2 09/25/2020 28  23 - 29 mmol/L Final    Glucose 09/25/2020 71  70 - 110 mg/dL Final    BUN 09/25/2020 10  6 - 20 mg/dL Final    Creatinine 09/25/2020 0.8  0.5 - 1.4 mg/dL Final    Calcium 09/25/2020 9.7  8.7 - 10.5 mg/dL Final    Total Protein 09/25/2020 7.3  6.0 - 8.4 g/dL Final    Albumin 09/25/2020 4.1  3.5  "- 5.2 g/dL Final    Total Bilirubin 09/25/2020 0.5  0.1 - 1.0 mg/dL Final    Alkaline Phosphatase 09/25/2020 78  55 - 135 U/L Final    AST 09/25/2020 19  10 - 40 U/L Final    ALT 09/25/2020 14  10 - 44 U/L Final    Anion Gap 09/25/2020 11  8 - 16 mmol/L Final    eGFR if African American 09/25/2020 >60.0  >60 mL/min/1.73 m^2 Final    eGFR if non African American 09/25/2020 >60.0  >60 mL/min/1.73 m^2 Final    Vitamin B-12 09/25/2020 294  210 - 950 pg/mL Final    Thiamine 09/25/2020 38  38 - 122 ug/L Final    Cholesterol 09/25/2020 198  120 - 199 mg/dL Final    Triglycerides 09/25/2020 90  30 - 150 mg/dL Final    HDL 09/25/2020 69  40 - 75 mg/dL Final    LDL Cholesterol 09/25/2020 111.0  63.0 - 159.0 mg/dL Final    HDL/Cholesterol Ratio 09/25/2020 34.8  20.0 - 50.0 % Final    Total Cholesterol/HDL Ratio 09/25/2020 2.9  2.0 - 5.0 Final    Non-HDL Cholesterol 09/25/2020 129  mg/dL Final    Iron 09/25/2020 97  30 - 160 ug/dL Final    Transferrin 09/25/2020 262  200 - 375 mg/dL Final    TIBC 09/25/2020 388  250 - 450 ug/dL Final    Saturated Iron 09/25/2020 25  20 - 50 % Final    Vit D, 25-Hydroxy 09/25/2020 24* 30 - 96 ng/mL Final   Office Visit on 09/24/2020   Component Date Value Ref Range Status    Final Pathologic Diagnosis 09/24/2020    Final                    Value:1.  Skin, left posterior thigh, punch biopsy:  - DERMATOFIBROMA.  MICROSCOPIC DESCRIPTION: Sections show a variably cellular proliferation of  benign spindle cells within the dermis, exhibiting collagen trapping with  associated overlying epidermal hyperplasia.      Gross 09/24/2020    Final                    Value:Patient ID/ Pathology ID:  [id number]  Received in formalin, labeled "left posterior thigh," is an 8 mm firm  tan-brown irregularly pigmented skin punch biopsy.  Bisected vertically. The  specimen is submitted entirely in cassette [id number].  Grossed by: Amanda Bowser       Imaging:  No results found for this or " any previous visit.    Note: I have independently reviewed any/all imaging/labs/tests and agree with the report (s) as documented.  Any discrepancies will be as noted/demarcated by free text.  MANUEL MURILLO 12/10/2020    ASSESSMENT:  1. Chronic migraine    2. Intractable migraine without aura and without status migrainosus    3. S/P laparoscopic sleeve gastrectomy    4. Anxiety    5. Lumbar radiculopathy      The Botox injections have achieved well over 50%  improvement in the patient's symptoms. Migraines have been reduced at least 7 days per month and the number of cumulative hours suffering with headaches has been reduced at least 100 total hours per month. Today she does have a headache indicating that the Botox has worn off. Frequency of treatment is every 12 weeks unless no response to the treatments, at which time we will discontinue the injections.    PLAN:  - Botox administered in clinic for Chronic Migraine (see below)   - Continue lyrica 75 mg BID as prescribed for lumbar radiculopathy (managed by ortho) and zoloft 200 mg daily as prescribed for anxiety (managed by outside mental health provider) - both of which may benefits migraines as well   - For acute migraines - naratriptan 2.5 mg tabs  - For mild headaches - tylenol   - For nausea - zofran 8 mg odt   - S/p sleeve gastrectomy - avoid use of NSAIDS  - RTC in 12 weeks for repeat Botox injections or sooner if needed     Orders Placed This Encounter    naratriptan (AMERGE) 2.5 MG tablet     All questions and concerns addressed.  Patient verbalizes understanding and is agreeable with the above stated treatment plan.      PROCEDURE NOTE:  BOTOX was performed as an indicated therapy for intractable chronic migraine headaches given that the patient failed more than 2 headache medications    A time out was conducted just before the start of the procedure to verify the correct patient and procedure, procedure location, and all relevant critical information.      Botulinum Toxin Injection Procedure     PROCEDURE PERFORMED: Botulinum toxin injection (11841)  CLINICAL INDICATION: G43.719  After risks and benefits were explained including bleeding, infection, worsening of pain, damage to the areas being injected, weakness of muscles, loss of muscle control, dysphagia if injecting the head or neck, facial droop if injecting the facial area, painful injection, allergic or other reaction to the medications being injected, and the failure of the procedure to help the problem, a signed consent was obtained.   The patient was placed in a comfortable area and the sites to be treated were identified.The area to be treated was prepped three times with alcohol and the alcohol allowed to dry. Next, a 30 gauge needle was used to inject the medication in the area to be treated.      Total Botox used: 155 Units   Unavoidable waste: 45 Units     Injection sites:    muscle bilaterally ( a total of 10 units divided into 2 sites)   Procerus muscle (5 units)   Frontalis muscle bilaterally (a total of 20 units divided into 4 sites)   Temporalis muscle bilaterally (a total of 40 units divided into 8 sites)   Occipitalis muscle bilaterally (a total of 30 units divided into 6 sites)   Cervical paraspinal muscles (a total of 20 units divided into 4 sites)   Trapezius muscle bilaterally (a total of 30 units divided into 6 sites)   Complications: none   RTC for the next Botox injection: 12 weeks     CC: DO Zofia Yoder, FNP-C [facility]   [phone]

## 2020-12-11 ENCOUNTER — CLINICAL SUPPORT (OUTPATIENT)
Dept: REHABILITATION | Facility: HOSPITAL | Age: 45
End: 2020-12-11
Payer: COMMERCIAL

## 2020-12-11 DIAGNOSIS — R68.89 DIFFICULTY PARTICIPATING IN LEISURE ACTIVITIES: ICD-10-CM

## 2020-12-11 DIAGNOSIS — R29.898 LEFT HAND WEAKNESS: ICD-10-CM

## 2020-12-11 DIAGNOSIS — M25.632 DECREASED RANGE OF MOTION OF LEFT WRIST: ICD-10-CM

## 2020-12-11 DIAGNOSIS — M79.642 LEFT HAND PAIN: ICD-10-CM

## 2020-12-11 PROCEDURE — 97165 OT EVAL LOW COMPLEX 30 MIN: CPT

## 2020-12-11 PROCEDURE — 97530 THERAPEUTIC ACTIVITIES: CPT

## 2020-12-11 PROCEDURE — 97110 THERAPEUTIC EXERCISES: CPT

## 2020-12-11 NOTE — PROGRESS NOTES
[facility] Occupational Therapy  Initial Evaluation     Date: 12/11/2020  Name: Emilia Coyle  Clinic Number: [id number]    Therapy Diagnosis:   Encounter Diagnoses   Name Primary?    Left hand weakness     Decreased range of motion of left wrist     Left hand pain     Difficulty participating in leisure activities      Physician: Shruthi Noriega PA    Physician Orders: Eval and treat with modalities as needed.  Medical Diagnosis: M67.432 (ICD-10-CM) - Ganglion cyst of volar aspect of left wrist  Surgical Procedure and Date: 11/10/2020, Ganglion cyst removal  Evaluation Date: 12/11/2020  Insurance Authorization Period Expiration: 12/31/2020  Plan of Care Certification Period: 12/7/2020-12/31/2020  Date of Return to MD: pt unaware    Visit # / Visits authorized: 1 / 20  Time In:0802  Time Out: 0845  Total Appointment Time (timed & untimed codes): 15 minutes untimed, 28 minutes timed    Precautions:  Standard    Subjective     Involved Side: Left  Dominant Side: Right  Date of Onset: 11/1/2020  History of Current Condition/Mechanism of Injury: Pt reports that she developed ganglion cyst approximately 10 days before consulting with surgeon.  Imaging: none   Previous Therapy: N/A    Past Medical History/Physical Systems Review:   Emilia Coyle  has a past medical history of Celiac disease, Diabetes mellitus, Headache, High triglycerides, Hypothyroidism, MIKAELA (obstructive sleep apnea), Sleep apnea in adult, and Thyroid disease.    Emilia Coyle  has a past surgical history that includes Hysterectomy; Total thyroidectomy (Right); Tubal ligation; Tonsillectomy; Adenoidectomy; Tympanostomy tube placement; Bladder suspension; Gastrectomy (07/2017); Abdominoplasty; Cholecystectomy (12/2017); Colonoscopy (10/27/2017); Upper gastrointestinal endoscopy (10/27/2017); Total Reduction Mammoplasty; Breast surgery (2008); Cosmetic surgery (2008); and Excision of ganglion cyst of hand (Left,  "11/20/2020).    Emilia has a current medication list which includes the following prescription(s): alprazolam, biotin, hydrocodone-acetaminophen, hydroxyzine pamoate, levothyroxine, lisdexamfetamine, meloxicam, multivitamin, naratriptan, ondansetron, polyethylene glycol, intrarosa, pregabalin, promethazine, and sertraline, and the following Facility-Administered Medications: onabotulinumtoxina.    Review of patient's allergies indicates:   Allergen Reactions    Rizatriptan Photosensitivity, Nausea Only and Other (See Comments)     "Feels like my brain is on fire."    Sumatriptan Photosensitivity, Nausea Only and Other (See Comments)     "Feels like my brain is on fire."    Zolmitriptan Photosensitivity, Nausea Only and Other (See Comments)     "Feels like my brain is on fire."    Gluten protein Hives, Diarrhea, Itching, Nausea And Vomiting, Swelling, Anxiety and Dermatitis    Latex, natural rubber Dermatitis     Latex gloves with Powder        Patient's Goals for Therapy: Full function back in hand and wrist.     Pain:  Functional Pain Scale Rating 0-10:   1/10 on average  n/a/10 at best  3/10 at worst  Location: L wrist  Description: Aching, Dull and Burning  Aggravating Factors: Splint is too large and very uncomfortable.  Easing Factors: Using a broom or mop bothers her, but ceasing work improves symptoms.    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  Working presently: employed. Reports using a keyboard for much of the day.  Duties: duties modified to avoid lifting more than 5 lbs.    Functional Limitations/Social History:    Previous functional status includes: Independent with all ADLs.     Current FunctionalStatus   Home/Living environment : lives with their spouse      Limitation of Functional Status as follows:   ADLs/IADLs:     - Feeding: Independent    - Bathing: Independent    - Dressing/Grooming: Independent    - Driving: Independent     Leisure: Driving and Crafts- pt is an avid " chriser, but wrist and hand weakness have interfered with melia.    Objective       Active Range of Motion: Measured in degrees    Wrist Left Right   Flexion 80 90   Extension 70 75   Ulnar dev. WNL WNL   Radial dev. WNL WNL     Passive Range of Motion: Measured in degrees    Wrist Left Right   Flexion 90 90   Extension 75 75   Ulnar dev. WNL WNL   Radial dev. WNL WNL        Strength (Dynamometer) and Pinch Strength (Pinch Gauge)  Measured in pounds.   12/11/2020 12/11/2020    Left Right   Rung II 60 75   Key Pinch 14 18   3pt Pinch 17 18   2pt Pinch 9 12       Manual Muscle Test   12/11/2020 12/11/2020    Left Right   Wrist Extension  4+ 5   Wrist Flexion 4+ 5   Radial Deviation 5 5   Ulnar Deviation 5 5   Supination 5 5   Pronation 5 5   Elbow Extension 5 5   Elbow Flexion 5 5         Limitation/Restriction for FOTO Wrist Survey    Therapist reviewed FOTO scores for Emilia Coyle on 12/11/2020.   FOTO documents entered into Embedded Chat - see Media section.    Limitation Score: 66.61%         Treatment     Treatment Time In: 0817  Treatment Time Out: 0845  Total Treatment time (time-based codes) separate from Evaluation: 28 minutes    Sophia received the following supervised modalities after being cleared from contraindications for 0 minutes:   -N/A    Sophia received the following direct contact modalities after being cleared for contraindications for 0 minutes:  -N/A    Sophia received the following manual therapy techniques for 5 minutes:   -stretching of wrist in flexion and extension    Sophia received therapeutic exercises for 10 minutes including:  -HEP training which included prayer stretch as well as green theraputty with pinches, finger extension, and composite grasp.  Good return demonstrations.    Sophia participated in dynamic functional therapeutic activities to improve functional performance for 10 minutes, including:  -Scar massage with pt's personal blend of olive oil and essential oils.    Home Exercise  Program/Education:  Issued HEP (see patient instructions in EMR) and educated on modality use for pain management . Exercises were reviewed and Sophia was able to demonstrate them prior to the end of the session.   Pt received a written copy of exercises to perform at home. Sophia demonstrated good  understanding of the education provided.  Pt was advised to perform these exercises free of pain, and to stop performing them if pain occurs.    Patient/Family Education: role of OT, goals for OT, scheduling/cancellations - pt verbalized understanding. Discussed insurance limitations with patient.    Additional Education provided: scar massage techniques and protection of surgical site until next session. She was reminded to use the splint at work while typing despite it's bulky fit.  Pt agreed to bring in the splint and another splint she has purchased for next session.    Assessment     Emilia Coyle is a 45 y.o. female referred to outpatient occupational therapy and presents with a medical diagnosis of M67.432 (ICD-10-CM) - Ganglion cyst of volar aspect of left wrist, resulting in Decreased ROM, Decreased  strength, Decreased pinch strength, Decreased muscle strength, Decreased functional hand use, Increased pain and Edema and demonstrates limitations as described in the chart below. Following medical record review it is determined that pt will benefit from occupational therapy services in order to maximize pain free and/or functional use of left hand. The following goals were discussed with the patient and patient is in agreement with them as to be addressed in the treatment plan. The patient's rehab potential is Excellent.     Anticipated barriers to occupational therapy: Pt has work at the same time as therapy sessions and may miss as a result.  OT will try to schedule her accordingly.  Pt has no cultural, educational or language barriers to learning provided.    Profile and History Assessment of Occupational  Performance Level of Clinical Decision Making Complexity Score   Occupational Profile:   Emilia Coyle is a 45 y.o. female who lives with spouse and is currently employed Emilia Coyle has difficulty with  ADLs and IADLs as listed previously, which  affecting his/her daily functional abilities.      Comorbidities:    has a past medical history of Celiac disease, Diabetes mellitus, Headache, High triglycerides, Hypothyroidism, MIKAELA (obstructive sleep apnea), Sleep apnea in adult, and Thyroid disease.    Medical and Therapy History Review:   Brief               Performance Deficits    Physical:  Skin Integrity/Scar Formation   Strength  Pinch Strength  Gross Motor Coordination  Fine Motor Coordination  Pain    Cognitive:  No Deficits    Psychosocial:    No Deficits     Clinical Decision Making:  low    Assessment Process:  Problem-Focused Assessments    Modification/Need for Assistance:  Not Necessary    Intervention Selection:  Limited Treatment Options       low  Based on PMHX, co morbidities , data from assessments and functional level of assistance required with task and clinical presentation directly impacting function.       The following goals were discussed with the patient and patient is in agreement with them as to be addressed in the treatment plan.     Goals:   LTGs to be met in 4 weeks include:  1) Pt will return to prior, independent, level of function with all activities related to melia and preserving foods.  2) Pt's L wrist range of motion will increase to at least 90 degrees of wrist flexion to match opposite wrist.  3) Pt's L wrist range of motion will increase to at least 75 degrees of wrist flexion to match opposite wrist.  4) Pt will demonstrate increased activity tolerance to grasping as demonstrated by ability to completely mop and sweep at home without pain.    STGs to be met in 2 weeks include:  1) Pt will improve L hand grasp from 60 to at least 65 lbs of force to improve safety and  quality of carrying a pressure canner.  2) Pt will improve L hand pad/pad pinch from 9 to 12 in order to improve skill in opening packages and containers.  3) Pt will improve L wrist flexion by 5 degrees to maximize safety and efficacy of L hand use as a functional assist while chopping or cutting items for melia prep.  4) Pt will improve L wrist strength to 5/5 with a manual muscle test in order to maximize safety and efficacy of keyboard use at work.    Plan   Certification Period/Plan of care expiration: 12/11/2020 to 1/15/2020.    Outpatient Occupational Therapy 2 times weekly for 4 weeks to include the following interventions: Manual therapy/joint mobilizations, Therapeutic exercises/activities., Strengthening, Orthotic Fabrication/Fit/Training, Scar Management, Joint Protection and Energy Conservation.      MADHAVI DIEHL, OT    Addendum:

## 2020-12-11 NOTE — PATIENT INSTRUCTIONS
[facility]  OCCUPATIONAL THERAPY  HOME EXERCISE PROGRAM     Resisted    Squeeze putty using thumb and all fingers.       Resisted Lumbrical   Bending only at large knuckles, press putty down against thumb. Keep fingertips straight.      Resisted Digit Extension   With thumb and all fingers in center of putty donut, stretch out.      Resisted Lateral/Key Pinch  Squeeze between thumb and side of index finger.      Resisted 3-Jaw and 2pt Pinch   Pinch putty between tip of thumb and tip of index/long fingers. Pinch putty between tip of thumb and tip of index.       Resisted Thumb Extension  Straighten thumb inside putty loop anchored by fingers.      Resisted Digit Adduction  Press between 2 fingers. Repeat with all fingers.      Resisted Digit Extension   Flatten putty on the table. Dig in the tips of your fingers into the putty and push your fingers straight.       Resisted Digit Flexion  Flatten putty on table. Place have flat over putty and pull fingers to bend the putty into your palm.    Please complete these exercises 1x a day.  Wear your splint when using a keyboard.  Massage your incision site 3 times a day.    Darren Yousif, OT  Occupational Therapist

## 2020-12-14 ENCOUNTER — PATIENT MESSAGE (OUTPATIENT)
Dept: BARIATRICS | Facility: CLINIC | Age: 45
End: 2020-12-14

## 2020-12-22 ENCOUNTER — PATIENT MESSAGE (OUTPATIENT)
Dept: ADMINISTRATIVE | Facility: OTHER | Age: 45
End: 2020-12-22

## 2020-12-28 ENCOUNTER — PATIENT MESSAGE (OUTPATIENT)
Dept: ORTHOPEDICS | Facility: CLINIC | Age: 45
End: 2020-12-28

## 2020-12-30 ENCOUNTER — LAB VISIT (OUTPATIENT)
Dept: LAB | Facility: HOSPITAL | Age: 45
End: 2020-12-30
Attending: INTERNAL MEDICINE
Payer: COMMERCIAL

## 2020-12-30 DIAGNOSIS — Z00.00 ANNUAL PHYSICAL EXAM: ICD-10-CM

## 2020-12-30 LAB
25(OH)D3+25(OH)D2 SERPL-MCNC: 23 NG/ML (ref 30–96)
ALBUMIN SERPL BCP-MCNC: 4.1 G/DL (ref 3.5–5.2)
ALP SERPL-CCNC: 82 U/L (ref 55–135)
ALT SERPL W/O P-5'-P-CCNC: 12 U/L (ref 10–44)
ANION GAP SERPL CALC-SCNC: 10 MMOL/L (ref 8–16)
AST SERPL-CCNC: 21 U/L (ref 10–40)
BASOPHILS # BLD AUTO: 0.09 K/UL (ref 0–0.2)
BASOPHILS NFR BLD: 1.5 % (ref 0–1.9)
BILIRUB SERPL-MCNC: 0.4 MG/DL (ref 0.1–1)
BUN SERPL-MCNC: 15 MG/DL (ref 6–20)
CALCIUM SERPL-MCNC: 9.2 MG/DL (ref 8.7–10.5)
CHLORIDE SERPL-SCNC: 103 MMOL/L (ref 95–110)
CHOLEST SERPL-MCNC: 197 MG/DL (ref 120–199)
CHOLEST/HDLC SERPL: 2.8 {RATIO} (ref 2–5)
CO2 SERPL-SCNC: 28 MMOL/L (ref 23–29)
CREAT SERPL-MCNC: 0.7 MG/DL (ref 0.5–1.4)
DIFFERENTIAL METHOD: ABNORMAL
EOSINOPHIL # BLD AUTO: 0.1 K/UL (ref 0–0.5)
EOSINOPHIL NFR BLD: 2.3 % (ref 0–8)
ERYTHROCYTE [DISTWIDTH] IN BLOOD BY AUTOMATED COUNT: 14.2 % (ref 11.5–14.5)
EST. GFR  (AFRICAN AMERICAN): >60 ML/MIN/1.73 M^2
EST. GFR  (NON AFRICAN AMERICAN): >60 ML/MIN/1.73 M^2
ESTIMATED AVG GLUCOSE: 105 MG/DL (ref 68–131)
ESTRADIOL SERPL-MCNC: <10 PG/ML
FSH SERPL-ACNC: 59.8 MIU/ML
GLUCOSE SERPL-MCNC: 79 MG/DL (ref 70–110)
HBA1C MFR BLD HPLC: 5.3 % (ref 4–5.6)
HCT VFR BLD AUTO: 38.8 % (ref 37–48.5)
HDLC SERPL-MCNC: 70 MG/DL (ref 40–75)
HDLC SERPL: 35.5 % (ref 20–50)
HGB BLD-MCNC: 11.9 G/DL (ref 12–16)
IMM GRANULOCYTES # BLD AUTO: 0.02 K/UL (ref 0–0.04)
IMM GRANULOCYTES NFR BLD AUTO: 0.3 % (ref 0–0.5)
LDLC SERPL CALC-MCNC: 113.6 MG/DL (ref 63–159)
LH SERPL-ACNC: 21.9 MIU/ML
LYMPHOCYTES # BLD AUTO: 1.5 K/UL (ref 1–4.8)
LYMPHOCYTES NFR BLD: 25.4 % (ref 18–48)
MCH RBC QN AUTO: 27.9 PG (ref 27–31)
MCHC RBC AUTO-ENTMCNC: 30.7 G/DL (ref 32–36)
MCV RBC AUTO: 91 FL (ref 82–98)
MONOCYTES # BLD AUTO: 0.4 K/UL (ref 0.3–1)
MONOCYTES NFR BLD: 7.1 % (ref 4–15)
NEUTROPHILS # BLD AUTO: 3.8 K/UL (ref 1.8–7.7)
NEUTROPHILS NFR BLD: 63.4 % (ref 38–73)
NONHDLC SERPL-MCNC: 127 MG/DL
NRBC BLD-RTO: 0 /100 WBC
PLATELET # BLD AUTO: 298 K/UL (ref 150–350)
PMV BLD AUTO: 10.7 FL (ref 9.2–12.9)
POTASSIUM SERPL-SCNC: 4.1 MMOL/L (ref 3.5–5.1)
PROT SERPL-MCNC: 7.3 G/DL (ref 6–8.4)
RBC # BLD AUTO: 4.26 M/UL (ref 4–5.4)
SODIUM SERPL-SCNC: 141 MMOL/L (ref 136–145)
T4 FREE SERPL-MCNC: 1 NG/DL (ref 0.71–1.51)
THYROPEROXIDASE IGG SERPL-ACNC: <6 IU/ML
TRIGL SERPL-MCNC: 67 MG/DL (ref 30–150)
TSH SERPL DL<=0.005 MIU/L-ACNC: 1.32 UIU/ML (ref 0.4–4)
WBC # BLD AUTO: 6.03 K/UL (ref 3.9–12.7)

## 2020-12-30 PROCEDURE — 82670 ASSAY OF TOTAL ESTRADIOL: CPT

## 2020-12-30 PROCEDURE — 83001 ASSAY OF GONADOTROPIN (FSH): CPT

## 2020-12-30 PROCEDURE — 84439 ASSAY OF FREE THYROXINE: CPT

## 2020-12-30 PROCEDURE — 86376 MICROSOMAL ANTIBODY EACH: CPT

## 2020-12-30 PROCEDURE — 82306 VITAMIN D 25 HYDROXY: CPT

## 2020-12-30 PROCEDURE — 84443 ASSAY THYROID STIM HORMONE: CPT

## 2020-12-30 PROCEDURE — 85025 COMPLETE CBC W/AUTO DIFF WBC: CPT

## 2020-12-30 PROCEDURE — 83036 HEMOGLOBIN GLYCOSYLATED A1C: CPT

## 2020-12-30 PROCEDURE — 80061 LIPID PANEL: CPT

## 2020-12-30 PROCEDURE — 83002 ASSAY OF GONADOTROPIN (LH): CPT

## 2020-12-30 PROCEDURE — 80053 COMPREHEN METABOLIC PANEL: CPT

## 2021-01-05 ENCOUNTER — PATIENT MESSAGE (OUTPATIENT)
Dept: ORTHOPEDICS | Facility: CLINIC | Age: 46
End: 2021-01-05

## 2021-01-05 ENCOUNTER — OFFICE VISIT (OUTPATIENT)
Dept: ORTHOPEDICS | Facility: CLINIC | Age: 46
End: 2021-01-05
Payer: COMMERCIAL

## 2021-01-05 DIAGNOSIS — Z01.818 PREOP TESTING: Primary | ICD-10-CM

## 2021-01-05 DIAGNOSIS — M67.432 GANGLION CYST OF VOLAR ASPECT OF LEFT WRIST: Primary | ICD-10-CM

## 2021-01-05 PROCEDURE — 99024 PR POST-OP FOLLOW-UP VISIT: ICD-10-PCS | Mod: S$GLB,,, | Performed by: ORTHOPAEDIC SURGERY

## 2021-01-05 PROCEDURE — 99999 PR PBB SHADOW E&M-EST. PATIENT-LVL III: ICD-10-PCS | Mod: PBBFAC,,, | Performed by: ORTHOPAEDIC SURGERY

## 2021-01-05 PROCEDURE — 99024 POSTOP FOLLOW-UP VISIT: CPT | Mod: S$GLB,,, | Performed by: ORTHOPAEDIC SURGERY

## 2021-01-05 PROCEDURE — 99999 PR PBB SHADOW E&M-EST. PATIENT-LVL III: CPT | Mod: PBBFAC,,, | Performed by: ORTHOPAEDIC SURGERY

## 2021-01-05 RX ORDER — MUPIROCIN 20 MG/G
OINTMENT TOPICAL
Status: CANCELLED | OUTPATIENT
Start: 2021-01-05

## 2021-01-12 ENCOUNTER — LAB VISIT (OUTPATIENT)
Dept: INTERNAL MEDICINE | Facility: CLINIC | Age: 46
End: 2021-01-12
Payer: COMMERCIAL

## 2021-01-12 DIAGNOSIS — Z01.818 PREOP TESTING: ICD-10-CM

## 2021-01-12 PROCEDURE — U0003 INFECTIOUS AGENT DETECTION BY NUCLEIC ACID (DNA OR RNA); SEVERE ACUTE RESPIRATORY SYNDROME CORONAVIRUS 2 (SARS-COV-2) (CORONAVIRUS DISEASE [COVID-19]), AMPLIFIED PROBE TECHNIQUE, MAKING USE OF HIGH THROUGHPUT TECHNOLOGIES AS DESCRIBED BY CMS-2020-01-R: HCPCS

## 2021-01-13 ENCOUNTER — OFFICE VISIT (OUTPATIENT)
Dept: INTERNAL MEDICINE | Facility: CLINIC | Age: 46
End: 2021-01-13
Payer: COMMERCIAL

## 2021-01-13 ENCOUNTER — ANESTHESIA EVENT (OUTPATIENT)
Dept: SURGERY | Facility: HOSPITAL | Age: 46
End: 2021-01-13
Payer: COMMERCIAL

## 2021-01-13 VITALS
WEIGHT: 155.88 LBS | HEART RATE: 78 BPM | BODY MASS INDEX: 25.97 KG/M2 | DIASTOLIC BLOOD PRESSURE: 66 MMHG | HEIGHT: 65 IN | TEMPERATURE: 98 F | SYSTOLIC BLOOD PRESSURE: 102 MMHG

## 2021-01-13 DIAGNOSIS — F42.9 OBSESSIVE-COMPULSIVE DISORDER, UNSPECIFIED TYPE: ICD-10-CM

## 2021-01-13 DIAGNOSIS — Z00.00 ANNUAL PHYSICAL EXAM: Primary | ICD-10-CM

## 2021-01-13 DIAGNOSIS — E03.9 HYPOTHYROIDISM, UNSPECIFIED TYPE: ICD-10-CM

## 2021-01-13 LAB — SARS-COV-2 RNA RESP QL NAA+PROBE: NOT DETECTED

## 2021-01-13 PROCEDURE — 3008F PR BODY MASS INDEX (BMI) DOCUMENTED: ICD-10-PCS | Mod: CPTII,S$GLB,, | Performed by: INTERNAL MEDICINE

## 2021-01-13 PROCEDURE — 1126F AMNT PAIN NOTED NONE PRSNT: CPT | Mod: S$GLB,,, | Performed by: INTERNAL MEDICINE

## 2021-01-13 PROCEDURE — 99999 PR PBB SHADOW E&M-EST. PATIENT-LVL IV: CPT | Mod: PBBFAC,,, | Performed by: INTERNAL MEDICINE

## 2021-01-13 PROCEDURE — 1126F PR PAIN SEVERITY QUANTIFIED, NO PAIN PRESENT: ICD-10-PCS | Mod: S$GLB,,, | Performed by: INTERNAL MEDICINE

## 2021-01-13 PROCEDURE — 99396 PR PREVENTIVE VISIT,EST,40-64: ICD-10-PCS | Mod: S$GLB,,, | Performed by: INTERNAL MEDICINE

## 2021-01-13 PROCEDURE — 3008F BODY MASS INDEX DOCD: CPT | Mod: CPTII,S$GLB,, | Performed by: INTERNAL MEDICINE

## 2021-01-13 PROCEDURE — 99999 PR PBB SHADOW E&M-EST. PATIENT-LVL IV: ICD-10-PCS | Mod: PBBFAC,,, | Performed by: INTERNAL MEDICINE

## 2021-01-13 PROCEDURE — 99396 PREV VISIT EST AGE 40-64: CPT | Mod: S$GLB,,, | Performed by: INTERNAL MEDICINE

## 2021-01-13 RX ORDER — NORTRIPTYLINE HYDROCHLORIDE 10 MG/1
10 CAPSULE ORAL NIGHTLY
COMMUNITY
End: 2021-12-09

## 2021-01-14 ENCOUNTER — TELEPHONE (OUTPATIENT)
Dept: ORTHOPEDICS | Facility: CLINIC | Age: 46
End: 2021-01-14

## 2021-01-14 RX ORDER — HYDROCODONE BITARTRATE AND ACETAMINOPHEN 7.5; 325 MG/1; MG/1
1 TABLET ORAL EVERY 6 HOURS PRN
Qty: 28 TABLET | Refills: 0 | Status: SHIPPED | OUTPATIENT
Start: 2021-01-14 | End: 2021-02-25

## 2021-01-15 ENCOUNTER — ANESTHESIA (OUTPATIENT)
Dept: SURGERY | Facility: HOSPITAL | Age: 46
End: 2021-01-15
Payer: COMMERCIAL

## 2021-01-15 ENCOUNTER — HOSPITAL ENCOUNTER (OUTPATIENT)
Facility: HOSPITAL | Age: 46
Discharge: HOME | End: 2021-01-15
Attending: ORTHOPAEDIC SURGERY | Admitting: ORTHOPAEDIC SURGERY
Payer: COMMERCIAL

## 2021-01-15 DIAGNOSIS — M67.432 GANGLION CYST OF VOLAR ASPECT OF LEFT WRIST: ICD-10-CM

## 2021-01-15 PROCEDURE — 25000003 PHARM REV CODE 250: Performed by: ORTHOPAEDIC SURGERY

## 2021-01-15 PROCEDURE — 71000015 HC POSTOP RECOV 1ST HR: Performed by: ORTHOPAEDIC SURGERY

## 2021-01-15 PROCEDURE — 88304 TISSUE EXAM BY PATHOLOGIST: CPT | Mod: 26,,, | Performed by: PATHOLOGY

## 2021-01-15 PROCEDURE — D9220A PRA ANESTHESIA: ICD-10-PCS | Mod: CRNA,,, | Performed by: NURSE ANESTHETIST, CERTIFIED REGISTERED

## 2021-01-15 PROCEDURE — 99900035 HC TECH TIME PER 15 MIN (STAT)

## 2021-01-15 PROCEDURE — 88304 PR  SURG PATH,LEVEL III: ICD-10-PCS | Mod: 26,,, | Performed by: PATHOLOGY

## 2021-01-15 PROCEDURE — 76942 ECHO GUIDE FOR BIOPSY: CPT | Performed by: STUDENT IN AN ORGANIZED HEALTH CARE EDUCATION/TRAINING PROGRAM

## 2021-01-15 PROCEDURE — 36000707: Performed by: ORTHOPAEDIC SURGERY

## 2021-01-15 PROCEDURE — 94761 N-INVAS EAR/PLS OXIMETRY MLT: CPT

## 2021-01-15 PROCEDURE — 64415 PR NERVE BLOCK INJ, ANES/STEROID, BRACHIAL PLEXUS, INCL IMAG GUIDANCE: ICD-10-PCS | Mod: 59,LT,, | Performed by: ANESTHESIOLOGY

## 2021-01-15 PROCEDURE — 25000003 PHARM REV CODE 250: Performed by: STUDENT IN AN ORGANIZED HEALTH CARE EDUCATION/TRAINING PROGRAM

## 2021-01-15 PROCEDURE — 63600175 PHARM REV CODE 636 W HCPCS: Performed by: STUDENT IN AN ORGANIZED HEALTH CARE EDUCATION/TRAINING PROGRAM

## 2021-01-15 PROCEDURE — 25112 REREMOVE WRIST TENDON LESION: CPT | Mod: 78,LT,, | Performed by: ORTHOPAEDIC SURGERY

## 2021-01-15 PROCEDURE — 76942 PR U/S GUIDANCE FOR NEEDLE GUIDANCE: ICD-10-PCS | Mod: 26,,, | Performed by: ANESTHESIOLOGY

## 2021-01-15 PROCEDURE — 71000033 HC RECOVERY, INTIAL HOUR: Performed by: ORTHOPAEDIC SURGERY

## 2021-01-15 PROCEDURE — 64415 NJX AA&/STRD BRCH PLXS IMG: CPT | Mod: 59,LT,, | Performed by: ANESTHESIOLOGY

## 2021-01-15 PROCEDURE — 64415 NJX AA&/STRD BRCH PLXS IMG: CPT | Performed by: STUDENT IN AN ORGANIZED HEALTH CARE EDUCATION/TRAINING PROGRAM

## 2021-01-15 PROCEDURE — 88304 TISSUE EXAM BY PATHOLOGIST: CPT | Performed by: PATHOLOGY

## 2021-01-15 PROCEDURE — 27200750 HC INSULATED NEEDLE/ STIMUPLEX: Performed by: STUDENT IN AN ORGANIZED HEALTH CARE EDUCATION/TRAINING PROGRAM

## 2021-01-15 PROCEDURE — D9220A PRA ANESTHESIA: ICD-10-PCS | Mod: ANES,,, | Performed by: ANESTHESIOLOGY

## 2021-01-15 PROCEDURE — 76942 ECHO GUIDE FOR BIOPSY: CPT | Mod: 26,,, | Performed by: ANESTHESIOLOGY

## 2021-01-15 PROCEDURE — 25112 PR EXCIS RECURRENT GANGLION WRIST: ICD-10-PCS | Mod: 78,LT,, | Performed by: ORTHOPAEDIC SURGERY

## 2021-01-15 PROCEDURE — D9220A PRA ANESTHESIA: Mod: CRNA,,, | Performed by: NURSE ANESTHETIST, CERTIFIED REGISTERED

## 2021-01-15 PROCEDURE — D9220A PRA ANESTHESIA: Mod: ANES,,, | Performed by: ANESTHESIOLOGY

## 2021-01-15 PROCEDURE — 37000008 HC ANESTHESIA 1ST 15 MINUTES: Performed by: ORTHOPAEDIC SURGERY

## 2021-01-15 PROCEDURE — 37000009 HC ANESTHESIA EA ADD 15 MINS: Performed by: ORTHOPAEDIC SURGERY

## 2021-01-15 PROCEDURE — 63600175 PHARM REV CODE 636 W HCPCS: Performed by: NURSE ANESTHETIST, CERTIFIED REGISTERED

## 2021-01-15 PROCEDURE — 36000706: Performed by: ORTHOPAEDIC SURGERY

## 2021-01-15 PROCEDURE — 25000003 PHARM REV CODE 250: Performed by: NURSE ANESTHETIST, CERTIFIED REGISTERED

## 2021-01-15 RX ORDER — FENTANYL CITRATE 50 UG/ML
25 INJECTION, SOLUTION INTRAMUSCULAR; INTRAVENOUS EVERY 5 MIN PRN
Status: DISPENSED | OUTPATIENT
Start: 2021-01-15

## 2021-01-15 RX ORDER — MUPIROCIN 20 MG/G
OINTMENT TOPICAL
Status: DISCONTINUED | OUTPATIENT
Start: 2021-01-15 | End: 2021-01-15 | Stop reason: HOSPADM

## 2021-01-15 RX ORDER — METOCLOPRAMIDE HYDROCHLORIDE 5 MG/ML
10 INJECTION INTRAMUSCULAR; INTRAVENOUS EVERY 10 MIN PRN
Status: DISCONTINUED | OUTPATIENT
Start: 2021-01-15 | End: 2021-01-15 | Stop reason: HOSPADM

## 2021-01-15 RX ORDER — SODIUM CHLORIDE 9 MG/ML
INJECTION, SOLUTION INTRAVENOUS CONTINUOUS PRN
Status: DISCONTINUED | OUTPATIENT
Start: 2021-01-15 | End: 2021-01-15

## 2021-01-15 RX ORDER — ACETAMINOPHEN 500 MG
1000 TABLET ORAL ONCE
Status: COMPLETED | OUTPATIENT
Start: 2021-01-15 | End: 2021-01-15

## 2021-01-15 RX ORDER — LIDOCAINE HYDROCHLORIDE 10 MG/ML
INJECTION, SOLUTION INTRAVENOUS
Status: DISCONTINUED | OUTPATIENT
Start: 2021-01-15 | End: 2021-01-15

## 2021-01-15 RX ORDER — DEXMEDETOMIDINE HYDROCHLORIDE 100 UG/ML
INJECTION, SOLUTION INTRAVENOUS
Status: DISCONTINUED | OUTPATIENT
Start: 2021-01-15 | End: 2021-01-15

## 2021-01-15 RX ORDER — PROPOFOL 10 MG/ML
VIAL (ML) INTRAVENOUS CONTINUOUS PRN
Status: DISCONTINUED | OUTPATIENT
Start: 2021-01-15 | End: 2021-01-15

## 2021-01-15 RX ORDER — FENTANYL CITRATE 50 UG/ML
25 INJECTION, SOLUTION INTRAMUSCULAR; INTRAVENOUS EVERY 5 MIN PRN
Status: DISCONTINUED | OUTPATIENT
Start: 2021-01-15 | End: 2021-01-15 | Stop reason: HOSPADM

## 2021-01-15 RX ORDER — LIDOCAINE HYDROCHLORIDE 10 MG/ML
INJECTION INFILTRATION; PERINEURAL
Status: DISCONTINUED | OUTPATIENT
Start: 2021-01-15 | End: 2021-01-15 | Stop reason: HOSPADM

## 2021-01-15 RX ORDER — SODIUM CHLORIDE 9 MG/ML
INJECTION, SOLUTION INTRAVENOUS CONTINUOUS
Status: DISCONTINUED | OUTPATIENT
Start: 2021-01-15 | End: 2021-01-15 | Stop reason: HOSPADM

## 2021-01-15 RX ORDER — HYDROCODONE BITARTRATE AND ACETAMINOPHEN 5; 325 MG/1; MG/1
1 TABLET ORAL EVERY 4 HOURS PRN
Status: DISCONTINUED | OUTPATIENT
Start: 2021-01-15 | End: 2021-01-15 | Stop reason: HOSPADM

## 2021-01-15 RX ORDER — MIDAZOLAM HYDROCHLORIDE 1 MG/ML
INJECTION INTRAMUSCULAR; INTRAVENOUS
Status: DISCONTINUED | OUTPATIENT
Start: 2021-01-15 | End: 2021-01-15

## 2021-01-15 RX ORDER — ONDANSETRON 2 MG/ML
INJECTION INTRAMUSCULAR; INTRAVENOUS
Status: DISCONTINUED | OUTPATIENT
Start: 2021-01-15 | End: 2021-01-15

## 2021-01-15 RX ORDER — SODIUM CHLORIDE 0.9 % (FLUSH) 0.9 %
10 SYRINGE (ML) INJECTION
Status: DISCONTINUED | OUTPATIENT
Start: 2021-01-15 | End: 2021-01-15 | Stop reason: HOSPADM

## 2021-01-15 RX ORDER — CEFAZOLIN SODIUM 1 G/3ML
2 INJECTION, POWDER, FOR SOLUTION INTRAMUSCULAR; INTRAVENOUS
Status: DISCONTINUED | OUTPATIENT
Start: 2021-01-15 | End: 2021-01-15 | Stop reason: HOSPADM

## 2021-01-15 RX ORDER — ONDANSETRON 4 MG/1
8 TABLET, ORALLY DISINTEGRATING ORAL EVERY 8 HOURS PRN
Status: DISCONTINUED | OUTPATIENT
Start: 2021-01-15 | End: 2021-01-15 | Stop reason: HOSPADM

## 2021-01-15 RX ORDER — CEFAZOLIN SODIUM 1 G/3ML
INJECTION, POWDER, FOR SOLUTION INTRAMUSCULAR; INTRAVENOUS
Status: DISCONTINUED | OUTPATIENT
Start: 2021-01-15 | End: 2021-01-15

## 2021-01-15 RX ORDER — OXYCODONE HYDROCHLORIDE 5 MG/1
5 TABLET ORAL
Status: DISCONTINUED | OUTPATIENT
Start: 2021-01-15 | End: 2021-01-15 | Stop reason: HOSPADM

## 2021-01-15 RX ORDER — MIDAZOLAM HYDROCHLORIDE 1 MG/ML
0.5 INJECTION INTRAMUSCULAR; INTRAVENOUS
Status: DISPENSED | OUTPATIENT
Start: 2021-01-15

## 2021-01-15 RX ADMIN — LIDOCAINE HYDROCHLORIDE 100 MG: 10 INJECTION, SOLUTION INTRAVENOUS at 10:01

## 2021-01-15 RX ADMIN — CEFAZOLIN 2 G: 330 INJECTION, POWDER, FOR SOLUTION INTRAMUSCULAR; INTRAVENOUS at 10:01

## 2021-01-15 RX ADMIN — ACETAMINOPHEN 1000 MG: 500 TABLET ORAL at 08:01

## 2021-01-15 RX ADMIN — ONDANSETRON 4 MG: 2 INJECTION, SOLUTION INTRAMUSCULAR; INTRAVENOUS at 10:01

## 2021-01-15 RX ADMIN — PROPOFOL 100 MCG/KG/MIN: 10 INJECTION, EMULSION INTRAVENOUS at 10:01

## 2021-01-15 RX ADMIN — MUPIROCIN: 20 OINTMENT TOPICAL at 08:01

## 2021-01-15 RX ADMIN — FENTANYL CITRATE 100 MCG: 50 INJECTION INTRAMUSCULAR; INTRAVENOUS at 08:01

## 2021-01-15 RX ADMIN — SODIUM CHLORIDE: 0.9 INJECTION, SOLUTION INTRAVENOUS at 09:01

## 2021-01-15 RX ADMIN — MIDAZOLAM HYDROCHLORIDE 2 MG: 1 INJECTION, SOLUTION INTRAMUSCULAR; INTRAVENOUS at 10:01

## 2021-01-15 RX ADMIN — DEXMEDETOMIDINE HYDROCHLORIDE 20 MCG: 100 INJECTION, SOLUTION, CONCENTRATE INTRAVENOUS at 10:01

## 2021-01-15 RX ADMIN — SODIUM CHLORIDE 125 ML/HR: 0.9 INJECTION, SOLUTION INTRAVENOUS at 08:01

## 2021-01-15 RX ADMIN — MIDAZOLAM HYDROCHLORIDE 2 MG: 1 INJECTION, SOLUTION INTRAMUSCULAR; INTRAVENOUS at 08:01

## 2021-01-19 VITALS
WEIGHT: 156 LBS | RESPIRATION RATE: 12 BRPM | SYSTOLIC BLOOD PRESSURE: 105 MMHG | DIASTOLIC BLOOD PRESSURE: 51 MMHG | BODY MASS INDEX: 25.99 KG/M2 | OXYGEN SATURATION: 100 % | TEMPERATURE: 98 F | HEIGHT: 65 IN | HEART RATE: 56 BPM

## 2021-01-21 ENCOUNTER — PATIENT MESSAGE (OUTPATIENT)
Dept: INTERNAL MEDICINE | Facility: CLINIC | Age: 46
End: 2021-01-21

## 2021-01-21 DIAGNOSIS — R05.9 COUGH: Primary | ICD-10-CM

## 2021-01-22 ENCOUNTER — LAB VISIT (OUTPATIENT)
Dept: INTERNAL MEDICINE | Facility: CLINIC | Age: 46
End: 2021-01-22
Payer: COMMERCIAL

## 2021-01-22 DIAGNOSIS — R05.9 COUGH: ICD-10-CM

## 2021-01-22 PROCEDURE — U0003 INFECTIOUS AGENT DETECTION BY NUCLEIC ACID (DNA OR RNA); SEVERE ACUTE RESPIRATORY SYNDROME CORONAVIRUS 2 (SARS-COV-2) (CORONAVIRUS DISEASE [COVID-19]), AMPLIFIED PROBE TECHNIQUE, MAKING USE OF HIGH THROUGHPUT TECHNOLOGIES AS DESCRIBED BY CMS-2020-01-R: HCPCS

## 2021-01-24 LAB — SARS-COV-2 RNA RESP QL NAA+PROBE: NOT DETECTED

## 2021-01-25 ENCOUNTER — PATIENT MESSAGE (OUTPATIENT)
Dept: INTERNAL MEDICINE | Facility: CLINIC | Age: 46
End: 2021-01-25

## 2021-01-26 LAB
FINAL PATHOLOGIC DIAGNOSIS: NORMAL
Lab: NORMAL

## 2021-01-27 ENCOUNTER — LAB VISIT (OUTPATIENT)
Dept: LAB | Facility: HOSPITAL | Age: 46
End: 2021-01-27
Attending: INTERNAL MEDICINE
Payer: COMMERCIAL

## 2021-01-27 DIAGNOSIS — R05.9 COUGH: ICD-10-CM

## 2021-01-27 LAB — SARS-COV-2 IGG SERPLBLD QL IA.RAPID: NEGATIVE

## 2021-01-27 PROCEDURE — 36415 COLL VENOUS BLD VENIPUNCTURE: CPT | Mod: PO

## 2021-01-27 PROCEDURE — 86769 SARS-COV-2 COVID-19 ANTIBODY: CPT

## 2021-01-28 ENCOUNTER — TELEPHONE (OUTPATIENT)
Dept: INTERNAL MEDICINE | Facility: CLINIC | Age: 46
End: 2021-01-28

## 2021-01-28 ENCOUNTER — OFFICE VISIT (OUTPATIENT)
Dept: ORTHOPEDICS | Facility: CLINIC | Age: 46
End: 2021-01-28
Payer: COMMERCIAL

## 2021-01-28 VITALS — HEIGHT: 65 IN | BODY MASS INDEX: 26 KG/M2 | WEIGHT: 156.06 LBS

## 2021-01-28 DIAGNOSIS — Z09 FOLLOW-UP EXAMINATION AFTER ORTHOPEDIC SURGERY: Primary | ICD-10-CM

## 2021-01-28 PROCEDURE — 99024 POSTOP FOLLOW-UP VISIT: CPT | Mod: S$GLB,,, | Performed by: PHYSICIAN ASSISTANT

## 2021-01-28 PROCEDURE — 99024 PR POST-OP FOLLOW-UP VISIT: ICD-10-PCS | Mod: S$GLB,,, | Performed by: PHYSICIAN ASSISTANT

## 2021-01-28 PROCEDURE — 99999 PR PBB SHADOW E&M-EST. PATIENT-LVL III: ICD-10-PCS | Mod: PBBFAC,,, | Performed by: PHYSICIAN ASSISTANT

## 2021-01-28 PROCEDURE — 99999 PR PBB SHADOW E&M-EST. PATIENT-LVL III: CPT | Mod: PBBFAC,,, | Performed by: PHYSICIAN ASSISTANT

## 2021-01-28 PROCEDURE — 1126F PR PAIN SEVERITY QUANTIFIED, NO PAIN PRESENT: ICD-10-PCS | Mod: S$GLB,,, | Performed by: PHYSICIAN ASSISTANT

## 2021-01-28 PROCEDURE — 3008F BODY MASS INDEX DOCD: CPT | Mod: CPTII,S$GLB,, | Performed by: PHYSICIAN ASSISTANT

## 2021-01-28 PROCEDURE — 3008F PR BODY MASS INDEX (BMI) DOCUMENTED: ICD-10-PCS | Mod: CPTII,S$GLB,, | Performed by: PHYSICIAN ASSISTANT

## 2021-01-28 PROCEDURE — 1126F AMNT PAIN NOTED NONE PRSNT: CPT | Mod: S$GLB,,, | Performed by: PHYSICIAN ASSISTANT

## 2021-02-18 ENCOUNTER — PATIENT MESSAGE (OUTPATIENT)
Dept: PHYSICAL MEDICINE AND REHAB | Facility: CLINIC | Age: 46
End: 2021-02-18

## 2021-02-25 ENCOUNTER — OFFICE VISIT (OUTPATIENT)
Dept: PHYSICAL MEDICINE AND REHAB | Facility: CLINIC | Age: 46
End: 2021-02-25
Payer: COMMERCIAL

## 2021-02-25 VITALS
HEIGHT: 65 IN | WEIGHT: 156.06 LBS | DIASTOLIC BLOOD PRESSURE: 70 MMHG | BODY MASS INDEX: 26 KG/M2 | HEART RATE: 66 BPM | SYSTOLIC BLOOD PRESSURE: 94 MMHG

## 2021-02-25 DIAGNOSIS — F42.9 OBSESSIVE-COMPULSIVE DISORDER, UNSPECIFIED TYPE: ICD-10-CM

## 2021-02-25 DIAGNOSIS — G43.009 MIGRAINE WITHOUT AURA AND WITHOUT STATUS MIGRAINOSUS, NOT INTRACTABLE: ICD-10-CM

## 2021-02-25 DIAGNOSIS — Z98.84 S/P LAPAROSCOPIC SLEEVE GASTRECTOMY: ICD-10-CM

## 2021-02-25 PROCEDURE — 99999 PR PBB SHADOW E&M-EST. PATIENT-LVL IV: CPT | Mod: PBBFAC,,, | Performed by: NURSE PRACTITIONER

## 2021-02-25 PROCEDURE — 1125F PR PAIN SEVERITY QUANTIFIED, PAIN PRESENT: ICD-10-PCS | Mod: S$GLB,,, | Performed by: NURSE PRACTITIONER

## 2021-02-25 PROCEDURE — 64615 CHEMODENERV MUSC MIGRAINE: CPT | Mod: S$GLB,,, | Performed by: NURSE PRACTITIONER

## 2021-02-25 PROCEDURE — 3008F PR BODY MASS INDEX (BMI) DOCUMENTED: ICD-10-PCS | Mod: CPTII,S$GLB,, | Performed by: NURSE PRACTITIONER

## 2021-02-25 PROCEDURE — 99213 PR OFFICE/OUTPT VISIT, EST, LEVL III, 20-29 MIN: ICD-10-PCS | Mod: 25,S$GLB,, | Performed by: NURSE PRACTITIONER

## 2021-02-25 PROCEDURE — 3008F BODY MASS INDEX DOCD: CPT | Mod: CPTII,S$GLB,, | Performed by: NURSE PRACTITIONER

## 2021-02-25 PROCEDURE — 99999 PR PBB SHADOW E&M-EST. PATIENT-LVL IV: ICD-10-PCS | Mod: PBBFAC,,, | Performed by: NURSE PRACTITIONER

## 2021-02-25 PROCEDURE — 1125F AMNT PAIN NOTED PAIN PRSNT: CPT | Mod: S$GLB,,, | Performed by: NURSE PRACTITIONER

## 2021-02-25 PROCEDURE — 64615 PR CHEMODENERVATION OF MUSCLE FOR CHRONIC MIGRAINE: ICD-10-PCS | Mod: S$GLB,,, | Performed by: NURSE PRACTITIONER

## 2021-02-25 PROCEDURE — 99213 OFFICE O/P EST LOW 20 MIN: CPT | Mod: 25,S$GLB,, | Performed by: NURSE PRACTITIONER

## 2021-03-02 ENCOUNTER — PATIENT MESSAGE (OUTPATIENT)
Dept: PHYSICAL MEDICINE AND REHAB | Facility: CLINIC | Age: 46
End: 2021-03-02

## 2021-03-02 ENCOUNTER — PATIENT MESSAGE (OUTPATIENT)
Dept: ENDOCRINOLOGY | Facility: CLINIC | Age: 46
End: 2021-03-02

## 2021-03-02 DIAGNOSIS — E55.9 VITAMIN D DEFICIENCY: ICD-10-CM

## 2021-03-02 DIAGNOSIS — E03.9 HYPOTHYROIDISM, UNSPECIFIED TYPE: Primary | ICD-10-CM

## 2021-03-05 ENCOUNTER — LAB VISIT (OUTPATIENT)
Dept: LAB | Facility: HOSPITAL | Age: 46
End: 2021-03-05
Attending: ORTHOPAEDIC SURGERY
Payer: COMMERCIAL

## 2021-03-05 DIAGNOSIS — E55.9 VITAMIN D DEFICIENCY: ICD-10-CM

## 2021-03-05 DIAGNOSIS — E03.9 HYPOTHYROIDISM, UNSPECIFIED TYPE: ICD-10-CM

## 2021-03-05 LAB
25(OH)D3+25(OH)D2 SERPL-MCNC: 27 NG/ML (ref 30–96)
ALBUMIN SERPL BCP-MCNC: 4.2 G/DL (ref 3.5–5.2)
ALP SERPL-CCNC: 83 U/L (ref 55–135)
ALT SERPL W/O P-5'-P-CCNC: 15 U/L (ref 10–44)
ANION GAP SERPL CALC-SCNC: 9 MMOL/L (ref 8–16)
AST SERPL-CCNC: 20 U/L (ref 10–40)
BILIRUB SERPL-MCNC: 0.4 MG/DL (ref 0.1–1)
BUN SERPL-MCNC: 20 MG/DL (ref 6–20)
CALCIUM SERPL-MCNC: 9.4 MG/DL (ref 8.7–10.5)
CHLORIDE SERPL-SCNC: 102 MMOL/L (ref 95–110)
CHOLEST SERPL-MCNC: 199 MG/DL (ref 120–199)
CHOLEST/HDLC SERPL: 2.7 {RATIO} (ref 2–5)
CO2 SERPL-SCNC: 28 MMOL/L (ref 23–29)
CREAT SERPL-MCNC: 0.8 MG/DL (ref 0.5–1.4)
EST. GFR  (AFRICAN AMERICAN): >60 ML/MIN/1.73 M^2
EST. GFR  (NON AFRICAN AMERICAN): >60 ML/MIN/1.73 M^2
GLUCOSE SERPL-MCNC: 83 MG/DL (ref 70–110)
HDLC SERPL-MCNC: 74 MG/DL (ref 40–75)
HDLC SERPL: 37.2 % (ref 20–50)
LDLC SERPL CALC-MCNC: 113.2 MG/DL (ref 63–159)
NONHDLC SERPL-MCNC: 125 MG/DL
POTASSIUM SERPL-SCNC: 3.9 MMOL/L (ref 3.5–5.1)
PROT SERPL-MCNC: 7.5 G/DL (ref 6–8.4)
SODIUM SERPL-SCNC: 139 MMOL/L (ref 136–145)
TRIGL SERPL-MCNC: 59 MG/DL (ref 30–150)
TSH SERPL DL<=0.005 MIU/L-ACNC: 0.74 UIU/ML (ref 0.4–4)

## 2021-03-05 PROCEDURE — 80061 LIPID PANEL: CPT | Performed by: NURSE PRACTITIONER

## 2021-03-05 PROCEDURE — 82306 VITAMIN D 25 HYDROXY: CPT | Performed by: NURSE PRACTITIONER

## 2021-03-05 PROCEDURE — 80053 COMPREHEN METABOLIC PANEL: CPT | Performed by: NURSE PRACTITIONER

## 2021-03-05 PROCEDURE — 84443 ASSAY THYROID STIM HORMONE: CPT | Performed by: NURSE PRACTITIONER

## 2021-03-08 ENCOUNTER — PATIENT MESSAGE (OUTPATIENT)
Dept: ENDOCRINOLOGY | Facility: CLINIC | Age: 46
End: 2021-03-08

## 2021-03-11 ENCOUNTER — TELEPHONE (OUTPATIENT)
Dept: ENDOCRINOLOGY | Facility: CLINIC | Age: 46
End: 2021-03-11

## 2021-03-11 PROBLEM — E55.9 VITAMIN D DEFICIENCY: Chronic | Status: ACTIVE | Noted: 2019-07-22

## 2021-03-11 PROBLEM — E03.9 HYPOTHYROID: Chronic | Status: ACTIVE | Noted: 2018-07-16

## 2021-03-12 ENCOUNTER — OFFICE VISIT (OUTPATIENT)
Dept: ENDOCRINOLOGY | Facility: CLINIC | Age: 46
End: 2021-03-12
Payer: COMMERCIAL

## 2021-03-12 DIAGNOSIS — E03.9 HYPOTHYROIDISM, UNSPECIFIED TYPE: Primary | Chronic | ICD-10-CM

## 2021-03-12 DIAGNOSIS — Z86.39 HISTORY OF DIABETES MELLITUS: ICD-10-CM

## 2021-03-12 DIAGNOSIS — G43.019 INTRACTABLE MIGRAINE WITHOUT AURA AND WITHOUT STATUS MIGRAINOSUS: ICD-10-CM

## 2021-03-12 DIAGNOSIS — R53.83 FATIGUE, UNSPECIFIED TYPE: ICD-10-CM

## 2021-03-12 DIAGNOSIS — E55.9 VITAMIN D DEFICIENCY: Chronic | ICD-10-CM

## 2021-03-12 PROCEDURE — 99214 PR OFFICE/OUTPT VISIT, EST, LEVL IV, 30-39 MIN: ICD-10-PCS | Mod: 95,,, | Performed by: NURSE PRACTITIONER

## 2021-03-12 PROCEDURE — 99214 OFFICE O/P EST MOD 30 MIN: CPT | Mod: 95,,, | Performed by: NURSE PRACTITIONER

## 2021-04-15 ENCOUNTER — PATIENT MESSAGE (OUTPATIENT)
Dept: RESEARCH | Facility: HOSPITAL | Age: 46
End: 2021-04-15

## 2021-04-27 ENCOUNTER — TELEPHONE (OUTPATIENT)
Dept: NEUROLOGY | Facility: CLINIC | Age: 46
End: 2021-04-27

## 2021-04-27 NOTE — TELEPHONE ENCOUNTER
Call placed to [facility] at [phone]    Spoke with representative. Botox delivery set to be shipped to [facility] Friday April 30th morning delivery.    Order Number: [id number]  Acct Number: [id number]

## 2021-04-27 NOTE — TELEPHONE ENCOUNTER
----- Message from Alicia Olvera sent at 4/27/2021  2:16 PM CDT -----  Type: Patient Call Back    Who called: Chhaya from [employer]    What is the request in detail:Confirming delivery address/medication before shipping    Can the clinic reply by [facility]? No     Would the patient rather a call back or a response via [facility]?  Call back     Best call back [phone]

## 2021-05-13 ENCOUNTER — OFFICE VISIT (OUTPATIENT)
Dept: PHYSICAL MEDICINE AND REHAB | Facility: CLINIC | Age: 46
End: 2021-05-13
Payer: COMMERCIAL

## 2021-05-13 VITALS
WEIGHT: 167 LBS | HEIGHT: 65 IN | DIASTOLIC BLOOD PRESSURE: 67 MMHG | BODY MASS INDEX: 27.82 KG/M2 | HEART RATE: 70 BPM | SYSTOLIC BLOOD PRESSURE: 101 MMHG

## 2021-05-13 DIAGNOSIS — G43.009 MIGRAINE WITHOUT AURA AND WITHOUT STATUS MIGRAINOSUS, NOT INTRACTABLE: ICD-10-CM

## 2021-05-13 PROCEDURE — 3008F BODY MASS INDEX DOCD: CPT | Mod: CPTII,S$GLB,, | Performed by: NURSE PRACTITIONER

## 2021-05-13 PROCEDURE — 64615 PR CHEMODENERVATION OF MUSCLE FOR CHRONIC MIGRAINE: ICD-10-PCS | Mod: S$GLB,,, | Performed by: NURSE PRACTITIONER

## 2021-05-13 PROCEDURE — 1125F PR PAIN SEVERITY QUANTIFIED, PAIN PRESENT: ICD-10-PCS | Mod: S$GLB,,, | Performed by: NURSE PRACTITIONER

## 2021-05-13 PROCEDURE — 3008F PR BODY MASS INDEX (BMI) DOCUMENTED: ICD-10-PCS | Mod: CPTII,S$GLB,, | Performed by: NURSE PRACTITIONER

## 2021-05-13 PROCEDURE — 1125F AMNT PAIN NOTED PAIN PRSNT: CPT | Mod: S$GLB,,, | Performed by: NURSE PRACTITIONER

## 2021-05-13 PROCEDURE — 99999 PR PBB SHADOW E&M-EST. PATIENT-LVL IV: ICD-10-PCS | Mod: PBBFAC,,, | Performed by: NURSE PRACTITIONER

## 2021-05-13 PROCEDURE — 99499 UNLISTED E&M SERVICE: CPT | Mod: S$GLB,,, | Performed by: NURSE PRACTITIONER

## 2021-05-13 PROCEDURE — 64615 CHEMODENERV MUSC MIGRAINE: CPT | Mod: S$GLB,,, | Performed by: NURSE PRACTITIONER

## 2021-05-13 PROCEDURE — 99499 NO LOS: ICD-10-PCS | Mod: S$GLB,,, | Performed by: NURSE PRACTITIONER

## 2021-05-13 PROCEDURE — 99999 PR PBB SHADOW E&M-EST. PATIENT-LVL IV: CPT | Mod: PBBFAC,,, | Performed by: NURSE PRACTITIONER

## 2021-06-24 ENCOUNTER — TELEPHONE (OUTPATIENT)
Dept: NEUROLOGY | Facility: CLINIC | Age: 46
End: 2021-06-24

## 2021-06-28 ENCOUNTER — TELEPHONE (OUTPATIENT)
Dept: NEUROLOGY | Facility: CLINIC | Age: 46
End: 2021-06-28

## 2021-07-01 ENCOUNTER — OFFICE VISIT (OUTPATIENT)
Dept: ENDOCRINOLOGY | Facility: CLINIC | Age: 46
End: 2021-07-01
Payer: COMMERCIAL

## 2021-07-01 ENCOUNTER — PATIENT MESSAGE (OUTPATIENT)
Dept: ENDOCRINOLOGY | Facility: CLINIC | Age: 46
End: 2021-07-01

## 2021-07-01 DIAGNOSIS — E03.9 HYPOTHYROIDISM, UNSPECIFIED TYPE: Chronic | ICD-10-CM

## 2021-07-01 DIAGNOSIS — E55.9 VITAMIN D DEFICIENCY: Chronic | ICD-10-CM

## 2021-07-01 DIAGNOSIS — R53.83 FATIGUE, UNSPECIFIED TYPE: ICD-10-CM

## 2021-07-01 DIAGNOSIS — Z86.39 HISTORY OF DIABETES MELLITUS: ICD-10-CM

## 2021-07-01 PROCEDURE — 99214 PR OFFICE/OUTPT VISIT, EST, LEVL IV, 30-39 MIN: ICD-10-PCS | Mod: 95,,, | Performed by: NURSE PRACTITIONER

## 2021-07-01 PROCEDURE — 99214 OFFICE O/P EST MOD 30 MIN: CPT | Mod: 95,,, | Performed by: NURSE PRACTITIONER

## 2021-07-02 ENCOUNTER — LAB VISIT (OUTPATIENT)
Dept: LAB | Facility: HOSPITAL | Age: 46
End: 2021-07-02
Attending: INTERNAL MEDICINE
Payer: COMMERCIAL

## 2021-07-02 DIAGNOSIS — Z86.39 HISTORY OF DIABETES MELLITUS: ICD-10-CM

## 2021-07-02 DIAGNOSIS — E03.9 HYPOTHYROIDISM, UNSPECIFIED TYPE: Chronic | ICD-10-CM

## 2021-07-02 LAB
CHOLEST SERPL-MCNC: 190 MG/DL (ref 120–199)
CHOLEST/HDLC SERPL: 2.6 {RATIO} (ref 2–5)
ESTIMATED AVG GLUCOSE: 103 MG/DL (ref 68–131)
HBA1C MFR BLD: 5.2 % (ref 4–5.6)
HDLC SERPL-MCNC: 73 MG/DL (ref 40–75)
HDLC SERPL: 38.4 % (ref 20–50)
LDLC SERPL CALC-MCNC: 106.2 MG/DL (ref 63–159)
NONHDLC SERPL-MCNC: 117 MG/DL
TRIGL SERPL-MCNC: 54 MG/DL (ref 30–150)
TSH SERPL DL<=0.005 MIU/L-ACNC: 0.42 UIU/ML (ref 0.4–4)

## 2021-07-02 PROCEDURE — 83036 HEMOGLOBIN GLYCOSYLATED A1C: CPT | Performed by: NURSE PRACTITIONER

## 2021-07-02 PROCEDURE — 80061 LIPID PANEL: CPT | Performed by: NURSE PRACTITIONER

## 2021-07-02 PROCEDURE — 84443 ASSAY THYROID STIM HORMONE: CPT | Performed by: NURSE PRACTITIONER

## 2021-07-06 ENCOUNTER — PATIENT MESSAGE (OUTPATIENT)
Dept: INTERNAL MEDICINE | Facility: CLINIC | Age: 46
End: 2021-07-06

## 2021-07-06 ENCOUNTER — PATIENT MESSAGE (OUTPATIENT)
Dept: ENDOCRINOLOGY | Facility: CLINIC | Age: 46
End: 2021-07-06

## 2021-07-06 DIAGNOSIS — R53.83 FATIGUE, UNSPECIFIED TYPE: Primary | ICD-10-CM

## 2021-07-06 DIAGNOSIS — Z86.39 HISTORY OF DIABETES MELLITUS: ICD-10-CM

## 2021-07-08 ENCOUNTER — LAB VISIT (OUTPATIENT)
Dept: LAB | Facility: HOSPITAL | Age: 46
End: 2021-07-08
Payer: COMMERCIAL

## 2021-07-08 DIAGNOSIS — Z86.39 HISTORY OF DIABETES MELLITUS: ICD-10-CM

## 2021-07-08 LAB
GLUCOSE SERPL-MCNC: 158 MG/DL
GLUCOSE SERPL-MCNC: 39 MG/DL
GLUCOSE SERPL-MCNC: 90 MG/DL (ref 70–110)

## 2021-07-08 PROCEDURE — 82951 GLUCOSE TOLERANCE TEST (GTT): CPT | Performed by: NURSE PRACTITIONER

## 2021-07-12 DIAGNOSIS — Z86.39 HISTORY OF DIABETES MELLITUS: ICD-10-CM

## 2021-07-12 DIAGNOSIS — E03.9 HYPOTHYROIDISM, UNSPECIFIED TYPE: Primary | ICD-10-CM

## 2021-07-12 RX ORDER — INSULIN PUMP SYRINGE, 3 ML
EACH MISCELLANEOUS
Qty: 1 EACH | Refills: 0 | Status: SHIPPED | OUTPATIENT
Start: 2021-07-12 | End: 2021-12-09

## 2021-07-12 RX ORDER — LANCETS
EACH MISCELLANEOUS
Qty: 100 EACH | Refills: 0 | Status: SHIPPED | OUTPATIENT
Start: 2021-07-12 | End: 2021-07-21

## 2021-07-14 ENCOUNTER — TELEPHONE (OUTPATIENT)
Dept: OBSTETRICS AND GYNECOLOGY | Facility: CLINIC | Age: 46
End: 2021-07-14

## 2021-07-14 DIAGNOSIS — Z12.31 BREAST CANCER SCREENING BY MAMMOGRAM: Primary | ICD-10-CM

## 2021-07-21 ENCOUNTER — LAB VISIT (OUTPATIENT)
Dept: LAB | Facility: HOSPITAL | Age: 46
End: 2021-07-21
Attending: INTERNAL MEDICINE
Payer: COMMERCIAL

## 2021-07-21 ENCOUNTER — OFFICE VISIT (OUTPATIENT)
Dept: INTERNAL MEDICINE | Facility: CLINIC | Age: 46
End: 2021-07-21
Payer: COMMERCIAL

## 2021-07-21 VITALS
HEIGHT: 65 IN | HEART RATE: 84 BPM | TEMPERATURE: 98 F | SYSTOLIC BLOOD PRESSURE: 120 MMHG | WEIGHT: 164 LBS | BODY MASS INDEX: 27.32 KG/M2 | RESPIRATION RATE: 16 BRPM | DIASTOLIC BLOOD PRESSURE: 64 MMHG | OXYGEN SATURATION: 98 %

## 2021-07-21 DIAGNOSIS — E03.9 HYPOTHYROIDISM, UNSPECIFIED TYPE: Chronic | ICD-10-CM

## 2021-07-21 DIAGNOSIS — M21.611 BUNION OF GREAT TOE OF RIGHT FOOT: ICD-10-CM

## 2021-07-21 DIAGNOSIS — Z01.818 PRE-OP EXAM: ICD-10-CM

## 2021-07-21 DIAGNOSIS — Z01.818 PRE-OP EXAM: Primary | ICD-10-CM

## 2021-07-21 DIAGNOSIS — G43.009 MIGRAINE WITHOUT AURA AND WITHOUT STATUS MIGRAINOSUS, NOT INTRACTABLE: ICD-10-CM

## 2021-07-21 DIAGNOSIS — F42.9 OBSESSIVE-COMPULSIVE DISORDER, UNSPECIFIED TYPE: ICD-10-CM

## 2021-07-21 LAB
ANION GAP SERPL CALC-SCNC: 8 MMOL/L (ref 8–16)
ANISOCYTOSIS BLD QL SMEAR: SLIGHT
BASOPHILS # BLD AUTO: 0.07 K/UL (ref 0–0.2)
BASOPHILS NFR BLD: 1.3 % (ref 0–1.9)
BUN SERPL-MCNC: 14 MG/DL (ref 6–20)
CALCIUM SERPL-MCNC: 9.4 MG/DL (ref 8.7–10.5)
CHLORIDE SERPL-SCNC: 104 MMOL/L (ref 95–110)
CO2 SERPL-SCNC: 27 MMOL/L (ref 23–29)
CREAT SERPL-MCNC: 0.8 MG/DL (ref 0.5–1.4)
DIFFERENTIAL METHOD: ABNORMAL
EOSINOPHIL # BLD AUTO: 0.1 K/UL (ref 0–0.5)
EOSINOPHIL NFR BLD: 2.6 % (ref 0–8)
ERYTHROCYTE [DISTWIDTH] IN BLOOD BY AUTOMATED COUNT: 13.6 % (ref 11.5–14.5)
EST. GFR  (AFRICAN AMERICAN): >60 ML/MIN/1.73 M^2
EST. GFR  (NON AFRICAN AMERICAN): >60 ML/MIN/1.73 M^2
GLUCOSE SERPL-MCNC: 61 MG/DL (ref 70–110)
HCT VFR BLD AUTO: 35.7 % (ref 37–48.5)
HGB BLD-MCNC: 11.6 G/DL (ref 12–16)
HYPOCHROMIA BLD QL SMEAR: ABNORMAL
IMM GRANULOCYTES # BLD AUTO: 0.01 K/UL (ref 0–0.04)
IMM GRANULOCYTES NFR BLD AUTO: 0.2 % (ref 0–0.5)
LYMPHOCYTES # BLD AUTO: 1.5 K/UL (ref 1–4.8)
LYMPHOCYTES NFR BLD: 27.9 % (ref 18–48)
MCH RBC QN AUTO: 28.6 PG (ref 27–31)
MCHC RBC AUTO-ENTMCNC: 32.5 G/DL (ref 32–36)
MCV RBC AUTO: 88 FL (ref 82–98)
MONOCYTES # BLD AUTO: 0.5 K/UL (ref 0.3–1)
MONOCYTES NFR BLD: 9 % (ref 4–15)
NEUTROPHILS # BLD AUTO: 3.2 K/UL (ref 1.8–7.7)
NEUTROPHILS NFR BLD: 59 % (ref 38–73)
NRBC BLD-RTO: 0 /100 WBC
OVALOCYTES BLD QL SMEAR: ABNORMAL
PLATELET # BLD AUTO: 288 K/UL (ref 150–450)
PLATELET BLD QL SMEAR: ABNORMAL
PMV BLD AUTO: 10.3 FL (ref 9.2–12.9)
POIKILOCYTOSIS BLD QL SMEAR: SLIGHT
POTASSIUM SERPL-SCNC: 3.8 MMOL/L (ref 3.5–5.1)
RBC # BLD AUTO: 4.05 M/UL (ref 4–5.4)
SODIUM SERPL-SCNC: 139 MMOL/L (ref 136–145)
WBC # BLD AUTO: 5.42 K/UL (ref 3.9–12.7)

## 2021-07-21 PROCEDURE — 93005 EKG 12-LEAD: ICD-10-PCS | Mod: S$GLB,,, | Performed by: INTERNAL MEDICINE

## 2021-07-21 PROCEDURE — 3008F PR BODY MASS INDEX (BMI) DOCUMENTED: ICD-10-PCS | Mod: CPTII,S$GLB,, | Performed by: INTERNAL MEDICINE

## 2021-07-21 PROCEDURE — 1160F RVW MEDS BY RX/DR IN RCRD: CPT | Mod: CPTII,S$GLB,, | Performed by: INTERNAL MEDICINE

## 2021-07-21 PROCEDURE — 99999 PR PBB SHADOW E&M-EST. PATIENT-LVL IV: CPT | Mod: PBBFAC,,, | Performed by: INTERNAL MEDICINE

## 2021-07-21 PROCEDURE — 80048 BASIC METABOLIC PNL TOTAL CA: CPT | Performed by: INTERNAL MEDICINE

## 2021-07-21 PROCEDURE — 99214 PR OFFICE/OUTPT VISIT, EST, LEVL IV, 30-39 MIN: ICD-10-PCS | Mod: S$GLB,,, | Performed by: INTERNAL MEDICINE

## 2021-07-21 PROCEDURE — 1159F PR MEDICATION LIST DOCUMENTED IN MEDICAL RECORD: ICD-10-PCS | Mod: CPTII,S$GLB,, | Performed by: INTERNAL MEDICINE

## 2021-07-21 PROCEDURE — 93005 ELECTROCARDIOGRAM TRACING: CPT | Mod: S$GLB,,, | Performed by: INTERNAL MEDICINE

## 2021-07-21 PROCEDURE — 1159F MED LIST DOCD IN RCRD: CPT | Mod: CPTII,S$GLB,, | Performed by: INTERNAL MEDICINE

## 2021-07-21 PROCEDURE — 99999 PR PBB SHADOW E&M-EST. PATIENT-LVL IV: ICD-10-PCS | Mod: PBBFAC,,, | Performed by: INTERNAL MEDICINE

## 2021-07-21 PROCEDURE — 93010 EKG 12-LEAD: ICD-10-PCS | Mod: S$GLB,,, | Performed by: INTERNAL MEDICINE

## 2021-07-21 PROCEDURE — 1160F PR REVIEW ALL MEDS BY PRESCRIBER/CLIN PHARMACIST DOCUMENTED: ICD-10-PCS | Mod: CPTII,S$GLB,, | Performed by: INTERNAL MEDICINE

## 2021-07-21 PROCEDURE — 85025 COMPLETE CBC W/AUTO DIFF WBC: CPT | Performed by: INTERNAL MEDICINE

## 2021-07-21 PROCEDURE — 3008F BODY MASS INDEX DOCD: CPT | Mod: CPTII,S$GLB,, | Performed by: INTERNAL MEDICINE

## 2021-07-21 PROCEDURE — 1126F AMNT PAIN NOTED NONE PRSNT: CPT | Mod: CPTII,S$GLB,, | Performed by: INTERNAL MEDICINE

## 2021-07-21 PROCEDURE — 93010 ELECTROCARDIOGRAM REPORT: CPT | Mod: S$GLB,,, | Performed by: INTERNAL MEDICINE

## 2021-07-21 PROCEDURE — 36415 COLL VENOUS BLD VENIPUNCTURE: CPT | Mod: PO | Performed by: INTERNAL MEDICINE

## 2021-07-21 PROCEDURE — 99214 OFFICE O/P EST MOD 30 MIN: CPT | Mod: S$GLB,,, | Performed by: INTERNAL MEDICINE

## 2021-07-21 PROCEDURE — 1126F PR PAIN SEVERITY QUANTIFIED, NO PAIN PRESENT: ICD-10-PCS | Mod: CPTII,S$GLB,, | Performed by: INTERNAL MEDICINE

## 2021-07-21 RX ORDER — GLUCOSAM/CHON-MSM1/C/MANG/BOSW 500-416.6
TABLET ORAL
COMMUNITY
Start: 2021-07-12 | End: 2021-12-09

## 2021-07-22 ENCOUNTER — TELEPHONE (OUTPATIENT)
Dept: INTERNAL MEDICINE | Facility: CLINIC | Age: 46
End: 2021-07-22

## 2021-07-23 ENCOUNTER — PATIENT MESSAGE (OUTPATIENT)
Dept: INTERNAL MEDICINE | Facility: CLINIC | Age: 46
End: 2021-07-23

## 2021-07-26 ENCOUNTER — PATIENT MESSAGE (OUTPATIENT)
Dept: ENDOCRINOLOGY | Facility: CLINIC | Age: 46
End: 2021-07-26

## 2021-07-26 ENCOUNTER — TELEPHONE (OUTPATIENT)
Dept: INTERNAL MEDICINE | Facility: CLINIC | Age: 46
End: 2021-07-26

## 2021-07-26 DIAGNOSIS — R63.5 WEIGHT GAIN: ICD-10-CM

## 2021-07-26 DIAGNOSIS — E03.9 HYPOTHYROIDISM, UNSPECIFIED TYPE: Primary | ICD-10-CM

## 2021-07-26 RX ORDER — DEXAMETHASONE 1 MG/1
1 TABLET ORAL ONCE
Qty: 1 TABLET | Refills: 0 | Status: SHIPPED | OUTPATIENT
Start: 2021-07-26 | End: 2021-07-26

## 2021-07-29 ENCOUNTER — OFFICE VISIT (OUTPATIENT)
Dept: PHYSICAL MEDICINE AND REHAB | Facility: CLINIC | Age: 46
End: 2021-07-29
Payer: COMMERCIAL

## 2021-07-29 VITALS
HEART RATE: 62 BPM | BODY MASS INDEX: 27.32 KG/M2 | SYSTOLIC BLOOD PRESSURE: 107 MMHG | DIASTOLIC BLOOD PRESSURE: 75 MMHG | WEIGHT: 164 LBS | HEIGHT: 65 IN

## 2021-07-29 DIAGNOSIS — G43.009 MIGRAINE WITHOUT AURA AND WITHOUT STATUS MIGRAINOSUS, NOT INTRACTABLE: ICD-10-CM

## 2021-07-29 PROCEDURE — 64615 CHEMODENERV MUSC MIGRAINE: CPT | Mod: S$GLB,,, | Performed by: NURSE PRACTITIONER

## 2021-07-29 PROCEDURE — 1159F MED LIST DOCD IN RCRD: CPT | Mod: CPTII,S$GLB,, | Performed by: NURSE PRACTITIONER

## 2021-07-29 PROCEDURE — 3008F BODY MASS INDEX DOCD: CPT | Mod: CPTII,S$GLB,, | Performed by: NURSE PRACTITIONER

## 2021-07-29 PROCEDURE — 3074F PR MOST RECENT SYSTOLIC BLOOD PRESSURE < 130 MM HG: ICD-10-PCS | Mod: CPTII,S$GLB,, | Performed by: NURSE PRACTITIONER

## 2021-07-29 PROCEDURE — 1125F PR PAIN SEVERITY QUANTIFIED, PAIN PRESENT: ICD-10-PCS | Mod: CPTII,S$GLB,, | Performed by: NURSE PRACTITIONER

## 2021-07-29 PROCEDURE — 99499 UNLISTED E&M SERVICE: CPT | Mod: S$GLB,,, | Performed by: NURSE PRACTITIONER

## 2021-07-29 PROCEDURE — 99499 NO LOS: ICD-10-PCS | Mod: S$GLB,,, | Performed by: NURSE PRACTITIONER

## 2021-07-29 PROCEDURE — 99999 PR PBB SHADOW E&M-EST. PATIENT-LVL IV: ICD-10-PCS | Mod: PBBFAC,,, | Performed by: NURSE PRACTITIONER

## 2021-07-29 PROCEDURE — 3078F DIAST BP <80 MM HG: CPT | Mod: CPTII,S$GLB,, | Performed by: NURSE PRACTITIONER

## 2021-07-29 PROCEDURE — 3008F PR BODY MASS INDEX (BMI) DOCUMENTED: ICD-10-PCS | Mod: CPTII,S$GLB,, | Performed by: NURSE PRACTITIONER

## 2021-07-29 PROCEDURE — 3044F HG A1C LEVEL LT 7.0%: CPT | Mod: CPTII,S$GLB,, | Performed by: NURSE PRACTITIONER

## 2021-07-29 PROCEDURE — 1160F RVW MEDS BY RX/DR IN RCRD: CPT | Mod: CPTII,S$GLB,, | Performed by: NURSE PRACTITIONER

## 2021-07-29 PROCEDURE — 3078F PR MOST RECENT DIASTOLIC BLOOD PRESSURE < 80 MM HG: ICD-10-PCS | Mod: CPTII,S$GLB,, | Performed by: NURSE PRACTITIONER

## 2021-07-29 PROCEDURE — 1159F PR MEDICATION LIST DOCUMENTED IN MEDICAL RECORD: ICD-10-PCS | Mod: CPTII,S$GLB,, | Performed by: NURSE PRACTITIONER

## 2021-07-29 PROCEDURE — 3044F PR MOST RECENT HEMOGLOBIN A1C LEVEL <7.0%: ICD-10-PCS | Mod: CPTII,S$GLB,, | Performed by: NURSE PRACTITIONER

## 2021-07-29 PROCEDURE — 99999 PR PBB SHADOW E&M-EST. PATIENT-LVL IV: CPT | Mod: PBBFAC,,, | Performed by: NURSE PRACTITIONER

## 2021-07-29 PROCEDURE — 1160F PR REVIEW ALL MEDS BY PRESCRIBER/CLIN PHARMACIST DOCUMENTED: ICD-10-PCS | Mod: CPTII,S$GLB,, | Performed by: NURSE PRACTITIONER

## 2021-07-29 PROCEDURE — 64615 PR CHEMODENERVATION OF MUSCLE FOR CHRONIC MIGRAINE: ICD-10-PCS | Mod: S$GLB,,, | Performed by: NURSE PRACTITIONER

## 2021-07-29 PROCEDURE — 3074F SYST BP LT 130 MM HG: CPT | Mod: CPTII,S$GLB,, | Performed by: NURSE PRACTITIONER

## 2021-07-29 PROCEDURE — 1125F AMNT PAIN NOTED PAIN PRSNT: CPT | Mod: CPTII,S$GLB,, | Performed by: NURSE PRACTITIONER

## 2021-07-30 ENCOUNTER — LAB VISIT (OUTPATIENT)
Dept: LAB | Facility: HOSPITAL | Age: 46
End: 2021-07-30
Attending: NURSE PRACTITIONER
Payer: COMMERCIAL

## 2021-07-30 DIAGNOSIS — R63.5 WEIGHT GAIN: ICD-10-CM

## 2021-07-30 PROCEDURE — 82542 COL CHROMOTOGRAPHY QUAL/QUAN: CPT | Performed by: NURSE PRACTITIONER

## 2021-07-30 PROCEDURE — 82533 TOTAL CORTISOL: CPT | Performed by: NURSE PRACTITIONER

## 2021-07-31 LAB — CORTIS SERPL-MCNC: 1 UG/DL (ref 4.3–22.4)

## 2021-08-01 ENCOUNTER — PATIENT MESSAGE (OUTPATIENT)
Dept: ENDOCRINOLOGY | Facility: CLINIC | Age: 46
End: 2021-08-01

## 2021-08-09 LAB — DEXAMETHASONE SERPL-MCNC: NORMAL NG/DL

## 2021-08-11 RX ORDER — LEVOTHYROXINE SODIUM 88 UG/1
TABLET ORAL
Qty: 30 TABLET | Refills: 0 | Status: SHIPPED | OUTPATIENT
Start: 2021-08-11 | End: 2021-11-05

## 2021-09-14 ENCOUNTER — PATIENT MESSAGE (OUTPATIENT)
Dept: OBSTETRICS AND GYNECOLOGY | Facility: CLINIC | Age: 46
End: 2021-09-14

## 2021-09-15 ENCOUNTER — PATIENT OUTREACH (OUTPATIENT)
Dept: ADMINISTRATIVE | Facility: OTHER | Age: 46
End: 2021-09-15

## 2021-09-16 ENCOUNTER — PROCEDURE VISIT (OUTPATIENT)
Dept: NEUROLOGY | Facility: CLINIC | Age: 46
End: 2021-09-16
Payer: COMMERCIAL

## 2021-09-16 VITALS
SYSTOLIC BLOOD PRESSURE: 107 MMHG | DIASTOLIC BLOOD PRESSURE: 71 MMHG | WEIGHT: 164 LBS | BODY MASS INDEX: 27.29 KG/M2 | HEART RATE: 65 BPM

## 2021-10-04 ENCOUNTER — PATIENT MESSAGE (OUTPATIENT)
Dept: ADMINISTRATIVE | Facility: HOSPITAL | Age: 46
End: 2021-10-04

## 2021-10-21 LAB — BCS RECOMMENDATION EXT: NORMAL

## 2021-11-04 ENCOUNTER — PATIENT MESSAGE (OUTPATIENT)
Dept: PHYSICAL MEDICINE AND REHAB | Facility: CLINIC | Age: 46
End: 2021-11-04
Payer: COMMERCIAL

## 2021-11-17 ENCOUNTER — PATIENT MESSAGE (OUTPATIENT)
Dept: INTERNAL MEDICINE | Facility: CLINIC | Age: 46
End: 2021-11-17
Payer: COMMERCIAL

## 2021-11-17 DIAGNOSIS — E03.9 HYPOTHYROIDISM, UNSPECIFIED TYPE: ICD-10-CM

## 2021-11-17 DIAGNOSIS — Z00.00 ANNUAL PHYSICAL EXAM: Primary | ICD-10-CM

## 2021-12-06 ENCOUNTER — RESEARCH ENCOUNTER (OUTPATIENT)
Dept: RESEARCH | Facility: HOSPITAL | Age: 46
End: 2021-12-06
Payer: COMMERCIAL

## 2021-12-07 ENCOUNTER — PATIENT OUTREACH (OUTPATIENT)
Dept: ADMINISTRATIVE | Facility: OTHER | Age: 46
End: 2021-12-07
Payer: COMMERCIAL

## 2021-12-09 ENCOUNTER — OFFICE VISIT (OUTPATIENT)
Dept: PHYSICAL MEDICINE AND REHAB | Facility: CLINIC | Age: 46
End: 2021-12-09
Payer: COMMERCIAL

## 2021-12-09 ENCOUNTER — TELEPHONE (OUTPATIENT)
Dept: RESEARCH | Facility: HOSPITAL | Age: 46
End: 2021-12-09
Payer: COMMERCIAL

## 2021-12-09 VITALS
HEART RATE: 62 BPM | BODY MASS INDEX: 29.16 KG/M2 | WEIGHT: 175 LBS | HEIGHT: 65 IN | SYSTOLIC BLOOD PRESSURE: 99 MMHG | DIASTOLIC BLOOD PRESSURE: 68 MMHG

## 2021-12-09 DIAGNOSIS — G43.009 MIGRAINE WITHOUT AURA AND WITHOUT STATUS MIGRAINOSUS, NOT INTRACTABLE: ICD-10-CM

## 2021-12-09 DIAGNOSIS — Z98.84 S/P LAPAROSCOPIC SLEEVE GASTRECTOMY: ICD-10-CM

## 2021-12-09 DIAGNOSIS — G43.709 CHRONIC MIGRAINE W/O AURA W/O STATUS MIGRAINOSUS, NOT INTRACTABLE: Primary | ICD-10-CM

## 2021-12-09 PROCEDURE — 99999 PR PBB SHADOW E&M-EST. PATIENT-LVL IV: CPT | Mod: PBBFAC,,, | Performed by: NURSE PRACTITIONER

## 2021-12-09 PROCEDURE — 99499 UNLISTED E&M SERVICE: CPT | Mod: S$GLB,,, | Performed by: NURSE PRACTITIONER

## 2021-12-09 PROCEDURE — 99499 NO LOS: ICD-10-PCS | Mod: S$GLB,,, | Performed by: NURSE PRACTITIONER

## 2021-12-09 PROCEDURE — 64615 PR CHEMODENERVATION OF MUSCLE FOR CHRONIC MIGRAINE: ICD-10-PCS | Mod: S$GLB,,, | Performed by: NURSE PRACTITIONER

## 2021-12-09 PROCEDURE — 64615 CHEMODENERV MUSC MIGRAINE: CPT | Mod: S$GLB,,, | Performed by: NURSE PRACTITIONER

## 2021-12-09 PROCEDURE — 99999 PR PBB SHADOW E&M-EST. PATIENT-LVL IV: ICD-10-PCS | Mod: PBBFAC,,, | Performed by: NURSE PRACTITIONER

## 2021-12-09 RX ORDER — FLUOXETINE HYDROCHLORIDE 20 MG/1
20 CAPSULE ORAL DAILY
COMMUNITY
Start: 2021-11-13 | End: 2022-02-01

## 2021-12-09 RX ORDER — DEXTROAMPHETAMINE SULFATE, DEXTROAMPHETAMINE SACCHARATE, AMPHETAMINE SULFATE AND AMPHETAMINE ASPARTATE 7.5; 7.5; 7.5; 7.5 MG/1; MG/1; MG/1; MG/1
CAPSULE, EXTENDED RELEASE ORAL EVERY MORNING
COMMUNITY
Start: 2021-12-07 | End: 2023-03-01

## 2021-12-09 RX ORDER — PROMETHAZINE HYDROCHLORIDE 25 MG/1
25 SUPPOSITORY RECTAL EVERY 6 HOURS PRN
Qty: 12 SUPPOSITORY | Refills: 2 | Status: SHIPPED | OUTPATIENT
Start: 2021-12-09 | End: 2023-03-01

## 2021-12-09 RX ORDER — ONDANSETRON 8 MG/1
8 TABLET, ORALLY DISINTEGRATING ORAL EVERY 8 HOURS PRN
Qty: 30 TABLET | Refills: 2 | Status: SHIPPED | OUTPATIENT
Start: 2021-12-09 | End: 2021-12-23 | Stop reason: SDUPTHER

## 2021-12-09 RX ORDER — ESTRADIOL 0.5 MG/1
0.5 TABLET ORAL DAILY
COMMUNITY
Start: 2021-10-07 | End: 2023-03-01

## 2021-12-10 ENCOUNTER — RESEARCH ENCOUNTER (OUTPATIENT)
Dept: RESEARCH | Facility: CLINIC | Age: 46
End: 2021-12-10
Payer: COMMERCIAL

## 2021-12-13 ENCOUNTER — TELEPHONE (OUTPATIENT)
Dept: RESEARCH | Facility: CLINIC | Age: 46
End: 2021-12-13
Payer: COMMERCIAL

## 2021-12-14 ENCOUNTER — RESEARCH ENCOUNTER (OUTPATIENT)
Dept: RESEARCH | Facility: CLINIC | Age: 46
End: 2021-12-14
Payer: COMMERCIAL

## 2021-12-14 DIAGNOSIS — Z23 NEED FOR VACCINATION: ICD-10-CM

## 2021-12-14 DIAGNOSIS — Z00.6 RESEARCH STUDY PATIENT: Primary | ICD-10-CM

## 2021-12-14 PROCEDURE — 91300 COVID-19, MRNA, LNP-S, PF, 30 MCG/0.3 ML DOSE VACCINE: CPT | Mod: PBBFAC | Performed by: INTERNAL MEDICINE

## 2021-12-22 ENCOUNTER — LAB VISIT (OUTPATIENT)
Dept: LAB | Facility: HOSPITAL | Age: 46
End: 2021-12-22
Attending: INTERNAL MEDICINE
Payer: COMMERCIAL

## 2021-12-22 DIAGNOSIS — E03.9 HYPOTHYROIDISM, UNSPECIFIED TYPE: ICD-10-CM

## 2021-12-22 DIAGNOSIS — Z00.00 ANNUAL PHYSICAL EXAM: ICD-10-CM

## 2021-12-22 DIAGNOSIS — E66.3 OVERWEIGHT (BMI 25.0-29.9): Primary | ICD-10-CM

## 2021-12-22 LAB
ALBUMIN SERPL BCP-MCNC: 3.7 G/DL (ref 3.5–5.2)
ALBUMIN/CREAT UR: NORMAL UG/MG (ref 0–30)
ALP SERPL-CCNC: 80 U/L (ref 55–135)
ALT SERPL W/O P-5'-P-CCNC: 14 U/L (ref 10–44)
ANION GAP SERPL CALC-SCNC: 6 MMOL/L (ref 8–16)
AST SERPL-CCNC: 19 U/L (ref 10–40)
BASOPHILS # BLD AUTO: 0.07 K/UL (ref 0–0.2)
BASOPHILS NFR BLD: 1.2 % (ref 0–1.9)
BILIRUB SERPL-MCNC: 0.5 MG/DL (ref 0.1–1)
BILIRUB UR QL STRIP: NEGATIVE
BUN SERPL-MCNC: 18 MG/DL (ref 6–20)
CALCIUM SERPL-MCNC: 9.2 MG/DL (ref 8.7–10.5)
CHLORIDE SERPL-SCNC: 105 MMOL/L (ref 95–110)
CHOLEST SERPL-MCNC: 180 MG/DL (ref 120–199)
CHOLEST/HDLC SERPL: 2.9 {RATIO} (ref 2–5)
CLARITY UR REFRACT.AUTO: CLEAR
CO2 SERPL-SCNC: 30 MMOL/L (ref 23–29)
COLOR UR AUTO: YELLOW
CREAT SERPL-MCNC: 0.7 MG/DL (ref 0.5–1.4)
CREAT UR-MCNC: 101 MG/DL (ref 15–325)
DIFFERENTIAL METHOD: ABNORMAL
EOSINOPHIL # BLD AUTO: 0.2 K/UL (ref 0–0.5)
EOSINOPHIL NFR BLD: 2.6 % (ref 0–8)
ERYTHROCYTE [DISTWIDTH] IN BLOOD BY AUTOMATED COUNT: 13.5 % (ref 11.5–14.5)
EST. GFR  (AFRICAN AMERICAN): >60 ML/MIN/1.73 M^2
EST. GFR  (NON AFRICAN AMERICAN): >60 ML/MIN/1.73 M^2
ESTIMATED AVG GLUCOSE: 105 MG/DL (ref 68–131)
GLUCOSE SERPL-MCNC: 82 MG/DL (ref 70–110)
GLUCOSE UR QL STRIP: NEGATIVE
HBA1C MFR BLD: 5.3 % (ref 4–5.6)
HCT VFR BLD AUTO: 37 % (ref 37–48.5)
HDLC SERPL-MCNC: 62 MG/DL (ref 40–75)
HDLC SERPL: 34.4 % (ref 20–50)
HGB BLD-MCNC: 11.6 G/DL (ref 12–16)
HGB UR QL STRIP: NEGATIVE
IMM GRANULOCYTES # BLD AUTO: 0.01 K/UL (ref 0–0.04)
IMM GRANULOCYTES NFR BLD AUTO: 0.2 % (ref 0–0.5)
KETONES UR QL STRIP: NEGATIVE
LDLC SERPL CALC-MCNC: 101.2 MG/DL (ref 63–159)
LEUKOCYTE ESTERASE UR QL STRIP: NEGATIVE
LYMPHOCYTES # BLD AUTO: 2 K/UL (ref 1–4.8)
LYMPHOCYTES NFR BLD: 34.3 % (ref 18–48)
MCH RBC QN AUTO: 28.2 PG (ref 27–31)
MCHC RBC AUTO-ENTMCNC: 31.4 G/DL (ref 32–36)
MCV RBC AUTO: 90 FL (ref 82–98)
MICROALBUMIN UR DL<=1MG/L-MCNC: <5 UG/ML
MONOCYTES # BLD AUTO: 0.4 K/UL (ref 0.3–1)
MONOCYTES NFR BLD: 6.6 % (ref 4–15)
NEUTROPHILS # BLD AUTO: 3.2 K/UL (ref 1.8–7.7)
NEUTROPHILS NFR BLD: 55.1 % (ref 38–73)
NITRITE UR QL STRIP: NEGATIVE
NONHDLC SERPL-MCNC: 118 MG/DL
NRBC BLD-RTO: 0 /100 WBC
PH UR STRIP: 6 [PH] (ref 5–8)
PLATELET # BLD AUTO: 299 K/UL (ref 150–450)
PMV BLD AUTO: 10.2 FL (ref 9.2–12.9)
POTASSIUM SERPL-SCNC: 4 MMOL/L (ref 3.5–5.1)
PROT SERPL-MCNC: 6.6 G/DL (ref 6–8.4)
PROT UR QL STRIP: NEGATIVE
RBC # BLD AUTO: 4.11 M/UL (ref 4–5.4)
SODIUM SERPL-SCNC: 141 MMOL/L (ref 136–145)
SP GR UR STRIP: 1.02 (ref 1–1.03)
T4 FREE SERPL-MCNC: 0.87 NG/DL (ref 0.71–1.51)
TRIGL SERPL-MCNC: 84 MG/DL (ref 30–150)
TSH SERPL DL<=0.005 MIU/L-ACNC: 0.39 UIU/ML (ref 0.4–4)
URN SPEC COLLECT METH UR: NORMAL
WBC # BLD AUTO: 5.77 K/UL (ref 3.9–12.7)

## 2021-12-22 PROCEDURE — 36415 COLL VENOUS BLD VENIPUNCTURE: CPT | Mod: PO | Performed by: INTERNAL MEDICINE

## 2021-12-22 PROCEDURE — 83036 HEMOGLOBIN GLYCOSYLATED A1C: CPT | Performed by: INTERNAL MEDICINE

## 2021-12-22 PROCEDURE — 85025 COMPLETE CBC W/AUTO DIFF WBC: CPT | Performed by: INTERNAL MEDICINE

## 2021-12-22 PROCEDURE — 80053 COMPREHEN METABOLIC PANEL: CPT | Performed by: INTERNAL MEDICINE

## 2021-12-22 PROCEDURE — 80061 LIPID PANEL: CPT | Performed by: INTERNAL MEDICINE

## 2021-12-22 PROCEDURE — 84439 ASSAY OF FREE THYROXINE: CPT | Performed by: INTERNAL MEDICINE

## 2021-12-22 PROCEDURE — 82570 ASSAY OF URINE CREATININE: CPT | Performed by: INTERNAL MEDICINE

## 2021-12-22 PROCEDURE — 84443 ASSAY THYROID STIM HORMONE: CPT | Performed by: INTERNAL MEDICINE

## 2021-12-22 PROCEDURE — 81003 URINALYSIS AUTO W/O SCOPE: CPT | Performed by: INTERNAL MEDICINE

## 2021-12-23 ENCOUNTER — TELEPHONE (OUTPATIENT)
Dept: INTERNAL MEDICINE | Facility: CLINIC | Age: 46
End: 2021-12-23
Payer: COMMERCIAL

## 2022-01-13 ENCOUNTER — RESEARCH ENCOUNTER (OUTPATIENT)
Dept: RESEARCH | Facility: HOSPITAL | Age: 47
End: 2022-01-13
Payer: COMMERCIAL

## 2022-01-13 NOTE — PROGRESS NOTES
Study title: A Phase 1/2/3. Placebo Controlled, Randomized, Observer-Blind, Dose-Finding Study to Describe the Safety, Tolerability, Immunogenicity and Potential Efficacy of SARS-COV-2 RNA Vaccine Candidates Against COVID-19 in Healthy Adults   IRB #: [id number]  Sponsor: [employer]   Sponsor's Protocol: [id number]  Site Number: [id number]  Subject ID: [id number]    Emilia Coyle was contacted via telephone call on 1/13/2022  for their Visit 502, 1 month follow up. The following information was collected from her     Has the patient had any prohibited medications since their last visit? no   Has the patient had any changes in health since their last visit (AE/HAIDER)? no   Has the patient had any non-study vaccinations since their last visit? no    Remind subejct that  their next telephone visit will be their 6 month follow up call (Visit 503)     Emilia Coyle was informed that their next visit will also be via a telephone call. Subject instructed to continue completing their weekly Illness Diary and contact the site staff or investigator if a medically attend event or hosptialization occures.

## 2022-01-24 ENCOUNTER — PATIENT MESSAGE (OUTPATIENT)
Dept: PHYSICAL MEDICINE AND REHAB | Facility: CLINIC | Age: 47
End: 2022-01-24
Payer: COMMERCIAL

## 2022-01-25 ENCOUNTER — PATIENT MESSAGE (OUTPATIENT)
Dept: ADMINISTRATIVE | Facility: HOSPITAL | Age: 47
End: 2022-01-25
Payer: COMMERCIAL

## 2022-02-01 ENCOUNTER — OFFICE VISIT (OUTPATIENT)
Dept: INTERNAL MEDICINE | Facility: CLINIC | Age: 47
End: 2022-02-01
Payer: COMMERCIAL

## 2022-02-01 VITALS
DIASTOLIC BLOOD PRESSURE: 80 MMHG | RESPIRATION RATE: 18 BRPM | WEIGHT: 181.69 LBS | HEIGHT: 65 IN | HEART RATE: 69 BPM | TEMPERATURE: 98 F | OXYGEN SATURATION: 99 % | BODY MASS INDEX: 30.27 KG/M2 | SYSTOLIC BLOOD PRESSURE: 110 MMHG

## 2022-02-01 DIAGNOSIS — G43.009 MIGRAINE WITHOUT AURA AND WITHOUT STATUS MIGRAINOSUS, NOT INTRACTABLE: ICD-10-CM

## 2022-02-01 DIAGNOSIS — F42.9 OBSESSIVE-COMPULSIVE DISORDER, UNSPECIFIED TYPE: ICD-10-CM

## 2022-02-01 DIAGNOSIS — E03.9 HYPOTHYROIDISM, UNSPECIFIED TYPE: Chronic | ICD-10-CM

## 2022-02-01 DIAGNOSIS — Z00.00 ANNUAL PHYSICAL EXAM: Primary | ICD-10-CM

## 2022-02-01 PROCEDURE — 1160F RVW MEDS BY RX/DR IN RCRD: CPT | Mod: CPTII,S$GLB,, | Performed by: INTERNAL MEDICINE

## 2022-02-01 PROCEDURE — 99999 PR PBB SHADOW E&M-EST. PATIENT-LVL IV: ICD-10-PCS | Mod: PBBFAC,,, | Performed by: INTERNAL MEDICINE

## 2022-02-01 PROCEDURE — 99999 PR PBB SHADOW E&M-EST. PATIENT-LVL IV: CPT | Mod: PBBFAC,,, | Performed by: INTERNAL MEDICINE

## 2022-02-01 PROCEDURE — 1159F PR MEDICATION LIST DOCUMENTED IN MEDICAL RECORD: ICD-10-PCS | Mod: CPTII,S$GLB,, | Performed by: INTERNAL MEDICINE

## 2022-02-01 PROCEDURE — 3079F DIAST BP 80-89 MM HG: CPT | Mod: CPTII,S$GLB,, | Performed by: INTERNAL MEDICINE

## 2022-02-01 PROCEDURE — 99396 PR PREVENTIVE VISIT,EST,40-64: ICD-10-PCS | Mod: S$GLB,,, | Performed by: INTERNAL MEDICINE

## 2022-02-01 PROCEDURE — 3079F PR MOST RECENT DIASTOLIC BLOOD PRESSURE 80-89 MM HG: ICD-10-PCS | Mod: CPTII,S$GLB,, | Performed by: INTERNAL MEDICINE

## 2022-02-01 PROCEDURE — 3008F BODY MASS INDEX DOCD: CPT | Mod: CPTII,S$GLB,, | Performed by: INTERNAL MEDICINE

## 2022-02-01 PROCEDURE — 3008F PR BODY MASS INDEX (BMI) DOCUMENTED: ICD-10-PCS | Mod: CPTII,S$GLB,, | Performed by: INTERNAL MEDICINE

## 2022-02-01 PROCEDURE — 1159F MED LIST DOCD IN RCRD: CPT | Mod: CPTII,S$GLB,, | Performed by: INTERNAL MEDICINE

## 2022-02-01 PROCEDURE — 1160F PR REVIEW ALL MEDS BY PRESCRIBER/CLIN PHARMACIST DOCUMENTED: ICD-10-PCS | Mod: CPTII,S$GLB,, | Performed by: INTERNAL MEDICINE

## 2022-02-01 PROCEDURE — 3074F PR MOST RECENT SYSTOLIC BLOOD PRESSURE < 130 MM HG: ICD-10-PCS | Mod: CPTII,S$GLB,, | Performed by: INTERNAL MEDICINE

## 2022-02-01 PROCEDURE — 99396 PREV VISIT EST AGE 40-64: CPT | Mod: S$GLB,,, | Performed by: INTERNAL MEDICINE

## 2022-02-01 PROCEDURE — 3074F SYST BP LT 130 MM HG: CPT | Mod: CPTII,S$GLB,, | Performed by: INTERNAL MEDICINE

## 2022-02-01 NOTE — PROGRESS NOTES
Subjective:       Patient ID: Emilia Coyle is a 46 y.o. female.    Chief Complaint: Annual Exam and Elbow Pain (Both elbows )    HPI   46 y.o. Female here for annual exam.      Vaccines: Influenza (2020); Tetanus (2019)  Eye exam: 2019  Mammogram: 8/20  Gyn exam: 7/19     Exercise: walks daily  Diet: regular  LMP: s/p hysterectomy      Past Medical History:  No date: Celiac disease  No date: Diabetes mellitus  7/12/2017: High triglycerides  No date: Hypothyroidism  No date: MIKAELA (obstructive sleep apnea)  No date: Sleep apnea in adult  No date: Thyroid disease  Past Surgical History:  No date: ABDOMINOPLASTY  No date: ADENOIDECTOMY  No date: BLADDER SUSPENSION  12/2017: CHOLECYSTECTOMY  10/27/2017: COLONOSCOPY      Comment:  Normal   (Rtn 10 yrs, Chas Cheng)   07/2017: GASTRECTOMY  No date: HYSTERECTOMY  No date: TONSILLECTOMY  No date: TOTAL REDUCTION MAMMOPLASTY  No date: TOTAL THYROIDECTOMY; Right  No date: TUBAL LIGATION  No date: TYMPANOSTOMY TUBE PLACEMENT  No date: right foot bunion surgery  10/27/2017: UPPER GASTROINTESTINAL ENDOSCOPY      Comment:  Gastritis Biopsied, Benign   Social History    Socioeconomic History      Marital status:       Spouse name: Not on file      Number of children: Not on file      Years of education: Not on file      Highest education level: Not on file    Occupational History      Not on file    Social Needs      Financial resource strain: Not hard at all      Food insecurity:        Worry: Never true        Inability: Never true      Transportation needs:        Medical: No        Non-medical: No    Tobacco Use      Smoking status: Never Smoker      Smokeless tobacco: Never Used    Substance and Sexual Activity      Alcohol use: No        Frequency: Never        Drinks per session: Patient refused        Binge frequency: Never      Drug use: No      Sexual activity: Yes        Partners: Male    Lifestyle      Physical activity:        Days per week: 4  "days        Minutes per session: 60 min      Stress: Not at all    Relationships      Social connections:        Talks on phone: More than three times a week        Gets together: More than three times a week        Attends Buddhist service: Not on file        Active member of club or organization: No        Attends meetings of clubs or organizations: Patient refused        Relationship status:     Other Topics      Concerns:        Are you pregnant or think you may be?: Not Asked        Breast-feeding: Not Asked    Social History Narrative      Not on file     Review of patient's allergies indicates:   -- Rizatriptan -- Photosensitivity, Nausea Only and                            Other (See Comments)    --  "Feels like my brain is on fire."   -- Sumatriptan -- Photosensitivity, Nausea Only and                            Other (See Comments)    --  "Feels like my brain is on fire."   -- Zolmitriptan -- Photosensitivity, Nausea Only and                            Other (See Comments)    --  "Feels like my brain is on fire."   -- Gluten protein -- Hives, Diarrhea, Itching, Nausea                            And Vomiting, Swelling, Anxiety and                           Dermatitis   -- Latex, natural rubber -- Dermatitis    --  Latex gloves with Powder  Review of Systems   Constitutional: Negative for activity change, appetite change, chills, diaphoresis, fatigue, fever and unexpected weight change.   HENT: Negative for nasal congestion, mouth sores, postnasal drip, rhinorrhea, sinus pressure/congestion, sneezing, sore throat, trouble swallowing and voice change.    Eyes: Negative for pain, discharge and visual disturbance.   Respiratory: Negative for cough, shortness of breath and wheezing.    Cardiovascular: Negative for chest pain, palpitations and leg swelling.   Gastrointestinal: Negative for abdominal pain, blood in stool, constipation, diarrhea, nausea and vomiting.   Endocrine: Negative for cold " intolerance and heat intolerance.   Genitourinary: Negative for difficulty urinating, dysuria, frequency, hematuria and urgency.   Musculoskeletal: Negative for arthralgias and myalgias.   Integumentary:  Negative for rash and wound.   Allergic/Immunologic: Negative for environmental allergies and food allergies.   Neurological: Positive for headaches. Negative for dizziness, tremors, seizures, syncope, weakness and light-headedness.   Hematological: Negative for adenopathy. Does not bruise/bleed easily.   Psychiatric/Behavioral: Negative for confusion and sleep disturbance. The patient is not nervous/anxious.          Objective:      Physical Exam  Vitals and nursing note reviewed.   Constitutional:       General: She is not in acute distress.     Appearance: She is well-developed and well-nourished. She is not diaphoretic.   HENT:      Head: Normocephalic and atraumatic.      Right Ear: External ear normal.      Left Ear: External ear normal.      Nose: Nose normal.      Mouth/Throat:      Mouth: Oropharynx is clear and moist.      Pharynx: No oropharyngeal exudate.   Eyes:      General: No scleral icterus.        Right eye: No discharge.         Left eye: No discharge.      Extraocular Movements: EOM normal.      Conjunctiva/sclera: Conjunctivae normal.      Pupils: Pupils are equal, round, and reactive to light.   Neck:      Thyroid: No thyromegaly.      Vascular: No JVD.   Cardiovascular:      Rate and Rhythm: Normal rate and regular rhythm.      Pulses: Intact distal pulses.      Heart sounds: Normal heart sounds. No murmur heard.      Pulmonary:      Effort: Pulmonary effort is normal. No respiratory distress.      Breath sounds: Normal breath sounds. No wheezing or rales.   Chest:      Chest wall: No tenderness.   Abdominal:      General: Bowel sounds are normal. There is no distension.      Palpations: Abdomen is soft.      Tenderness: There is no abdominal tenderness. There is no guarding.    Musculoskeletal:         General: No edema.      Cervical back: Neck supple.   Lymphadenopathy:      Cervical: No cervical adenopathy.   Skin:     General: Skin is warm and dry.      Coloration: Skin is not pale.      Findings: No rash.   Neurological:      Mental Status: She is alert and oriented to person, place, and time.   Psychiatric:         Mood and Affect: Mood and affect normal.         Judgment: Judgment normal.         Assessment:       Problem List Items Addressed This Visit        Neuro    Migraine without status migrainosus, not intractable       Psychiatric    Obsessive-compulsive disorder       Endocrine    Hypothyroid (Chronic)      Other Visit Diagnoses     Annual physical exam    -  Primary          Plan:    Blood work reviewed with pt     Hypothyroidism- stable on Synthroid      OCD- controlled on current meds, followed by Psyc      Migraines- stable on Fioricet PRN

## 2022-02-10 ENCOUNTER — PATIENT MESSAGE (OUTPATIENT)
Dept: INTERNAL MEDICINE | Facility: CLINIC | Age: 47
End: 2022-02-10
Payer: COMMERCIAL

## 2022-02-10 DIAGNOSIS — K90.0 CELIAC DISEASE: ICD-10-CM

## 2022-02-10 DIAGNOSIS — R53.1 ASTHENIA: Primary | ICD-10-CM

## 2022-02-10 DIAGNOSIS — E88.810 DYSMETABOLIC SYNDROME X: ICD-10-CM

## 2022-02-10 DIAGNOSIS — R63.5 ABNORMAL WEIGHT GAIN: ICD-10-CM

## 2022-02-10 NOTE — TELEPHONE ENCOUNTER
Pt requesting labs    Labs now - ACTH, CORTISOL TOTAL & CORTISOL A.M.    Labs for 8/2022 CBC, CMP, TSH, T3 & T4, LIPID, VIT D

## 2022-02-19 ENCOUNTER — OFFICE VISIT (OUTPATIENT)
Dept: URGENT CARE | Facility: CLINIC | Age: 47
End: 2022-02-19
Payer: COMMERCIAL

## 2022-02-19 VITALS
WEIGHT: 181 LBS | OXYGEN SATURATION: 98 % | BODY MASS INDEX: 30.16 KG/M2 | SYSTOLIC BLOOD PRESSURE: 104 MMHG | HEART RATE: 72 BPM | RESPIRATION RATE: 16 BRPM | DIASTOLIC BLOOD PRESSURE: 6 MMHG | HEIGHT: 65 IN | TEMPERATURE: 98 F

## 2022-02-19 DIAGNOSIS — T78.40XA RASH DUE TO ALLERGY: ICD-10-CM

## 2022-02-19 DIAGNOSIS — R21 RASH DUE TO ALLERGY: ICD-10-CM

## 2022-02-19 DIAGNOSIS — T78.40XA ALLERGIC REACTION, INITIAL ENCOUNTER: Primary | ICD-10-CM

## 2022-02-19 PROCEDURE — 99213 PR OFFICE/OUTPT VISIT, EST, LEVL III, 20-29 MIN: ICD-10-PCS | Mod: 25,S$GLB,, | Performed by: FAMILY MEDICINE

## 2022-02-19 PROCEDURE — 99213 OFFICE O/P EST LOW 20 MIN: CPT | Mod: 25,S$GLB,, | Performed by: FAMILY MEDICINE

## 2022-02-19 PROCEDURE — 3074F SYST BP LT 130 MM HG: CPT | Mod: CPTII,S$GLB,, | Performed by: FAMILY MEDICINE

## 2022-02-19 PROCEDURE — 1159F MED LIST DOCD IN RCRD: CPT | Mod: CPTII,S$GLB,, | Performed by: FAMILY MEDICINE

## 2022-02-19 PROCEDURE — 3078F PR MOST RECENT DIASTOLIC BLOOD PRESSURE < 80 MM HG: ICD-10-PCS | Mod: CPTII,S$GLB,, | Performed by: FAMILY MEDICINE

## 2022-02-19 PROCEDURE — 96372 PR INJECTION,THERAP/PROPH/DIAG2ST, IM OR SUBCUT: ICD-10-PCS | Mod: S$GLB,,, | Performed by: FAMILY MEDICINE

## 2022-02-19 PROCEDURE — 3008F BODY MASS INDEX DOCD: CPT | Mod: CPTII,S$GLB,, | Performed by: FAMILY MEDICINE

## 2022-02-19 PROCEDURE — 1159F PR MEDICATION LIST DOCUMENTED IN MEDICAL RECORD: ICD-10-PCS | Mod: CPTII,S$GLB,, | Performed by: FAMILY MEDICINE

## 2022-02-19 PROCEDURE — 3008F PR BODY MASS INDEX (BMI) DOCUMENTED: ICD-10-PCS | Mod: CPTII,S$GLB,, | Performed by: FAMILY MEDICINE

## 2022-02-19 PROCEDURE — 3078F DIAST BP <80 MM HG: CPT | Mod: CPTII,S$GLB,, | Performed by: FAMILY MEDICINE

## 2022-02-19 PROCEDURE — 96372 THER/PROPH/DIAG INJ SC/IM: CPT | Mod: S$GLB,,, | Performed by: FAMILY MEDICINE

## 2022-02-19 PROCEDURE — 3074F PR MOST RECENT SYSTOLIC BLOOD PRESSURE < 130 MM HG: ICD-10-PCS | Mod: CPTII,S$GLB,, | Performed by: FAMILY MEDICINE

## 2022-02-19 RX ORDER — TRIAMCINOLONE ACETONIDE 1 MG/G
CREAM TOPICAL 2 TIMES DAILY
Qty: 45 G | Refills: 0 | Status: SHIPPED | OUTPATIENT
Start: 2022-02-19 | End: 2023-03-01

## 2022-02-19 RX ORDER — METHYLPREDNISOLONE 4 MG/1
4 TABLET ORAL DAILY
Qty: 21 TABLET | Refills: 0 | Status: SHIPPED | OUTPATIENT
Start: 2022-02-19 | End: 2022-02-19

## 2022-02-19 RX ORDER — TRIAMCINOLONE ACETONIDE 1 MG/G
CREAM TOPICAL 2 TIMES DAILY
Qty: 45 G | Refills: 0 | Status: SHIPPED | OUTPATIENT
Start: 2022-02-19 | End: 2022-02-19

## 2022-02-19 RX ORDER — FAMOTIDINE 20 MG/1
20 TABLET, FILM COATED ORAL 2 TIMES DAILY
Qty: 14 TABLET | Refills: 0 | Status: SHIPPED | OUTPATIENT
Start: 2022-02-19 | End: 2022-03-23

## 2022-02-19 RX ORDER — HYDROXYZINE PAMOATE 25 MG/1
25 CAPSULE ORAL 2 TIMES DAILY
Qty: 14 CAPSULE | Refills: 0 | Status: SHIPPED | OUTPATIENT
Start: 2022-02-19 | End: 2022-02-19

## 2022-02-19 RX ORDER — FAMOTIDINE 20 MG/1
20 TABLET, FILM COATED ORAL 2 TIMES DAILY
Qty: 14 TABLET | Refills: 0 | Status: SHIPPED | OUTPATIENT
Start: 2022-02-19 | End: 2022-02-19

## 2022-02-19 RX ORDER — METHYLPREDNISOLONE 4 MG/1
4 TABLET ORAL DAILY
Qty: 21 TABLET | Refills: 0 | Status: SHIPPED | OUTPATIENT
Start: 2022-02-19 | End: 2022-02-25

## 2022-02-19 RX ORDER — DEXAMETHASONE SODIUM PHOSPHATE 100 MG/10ML
10 INJECTION INTRAMUSCULAR; INTRAVENOUS
Status: COMPLETED | OUTPATIENT
Start: 2022-02-19 | End: 2022-02-19

## 2022-02-19 RX ORDER — HYDROXYZINE PAMOATE 25 MG/1
25 CAPSULE ORAL 2 TIMES DAILY
Qty: 14 CAPSULE | Refills: 0 | Status: SHIPPED | OUTPATIENT
Start: 2022-02-19 | End: 2022-02-26

## 2022-02-19 RX ADMIN — DEXAMETHASONE SODIUM PHOSPHATE 10 MG: 100 INJECTION INTRAMUSCULAR; INTRAVENOUS at 03:02

## 2022-02-19 NOTE — PATIENT INSTRUCTIONS
You received a steroid shot today - this can elevate your blood pressure, elevate your blood sugar, water weight gain, nervous energy, redness to the face and dimpling of the skin where the shot goes in if you had an injection.   If you are given a steroid pill prescription also, please do not start oral steroids today. If required, you can start the tablet tomorrow.   Do not use steroids more than 3 times per year.   If you have diabetes, please check you blood sugar frequently.  If you have high blood pressure, please check your blood pressure frequently.

## 2022-02-19 NOTE — PROGRESS NOTES
"Subjective:       Patient ID: Emilia Coyle is a 46 y.o. female.    Vitals:  height is 5' 5" (1.651 m) and weight is 82.1 kg (181 lb). Her oral temperature is 98.3 °F (36.8 °C). Her blood pressure is 104/6 (abnormal) and her pulse is 72. Her respiration is 16 and oxygen saturation is 98%.     Chief Complaint: Rash    Pt presents with c/o hives all over body since this am. Pt has known gluten allergy. Denies taking or exposed to anything new. Denies CP, SOB, HA, blurry vision, dizziness, N/V, fever.     Rash  This is a new problem. The current episode started yesterday. The problem has been gradually worsening since onset. The affected locations include the lips, right arm, left arm, right foot, left foot and abdomen. The rash is characterized by itchiness. She was exposed to nothing. Treatments tried: bendryl. The treatment provided no relief.       Skin: Positive for rash.       Objective:      Physical Exam   Constitutional: She is oriented to person, place, and time. She appears well-developed. She is cooperative.  Non-toxic appearance. She does not appear ill. No distress.   HENT:   Head: Normocephalic and atraumatic.   Ears:   Right Ear: Hearing, tympanic membrane, external ear and ear canal normal. impacted cerumen  Left Ear: Hearing, tympanic membrane, external ear and ear canal normal. impacted cerumen  Nose: Nose normal. No mucosal edema, rhinorrhea, nasal deformity or congestion. No epistaxis. Right sinus exhibits no maxillary sinus tenderness and no frontal sinus tenderness. Left sinus exhibits no maxillary sinus tenderness and no frontal sinus tenderness.   Mouth/Throat: Uvula is midline, oropharynx is clear and moist and mucous membranes are normal. No trismus in the jaw. Normal dentition. No uvula swelling. No oropharyngeal exudate or posterior oropharyngeal erythema.   Eyes: Conjunctivae and lids are normal. Right eye exhibits no discharge. Left eye exhibits no discharge. No scleral icterus.   Neck: " Trachea normal and phonation normal. Neck supple.   Cardiovascular: Normal rate, regular rhythm, normal heart sounds and normal pulses.   No murmur heard.  Pulmonary/Chest: Effort normal and breath sounds normal. No stridor. No respiratory distress. She has no wheezes. She has no rhonchi. She has no rales.   Abdominal: Normal appearance and bowel sounds are normal. She exhibits no distension and no mass. Soft. There is no abdominal tenderness.   Musculoskeletal: Normal range of motion.         General: No deformity. Normal range of motion.      Cervical back: She exhibits no tenderness.   Lymphadenopathy:     She has no cervical adenopathy.   Neurological: She is alert and oriented to person, place, and time. She exhibits normal muscle tone. Coordination normal.   Skin: Skin is warm, dry, intact, not diaphoretic, not pale and rash.         Comments: +ve erythmatous maculopapular rash dispersed over b/l lower and upper extremities. No vesicles.   +ve slight swelling to lower lip. NO tongue, throat or eye swelling.    Psychiatric: Her speech is normal and behavior is normal. Judgment and thought content normal.   Nursing note and vitals reviewed.        Assessment:       1. Allergic reaction, initial encounter    2. Rash due to allergy          Plan:         Allergic reaction, initial encounter    Rash due to allergy    Other orders  -     dexamethasone injection 10 mg  -     Discontinue: hydrOXYzine pamoate (VISTARIL) 25 MG Cap; Take 1 capsule (25 mg total) by mouth 2 (two) times a day. for 7 days  Dispense: 14 capsule; Refill: 0  -     Discontinue: famotidine (PEPCID) 20 MG tablet; Take 1 tablet (20 mg total) by mouth 2 (two) times daily. for 7 days  Dispense: 14 tablet; Refill: 0  -     Discontinue: triamcinolone acetonide 0.1% (KENALOG) 0.1 % cream; Apply topically 2 (two) times daily.  Dispense: 45 g; Refill: 0  -     Discontinue: methylPREDNISolone (MEDROL DOSEPACK) 4 mg tablet; Take 1 tablet (4 mg total) by  mouth once daily. Take as directed for 6 days  Dispense: 21 tablet; Refill: 0  -     methylPREDNISolone (MEDROL DOSEPACK) 4 mg tablet; Take 1 tablet (4 mg total) by mouth once daily. Take as directed for 6 days  Dispense: 21 tablet; Refill: 0  -     hydrOXYzine pamoate (VISTARIL) 25 MG Cap; Take 1 capsule (25 mg total) by mouth 2 (two) times a day. for 7 days  Dispense: 14 capsule; Refill: 0  -     famotidine (PEPCID) 20 MG tablet; Take 1 tablet (20 mg total) by mouth 2 (two) times daily. for 7 days  Dispense: 14 tablet; Refill: 0  -     triamcinolone acetonide 0.1% (KENALOG) 0.1 % cream; Apply topically 2 (two) times daily.  Dispense: 45 g; Refill: 0              Allergic Reaction ED   General Information   You came to the Emergency Department (ED) for an allergic reaction. This is your bodys response to an allergen. Some people have a rash, hives, trouble breathing, or swelling. Others may throw up, feel dizzy, or pass out. This problem can be caused by things like:  · Food.  · Medicine.  · Insect stings or bites.  · Exercise.  · Latex.  · Triggers that cant be identified at the time.  Some people can come in contact with these things and have no problems. But when you have an allergy to something, your body acts as if the substance is harming you. This causes symptoms.  What care is needed at home?   · Call your regular doctor to let them know you were in the ED. Make a follow-up appointment if you were told to.  · If you were told to see an allergist, ask your regular doctor for a referral.  · If you were prescribed an epinephrine autoinjector, fill the prescription right away. Make sure you know how to use it.  · Avoid the allergen if you know what caused your reaction. You may need to work with your regular doctor or an allergist to find what has caused your reaction. Then you can try to avoid it in the future.  · Use a cool washcloth on your eyes or skin.  · If possible, rest for the next few days.  · Use  over-the-counter medicines to help with your milder symptoms.  · Use antihistamine eye drops to help with itching and hives.  When do I need to get emergency help?   · Call an ambulance right away if:   ? You have signs of a severe reaction like:  § You have trouble breathing, wheezing, or have a cough that wont stop.  § You feel like your throat is closing or your lips or tongue are swelling.  § You feel very weak like you are going to pass out, or actually pass out.  ? If you have signs of a severe allergic reaction, use your epinephrine autoinjector right away if you have one, then call for an ambulance.  When do I need to call the doctor?   · You are not  having a severe reaction but do have other signs of an allergic reaction, such as:  ? You have a rash, skin redness, flushing, or hives.  ? Your skin itches.  ? You have belly pain or an upset stomach.  · You are not feeling better in 2 to 3 days.  · You have new or worsening symptoms.  Last Reviewed Date   2021-02-11  Consumer Information Use and Disclaimer   This information is not specific medical advice and does not replace information you receive from your health care provider. This is only a brief summary of general information. It does NOT include all information about conditions, illnesses, injuries, tests, procedures, treatments, therapies, discharge instructions or life-style choices that may apply to you. You must talk with your health care provider for complete information about your health and treatment options. This information should not be used to decide whether or not to accept your health care providers advice, instructions or recommendations. Only your health care provider has the knowledge and training to provide advice that is right for you.  Copyright   Copyright © 2021 [employer] and its affiliates and/or licensors. All rights reserved.                   Skin Rash Discharge Instructions   About this topic   Skin rash is also called  dermatitis. Many things can cause your rash. You may have a rash if something is irritating your skin. An allergy can cause a rash and so can plants, soaps, and some kinds of metal. The doctors may not know what is causing your rash.     What care is needed at home?   · Ask your doctor what you need to do when you go home. Make sure you ask questions if you do not understand what the doctor says.  · Use an unscented cream or lotion to keep your skin moist.  · Drink plenty of fluids to keep your body hydrated.  · Bathe with cool or warm water. Do not use hot water. Pat yourself dry with a clean, thick, soft towel. Use mild and unscented soap, moisturizers, and deodorants.  · At-home care to help with scratching:  ? Wear gloves to protect skin on your hands. Try wearing cotton gloves under plastic gloves. Remove both sets of gloves from time to time to prevent sweating.  ? Keep nails short and clean.  ? If you scratch in your sleep, wear white cotton gloves to bed.  ? Try using cool compresses on the skin. They may help with swelling and itching. Dip a cloth in cold water and put it right on your itchy skin.  What follow-up care is needed?   · Your doctor may ask you to make visits to the office to check on your progress. Be sure to keep these visits.  · You may need to see a doctor who takes care of allergies or a dermatologist.  When do I need to call the doctor?   · You start to have severe trouble breathing or swallowing (for example, you cannot speak in full sentences).  · The rash spreads over large parts of your body and most of your skin becomes red.  · It is becoming hard to breathe, but you can still talk in full sentences.  · You have a fever of 100.4°F (38°C) or higher or chills.  · You have signs of a wound infection like swelling, redness, warmth, pain, or drainage from the wound.  Teach Back: Helping You Understand   The Teach Back Method helps you understand the information we are giving you. After you  talk with the staff, tell them in your own words what you learned. This helps to make sure the staff has described each thing clearly. It also helps to explain things that may have been confusing. Before going home, make sure you can do these:  · I can tell you about my condition.  · I can tell you how to care for my skin.  · I can tell you what I will do if I have a fever, trouble breathing, blisters, or my rash is not getting better.  Where can I learn more?   NHS  https://www.nhs.uk/conditions/rashes-babies-and-children/   National [place] of [employer] and Musculoskeletal and Skin Diseases  http://www.niams.nih.gov/Health_Info/Atopic_Dermatitis/default.asp   Last Reviewed Date   2021-09-09  Consumer Information Use and Disclaimer   This information is not specific medical advice and does not replace information you receive from your health care provider. This is only a brief summary of general information. It does NOT include all information about conditions, illnesses, injuries, tests, procedures, treatments, therapies, discharge instructions or life-style choices that may apply to you. You must talk with your health care provider for complete information about your health and treatment options. This information should not be used to decide whether or not to accept your health care providers advice, instructions or recommendations. Only your health care provider has the knowledge and training to provide advice that is right for you.  Copyright   Copyright © 2021 [employer], [employer]. and its affiliates and/or licensors. All rights reserved.              You received a steroid shot today - this can elevate your blood pressure, elevate your blood sugar, water weight gain, nervous energy, redness to the face and dimpling of the skin where the shot goes in if you had an injection.   If you are given a steroid pill prescription also, please do not start oral steroids today. If required, you can start the tablet tomorrow.    Do not use steroids more than 3 times per year.   If you have diabetes, please check you blood sugar frequently.  If you have high blood pressure, please check your blood pressure frequently.

## 2022-03-07 ENCOUNTER — PATIENT MESSAGE (OUTPATIENT)
Dept: BARIATRICS | Facility: CLINIC | Age: 47
End: 2022-03-07
Payer: COMMERCIAL

## 2022-03-14 ENCOUNTER — HOSPITAL ENCOUNTER (EMERGENCY)
Facility: HOSPITAL | Age: 47
Discharge: HOME | End: 2022-03-14
Attending: EMERGENCY MEDICINE
Payer: COMMERCIAL

## 2022-03-14 VITALS
WEIGHT: 173 LBS | DIASTOLIC BLOOD PRESSURE: 77 MMHG | HEIGHT: 65 IN | BODY MASS INDEX: 28.82 KG/M2 | OXYGEN SATURATION: 100 % | HEART RATE: 81 BPM | TEMPERATURE: 98 F | RESPIRATION RATE: 18 BRPM | SYSTOLIC BLOOD PRESSURE: 112 MMHG

## 2022-03-14 DIAGNOSIS — T78.40XA ALLERGIC REACTION, INITIAL ENCOUNTER: Primary | ICD-10-CM

## 2022-03-14 PROCEDURE — 99284 EMERGENCY DEPT VISIT MOD MDM: CPT | Mod: ,,, | Performed by: EMERGENCY MEDICINE

## 2022-03-14 PROCEDURE — 25000003 PHARM REV CODE 250: Performed by: EMERGENCY MEDICINE

## 2022-03-14 PROCEDURE — 99284 EMERGENCY DEPT VISIT MOD MDM: CPT

## 2022-03-14 PROCEDURE — 99284 PR EMERGENCY DEPT VISIT,LEVEL IV: ICD-10-PCS | Mod: ,,, | Performed by: EMERGENCY MEDICINE

## 2022-03-14 PROCEDURE — 96372 THER/PROPH/DIAG INJ SC/IM: CPT | Performed by: EMERGENCY MEDICINE

## 2022-03-14 PROCEDURE — 63600175 PHARM REV CODE 636 W HCPCS: Performed by: EMERGENCY MEDICINE

## 2022-03-14 RX ORDER — EPINEPHRINE 0.3 MG/.3ML
1 INJECTION SUBCUTANEOUS
Qty: 1 EACH | Refills: 2 | Status: SHIPPED | OUTPATIENT
Start: 2022-03-14 | End: 2023-03-01

## 2022-03-14 RX ORDER — DIPHENHYDRAMINE HYDROCHLORIDE 50 MG/ML
25 INJECTION INTRAMUSCULAR; INTRAVENOUS
Status: COMPLETED | OUTPATIENT
Start: 2022-03-14 | End: 2022-03-14

## 2022-03-14 RX ORDER — EPINEPHRINE 0.3 MG/.3ML
0.3 INJECTION SUBCUTANEOUS
Status: COMPLETED | OUTPATIENT
Start: 2022-03-14 | End: 2022-03-14

## 2022-03-14 RX ORDER — METHYLPREDNISOLONE 4 MG/1
TABLET ORAL
Qty: 1 EACH | Refills: 0 | Status: SHIPPED | OUTPATIENT
Start: 2022-03-14 | End: 2022-03-21

## 2022-03-14 RX ADMIN — DIPHENHYDRAMINE HYDROCHLORIDE 25 MG: 50 INJECTION, SOLUTION INTRAMUSCULAR; INTRAVENOUS at 11:03

## 2022-03-14 RX ADMIN — EPINEPHRINE 0.3 MG: 0.3 INJECTION INTRAMUSCULAR at 11:03

## 2022-03-15 NOTE — ED PROVIDER NOTES
"Encounter Date: 3/14/2022    SCRIBE #1 NOTE: I, Loretta Yosi, am scribing for, and in the presence of,  Evelyn Aguilar MD. I have scribed the following portions of the note - Other sections scribed: HPI ROS PE.       History     Chief Complaint   Patient presents with    Urticaria     Hives x 1 hour, thinks it was the hot tamale candies, 99% ra     Time patient was seen by the provider: 10:43 PM      The patient is a 46 y.o. female with past medical history of thyroid disease, hypothyroidism, celiac disease, MIKAELA, DM, hypotension, who presents to the ED with a complaint of rash onset 3 hour PTA. The patient reports she was eating hot tamales candies and about 20 minutes after she developed a rash throughout her body and face. She notes she developed tightness to her throat. She took hydroxyzine and 3 tablets of medrol dose pack PTA. States the tightness is starting to improve. Patient experienced a similar reaction last month after eating the same candies. Denies shortness of breath, nausea or vomiting.    The history is provided by the patient and medical records. No  was used.     Review of patient's allergies indicates:   Allergen Reactions    Rizatriptan Photosensitivity, Nausea Only and Other (See Comments)     "Feels like my brain is on fire."    Sumatriptan Photosensitivity, Nausea Only and Other (See Comments)     "Feels like my brain is on fire."    Zolmitriptan Photosensitivity, Nausea Only and Other (See Comments)     "Feels like my brain is on fire."    Gluten protein Hives, Diarrhea, Itching, Nausea And Vomiting, Swelling, Anxiety and Dermatitis    Latex, natural rubber Dermatitis     Latex gloves with Powder     Past Medical History:   Diagnosis Date    Celiac disease     Diabetes mellitus     resolved    Headache     High triglycerides 7/12/2017    Hypotension     Hypothyroidism     MIKAELA (obstructive sleep apnea)     Sleep apnea in adult     Thyroid disease  "     Past Surgical History:   Procedure Laterality Date    ABDOMINOPLASTY      ADENOIDECTOMY      BLADDER SUSPENSION      BREAST SURGERY  2008    CHOLECYSTECTOMY  12/2017    COLONOSCOPY  10/27/2017    Normal   (Rtn 10 yrs, Chas Skylar)     COSMETIC SURGERY  2008    tummy tuck, breast reduction, lipo    EXCISION OF GANGLION CYST OF HAND Left 11/20/2020    Procedure: EXCISION, GANGLION CYST, HAND- LEFT VOLAR RADIAL - hand table;  Surgeon: Ryan Lorenzo MD;  Location: [facility] OR;  Service: Orthopedics;  Laterality: Left;    EXCISION OF GANGLION OF WRIST Left 1/15/2021    Procedure: EXCISION, GANGLION CYST, WRIST - recurrent left volar ganglion;  Surgeon: Ryan Lorenzo MD;  Location: [facility] OR;  Service: Orthopedics;  Laterality: Left;    GASTRECTOMY  07/2017    HYSTERECTOMY      TONSILLECTOMY      TOTAL REDUCTION MAMMOPLASTY      TOTAL THYROIDECTOMY Right     TUBAL LIGATION      TYMPANOSTOMY TUBE PLACEMENT      UPPER GASTROINTESTINAL ENDOSCOPY  10/27/2017    Gastritis Biopsied, Benign      Family History   Problem Relation Age of Onset    Asthma Mother     Migraines Mother     Hyperlipidemia Father     Arthritis Father     Asthma Father     Migraines Father     Obesity Brother     Hyperlipidemia Brother     Migraines Brother     Hyperlipidemia Daughter     Kidney failure Daughter     Kidney failure Maternal Aunt     Hypothyroidism Maternal Aunt     Hyperlipidemia Paternal Uncle     Hypertension Paternal Uncle     Heart disease Paternal Uncle     Cancer Maternal Grandmother     Diabetes Maternal Grandmother     Depression Maternal Grandmother     Heart disease Maternal Grandmother     Hypertension Maternal Grandmother     Sleep apnea Maternal Grandmother     Obesity Maternal Grandmother     Melanoma Maternal Grandmother     Arthritis Maternal Grandmother     Asthma Maternal Grandmother     Hyperlipidemia Maternal Grandmother     Kidney disease Maternal Grandmother      Breast cancer Maternal Grandmother     Cancer Maternal Grandfather     Hyperlipidemia Maternal Grandfather     Hypertension Maternal Grandfather     Heart disease Maternal Grandfather     Kidney failure Maternal Grandfather     Stroke Maternal Grandfather     Hyperlipidemia Paternal Grandmother     Hypertension Paternal Grandmother     Diabetes Paternal Grandmother         diabetes, kidney failure, obesity    Obesity Paternal Grandmother     Arthritis Paternal Grandmother     Kidney disease Paternal Grandmother     Cancer Paternal Grandfather     Hyperlipidemia Paternal Grandfather     Diabetes Paternal Grandfather     Hypertension Paternal Grandfather     Heart disease Paternal Grandfather     Kidney failure Paternal Grandfather     Obesity Paternal Grandfather     Sleep apnea Paternal Grandfather     Stroke Paternal Grandfather     Drug abuse Paternal Grandfather         drug abuse in his 60's    Asthma Daughter     Kidney disease Daughter     Kidney disease Maternal Aunt     Migraines Maternal Aunt      Social History     Tobacco Use    Smoking status: Never Smoker    Smokeless tobacco: Never Used   Substance Use Topics    Alcohol use: No    Drug use: No     Review of Systems   Constitutional: Negative for fever.   HENT: Negative for sore throat.    Respiratory: Negative for shortness of breath.    Cardiovascular: Negative for chest pain.   Gastrointestinal: Negative for nausea.   Genitourinary: Negative for dysuria.   Musculoskeletal: Negative for back pain.   Skin: Positive for rash.   Neurological: Negative for weakness.   Hematological: Does not bruise/bleed easily.       Physical Exam     Initial Vitals [03/14/22 2134]   BP Pulse Resp Temp SpO2   112/77 81 18 98.4 °F (36.9 °C) 100 %      MAP       --         Physical Exam    Nursing note and vitals reviewed.  Constitutional: Vital signs are normal. She appears well-developed and well-nourished.  Non-toxic appearance. She does  not appear ill. No distress.   HENT:   Head: Normocephalic and atraumatic.   Mouth/Throat: Mucous membranes are normal. Mucous membranes are not dry.   No lip, tongue or uvula swelling   Eyes: Conjunctivae and lids are normal.   Neck: Trachea normal and phonation normal. Neck supple. No stridor present.   Normal range of motion.   Full passive range of motion without pain.     Cardiovascular: Normal rate.   Pulmonary/Chest: No stridor.   Musculoskeletal:      Cervical back: Full passive range of motion without pain, normal range of motion and neck supple.     Neurological: She is alert and oriented to person, place, and time.   Skin: Skin is dry and intact. Rash noted. Rash is urticarial. No pallor.   Scattered urticaria across her body   Psychiatric: She has a normal mood and affect. Her speech is normal and behavior is normal.         ED Course   Procedures  Labs Reviewed - No data to display       Imaging Results    None          Medications   EPINEPHrine (EPIPEN) 0.3 mg/0.3 mL pen injection 0.3 mg (0.3 mg Intramuscular Given 3/14/22 2300)   diphenhydrAMINE injection 25 mg (25 mg Intramuscular Given 3/14/22 2300)     Medical Decision Making:   History:   Old Medical Records: I decided to obtain old medical records.  Initial Assessment:   Urticaria + sensation of throat tightness, onset about 20 min after eating hot tamales  Improving with steroids and atarax taken at home PTA    VSS  Well-appearing clinically  No signs of swelling in airway and lungs CTA    Given complaint of throat tightness, proximity to eating candy - will presume anaphylaxis out of an abundance of caution and given dose of epi, as well as benadryl.  Pt already took steroids PTA - will give rx for medrol dose pack at discharge and rx for epi pen          Scribe Attestation:   Scribe #1: I performed the above scribed service and the documentation accurately describes the services I performed. I attest to the accuracy of the note.        ED  Course as of 03/15/22 0032   Mon Mar 14, 2022   2320 Already feeling better and requesting d/c home. [AS]      ED Course User Index  [AS] Evelyn Aguilar MD           I, Dr. Evelyn Aguilar, personally performed the services described in this documentation. All medical record entries made by the scribe were at my direction and in my presence.  I have reviewed the chart and agree that the record reflects my personal performance and is accurate and complete. Evelyn Aguilar MD.  11:06 PM 03/15/2022    Clinical Impression:   Final diagnoses:  [T78.40XA] Allergic reaction, initial encounter (Primary)          ED Disposition Condition    Discharge Stable        ED Prescriptions     Medication Sig Dispense Start Date End Date Auth. Provider    methylPREDNISolone (MEDROL DOSEPACK) 4 mg tablet Use as directed 1 each 3/14/2022 4/4/2022 Evelyn Aguilar MD    EPINEPHrine (EPIPEN) 0.3 mg/0.3 mL AtIn Inject 0.3 mLs (0.3 mg total) into the muscle as needed. 1 each 3/14/2022 3/14/2023 Evelyn Aguilar MD        Follow-up Information     Follow up With Specialties Details Why Contact Info    Joe Pittman, DO Internal Medicine Schedule an appointment as soon as possible for a visit   [address] [facility] [address]  [phone]             Evelyn Aguilar MD  03/15/22 0033

## 2022-03-15 NOTE — ED NOTES
Medications and discharge instructions reviewed with patient. Patient discharged with all paperwork and belongings

## 2022-03-20 ENCOUNTER — PATIENT MESSAGE (OUTPATIENT)
Dept: INTERNAL MEDICINE | Facility: CLINIC | Age: 47
End: 2022-03-20
Payer: COMMERCIAL

## 2022-03-20 DIAGNOSIS — L50.9 URTICARIA: Primary | ICD-10-CM

## 2022-03-21 ENCOUNTER — PATIENT MESSAGE (OUTPATIENT)
Dept: INTERNAL MEDICINE | Facility: CLINIC | Age: 47
End: 2022-03-21
Payer: COMMERCIAL

## 2022-03-21 RX ORDER — METHYLPREDNISOLONE 4 MG/1
TABLET ORAL
Qty: 1 EACH | Refills: 0 | Status: SHIPPED | OUTPATIENT
Start: 2022-03-21 | End: 2023-03-01

## 2022-03-21 NOTE — TELEPHONE ENCOUNTER
Urgent referral to allergy  Medrol pack refilled  Take daily antihistamine and also Benadryl as needed  Can consider adding Pepcid if hives are still present

## 2022-03-23 ENCOUNTER — LAB VISIT (OUTPATIENT)
Dept: LAB | Facility: HOSPITAL | Age: 47
End: 2022-03-23
Payer: COMMERCIAL

## 2022-03-23 ENCOUNTER — OFFICE VISIT (OUTPATIENT)
Dept: ALLERGY | Facility: CLINIC | Age: 47
End: 2022-03-23
Payer: COMMERCIAL

## 2022-03-23 VITALS — BODY MASS INDEX: 28.91 KG/M2 | HEART RATE: 69 BPM | HEIGHT: 65 IN | OXYGEN SATURATION: 97 % | WEIGHT: 173.5 LBS

## 2022-03-23 DIAGNOSIS — L50.9 URTICARIA: ICD-10-CM

## 2022-03-23 DIAGNOSIS — G43.709 CHRONIC MIGRAINE W/O AURA W/O STATUS MIGRAINOSUS, NOT INTRACTABLE: ICD-10-CM

## 2022-03-23 DIAGNOSIS — E03.9 HYPOTHYROIDISM, UNSPECIFIED TYPE: Chronic | ICD-10-CM

## 2022-03-23 DIAGNOSIS — K90.0 CELIAC DISEASE: ICD-10-CM

## 2022-03-23 DIAGNOSIS — Z98.84 S/P LAPAROSCOPIC SLEEVE GASTRECTOMY: ICD-10-CM

## 2022-03-23 DIAGNOSIS — T78.3XXA ANGIOEDEMA, INITIAL ENCOUNTER: Primary | ICD-10-CM

## 2022-03-23 LAB
25(OH)D3+25(OH)D2 SERPL-MCNC: 24 NG/ML (ref 30–96)
IGA SERPL-MCNC: 189 MG/DL (ref 40–350)
IGE SERPL-ACNC: 38 IU/ML (ref 0–100)
T4 FREE SERPL-MCNC: 1.18 NG/DL (ref 0.71–1.51)
THYROGLOB AB SERPL IA-ACNC: <4 IU/ML (ref 0–3.9)
THYROPEROXIDASE IGG SERPL-ACNC: <6 IU/ML
TSH SERPL DL<=0.005 MIU/L-ACNC: 0.21 UIU/ML (ref 0.4–4)

## 2022-03-23 PROCEDURE — 84165 PROTEIN E-PHORESIS SERUM: CPT | Mod: 26,,, | Performed by: PATHOLOGY

## 2022-03-23 PROCEDURE — 99205 OFFICE O/P NEW HI 60 MIN: CPT | Mod: S$GLB,,, | Performed by: ALLERGY & IMMUNOLOGY

## 2022-03-23 PROCEDURE — 84443 ASSAY THYROID STIM HORMONE: CPT | Performed by: ALLERGY & IMMUNOLOGY

## 2022-03-23 PROCEDURE — 88184 FLOWCYTOMETRY/ TC 1 MARKER: CPT | Performed by: ALLERGY & IMMUNOLOGY

## 2022-03-23 PROCEDURE — 84165 PROTEIN E-PHORESIS SERUM: CPT | Performed by: ALLERGY & IMMUNOLOGY

## 2022-03-23 PROCEDURE — 86003 ALLG SPEC IGE CRUDE XTRC EA: CPT | Performed by: ALLERGY & IMMUNOLOGY

## 2022-03-23 PROCEDURE — 1159F PR MEDICATION LIST DOCUMENTED IN MEDICAL RECORD: ICD-10-PCS | Mod: CPTII,S$GLB,, | Performed by: ALLERGY & IMMUNOLOGY

## 2022-03-23 PROCEDURE — 84165 PATHOLOGIST INTERPRETATION SPE: ICD-10-PCS | Mod: 26,,, | Performed by: PATHOLOGY

## 2022-03-23 PROCEDURE — 86376 MICROSOMAL ANTIBODY EACH: CPT | Performed by: ALLERGY & IMMUNOLOGY

## 2022-03-23 PROCEDURE — 82785 ASSAY OF IGE: CPT | Performed by: ALLERGY & IMMUNOLOGY

## 2022-03-23 PROCEDURE — 1160F PR REVIEW ALL MEDS BY PRESCRIBER/CLIN PHARMACIST DOCUMENTED: ICD-10-PCS | Mod: CPTII,S$GLB,, | Performed by: ALLERGY & IMMUNOLOGY

## 2022-03-23 PROCEDURE — 3008F PR BODY MASS INDEX (BMI) DOCUMENTED: ICD-10-PCS | Mod: CPTII,S$GLB,, | Performed by: ALLERGY & IMMUNOLOGY

## 2022-03-23 PROCEDURE — 1159F MED LIST DOCD IN RCRD: CPT | Mod: CPTII,S$GLB,, | Performed by: ALLERGY & IMMUNOLOGY

## 2022-03-23 PROCEDURE — 82784 ASSAY IGA/IGD/IGG/IGM EACH: CPT | Performed by: ALLERGY & IMMUNOLOGY

## 2022-03-23 PROCEDURE — 86003 ALLG SPEC IGE CRUDE XTRC EA: CPT | Mod: 59 | Performed by: ALLERGY & IMMUNOLOGY

## 2022-03-23 PROCEDURE — 99999 PR PBB SHADOW E&M-EST. PATIENT-LVL V: ICD-10-PCS | Mod: PBBFAC,,, | Performed by: ALLERGY & IMMUNOLOGY

## 2022-03-23 PROCEDURE — 99999 PR PBB SHADOW E&M-EST. PATIENT-LVL V: CPT | Mod: PBBFAC,,, | Performed by: ALLERGY & IMMUNOLOGY

## 2022-03-23 PROCEDURE — 99205 PR OFFICE/OUTPT VISIT, NEW, LEVL V, 60-74 MIN: ICD-10-PCS | Mod: S$GLB,,, | Performed by: ALLERGY & IMMUNOLOGY

## 2022-03-23 PROCEDURE — 36415 COLL VENOUS BLD VENIPUNCTURE: CPT | Performed by: ALLERGY & IMMUNOLOGY

## 2022-03-23 PROCEDURE — 83516 IMMUNOASSAY NONANTIBODY: CPT | Performed by: ALLERGY & IMMUNOLOGY

## 2022-03-23 PROCEDURE — 82306 VITAMIN D 25 HYDROXY: CPT | Performed by: ALLERGY & IMMUNOLOGY

## 2022-03-23 PROCEDURE — 3008F BODY MASS INDEX DOCD: CPT | Mod: CPTII,S$GLB,, | Performed by: ALLERGY & IMMUNOLOGY

## 2022-03-23 PROCEDURE — 83520 IMMUNOASSAY QUANT NOS NONAB: CPT | Performed by: ALLERGY & IMMUNOLOGY

## 2022-03-23 PROCEDURE — 1160F RVW MEDS BY RX/DR IN RCRD: CPT | Mod: CPTII,S$GLB,, | Performed by: ALLERGY & IMMUNOLOGY

## 2022-03-23 PROCEDURE — 86800 THYROGLOBULIN ANTIBODY: CPT | Performed by: ALLERGY & IMMUNOLOGY

## 2022-03-23 PROCEDURE — 84439 ASSAY OF FREE THYROXINE: CPT | Performed by: ALLERGY & IMMUNOLOGY

## 2022-03-23 NOTE — PROGRESS NOTES
"Emilia "Julius Crane is referred by Dr. Joe Pittman for a consult regarding urticaria and angioedema. She is here alone.    She first had urticaria and angioedema of the left upper lip on February 18, 2022. She was seen in urgent care the following day. She was given IM dexamethasone and prescribed methylprednisolone, triamcinolone, famotidine, and hydroxyzine. Her symptoms did resolve.    She had a mild episode with mild swelling of the left upper lip about a week later. She took Benadryl with resolution.    She had another episode on March 14 2022 again with urticaria and swelling of the left upper lip. She also had the sensation of throat closing. She also had difficulty breathing. She was given epinephrine and Benadryl. She was discharged with methylprednisolone and an EpiPen.    He she has not had any episodes since.    The lesions are red, raised, and pruritic. They do not hurt or bruise. They last less than 24 hours before resolving.    There is no association with any food, contact, or ingestion.    She has been taking Xyzal and Benadryl as needed.      A cold thyroid nodule was diagnosed in 1998.  She had a right thyroidectomy and has been on Synthroid since 1999. She has had an ultrasound of the thyroid that showed multinodular thyroid disease.    She has been followed by Dr. Carlos Galloway an endocrinologist since then. He discuss the possibility of Hashimoto's thyroiditis with her at her last visit. She does have increased fatigue and difficulty sleeping.    She does occasionally have itchy eyes and sneezing during the spring.    She was diagnosed with asthma and had symptoms for about two years. She had albuterol to use as needed. This has resolved.    She had a gastric sleeve surgery done in July 2017. She has been getting some weight and her endocrinologist Dr. Galloway started her on Ozempic in early February. She had her first dose on February 5th. She was to take this weekly. She has " discontinued because of her urticaria and angioedema though there is not been any direct association.    She had abdominal pain, diarrhea, and headaches and was diagnosed with celiac disease by Dr. Zofia Roque in 2012. She had an abnormal blood studies well as an abnormal EGD.    She completely avoids gluten and all grains.    She does occasionally have mild GERD.    She had a bunionectomy in August 2021 on the right. They did leave a metal piece in her foot. She continues to have some discomfort there.    She has a history of migraine headaches and was on Botox and Amerge. She now has medical marijuana to use if needed. She has tried this once with relief. She    [facility]GY QUESTIONNAIRE LONG 3/21/2022   Head or facial pain: No symptoms   Eyes: No symptoms   Do you have difficulty wearing contacts, if applicable?  No   Ears: Itching   Do you have ear infections? No   Do you have ear tubes? No   Did you have ear surgery? Yes   When did you have ear surgery? 1981   Nose: Itching   Did you have a blocked nose? No   Did you have nasal surgery? No   Has your nose ever been broken? No   Throat: Itching   Sinuses: Sinus pressure or pain   Have you had x-rays done for your sinuses? No   Have you had a CT scan done for your sinuses? No   Lungs: No symptoms   Was your last chest x-ray normal or abnormal, if applicable? Normal   Have you ever has a tuberculosis skin test?  Yes   When did you have a tuberculosis skin test? 10 yrs ago?   Was your tuberculosis skin test positive or negative? Negative   Have you ever had a lung-function test? No   Have you had a flu shot this year? No   Have you had the pneumonia vaccine?  No   Do you have any known problems with your immune system? Yes   Do you suspect you may have problems with your immune system? Yes   Do you have frequent infections? No   Skin: Itching, Hives, Redness, Rash   When did your hives and/or swelling first begin? Visit early Jagjit Gras 2022 to [facility] [address]   Please note the frequency of hives and/or swelling in days, weeks OR months. 2nd episode  3/14/22 to ER, noticed again on 3/18/22   Body location most commonly affected by hives: all over   Body location most commonly affected by swelling: Lips   What are the substances, contacts, activity, foods, or drugs, which you think are related to hives or swelling? not sure, as a celiac pt I have gluten allergies, only new med introdiced was ozempic wkly injections   Which medications have been helpful in controlling hives or swelling? steroids   Have you associated the hives or swelling with any of the following? Not applicable   Have you had any other associated symptoms with the hives or swelling such as: Fatigue or malaise not related to antihistamines   When did these symptoms first occur? Early March 2022   Are they getting worse or better? Not applicable   How often do these symptoms occur? seems to have appeared within 10 days or less of the first episode, again on 3/14, 3/18, 3/19   When do these symptoms occur? first time in over 30 yrs that i have gotten the hives   Do they occur year round? No   If there is any seasonal variation in your symptoms, when are they worse? N/A   Is there a particular time of the day or night when the symptoms are worse? N/A   Is there anything you have identified, which can cause symptoms or make them worse? (such as dust, grass, plant or animal products, mold, heat, cold, strong odors, exercise) Not really, but maybe new med of Ozempic Injection (I put on hold for now)   Is there anything you have identified, which can make symptoms better?  Steroids dose pack, Benadryl...last ER visit also an epi pen inj   What medications have you tried in the past to help control these symptoms?  N/A   Please list all the vitamins or herbal medications you are taking. [employer] Multi Vit, Calcium, Selenium, Iron   Have you ever seen an allergist for these symptoms? No   Have you  ever had skin tests? Yes   When did you have skin tests? Over 25 yrs ago   Have you ever had any other type of allergy testing? No   Have you ever had allergy shots? No   Do you have food allergies? Yes   Please list the food(s), type of reaction(s) and last date of reaction(s) gluten (wheat, barley, rye, oat and malts)   Do you have insect allergies? No   Do you have latex allergies? Yes   Please list the latex product(s), type of reaction(s), last date of reaction(s), and whether or not you went to the emergency as a result.  no ER visit, latex allergies to inner hands=contact dermitis/rash/skin cracking, etc   Constitution No symptoms   Cardiovascular: No symptoms   Gastrointestinal: Heartburn/ indigestion/ reflux   Genital/ urinary: No symptoms   Musculoskeletal: Joint pain   Endocrine: Headaches   Hematologic: No symptoms   Please note which family members have allergies or asthma and specify which they have. Mother, Father, Brother, Grandparents=Asthma, Aunt=Celiac   How long have you lived at your current address? 8yrs   Has your residence ever had water or flood damage? No   Is there any evidence of mold in the house? No   Does your house have: Central air conditioning, Gas heat   Does your bedroom have: Carpeting, Ceiling fan, Venetian blinds   Do you have pets? Yes   Please list the type of pet(s), how many, how long you have had the pet(s), whether or not the pet(s) are living inside or outside, and whether the pet(s) aggravate your symptoms.  Dogs, 12yrs, 11yrs, 3 yrs and 2 yrs, indoors and do not aggrevate my symptoms   Does anyone in the house smoke? 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  No     Physical Examination:  General: Well-developed, well-nourished, no acute distress.  Head: No sinus tenderness.  Eyes: Conjunctivae:  No bulbar or palpebral conjunctival injection.  Ears: EAC's clear.  TM's clear.  No  pre-auricular nodes.  Nose: Nasal Mucosa:  Pink.  Septum: No apparent deviation.  Turbinates:  No significant edema.  Polyps/Mass:  None visible.  Teeth/Gums:  No bleeding noted.  Oropharynx: No exudates.  Neck: Supple without thyromegaly. No cervical lymphadenopathy.    Respiratory/Chest: Effort: Good.  Auscultation:  Clear bilaterally.  Cardiovascular:  No murmur, rubs, or gallop heard.   GI:  Non-tender.  No masses.  No organomegaly.  Extremities:  No cyanosis, clubbing, or edema.  Skin: Good turgor.  No urticaria or angioedema.  Neuro/Psych: Oriented x 3.    Assessment:  1. Chronic urticaria and angioedema of uncertain etiology.  2. Chronic rhinitis, consider allergic.  3. Chronic conjunctivitis, consider allergic.  4. History of asthma.  5. Multinodular thyroid disease.  6. S/P right thyroidectomy 1998.  7. S/P gastric sleeve July 2017.  8. S/P right bunionectomy August 2021.  9. Migraine headaches.  10. Celiac disease.    Recommendations:  1. Laboratory as ordered.  2. Xyzal at least one a day. She may take up to four day if needed.  3. Benadryl if needed.  4. Take pictures of any further lesions.  5. Discuss Ozempic with Dr. Galloway.  6. Obtain records from [facility], and [facility] for diagnosis of celiac disease.   7. Return to clinic in one week.    Time attributed to the following activities: preparing to see the patient, obtaining and/or reviewing separately obtained history, performing a medically appropriate examination and/or evaluation, counseling and education the patient/family/caregiver, documenting clinical information in the electronic or other health record, independently interpreting results (not separately reported) and communicating results to the patient/family/caregiver, care coordination (not separately reported), ordering medications, tests, or procedures and referring and communications with other health care professionals (not separately reported).  Seventy minutes.

## 2022-03-24 LAB
ALBUMIN SERPL ELPH-MCNC: 4.39 G/DL (ref 3.35–5.55)
ALPHA1 GLOB SERPL ELPH-MCNC: 0.3 G/DL (ref 0.17–0.41)
ALPHA2 GLOB SERPL ELPH-MCNC: 0.81 G/DL (ref 0.43–0.99)
B-GLOBULIN SERPL ELPH-MCNC: 0.75 G/DL (ref 0.5–1.1)
GAMMA GLOB SERPL ELPH-MCNC: 0.94 G/DL (ref 0.67–1.58)
PATHOLOGIST INTERPRETATION SPE: NORMAL
PROT SERPL-MCNC: 7.2 G/DL (ref 6–8.4)
TRYPTASE LEVEL: 4.1 NG/ML

## 2022-03-28 ENCOUNTER — PATIENT MESSAGE (OUTPATIENT)
Dept: ALLERGY | Facility: CLINIC | Age: 47
End: 2022-03-28
Payer: COMMERCIAL

## 2022-03-28 LAB — TTG IGA SER-ACNC: 4 UNITS

## 2022-03-29 LAB
A ALTERNATA IGE QN: <0.1 KU/L
A FUMIGATUS IGE QN: <0.1 KU/L
BERMUDA GRASS IGE QN: 0.94 KU/L
CAT DANDER IGE QN: 4.54 KU/L
CEDAR IGE QN: <0.1 KU/L
D FARINAE IGE QN: 3.23 KU/L
D PTERONYSS IGE QN: 2.94 KU/L
DEPRECATED A ALTERNATA IGE RAST QL: NORMAL
DEPRECATED A FUMIGATUS IGE RAST QL: NORMAL
DEPRECATED BERMUDA GRASS IGE RAST QL: ABNORMAL
DEPRECATED CAT DANDER IGE RAST QL: ABNORMAL
DEPRECATED CEDAR IGE RAST QL: NORMAL
DEPRECATED D FARINAE IGE RAST QL: ABNORMAL
DEPRECATED D PTERONYSS IGE RAST QL: ABNORMAL
DEPRECATED DOG DANDER IGE RAST QL: ABNORMAL
DEPRECATED ELDER IGE RAST QL: NORMAL
DEPRECATED ENGL PLANTAIN IGE RAST QL: NORMAL
DEPRECATED PECAN/HICK TREE IGE RAST QL: ABNORMAL
DEPRECATED ROACH IGE RAST QL: NORMAL
DEPRECATED TIMOTHY IGE RAST QL: ABNORMAL
DEPRECATED WEST RAGWEED IGE RAST QL: NORMAL
DEPRECATED WHITE OAK IGE RAST QL: NORMAL
DOG DANDER IGE QN: 9.07 KU/L
ELDER IGE QN: <0.1 KU/L
ENGL PLANTAIN IGE QN: <0.1 KU/L
PECAN/HICK TREE IGE QN: 0.23 KU/L
ROACH IGE QN: <0.1 KU/L
TIMOTHY IGE QN: 1.31 KU/L
WEST RAGWEED IGE QN: <0.1 KU/L
WHITE OAK IGE QN: <0.1 KU/L

## 2022-03-31 ENCOUNTER — OFFICE VISIT (OUTPATIENT)
Dept: ALLERGY | Facility: CLINIC | Age: 47
End: 2022-03-31
Payer: COMMERCIAL

## 2022-03-31 VITALS — WEIGHT: 177.69 LBS | HEIGHT: 67 IN | BODY MASS INDEX: 27.89 KG/M2

## 2022-03-31 DIAGNOSIS — E03.9 HYPOTHYROIDISM, UNSPECIFIED TYPE: Chronic | ICD-10-CM

## 2022-03-31 DIAGNOSIS — J30.89 ALLERGIC RHINITIS DUE TO DUST MITE: ICD-10-CM

## 2022-03-31 DIAGNOSIS — H10.13 ALLERGIC CONJUNCTIVITIS, BILATERAL: ICD-10-CM

## 2022-03-31 DIAGNOSIS — K90.0 CELIAC DISEASE: ICD-10-CM

## 2022-03-31 DIAGNOSIS — Z98.84 S/P LAPAROSCOPIC SLEEVE GASTRECTOMY: ICD-10-CM

## 2022-03-31 DIAGNOSIS — L50.1 IDIOPATHIC URTICARIA: ICD-10-CM

## 2022-03-31 DIAGNOSIS — T78.3XXD ANGIOEDEMA, SUBSEQUENT ENCOUNTER: Primary | ICD-10-CM

## 2022-03-31 PROCEDURE — 3008F PR BODY MASS INDEX (BMI) DOCUMENTED: ICD-10-PCS | Mod: CPTII,S$GLB,, | Performed by: ALLERGY & IMMUNOLOGY

## 2022-03-31 PROCEDURE — 1159F MED LIST DOCD IN RCRD: CPT | Mod: CPTII,S$GLB,, | Performed by: ALLERGY & IMMUNOLOGY

## 2022-03-31 PROCEDURE — 99999 PR PBB SHADOW E&M-EST. PATIENT-LVL IV: CPT | Mod: PBBFAC,,, | Performed by: ALLERGY & IMMUNOLOGY

## 2022-03-31 PROCEDURE — 99214 PR OFFICE/OUTPT VISIT, EST, LEVL IV, 30-39 MIN: ICD-10-PCS | Mod: S$GLB,,, | Performed by: ALLERGY & IMMUNOLOGY

## 2022-03-31 PROCEDURE — 1159F PR MEDICATION LIST DOCUMENTED IN MEDICAL RECORD: ICD-10-PCS | Mod: CPTII,S$GLB,, | Performed by: ALLERGY & IMMUNOLOGY

## 2022-03-31 PROCEDURE — 1160F RVW MEDS BY RX/DR IN RCRD: CPT | Mod: CPTII,S$GLB,, | Performed by: ALLERGY & IMMUNOLOGY

## 2022-03-31 PROCEDURE — 99214 OFFICE O/P EST MOD 30 MIN: CPT | Mod: S$GLB,,, | Performed by: ALLERGY & IMMUNOLOGY

## 2022-03-31 PROCEDURE — 1160F PR REVIEW ALL MEDS BY PRESCRIBER/CLIN PHARMACIST DOCUMENTED: ICD-10-PCS | Mod: CPTII,S$GLB,, | Performed by: ALLERGY & IMMUNOLOGY

## 2022-03-31 PROCEDURE — 3008F BODY MASS INDEX DOCD: CPT | Mod: CPTII,S$GLB,, | Performed by: ALLERGY & IMMUNOLOGY

## 2022-03-31 PROCEDURE — 99999 PR PBB SHADOW E&M-EST. PATIENT-LVL IV: ICD-10-PCS | Mod: PBBFAC,,, | Performed by: ALLERGY & IMMUNOLOGY

## 2022-03-31 RX ORDER — SERTRALINE HYDROCHLORIDE 100 MG/1
TABLET, FILM COATED ORAL
COMMUNITY
Start: 2021-12-14

## 2022-03-31 RX ORDER — DEXAMETHASONE 1 MG/1
TABLET ORAL
COMMUNITY
Start: 2022-02-03 | End: 2023-03-01

## 2022-03-31 RX ORDER — SEMAGLUTIDE 1.34 MG/ML
INJECTION, SOLUTION SUBCUTANEOUS
COMMUNITY
Start: 2022-02-05 | End: 2023-03-01

## 2022-03-31 RX ORDER — ERGOCALCIFEROL 1.25 MG/1
50000 CAPSULE ORAL
Qty: 12 CAPSULE | Refills: 1 | Status: SHIPPED | OUTPATIENT
Start: 2022-03-31

## 2022-03-31 RX ORDER — FAMOTIDINE 20 MG/1
20 TABLET, FILM COATED ORAL
COMMUNITY
Start: 2022-02-19 | End: 2023-03-01

## 2022-03-31 NOTE — PROGRESS NOTES
"Emilia Crane (Mimi) returns to clinic today for continued evaluation of urticaria and angioedema. She is here alone. She was last seen March 23, 2022.    Since her last visit, she has been taking OTC antihistamines daily.    She has not had any further urticaria or angioedema.    There is no association with any food, contact, or ingestion.    Her rhinitis and conjunctivitis have been well controlled.    She does have four indoor dogs. She does not have any symptoms around the dogs. She does not have any cats but will have increased symptoms around them.    She has not been taking her Ozempic.    She was not able to get any records from Dr. Zofia Roque.  No records have been received either.    She has been avoiding all grains.    [facility] ALL[facility]GY QUESTIONNAIRE SHORT 3/28/2022   Head or facial pain: -   Facial swelling? No   Sinus pain? No   Sinus pressure? Yes   Ears: No symptoms   Hearing loss? -   Nosebleeds? No   Postnasal drip? No   Sneezing? Yes   Runny nose? No   Congestion? No   Throat: No symptoms   Trouble swallowing? -   Eyes: -   Eye itching? Yes   Eye redness? No   Eye discharge? No   Eye pain?  No   Light sensitivity / light hurts the eyes? Yes   Lungs: No symptoms   Cough? -   Wheezing? -   Shortness of breath? -   Chest tightness? -   Skin: No symptoms     Physical Examination:  General: Well-developed, well-nourished, no acute distress.  Head: No sinus tenderness.  Eyes: Conjunctivae:  No bulbar or palpebral conjunctival injection.  Ears: EAC's clear.  TM's clear.  No pre-auricular nodes.  Nose: Nasal Mucosa:  Pink.  Septum: No apparent deviation.  Turbinates:  No significant edema.  Polyps/Mass:  None visible.  Teeth/Gums:  No bleeding noted.  Oropharynx: No exudates.  Neck: Supple without thyromegaly. No cervical lymphadenopathy.    Respiratory/Chest: Effort: Good.  Auscultation:  Clear bilaterally.  Skin: Good turgor.  No urticaria or angioedema.  Neuro/Psych: Oriented x 3.    Laboratory " 03/23/2022:  IgE level: 38.  ImmunoCAP:  Class III: Cat, dog.  Class II: Dust mites, [place], Mehrdad.  Class 0/I: Pecan pollen (0.23).  TSH: 0.206.  Thyroid peroxidase antibody level: Less than 4.0.  Thyroglobulin antibody level: Less than 4.0.  Vitamin-D level: 24.  Serum tryptase: 4.1.  TTG IgA: Four.  IgA: 189.  SPEP: Normal.  Anti IgE receptor antibody level: Pending.    Assessment:  1. Chronic urticaria and angioedema, probably idiopathic, controlled.  2. Allergic rhinitis.  3. Allergic conjunctivitis.  4. History of asthma.  5. Multinodular thyroid disease.  6. S/P right thyroidectomy 1998.  7. S/P gastric sleeve July 2017.  8. S/P right bunionectomy August 2021.  9. Migraine headaches.  10. Celiac disease.    Recommendations:  1. House dust mite avoidance.  2. Xyzal at least one a day. She may take up to four day if needed.  3. Benadryl if needed.  4. Take pictures of any further lesions.  5. She will discuss additional is Ozempic with Dr. Galloway.  6. Obtain records from Dr. Cervantes, [facility], and [facility] for diagnosis of celiac disease.   7. Return to clinic in 2 to 4 weeks or sooner if needed.    Patient education March 30, 2022: Allergic mechanisms treatment options were reviewed in detail. Urticaria and angioedema were reviewed. House dust mite avoidance was reviewed. Handouts were given.

## 2022-04-07 ENCOUNTER — PATIENT MESSAGE (OUTPATIENT)
Dept: BARIATRICS | Facility: CLINIC | Age: 47
End: 2022-04-07
Payer: COMMERCIAL

## 2022-04-08 ENCOUNTER — PATIENT MESSAGE (OUTPATIENT)
Dept: ALLERGY | Facility: CLINIC | Age: 47
End: 2022-04-08
Payer: COMMERCIAL

## 2022-04-12 ENCOUNTER — PATIENT MESSAGE (OUTPATIENT)
Dept: BARIATRICS | Facility: CLINIC | Age: 47
End: 2022-04-12
Payer: COMMERCIAL

## 2022-04-12 LAB
BASOPHILS %, CD203C: 7.6 % (ref 0–12)
IGE RECEPTOR AB INTERPRETATION:: NORMAL

## 2022-05-05 ENCOUNTER — PATIENT MESSAGE (OUTPATIENT)
Dept: BARIATRICS | Facility: CLINIC | Age: 47
End: 2022-05-05
Payer: COMMERCIAL

## 2022-05-10 ENCOUNTER — PATIENT MESSAGE (OUTPATIENT)
Dept: BARIATRICS | Facility: CLINIC | Age: 47
End: 2022-05-10
Payer: COMMERCIAL

## 2022-05-24 ENCOUNTER — TELEPHONE (OUTPATIENT)
Dept: RESEARCH | Facility: HOSPITAL | Age: 47
End: 2022-05-24
Payer: COMMERCIAL

## 2022-05-24 NOTE — TELEPHONE ENCOUNTER
Study title: A Phase 1/2/3. Placebo Controlled, Randomized, Observer-Blind, Dose-Finding Study to Describe the Safety, Tolerability, Immunogenicity and Potential Efficacy of SARS-COV-2 RNA Vaccine Candidates Against COVID-19 in Healthy Adults   IRB #: 2020.198  Sponsor: [employer]   Sponsor's Protocol: [id number]  Site Number: [id number]  Subject ID: [id number]    Patient requested a new activation code after getting a new phone. Application was successfully reset and an update was made. No new symptoms to report.    Patient was also reminded of their upcoming V503 and V504 dates.     [id number]

## 2022-05-30 ENCOUNTER — PATIENT MESSAGE (OUTPATIENT)
Dept: ADMINISTRATIVE | Facility: HOSPITAL | Age: 47
End: 2022-05-30
Payer: COMMERCIAL

## 2022-06-07 ENCOUNTER — RESEARCH ENCOUNTER (OUTPATIENT)
Dept: RESEARCH | Facility: HOSPITAL | Age: 47
End: 2022-06-07
Payer: COMMERCIAL

## 2022-06-07 NOTE — PROGRESS NOTES
Study title: A Phase 1/2/3. Placebo Controlled, Randomized, Observer-Blind, Dose-Finding Study to Describe the Safety, Tolerability, Immunogenicity and Potential Efficacy of SARS-COV-2 RNA Vaccine Candidates Against COVID-19 in Healthy Adults   IRB #: [id number]  Sponsor: [employer]   Sponsor's Protocol: [id number]  Site Number: [id number]  Subject ID: [id number]      Emilia Coyle was contacted via telephone call on 6/7/2022 for their Visit 503, 6 month follow up. The following information was collected from her         Has the patient had any prohibited medications since their last visit? no   Has the patient had any major changes in health since their last visit (HAIDER)? no   Has the patient had any non-study vaccinations since their last visit? no      Reminded subject that their next appointment visit will be their 1 year follow up visit (Visit 504)      Emilia Coyle was informed that their next visit will be an in person visit. Subject instructed to continue completing their weekly Illness Diary and contact the site staff or investigator if a medically attend event or hospitalization occurs.

## 2022-06-10 ENCOUNTER — PATIENT MESSAGE (OUTPATIENT)
Dept: BARIATRICS | Facility: CLINIC | Age: 47
End: 2022-06-10
Payer: COMMERCIAL

## 2022-06-10 ENCOUNTER — PATIENT OUTREACH (OUTPATIENT)
Dept: ADMINISTRATIVE | Facility: HOSPITAL | Age: 47
End: 2022-06-10
Payer: COMMERCIAL

## 2022-06-10 NOTE — LETTER
AUTHORIZATION FOR RELEASE OF   CONFIDENTIAL INFORMATION    Dear Rosy Fleming,    We are seeing Emilia Coyle, date of birth 1975, in the clinic at [facility]. Joe Pittman DO is the patient's PCP. Emilia Coyle has an outstanding lab/procedure at the time we reviewed her chart. In order to help keep her health information updated, she has authorized us to request the following medical record(s):        ( x )  MAMMOGRAM                                     (  )  COLONOSCOPY      (  )  PAP SMEAR                                          (  )  OUTSIDE LAB RESULTS     (  )  DEXA SCAN                                          (  )  EYE EXAM            (  )  FOOT EXAM                                          (  )  ENTIRE RECORD     (  )  OUTSIDE IMMUNIZATIONS                 (  )  _______________         Please fax records to [facility], Brian M Helmstetter, DO, [phone]     If you have any questions, please contact BARRON Ivy at [phone]          Patient Name: Emilia Coyle  : 1975  Patient Phone #: [phone]

## 2022-06-14 ENCOUNTER — PATIENT MESSAGE (OUTPATIENT)
Dept: BARIATRICS | Facility: CLINIC | Age: 47
End: 2022-06-14
Payer: COMMERCIAL

## 2022-07-05 ENCOUNTER — PATIENT MESSAGE (OUTPATIENT)
Dept: BARIATRICS | Facility: CLINIC | Age: 47
End: 2022-07-05
Payer: COMMERCIAL

## 2022-07-12 ENCOUNTER — PATIENT MESSAGE (OUTPATIENT)
Dept: ADMINISTRATIVE | Facility: HOSPITAL | Age: 47
End: 2022-07-12
Payer: COMMERCIAL

## 2022-07-13 ENCOUNTER — PATIENT OUTREACH (OUTPATIENT)
Dept: ADMINISTRATIVE | Facility: HOSPITAL | Age: 47
End: 2022-07-13
Payer: COMMERCIAL

## 2022-07-13 NOTE — LETTER
AUTHORIZATION FOR RELEASE OF   CONFIDENTIAL INFORMATION    Dear Medical Records,    We are seeing Emilia Coyle, date of birth 1975, in the clinic at [facility]. Joe Pittman DO is the patient's PCP. Emilia Coyle has an outstanding lab/procedure at the time we reviewed her chart. In order to help keep her health information updated, she has authorized us to request the following medical record(s):        ( x )  MAMMOGRAM                                     (  )  COLONOSCOPY      (  )  PAP SMEAR                                          (  )  OUTSIDE LAB RESULTS     (  )  DEXA SCAN                                          (  )  EYE EXAM            (  )  FOOT EXAM                                          (  )  ENTIRE RECORD     (  )  OUTSIDE IMMUNIZATIONS                 (  )  _______________         Please fax records to [facility], Brian M Helmstetter, DO, [phone]     If you have any questions, please contact BARRON Ivy at [phone]          Patient Name: Emilia Coyle  : 1975  Patient Phone #: [phone]

## 2022-07-15 ENCOUNTER — PATIENT OUTREACH (OUTPATIENT)
Dept: ADMINISTRATIVE | Facility: HOSPITAL | Age: 47
End: 2022-07-15
Payer: COMMERCIAL

## 2022-08-02 ENCOUNTER — LAB VISIT (OUTPATIENT)
Dept: LAB | Facility: HOSPITAL | Age: 47
End: 2022-08-02
Payer: COMMERCIAL

## 2022-08-02 ENCOUNTER — PATIENT MESSAGE (OUTPATIENT)
Dept: BARIATRICS | Facility: CLINIC | Age: 47
End: 2022-08-02

## 2022-08-02 DIAGNOSIS — R63.5 ABNORMAL WEIGHT GAIN: ICD-10-CM

## 2022-08-02 DIAGNOSIS — R53.1 ASTHENIA: ICD-10-CM

## 2022-08-02 DIAGNOSIS — K90.0 CELIAC DISEASE: ICD-10-CM

## 2022-08-02 DIAGNOSIS — E88.810 DYSMETABOLIC SYNDROME X: ICD-10-CM

## 2022-08-02 LAB
ALBUMIN SERPL BCP-MCNC: 4.1 G/DL (ref 3.5–5.2)
ALP SERPL-CCNC: 77 U/L (ref 55–135)
ALT SERPL W/O P-5'-P-CCNC: 9 U/L (ref 10–44)
ANION GAP SERPL CALC-SCNC: 7 MMOL/L (ref 8–16)
AST SERPL-CCNC: 14 U/L (ref 10–40)
BASOPHILS # BLD AUTO: 0.09 K/UL (ref 0–0.2)
BASOPHILS NFR BLD: 1.4 % (ref 0–1.9)
BILIRUB SERPL-MCNC: 0.5 MG/DL (ref 0.1–1)
BUN SERPL-MCNC: 18 MG/DL (ref 6–20)
CALCIUM SERPL-MCNC: 9.7 MG/DL (ref 8.7–10.5)
CHLORIDE SERPL-SCNC: 106 MMOL/L (ref 95–110)
CHOLEST SERPL-MCNC: 165 MG/DL (ref 120–199)
CHOLEST/HDLC SERPL: 2.8 {RATIO} (ref 2–5)
CO2 SERPL-SCNC: 27 MMOL/L (ref 23–29)
CREAT SERPL-MCNC: 0.7 MG/DL (ref 0.5–1.4)
DIFFERENTIAL METHOD: ABNORMAL
EOSINOPHIL # BLD AUTO: 0.2 K/UL (ref 0–0.5)
EOSINOPHIL NFR BLD: 2.4 % (ref 0–8)
ERYTHROCYTE [DISTWIDTH] IN BLOOD BY AUTOMATED COUNT: 14 % (ref 11.5–14.5)
EST. GFR  (NO RACE VARIABLE): >60 ML/MIN/1.73 M^2
GLUCOSE SERPL-MCNC: 80 MG/DL (ref 70–110)
HCT VFR BLD AUTO: 38.2 % (ref 37–48.5)
HDLC SERPL-MCNC: 59 MG/DL (ref 40–75)
HDLC SERPL: 35.8 % (ref 20–50)
HGB BLD-MCNC: 12 G/DL (ref 12–16)
IMM GRANULOCYTES # BLD AUTO: 0.02 K/UL (ref 0–0.04)
IMM GRANULOCYTES NFR BLD AUTO: 0.3 % (ref 0–0.5)
LDLC SERPL CALC-MCNC: 89.8 MG/DL (ref 63–159)
LYMPHOCYTES # BLD AUTO: 2.5 K/UL (ref 1–4.8)
LYMPHOCYTES NFR BLD: 38.5 % (ref 18–48)
MCH RBC QN AUTO: 28.4 PG (ref 27–31)
MCHC RBC AUTO-ENTMCNC: 31.4 G/DL (ref 32–36)
MCV RBC AUTO: 90 FL (ref 82–98)
MONOCYTES # BLD AUTO: 0.4 K/UL (ref 0.3–1)
MONOCYTES NFR BLD: 6.6 % (ref 4–15)
NEUTROPHILS # BLD AUTO: 3.2 K/UL (ref 1.8–7.7)
NEUTROPHILS NFR BLD: 50.8 % (ref 38–73)
NONHDLC SERPL-MCNC: 106 MG/DL
NRBC BLD-RTO: 0 /100 WBC
PLATELET # BLD AUTO: 315 K/UL (ref 150–450)
PMV BLD AUTO: 10.7 FL (ref 9.2–12.9)
POTASSIUM SERPL-SCNC: 4.4 MMOL/L (ref 3.5–5.1)
PROT SERPL-MCNC: 7.2 G/DL (ref 6–8.4)
RBC # BLD AUTO: 4.23 M/UL (ref 4–5.4)
SODIUM SERPL-SCNC: 140 MMOL/L (ref 136–145)
T3 SERPL-MCNC: 83 NG/DL (ref 60–180)
TRIGL SERPL-MCNC: 81 MG/DL (ref 30–150)
TSH SERPL DL<=0.005 MIU/L-ACNC: 0.94 UIU/ML (ref 0.4–4)
WBC # BLD AUTO: 6.36 K/UL (ref 3.9–12.7)

## 2022-08-02 PROCEDURE — 80061 LIPID PANEL: CPT | Performed by: INTERNAL MEDICINE

## 2022-08-02 PROCEDURE — 84480 ASSAY TRIIODOTHYRONINE (T3): CPT | Performed by: INTERNAL MEDICINE

## 2022-08-02 PROCEDURE — 80053 COMPREHEN METABOLIC PANEL: CPT | Performed by: INTERNAL MEDICINE

## 2022-08-02 PROCEDURE — 36415 COLL VENOUS BLD VENIPUNCTURE: CPT | Mod: PO | Performed by: INTERNAL MEDICINE

## 2022-08-02 PROCEDURE — 85025 COMPLETE CBC W/AUTO DIFF WBC: CPT | Performed by: INTERNAL MEDICINE

## 2022-08-02 PROCEDURE — 84443 ASSAY THYROID STIM HORMONE: CPT | Performed by: INTERNAL MEDICINE

## 2022-08-04 ENCOUNTER — PATIENT MESSAGE (OUTPATIENT)
Dept: BARIATRICS | Facility: CLINIC | Age: 47
End: 2022-08-04

## 2022-08-17 ENCOUNTER — PATIENT MESSAGE (OUTPATIENT)
Dept: INTERNAL MEDICINE | Facility: CLINIC | Age: 47
End: 2022-08-17

## 2022-08-17 DIAGNOSIS — R30.0 DYSURIA: Primary | ICD-10-CM

## 2022-08-18 ENCOUNTER — PATIENT MESSAGE (OUTPATIENT)
Dept: INTERNAL MEDICINE | Facility: CLINIC | Age: 47
End: 2022-08-18

## 2022-08-18 RX ORDER — AMOXICILLIN AND CLAVULANATE POTASSIUM 875; 125 MG/1; MG/1
1 TABLET, FILM COATED ORAL 2 TIMES DAILY
Qty: 14 TABLET | Refills: 0 | Status: SHIPPED | OUTPATIENT
Start: 2022-08-18 | End: 2022-08-25

## 2022-08-19 ENCOUNTER — LAB VISIT (OUTPATIENT)
Dept: LAB | Facility: HOSPITAL | Age: 47
End: 2022-08-19
Attending: INTERNAL MEDICINE
Payer: COMMERCIAL

## 2022-08-19 DIAGNOSIS — R30.0 DYSURIA: ICD-10-CM

## 2022-08-19 LAB
BACTERIA #/AREA URNS AUTO: ABNORMAL /HPF
BILIRUB UR QL STRIP: NEGATIVE
CLARITY UR REFRACT.AUTO: CLEAR
COLOR UR AUTO: YELLOW
GLUCOSE UR QL STRIP: NEGATIVE
HGB UR QL STRIP: NEGATIVE
KETONES UR QL STRIP: NEGATIVE
LEUKOCYTE ESTERASE UR QL STRIP: ABNORMAL
MICROSCOPIC COMMENT: ABNORMAL
NITRITE UR QL STRIP: NEGATIVE
PH UR STRIP: 6 [PH] (ref 5–8)
PROT UR QL STRIP: NEGATIVE
RBC #/AREA URNS AUTO: 7 /HPF (ref 0–4)
SP GR UR STRIP: 1.02 (ref 1–1.03)
SQUAMOUS #/AREA URNS AUTO: 5 /HPF
URN SPEC COLLECT METH UR: ABNORMAL
WBC #/AREA URNS AUTO: 94 /HPF (ref 0–5)
WBC CLUMPS UR QL AUTO: ABNORMAL

## 2022-08-19 PROCEDURE — 87086 URINE CULTURE/COLONY COUNT: CPT | Performed by: INTERNAL MEDICINE

## 2022-08-19 PROCEDURE — 81001 URINALYSIS AUTO W/SCOPE: CPT | Performed by: INTERNAL MEDICINE

## 2022-08-20 LAB — BACTERIA UR CULT: NO GROWTH

## 2022-09-07 ENCOUNTER — PATIENT MESSAGE (OUTPATIENT)
Dept: BARIATRICS | Facility: CLINIC | Age: 47
End: 2022-09-07
Payer: COMMERCIAL

## 2022-09-15 ENCOUNTER — PATIENT MESSAGE (OUTPATIENT)
Dept: INTERNAL MEDICINE | Facility: CLINIC | Age: 47
End: 2022-09-15
Payer: COMMERCIAL

## 2022-09-22 ENCOUNTER — LAB VISIT (OUTPATIENT)
Dept: LAB | Facility: HOSPITAL | Age: 47
End: 2022-09-22
Attending: INTERNAL MEDICINE
Payer: COMMERCIAL

## 2022-09-22 ENCOUNTER — OFFICE VISIT (OUTPATIENT)
Dept: INTERNAL MEDICINE | Facility: CLINIC | Age: 47
End: 2022-09-22
Payer: COMMERCIAL

## 2022-09-22 ENCOUNTER — PATIENT MESSAGE (OUTPATIENT)
Dept: INTERNAL MEDICINE | Facility: CLINIC | Age: 47
End: 2022-09-22

## 2022-09-22 VITALS
RESPIRATION RATE: 18 BRPM | HEIGHT: 65 IN | SYSTOLIC BLOOD PRESSURE: 104 MMHG | OXYGEN SATURATION: 97 % | WEIGHT: 158.81 LBS | HEART RATE: 66 BPM | TEMPERATURE: 97 F | BODY MASS INDEX: 26.46 KG/M2 | DIASTOLIC BLOOD PRESSURE: 72 MMHG

## 2022-09-22 DIAGNOSIS — G43.009 MIGRAINE WITHOUT AURA AND WITHOUT STATUS MIGRAINOSUS, NOT INTRACTABLE: ICD-10-CM

## 2022-09-22 DIAGNOSIS — Z01.818 PRE-OP EXAM: ICD-10-CM

## 2022-09-22 DIAGNOSIS — Z01.818 PRE-OP EXAM: Primary | ICD-10-CM

## 2022-09-22 DIAGNOSIS — E03.9 HYPOTHYROIDISM, UNSPECIFIED TYPE: Chronic | ICD-10-CM

## 2022-09-22 DIAGNOSIS — G89.29 CHRONIC PAIN IN RIGHT FOOT: ICD-10-CM

## 2022-09-22 DIAGNOSIS — F42.9 OBSESSIVE-COMPULSIVE DISORDER, UNSPECIFIED TYPE: ICD-10-CM

## 2022-09-22 DIAGNOSIS — M79.671 CHRONIC PAIN IN RIGHT FOOT: ICD-10-CM

## 2022-09-22 LAB
ANION GAP SERPL CALC-SCNC: 9 MMOL/L (ref 8–16)
BASOPHILS # BLD AUTO: 0.08 K/UL (ref 0–0.2)
BASOPHILS NFR BLD: 1.2 % (ref 0–1.9)
BUN SERPL-MCNC: 15 MG/DL (ref 6–20)
CALCIUM SERPL-MCNC: 9.7 MG/DL (ref 8.7–10.5)
CHLORIDE SERPL-SCNC: 104 MMOL/L (ref 95–110)
CO2 SERPL-SCNC: 26 MMOL/L (ref 23–29)
CREAT SERPL-MCNC: 0.8 MG/DL (ref 0.5–1.4)
DIFFERENTIAL METHOD: ABNORMAL
EOSINOPHIL # BLD AUTO: 0.1 K/UL (ref 0–0.5)
EOSINOPHIL NFR BLD: 1.4 % (ref 0–8)
ERYTHROCYTE [DISTWIDTH] IN BLOOD BY AUTOMATED COUNT: 13.5 % (ref 11.5–14.5)
EST. GFR  (NO RACE VARIABLE): >60 ML/MIN/1.73 M^2
GLUCOSE SERPL-MCNC: 71 MG/DL (ref 70–110)
HCT VFR BLD AUTO: 39.3 % (ref 37–48.5)
HGB BLD-MCNC: 12.2 G/DL (ref 12–16)
IMM GRANULOCYTES # BLD AUTO: 0.01 K/UL (ref 0–0.04)
IMM GRANULOCYTES NFR BLD AUTO: 0.2 % (ref 0–0.5)
LYMPHOCYTES # BLD AUTO: 1.8 K/UL (ref 1–4.8)
LYMPHOCYTES NFR BLD: 27.1 % (ref 18–48)
MCH RBC QN AUTO: 28.1 PG (ref 27–31)
MCHC RBC AUTO-ENTMCNC: 31 G/DL (ref 32–36)
MCV RBC AUTO: 91 FL (ref 82–98)
MONOCYTES # BLD AUTO: 0.4 K/UL (ref 0.3–1)
MONOCYTES NFR BLD: 6.5 % (ref 4–15)
NEUTROPHILS # BLD AUTO: 4.1 K/UL (ref 1.8–7.7)
NEUTROPHILS NFR BLD: 63.6 % (ref 38–73)
NRBC BLD-RTO: 0 /100 WBC
PLATELET # BLD AUTO: 337 K/UL (ref 150–450)
PMV BLD AUTO: 10.8 FL (ref 9.2–12.9)
POTASSIUM SERPL-SCNC: 4.3 MMOL/L (ref 3.5–5.1)
RBC # BLD AUTO: 4.34 M/UL (ref 4–5.4)
SODIUM SERPL-SCNC: 139 MMOL/L (ref 136–145)
WBC # BLD AUTO: 6.46 K/UL (ref 3.9–12.7)

## 2022-09-22 PROCEDURE — 3008F PR BODY MASS INDEX (BMI) DOCUMENTED: ICD-10-PCS | Mod: CPTII,S$GLB,, | Performed by: INTERNAL MEDICINE

## 2022-09-22 PROCEDURE — 1159F MED LIST DOCD IN RCRD: CPT | Mod: CPTII,S$GLB,, | Performed by: INTERNAL MEDICINE

## 2022-09-22 PROCEDURE — 93010 ELECTROCARDIOGRAM REPORT: CPT | Mod: S$GLB,,, | Performed by: INTERNAL MEDICINE

## 2022-09-22 PROCEDURE — 3078F PR MOST RECENT DIASTOLIC BLOOD PRESSURE < 80 MM HG: ICD-10-PCS | Mod: CPTII,S$GLB,, | Performed by: INTERNAL MEDICINE

## 2022-09-22 PROCEDURE — 3008F BODY MASS INDEX DOCD: CPT | Mod: CPTII,S$GLB,, | Performed by: INTERNAL MEDICINE

## 2022-09-22 PROCEDURE — 99214 OFFICE O/P EST MOD 30 MIN: CPT | Mod: S$GLB,,, | Performed by: INTERNAL MEDICINE

## 2022-09-22 PROCEDURE — 93010 EKG 12-LEAD: ICD-10-PCS | Mod: S$GLB,,, | Performed by: INTERNAL MEDICINE

## 2022-09-22 PROCEDURE — 99999 PR PBB SHADOW E&M-EST. PATIENT-LVL V: ICD-10-PCS | Mod: PBBFAC,,, | Performed by: INTERNAL MEDICINE

## 2022-09-22 PROCEDURE — 99999 PR PBB SHADOW E&M-EST. PATIENT-LVL V: CPT | Mod: PBBFAC,,, | Performed by: INTERNAL MEDICINE

## 2022-09-22 PROCEDURE — 85025 COMPLETE CBC W/AUTO DIFF WBC: CPT | Performed by: INTERNAL MEDICINE

## 2022-09-22 PROCEDURE — 1159F PR MEDICATION LIST DOCUMENTED IN MEDICAL RECORD: ICD-10-PCS | Mod: CPTII,S$GLB,, | Performed by: INTERNAL MEDICINE

## 2022-09-22 PROCEDURE — 99214 PR OFFICE/OUTPT VISIT, EST, LEVL IV, 30-39 MIN: ICD-10-PCS | Mod: S$GLB,,, | Performed by: INTERNAL MEDICINE

## 2022-09-22 PROCEDURE — 3074F SYST BP LT 130 MM HG: CPT | Mod: CPTII,S$GLB,, | Performed by: INTERNAL MEDICINE

## 2022-09-22 PROCEDURE — 3078F DIAST BP <80 MM HG: CPT | Mod: CPTII,S$GLB,, | Performed by: INTERNAL MEDICINE

## 2022-09-22 PROCEDURE — 36415 COLL VENOUS BLD VENIPUNCTURE: CPT | Mod: PO | Performed by: INTERNAL MEDICINE

## 2022-09-22 PROCEDURE — 93005 ELECTROCARDIOGRAM TRACING: CPT | Mod: S$GLB,,, | Performed by: INTERNAL MEDICINE

## 2022-09-22 PROCEDURE — 80048 BASIC METABOLIC PNL TOTAL CA: CPT | Performed by: INTERNAL MEDICINE

## 2022-09-22 PROCEDURE — 3074F PR MOST RECENT SYSTOLIC BLOOD PRESSURE < 130 MM HG: ICD-10-PCS | Mod: CPTII,S$GLB,, | Performed by: INTERNAL MEDICINE

## 2022-09-22 PROCEDURE — 93005 EKG 12-LEAD: ICD-10-PCS | Mod: S$GLB,,, | Performed by: INTERNAL MEDICINE

## 2022-09-22 NOTE — PROGRESS NOTES
Subjective:       Patient ID: Emilia Coyle is a 47 y.o. female.    Chief Complaint: Pre-op Exam    HPI  Pt with Migraines, OCD, Hypothyroidism, Chronic right foot pain is here for pre-op evaluation. Podiatry will be removing the hardware in the right foot on 10/18/22 by Dr. Tapia.  Pt denies any hx of reactions to anesthesia, CAD/MI/CVA or any acute cardiopulmonary complaints today.   Review of Systems   Constitutional:  Negative for activity change, appetite change, chills, diaphoresis, fatigue, fever and unexpected weight change.   HENT:  Negative for nasal congestion, mouth sores, postnasal drip, rhinorrhea, sinus pressure/congestion, sneezing, sore throat, trouble swallowing and voice change.    Eyes:  Negative for pain, discharge and visual disturbance.   Respiratory:  Negative for cough, shortness of breath and wheezing.    Cardiovascular:  Negative for chest pain, palpitations and leg swelling.   Gastrointestinal:  Negative for abdominal pain, blood in stool, constipation, diarrhea, nausea and vomiting.   Endocrine: Negative for cold intolerance and heat intolerance.   Genitourinary:  Negative for difficulty urinating, dysuria, frequency, hematuria and urgency.   Musculoskeletal:  Negative for arthralgias and myalgias.   Integumentary:  Negative for rash and wound.   Allergic/Immunologic: Negative for environmental allergies and food allergies.   Neurological:  Positive for headaches. Negative for dizziness, tremors, seizures, syncope, weakness and light-headedness.   Hematological:  Negative for adenopathy. Does not bruise/bleed easily.   Psychiatric/Behavioral:  Negative for confusion and sleep disturbance. The patient is not nervous/anxious.        Objective:      Physical Exam  Vitals and nursing note reviewed.   Constitutional:       General: She is not in acute distress.     Appearance: Normal appearance. She is well-developed. She is not diaphoretic.   HENT:      Head: Normocephalic and  atraumatic.      Right Ear: External ear normal.      Left Ear: External ear normal.      Nose: Nose normal.      Mouth/Throat:      Pharynx: No oropharyngeal exudate.   Eyes:      General: No scleral icterus.        Right eye: No discharge.         Left eye: No discharge.      Conjunctiva/sclera: Conjunctivae normal.      Pupils: Pupils are equal, round, and reactive to light.   Neck:      Thyroid: No thyromegaly.      Vascular: No JVD.   Cardiovascular:      Rate and Rhythm: Normal rate and regular rhythm.      Pulses: Normal pulses.      Heart sounds: Normal heart sounds. No murmur heard.  Pulmonary:      Effort: Pulmonary effort is normal. No respiratory distress.      Breath sounds: Normal breath sounds. No wheezing, rhonchi or rales.   Chest:      Chest wall: No tenderness.   Abdominal:      General: Bowel sounds are normal. There is no distension.      Palpations: Abdomen is soft.      Tenderness: There is no abdominal tenderness. There is no guarding or rebound.   Musculoskeletal:      Cervical back: Neck supple.      Right lower leg: No edema.      Left lower leg: No edema.   Lymphadenopathy:      Cervical: No cervical adenopathy.   Skin:     General: Skin is warm and dry.      Coloration: Skin is not pale.      Findings: No rash.   Neurological:      General: No focal deficit present.      Mental Status: She is alert and oriented to person, place, and time.      Gait: Gait normal.   Psychiatric:         Behavior: Behavior normal.         Thought Content: Thought content normal.         Judgment: Judgment normal.       Assessment:       Problem List Items Addressed This Visit          Neuro    Migraine without status migrainosus, not intractable       Psychiatric    Obsessive-compulsive disorder       Endocrine    Hypothyroid (Chronic)       Orthopedic    Chronic pain in right foot     Other Visit Diagnoses       Pre-op exam    -  Primary    Relevant Orders    EKG 12-lead (Completed)    CBC Auto  Differential (Completed)    Basic Metabolic Panel (Completed)            Plan:    Check EKG/labs     Chronic right foot pain- hardware removal scheduled for 10/18/22     Hypothyroidism- stable on Synthroid      OCD- controlled on current meds, followed by [facility]      Migraines- stable on Fioricet PRN       Pt has no clinical predictors. She is going for an low risk procedure. Has good   functional Capacity at 4 mets. At this time there are no contraindications to foot surgery.

## 2022-09-26 ENCOUNTER — TELEPHONE (OUTPATIENT)
Dept: INTERNAL MEDICINE | Facility: CLINIC | Age: 47
End: 2022-09-26
Payer: COMMERCIAL

## 2022-10-03 PROBLEM — G89.29 CHRONIC PAIN IN RIGHT FOOT: Status: ACTIVE | Noted: 2022-10-03

## 2022-10-03 PROBLEM — M79.671 CHRONIC PAIN IN RIGHT FOOT: Status: ACTIVE | Noted: 2022-10-03

## 2022-10-06 ENCOUNTER — PATIENT MESSAGE (OUTPATIENT)
Dept: BARIATRICS | Facility: CLINIC | Age: 47
End: 2022-10-06
Payer: COMMERCIAL

## 2022-10-19 ENCOUNTER — RESEARCH ENCOUNTER (OUTPATIENT)
Dept: RESEARCH | Facility: HOSPITAL | Age: 47
End: 2022-10-19
Payer: COMMERCIAL

## 2022-10-19 NOTE — PROGRESS NOTES
Study title: A Phase 1/2/3. Placebo Controlled, Randomized, Observer-Blind, Dose-Finding Study to Describe the Safety, Tolerability, Immunogenicity and Potential Efficacy of SARS-COV-2 RNA Vaccine Candidates Against COVID-19 in Healthy Adults   IRB #: 2020.198  Sponsor: Pfizer   Sponsor's Protocol: [id number]  Site Number: [id number]  Subject ID: [id number]      Site contacted subject to inform subject of study ending per PA20. Subject was informed that the study was ending earlier than planned as the study is no longer testing additional vaccine doses or new variant vaccines on study, and the required safety follow up period has been completed. Subject was informed that from today, 19 OCT 2022, forward, study participation has concluded. Subject expressed understanding and was thanked for their participation in this study.    Subject was informed that Dr. Reji Lang replaced Dr. Radha Gomes as PI of the study. yes      Did the subject have any ongoing AE's no    Did the subject have any ongoing illness symptoms? no    Did the subject have a provisional device? no    Did the subject use a personal device? yes  Subject was instructed to delete [place] from device? yes      Off Study Date: 19 OCT 2022  Off Study Reason: Follow up Complete per PA20.

## 2022-11-04 ENCOUNTER — PATIENT MESSAGE (OUTPATIENT)
Dept: BARIATRICS | Facility: CLINIC | Age: 47
End: 2022-11-04
Payer: COMMERCIAL

## 2022-11-30 DIAGNOSIS — Z12.31 OTHER SCREENING MAMMOGRAM: ICD-10-CM

## 2022-12-02 ENCOUNTER — PATIENT MESSAGE (OUTPATIENT)
Dept: INTERNAL MEDICINE | Facility: CLINIC | Age: 47
End: 2022-12-02
Payer: COMMERCIAL

## 2022-12-02 DIAGNOSIS — Z00.00 ANNUAL PHYSICAL EXAM: Primary | ICD-10-CM

## 2023-01-16 ENCOUNTER — PATIENT MESSAGE (OUTPATIENT)
Dept: INTERNAL MEDICINE | Facility: CLINIC | Age: 48
End: 2023-01-16
Payer: COMMERCIAL

## 2023-01-16 DIAGNOSIS — E03.9 HYPOTHYROIDISM, UNSPECIFIED TYPE: Primary | ICD-10-CM

## 2023-01-16 DIAGNOSIS — E55.9 VITAMIN D DEFICIENCY: Chronic | ICD-10-CM

## 2023-02-22 ENCOUNTER — LAB VISIT (OUTPATIENT)
Dept: LAB | Facility: HOSPITAL | Age: 48
End: 2023-02-22
Attending: INTERNAL MEDICINE
Payer: COMMERCIAL

## 2023-02-22 DIAGNOSIS — E55.9 VITAMIN D DEFICIENCY: Chronic | ICD-10-CM

## 2023-02-22 DIAGNOSIS — R53.1 ASTHENIA: ICD-10-CM

## 2023-02-22 DIAGNOSIS — K90.0 CELIAC DISEASE: ICD-10-CM

## 2023-02-22 DIAGNOSIS — R63.5 ABNORMAL WEIGHT GAIN: ICD-10-CM

## 2023-02-22 DIAGNOSIS — E88.810 DYSMETABOLIC SYNDROME X: ICD-10-CM

## 2023-02-22 DIAGNOSIS — Z00.00 ANNUAL PHYSICAL EXAM: ICD-10-CM

## 2023-02-22 DIAGNOSIS — E03.9 HYPOTHYROIDISM, UNSPECIFIED TYPE: ICD-10-CM

## 2023-02-22 LAB
25(OH)D3+25(OH)D2 SERPL-MCNC: 32 NG/ML (ref 30–96)
ALBUMIN SERPL BCP-MCNC: 3.9 G/DL (ref 3.5–5.2)
ALP SERPL-CCNC: 77 U/L (ref 55–135)
ALT SERPL W/O P-5'-P-CCNC: 12 U/L (ref 10–44)
ANION GAP SERPL CALC-SCNC: 13 MMOL/L (ref 8–16)
AST SERPL-CCNC: 14 U/L (ref 10–40)
BASOPHILS # BLD AUTO: 0.07 K/UL (ref 0–0.2)
BASOPHILS NFR BLD: 1.1 % (ref 0–1.9)
BILIRUB SERPL-MCNC: 0.3 MG/DL (ref 0.1–1)
BUN SERPL-MCNC: 15 MG/DL (ref 6–20)
CALCIUM SERPL-MCNC: 9.2 MG/DL (ref 8.7–10.5)
CHLORIDE SERPL-SCNC: 104 MMOL/L (ref 95–110)
CHOLEST SERPL-MCNC: 175 MG/DL (ref 120–199)
CHOLEST/HDLC SERPL: 2.9 {RATIO} (ref 2–5)
CO2 SERPL-SCNC: 23 MMOL/L (ref 23–29)
CORTIS SERPL-MCNC: 16.2 UG/DL (ref 4.3–22.4)
CREAT SERPL-MCNC: 0.8 MG/DL (ref 0.5–1.4)
DIFFERENTIAL METHOD: ABNORMAL
EOSINOPHIL # BLD AUTO: 0.1 K/UL (ref 0–0.5)
EOSINOPHIL NFR BLD: 1.7 % (ref 0–8)
ERYTHROCYTE [DISTWIDTH] IN BLOOD BY AUTOMATED COUNT: 14.6 % (ref 11.5–14.5)
EST. GFR  (NO RACE VARIABLE): >60 ML/MIN/1.73 M^2
ESTIMATED AVG GLUCOSE: 103 MG/DL (ref 68–131)
GLUCOSE SERPL-MCNC: 142 MG/DL (ref 70–110)
HBA1C MFR BLD: 5.2 % (ref 4–5.6)
HCT VFR BLD AUTO: 37.2 % (ref 37–48.5)
HDLC SERPL-MCNC: 61 MG/DL (ref 40–75)
HDLC SERPL: 34.9 % (ref 20–50)
HGB BLD-MCNC: 11.5 G/DL (ref 12–16)
IMM GRANULOCYTES # BLD AUTO: 0.02 K/UL (ref 0–0.04)
IMM GRANULOCYTES NFR BLD AUTO: 0.3 % (ref 0–0.5)
LDLC SERPL CALC-MCNC: 101.2 MG/DL (ref 63–159)
LYMPHOCYTES # BLD AUTO: 1.8 K/UL (ref 1–4.8)
LYMPHOCYTES NFR BLD: 29.1 % (ref 18–48)
MCH RBC QN AUTO: 27.8 PG (ref 27–31)
MCHC RBC AUTO-ENTMCNC: 30.9 G/DL (ref 32–36)
MCV RBC AUTO: 90 FL (ref 82–98)
MONOCYTES # BLD AUTO: 0.4 K/UL (ref 0.3–1)
MONOCYTES NFR BLD: 5.5 % (ref 4–15)
NEUTROPHILS # BLD AUTO: 3.9 K/UL (ref 1.8–7.7)
NEUTROPHILS NFR BLD: 62.3 % (ref 38–73)
NONHDLC SERPL-MCNC: 114 MG/DL
NRBC BLD-RTO: 0 /100 WBC
PLATELET # BLD AUTO: 308 K/UL (ref 150–450)
PMV BLD AUTO: 10.4 FL (ref 9.2–12.9)
POTASSIUM SERPL-SCNC: 3.7 MMOL/L (ref 3.5–5.1)
PROT SERPL-MCNC: 6.8 G/DL (ref 6–8.4)
RBC # BLD AUTO: 4.13 M/UL (ref 4–5.4)
SODIUM SERPL-SCNC: 140 MMOL/L (ref 136–145)
T3 SERPL-MCNC: 72 NG/DL (ref 60–180)
T4 FREE SERPL-MCNC: 0.86 NG/DL (ref 0.71–1.51)
TRIGL SERPL-MCNC: 64 MG/DL (ref 30–150)
TSH SERPL DL<=0.005 MIU/L-ACNC: 0.57 UIU/ML (ref 0.4–4)
WBC # BLD AUTO: 6.33 K/UL (ref 3.9–12.7)

## 2023-02-22 PROCEDURE — 80061 LIPID PANEL: CPT | Performed by: INTERNAL MEDICINE

## 2023-02-22 PROCEDURE — 80053 COMPREHEN METABOLIC PANEL: CPT | Performed by: INTERNAL MEDICINE

## 2023-02-22 PROCEDURE — 84443 ASSAY THYROID STIM HORMONE: CPT | Performed by: INTERNAL MEDICINE

## 2023-02-22 PROCEDURE — 82024 ASSAY OF ACTH: CPT | Performed by: INTERNAL MEDICINE

## 2023-02-22 PROCEDURE — 82306 VITAMIN D 25 HYDROXY: CPT | Performed by: INTERNAL MEDICINE

## 2023-02-22 PROCEDURE — 36415 COLL VENOUS BLD VENIPUNCTURE: CPT | Mod: PO | Performed by: INTERNAL MEDICINE

## 2023-02-22 PROCEDURE — 82533 TOTAL CORTISOL: CPT | Performed by: INTERNAL MEDICINE

## 2023-02-22 PROCEDURE — 83036 HEMOGLOBIN GLYCOSYLATED A1C: CPT | Performed by: INTERNAL MEDICINE

## 2023-02-22 PROCEDURE — 84480 ASSAY TRIIODOTHYRONINE (T3): CPT | Performed by: INTERNAL MEDICINE

## 2023-02-22 PROCEDURE — 82530 CORTISOL FREE: CPT | Performed by: INTERNAL MEDICINE

## 2023-02-22 PROCEDURE — 84439 ASSAY OF FREE THYROXINE: CPT | Performed by: INTERNAL MEDICINE

## 2023-02-22 PROCEDURE — 85025 COMPLETE CBC W/AUTO DIFF WBC: CPT | Performed by: INTERNAL MEDICINE

## 2023-02-24 LAB — ACTH PLAS-MCNC: 23 PG/ML (ref 0–46)

## 2023-03-01 ENCOUNTER — OFFICE VISIT (OUTPATIENT)
Dept: INTERNAL MEDICINE | Facility: CLINIC | Age: 48
End: 2023-03-01
Payer: COMMERCIAL

## 2023-03-01 VITALS
SYSTOLIC BLOOD PRESSURE: 100 MMHG | BODY MASS INDEX: 26.08 KG/M2 | DIASTOLIC BLOOD PRESSURE: 62 MMHG | HEIGHT: 65 IN | WEIGHT: 156.5 LBS | TEMPERATURE: 97 F | RESPIRATION RATE: 16 BRPM | HEART RATE: 80 BPM | OXYGEN SATURATION: 99 %

## 2023-03-01 DIAGNOSIS — F42.9 OBSESSIVE-COMPULSIVE DISORDER, UNSPECIFIED TYPE: ICD-10-CM

## 2023-03-01 DIAGNOSIS — M79.671 CHRONIC PAIN IN RIGHT FOOT: ICD-10-CM

## 2023-03-01 DIAGNOSIS — G89.29 CHRONIC PAIN IN RIGHT FOOT: ICD-10-CM

## 2023-03-01 DIAGNOSIS — E03.9 HYPOTHYROIDISM, UNSPECIFIED TYPE: Chronic | ICD-10-CM

## 2023-03-01 DIAGNOSIS — G43.709 CHRONIC MIGRAINE W/O AURA W/O STATUS MIGRAINOSUS, NOT INTRACTABLE: Primary | ICD-10-CM

## 2023-03-01 PROCEDURE — 3044F PR MOST RECENT HEMOGLOBIN A1C LEVEL <7.0%: ICD-10-PCS | Mod: CPTII,S$GLB,, | Performed by: INTERNAL MEDICINE

## 2023-03-01 PROCEDURE — 3074F SYST BP LT 130 MM HG: CPT | Mod: CPTII,S$GLB,, | Performed by: INTERNAL MEDICINE

## 2023-03-01 PROCEDURE — 99999 PR PBB SHADOW E&M-EST. PATIENT-LVL IV: ICD-10-PCS | Mod: PBBFAC,,, | Performed by: INTERNAL MEDICINE

## 2023-03-01 PROCEDURE — 3078F DIAST BP <80 MM HG: CPT | Mod: CPTII,S$GLB,, | Performed by: INTERNAL MEDICINE

## 2023-03-01 PROCEDURE — 1159F PR MEDICATION LIST DOCUMENTED IN MEDICAL RECORD: ICD-10-PCS | Mod: CPTII,S$GLB,, | Performed by: INTERNAL MEDICINE

## 2023-03-01 PROCEDURE — 3008F PR BODY MASS INDEX (BMI) DOCUMENTED: ICD-10-PCS | Mod: CPTII,S$GLB,, | Performed by: INTERNAL MEDICINE

## 2023-03-01 PROCEDURE — 3074F PR MOST RECENT SYSTOLIC BLOOD PRESSURE < 130 MM HG: ICD-10-PCS | Mod: CPTII,S$GLB,, | Performed by: INTERNAL MEDICINE

## 2023-03-01 PROCEDURE — 99396 PR PREVENTIVE VISIT,EST,40-64: ICD-10-PCS | Mod: S$GLB,,, | Performed by: INTERNAL MEDICINE

## 2023-03-01 PROCEDURE — 3008F BODY MASS INDEX DOCD: CPT | Mod: CPTII,S$GLB,, | Performed by: INTERNAL MEDICINE

## 2023-03-01 PROCEDURE — 99999 PR PBB SHADOW E&M-EST. PATIENT-LVL IV: CPT | Mod: PBBFAC,,, | Performed by: INTERNAL MEDICINE

## 2023-03-01 PROCEDURE — 1159F MED LIST DOCD IN RCRD: CPT | Mod: CPTII,S$GLB,, | Performed by: INTERNAL MEDICINE

## 2023-03-01 PROCEDURE — 3044F HG A1C LEVEL LT 7.0%: CPT | Mod: CPTII,S$GLB,, | Performed by: INTERNAL MEDICINE

## 2023-03-01 PROCEDURE — 99396 PREV VISIT EST AGE 40-64: CPT | Mod: S$GLB,,, | Performed by: INTERNAL MEDICINE

## 2023-03-01 PROCEDURE — 3078F PR MOST RECENT DIASTOLIC BLOOD PRESSURE < 80 MM HG: ICD-10-PCS | Mod: CPTII,S$GLB,, | Performed by: INTERNAL MEDICINE

## 2023-03-01 RX ORDER — CYANOCOBALAMIN 1000 UG/ML
INJECTION, SOLUTION INTRAMUSCULAR; SUBCUTANEOUS
COMMUNITY
Start: 2022-09-30

## 2023-03-01 RX ORDER — MELOXICAM 15 MG/1
15 TABLET ORAL
COMMUNITY
Start: 2023-02-14 | End: 2024-03-04

## 2023-03-01 RX ORDER — DEXTROAMPHETAMINE SACCHARATE, AMPHETAMINE ASPARTATE, DEXTROAMPHETAMINE SULFATE AND AMPHETAMINE SULFATE 7.5; 7.5; 7.5; 7.5 MG/1; MG/1; MG/1; MG/1
1 TABLET ORAL 2 TIMES DAILY
COMMUNITY
Start: 2023-02-15

## 2023-03-01 RX ORDER — DEXTROAMPHETAMINE SACCHARATE, AMPHETAMINE ASPARTATE, DEXTROAMPHETAMINE SULFATE AND AMPHETAMINE SULFATE 7.5; 7.5; 7.5; 7.5 MG/1; MG/1; MG/1; MG/1
1 TABLET ORAL
COMMUNITY
Start: 2022-01-03 | End: 2024-03-04

## 2023-03-01 NOTE — PROGRESS NOTES
Subjective:       Patient ID: Emilia Coyle is a 47 y.o. female.    Chief Complaint: Annual Exam    HPI  47 y.o. Female here for annual exam.      Vaccines: Influenza (2020); Tetanus (2019)  Eye exam: 2019  Mammogram: 8/20  Gyn exam: 7/19  Colonoscopy: current(2017)     Exercise: walks daily  Diet: regular  LMP: s/p hysterectomy      Past Medical History:  No date: Celiac disease  No date: Diabetes mellitus  7/12/2017: High triglycerides  No date: Hypothyroidism  No date: MIKAELA (obstructive sleep apnea)  No date: Sleep apnea in adult  No date: Thyroid disease  Past Surgical History:  No date: ABDOMINOPLASTY  No date: ADENOIDECTOMY  No date: BLADDER SUSPENSION  12/2017: CHOLECYSTECTOMY  10/27/2017: COLONOSCOPY      Comment:  Normal   (Rtn 10 yrs, [facility])   07/2017: GASTRECTOMY  No date: HYSTERECTOMY  No date: TONSILLECTOMY  No date: TOTAL REDUCTION MAMMOPLASTY  No date: TOTAL THYROIDECTOMY; Right  No date: TUBAL LIGATION  No date: TYMPANOSTOMY TUBE PLACEMENT  No date: right foot bunion surgery  10/27/2017: UPPER GASTROINTESTINAL ENDOSCOPY      Comment:  Gastritis Biopsied, Benign   Social History    Socioeconomic History      Marital status:       Spouse name: Not on file      Number of children: Not on file      Years of education: Not on file      Highest education level: Not on file    Occupational History      Not on file    Social Needs      Financial resource strain: Not hard at all      Food insecurity:        Worry: Never true        Inability: Never true      Transportation needs:        Medical: No        Non-medical: No    Tobacco Use      Smoking status: Never Smoker      Smokeless tobacco: Never Used    Substance and Sexual Activity      Alcohol use: No        Frequency: Never        Drinks per session: Patient refused        Binge frequency: Never      Drug use: No      Sexual activity: Yes        Partners: Male    Lifestyle      Physical activity:        Days per week: 4 days         "Minutes per session: 60 min      Stress: Not at all    Relationships      Social connections:        Talks on phone: More than three times a week        Gets together: More than three times a week        Attends Worship service: Not on file        Active member of club or organization: No        Attends meetings of clubs or organizations: Patient refused        Relationship status:     Other Topics      Concerns:        Are you pregnant or think you may be?: Not Asked        Breast-feeding: Not Asked    Social History Narrative      Not on file     Review of patient's allergies indicates:   -- Rizatriptan -- Photosensitivity, Nausea Only and                            Other (See Comments)    --  "Feels like my brain is on fire."   -- Sumatriptan -- Photosensitivity, Nausea Only and                            Other (See Comments)    --  "Feels like my brain is on fire."   -- Zolmitriptan -- Photosensitivity, Nausea Only and                            Other (See Comments)    --  "Feels like my brain is on fire."   -- Gluten protein -- Hives, Diarrhea, Itching, Nausea                            And Vomiting, Swelling, Anxiety and                           Dermatitis   -- Latex, natural rubber -- Dermatitis    --  Latex gloves with Powder  Review of Systems   Constitutional:  Negative for activity change, appetite change, chills, diaphoresis, fatigue, fever and unexpected weight change.   HENT:  Negative for nasal congestion, mouth sores, postnasal drip, rhinorrhea, sinus pressure/congestion, sneezing, sore throat, trouble swallowing and voice change.    Eyes:  Negative for pain, discharge and visual disturbance.   Respiratory:  Negative for cough, shortness of breath and wheezing.    Cardiovascular:  Negative for chest pain, palpitations and leg swelling.   Gastrointestinal:  Negative for abdominal pain, blood in stool, constipation, diarrhea, nausea and vomiting.   Endocrine: Negative for cold intolerance and " heat intolerance.   Genitourinary:  Negative for difficulty urinating, dysuria, frequency, hematuria and urgency.   Musculoskeletal:  Negative for arthralgias and myalgias.   Integumentary:  Negative for rash and wound.   Allergic/Immunologic: Negative for environmental allergies and food allergies.   Neurological:  Positive for headaches. Negative for dizziness, tremors, seizures, syncope, weakness and light-headedness.   Hematological:  Negative for adenopathy. Does not bruise/bleed easily.   Psychiatric/Behavioral:  Negative for confusion and sleep disturbance. The patient is not nervous/anxious.        Objective:      Physical Exam  Vitals and nursing note reviewed.   Constitutional:       General: She is not in acute distress.     Appearance: Normal appearance. She is well-developed. She is not diaphoretic.   HENT:      Head: Normocephalic and atraumatic.      Right Ear: External ear normal.      Left Ear: External ear normal.      Nose: Nose normal.      Mouth/Throat:      Pharynx: No oropharyngeal exudate.   Eyes:      General: No scleral icterus.        Right eye: No discharge.         Left eye: No discharge.      Conjunctiva/sclera: Conjunctivae normal.      Pupils: Pupils are equal, round, and reactive to light.   Neck:      Thyroid: No thyromegaly.      Vascular: No JVD.   Cardiovascular:      Rate and Rhythm: Normal rate and regular rhythm.      Pulses: Normal pulses.      Heart sounds: Normal heart sounds. No murmur heard.  Pulmonary:      Effort: Pulmonary effort is normal. No respiratory distress.      Breath sounds: Normal breath sounds. No wheezing, rhonchi or rales.   Chest:      Chest wall: No tenderness.   Abdominal:      General: Bowel sounds are normal. There is no distension.      Palpations: Abdomen is soft.      Tenderness: There is no abdominal tenderness. There is no guarding or rebound.   Musculoskeletal:      Cervical back: Neck supple.      Right lower leg: No edema.      Left lower  leg: No edema.   Lymphadenopathy:      Cervical: No cervical adenopathy.   Skin:     General: Skin is warm and dry.      Coloration: Skin is not pale.      Findings: No rash.   Neurological:      General: No focal deficit present.      Mental Status: She is alert and oriented to person, place, and time.      Gait: Gait normal.   Psychiatric:         Behavior: Behavior normal.         Thought Content: Thought content normal.         Judgment: Judgment normal.       Assessment:       Problem List Items Addressed This Visit          Neuro    Chronic migraine w/o aura w/o status migrainosus, not intractable - Primary       Psychiatric    Obsessive-compulsive disorder       Endocrine    Hypothyroid (Chronic)       Orthopedic    Chronic pain in right foot       Plan:    Blood work reviewed with pt     Chronic right foot pain- pt postponed surgery      Hypothyroidism- stable on Synthroid      OCD- controlled on current meds, followed by [facility]      Migraines- stable on Fioricet PRN     F/u in 1 yr

## 2023-03-04 LAB — CORTIS F SERPL-MCNC: 0.74 MCG/DL

## 2023-05-24 NOTE — TELEPHONE ENCOUNTER
Discussed genetic testing results with the patient.  No genetic mutation was found.  She was very happy to see these results.  She would therefore like to undergo breast preservation therapy.  Preoperative orders have been placed for right breast wire localized lumpectomy with sentinel lymph node biopsy.  The procedure will be performed under MAC anesthesia with same-day discharge.  My surgery scheduler will give her a call to pick a date.    Lillian Marie DO  General Surgeon  [facility]  [facility] - [address] [address]  Office: [phone]  Employed by - [employer]     Please advise    thanks

## 2023-08-02 RX ORDER — LEVOTHYROXINE SODIUM 88 UG/1
88 TABLET ORAL DAILY
Qty: 90 TABLET | Refills: 1 | Status: SHIPPED | OUTPATIENT
Start: 2023-08-02 | End: 2024-01-22

## 2023-08-02 NOTE — TELEPHONE ENCOUNTER
Refill Decision Note   Emilia Leonides  is requesting a refill authorization.  Brief Assessment and Rationale for Refill:  Approve     Medication Therapy Plan:         Comments:     Note composed:3:58 PM 08/02/2023

## 2023-08-02 NOTE — TELEPHONE ENCOUNTER
No care due was identified.  [facility] Embedded Care Due Messages. Reference number: [id number].   8/02/2023 9:14:03 AM CDT

## 2024-01-22 RX ORDER — LEVOTHYROXINE SODIUM 88 UG/1
88 TABLET ORAL DAILY
Qty: 90 TABLET | Refills: 0 | Status: SHIPPED | OUTPATIENT
Start: 2024-01-22

## 2024-01-22 NOTE — TELEPHONE ENCOUNTER
Provider Staff:  Action required for this patient    Requires labs      Please see care gap opportunities below in Care Due Message.    Thanks!  [facility]     Appointments      Date Provider   Last Visit   3/1/2023 Joe Pittman,    Next Visit   Visit date not found Joe Pittman, DO     Refill Decision Note   Emilia Coyle  is requesting a refill authorization.  Brief Assessment and Rationale for Refill:  Approve     Medication Therapy Plan:         Comments:     Note composed:9:21 AM 01/22/2024

## 2024-01-22 NOTE — TELEPHONE ENCOUNTER
Care Due:                  Date            Visit Type   Department     Provider  --------------------------------------------------------------------------------                                EP -                              PRIMARY      MET INTERNAL  Last Visit: 03-      CARE ([facility])   MEDICINE       Joe Pittman  Next Visit: None Scheduled  None         None Found                                                            Last  Test          Frequency    Reason                     Performed    Due Date  --------------------------------------------------------------------------------    TSH.........  12 months..  levothyroxine............  02- 02-    [facility] Embedded Care Due Messages. Reference number: [id number].   1/22/2024 9:07:08 AM CST

## 2024-03-04 ENCOUNTER — OFFICE VISIT (OUTPATIENT)
Dept: INTERNAL MEDICINE | Facility: CLINIC | Age: 49
End: 2024-03-04
Payer: COMMERCIAL

## 2024-03-04 ENCOUNTER — LAB VISIT (OUTPATIENT)
Dept: LAB | Facility: HOSPITAL | Age: 49
End: 2024-03-04
Attending: INTERNAL MEDICINE
Payer: COMMERCIAL

## 2024-03-04 VITALS
HEART RATE: 70 BPM | OXYGEN SATURATION: 98 % | HEIGHT: 65 IN | WEIGHT: 159.5 LBS | SYSTOLIC BLOOD PRESSURE: 116 MMHG | DIASTOLIC BLOOD PRESSURE: 60 MMHG | BODY MASS INDEX: 26.57 KG/M2 | TEMPERATURE: 97 F

## 2024-03-04 DIAGNOSIS — Z00.00 ANNUAL PHYSICAL EXAM: ICD-10-CM

## 2024-03-04 DIAGNOSIS — F42.9 OBSESSIVE-COMPULSIVE DISORDER, UNSPECIFIED TYPE: ICD-10-CM

## 2024-03-04 DIAGNOSIS — G43.709 CHRONIC MIGRAINE W/O AURA W/O STATUS MIGRAINOSUS, NOT INTRACTABLE: ICD-10-CM

## 2024-03-04 DIAGNOSIS — Z00.00 ANNUAL PHYSICAL EXAM: Primary | ICD-10-CM

## 2024-03-04 DIAGNOSIS — G43.009 MIGRAINE WITHOUT AURA AND WITHOUT STATUS MIGRAINOSUS, NOT INTRACTABLE: ICD-10-CM

## 2024-03-04 DIAGNOSIS — E03.9 HYPOTHYROIDISM, UNSPECIFIED TYPE: Chronic | ICD-10-CM

## 2024-03-04 LAB
ALBUMIN SERPL BCP-MCNC: 4 G/DL (ref 3.5–5.2)
ALP SERPL-CCNC: 79 U/L (ref 55–135)
ALT SERPL W/O P-5'-P-CCNC: 17 U/L (ref 10–44)
ANION GAP SERPL CALC-SCNC: 9 MMOL/L (ref 8–16)
AST SERPL-CCNC: 16 U/L (ref 10–40)
BASOPHILS # BLD AUTO: 0.09 K/UL (ref 0–0.2)
BASOPHILS NFR BLD: 1.3 % (ref 0–1.9)
BILIRUB SERPL-MCNC: 0.5 MG/DL (ref 0.1–1)
BUN SERPL-MCNC: 17 MG/DL (ref 6–20)
CALCIUM SERPL-MCNC: 9.7 MG/DL (ref 8.7–10.5)
CHLORIDE SERPL-SCNC: 105 MMOL/L (ref 95–110)
CHOLEST SERPL-MCNC: 155 MG/DL (ref 120–199)
CHOLEST/HDLC SERPL: 2.2 {RATIO} (ref 2–5)
CO2 SERPL-SCNC: 27 MMOL/L (ref 23–29)
CREAT SERPL-MCNC: 0.7 MG/DL (ref 0.5–1.4)
DIFFERENTIAL METHOD BLD: ABNORMAL
EOSINOPHIL # BLD AUTO: 0.2 K/UL (ref 0–0.5)
EOSINOPHIL NFR BLD: 3.3 % (ref 0–8)
ERYTHROCYTE [DISTWIDTH] IN BLOOD BY AUTOMATED COUNT: 14.7 % (ref 11.5–14.5)
EST. GFR  (NO RACE VARIABLE): >60 ML/MIN/1.73 M^2
ESTIMATED AVG GLUCOSE: 103 MG/DL (ref 68–131)
GLUCOSE SERPL-MCNC: 81 MG/DL (ref 70–110)
HBA1C MFR BLD: 5.2 % (ref 4–5.6)
HCT VFR BLD AUTO: 37.5 % (ref 37–48.5)
HDLC SERPL-MCNC: 70 MG/DL (ref 40–75)
HDLC SERPL: 45.2 % (ref 20–50)
HGB BLD-MCNC: 12.1 G/DL (ref 12–16)
IMM GRANULOCYTES # BLD AUTO: 0.01 K/UL (ref 0–0.04)
IMM GRANULOCYTES NFR BLD AUTO: 0.1 % (ref 0–0.5)
LDLC SERPL CALC-MCNC: 63 MG/DL (ref 63–159)
LYMPHOCYTES # BLD AUTO: 2 K/UL (ref 1–4.8)
LYMPHOCYTES NFR BLD: 29.1 % (ref 18–48)
MCH RBC QN AUTO: 29.1 PG (ref 27–31)
MCHC RBC AUTO-ENTMCNC: 32.3 G/DL (ref 32–36)
MCV RBC AUTO: 90 FL (ref 82–98)
MONOCYTES # BLD AUTO: 0.6 K/UL (ref 0.3–1)
MONOCYTES NFR BLD: 8.9 % (ref 4–15)
NEUTROPHILS # BLD AUTO: 3.8 K/UL (ref 1.8–7.7)
NEUTROPHILS NFR BLD: 57.3 % (ref 38–73)
NONHDLC SERPL-MCNC: 85 MG/DL
NRBC BLD-RTO: 0 /100 WBC
PLATELET # BLD AUTO: 294 K/UL (ref 150–450)
PMV BLD AUTO: 10.5 FL (ref 9.2–12.9)
POTASSIUM SERPL-SCNC: 3.6 MMOL/L (ref 3.5–5.1)
PROT SERPL-MCNC: 7.3 G/DL (ref 6–8.4)
RBC # BLD AUTO: 4.16 M/UL (ref 4–5.4)
SODIUM SERPL-SCNC: 141 MMOL/L (ref 136–145)
TRIGL SERPL-MCNC: 110 MG/DL (ref 30–150)
TSH SERPL DL<=0.005 MIU/L-ACNC: 0.68 UIU/ML (ref 0.4–4)
WBC # BLD AUTO: 6.71 K/UL (ref 3.9–12.7)

## 2024-03-04 PROCEDURE — 3074F SYST BP LT 130 MM HG: CPT | Mod: CPTII,S$GLB,, | Performed by: INTERNAL MEDICINE

## 2024-03-04 PROCEDURE — 99396 PREV VISIT EST AGE 40-64: CPT | Mod: S$GLB,,, | Performed by: INTERNAL MEDICINE

## 2024-03-04 PROCEDURE — 99999 PR PBB SHADOW E&M-EST. PATIENT-LVL IV: CPT | Mod: PBBFAC,,, | Performed by: INTERNAL MEDICINE

## 2024-03-04 PROCEDURE — 80053 COMPREHEN METABOLIC PANEL: CPT | Performed by: INTERNAL MEDICINE

## 2024-03-04 PROCEDURE — 84443 ASSAY THYROID STIM HORMONE: CPT | Performed by: INTERNAL MEDICINE

## 2024-03-04 PROCEDURE — 3078F DIAST BP <80 MM HG: CPT | Mod: CPTII,S$GLB,, | Performed by: INTERNAL MEDICINE

## 2024-03-04 PROCEDURE — 1159F MED LIST DOCD IN RCRD: CPT | Mod: CPTII,S$GLB,, | Performed by: INTERNAL MEDICINE

## 2024-03-04 PROCEDURE — 36415 COLL VENOUS BLD VENIPUNCTURE: CPT | Mod: PO | Performed by: INTERNAL MEDICINE

## 2024-03-04 PROCEDURE — 3008F BODY MASS INDEX DOCD: CPT | Mod: CPTII,S$GLB,, | Performed by: INTERNAL MEDICINE

## 2024-03-04 PROCEDURE — 1160F RVW MEDS BY RX/DR IN RCRD: CPT | Mod: CPTII,S$GLB,, | Performed by: INTERNAL MEDICINE

## 2024-03-04 PROCEDURE — 80061 LIPID PANEL: CPT | Performed by: INTERNAL MEDICINE

## 2024-03-04 PROCEDURE — 85025 COMPLETE CBC W/AUTO DIFF WBC: CPT | Performed by: INTERNAL MEDICINE

## 2024-03-04 PROCEDURE — 83036 HEMOGLOBIN GLYCOSYLATED A1C: CPT | Performed by: INTERNAL MEDICINE

## 2024-06-24 RX ORDER — LEVOTHYROXINE SODIUM 88 UG/1
88 TABLET ORAL DAILY
Qty: 90 TABLET | Refills: 3 | Status: SHIPPED | OUTPATIENT
Start: 2024-06-24

## 2024-06-24 NOTE — TELEPHONE ENCOUNTER
Refill Decision Note   Emilia Leonides  is requesting a refill authorization.  Brief Assessment and Rationale for Refill:  Approve     Medication Therapy Plan:         Comments:     Note composed:10:05 AM 06/24/2024

## 2024-06-24 NOTE — TELEPHONE ENCOUNTER
No care due was identified.  [facility] Embedded Care Due Messages. Reference number: [id number].   6/24/2024 9:05:03 AM CDT

## 2024-10-01 ENCOUNTER — PATIENT MESSAGE (OUTPATIENT)
Dept: INTERNAL MEDICINE | Facility: CLINIC | Age: 49
End: 2024-10-01
Payer: COMMERCIAL

## 2024-10-17 NOTE — TELEPHONE ENCOUNTER
----- Message from Hailee Rae LPN sent at 7/1/2020  2:13 PM CDT -----    ----- Message -----  From: Fabiola Castellanos MD  Sent: 7/1/2020  12:46 PM CDT  To: Emanuel Geiger Staff    Please note that your Covid- 19 antibody test was negative.    Your results are being reviewed and reported by Dr. Umana as Dr. Louie is unavailable at this time.   Please do not hesitate to call/email if you have any questions or concerns    
Results reviewed by patient on Portal 7/1/2020  
to be assessed

## 2025-03-10 NOTE — TELEPHONE ENCOUNTER
Care Due:                  Date            Visit Type   Department     Provider  --------------------------------------------------------------------------------                                MYCHART                              ANNUAL                              CHECKUP/PHY  [facility] INTERNAL  Last Visit: 03-      S            MEDICINE       Joefabrice Pittman                              [facility]                              ANNUAL                              CHECKUP/PHY  [facility] INTERNAL  Next Visit: 03-      S            MEDICINE       [facility]                                                            Last  Test          Frequency    Reason                     Performed    Due Date  --------------------------------------------------------------------------------    TSH.........  12 months..  levothyroxine............  03- 02-    [facility] Embedded Care Due Messages. Reference number: [id number].   3/10/2025 9:02:36 AM CDT

## 2025-03-11 RX ORDER — LEVOTHYROXINE SODIUM 88 UG/1
88 TABLET ORAL
Qty: 90 TABLET | Refills: 3 | Status: SHIPPED | OUTPATIENT
Start: 2025-03-11

## 2025-03-11 NOTE — TELEPHONE ENCOUNTER
Refill Routing Note   Medication(s) are not appropriate for processing by [facility] for the following reason(s):        Required labs outdated    [facility] action(s):  Defer   Requires labs : Yes             Appointments  past 12m or future 3m with PCP    Date Provider   Last Visit   3/4/2024 Joe Pittman, DO   Next Visit   3/28/2025 Joe Pittman, DO   ED visits in past 90 days: 0        Note composed:10:48 PM 03/10/2025

## 2025-03-28 ENCOUNTER — OFFICE VISIT (OUTPATIENT)
Dept: INTERNAL MEDICINE | Facility: CLINIC | Age: 50
End: 2025-03-28
Payer: COMMERCIAL

## 2025-03-28 VITALS
RESPIRATION RATE: 18 BRPM | SYSTOLIC BLOOD PRESSURE: 112 MMHG | HEART RATE: 68 BPM | HEIGHT: 65 IN | WEIGHT: 156.94 LBS | TEMPERATURE: 98 F | OXYGEN SATURATION: 98 % | BODY MASS INDEX: 26.15 KG/M2 | DIASTOLIC BLOOD PRESSURE: 64 MMHG

## 2025-03-28 DIAGNOSIS — Z00.00 ANNUAL PHYSICAL EXAM: Primary | ICD-10-CM

## 2025-03-28 DIAGNOSIS — M79.671 CHRONIC PAIN IN RIGHT FOOT: ICD-10-CM

## 2025-03-28 DIAGNOSIS — G89.29 CHRONIC PAIN IN RIGHT FOOT: ICD-10-CM

## 2025-03-28 DIAGNOSIS — F42.9 OBSESSIVE-COMPULSIVE DISORDER, UNSPECIFIED TYPE: ICD-10-CM

## 2025-03-28 DIAGNOSIS — G43.009 MIGRAINE WITHOUT AURA AND WITHOUT STATUS MIGRAINOSUS, NOT INTRACTABLE: ICD-10-CM

## 2025-03-28 DIAGNOSIS — E03.9 HYPOTHYROIDISM, UNSPECIFIED TYPE: Chronic | ICD-10-CM

## 2025-03-28 PROCEDURE — 99999 PR PBB SHADOW E&M-EST. PATIENT-LVL IV: CPT | Mod: PBBFAC,,, | Performed by: INTERNAL MEDICINE

## 2025-03-28 NOTE — PROGRESS NOTES
Subjective     Patient ID: Emilia Coyle is a 49 y.o. female.    Chief Complaint: Annual Exam    HPI  49 y.o. Female here for annual exam.      Vaccines: Influenza (2020); Tetanus (2019)  Eye exam: 2019  Mammogram: current  Gyn exam: 7/19  Colonoscopy: current(2017)     Exercise: walks daily  Diet: regular  LMP: s/p hysterectomy      Past Medical History:  No date: Celiac disease  No date: Diabetes mellitus  7/12/2017: High triglycerides  No date: Hypothyroidism  No date: MIKAELA (obstructive sleep apnea)  No date: Sleep apnea in adult  No date: Thyroid disease  Past Surgical History:  No date: ABDOMINOPLASTY  No date: ADENOIDECTOMY  No date: BLADDER SUSPENSION  12/2017: CHOLECYSTECTOMY  10/27/2017: COLONOSCOPY      Comment:  Normal   (Rtn 10 yrs, [facility])   07/2017: GASTRECTOMY  No date: HYSTERECTOMY  No date: TONSILLECTOMY  No date: TOTAL REDUCTION MAMMOPLASTY  No date: TOTAL THYROIDECTOMY; Right  No date: TUBAL LIGATION  No date: TYMPANOSTOMY TUBE PLACEMENT  No date: right foot bunion surgery  10/27/2017: UPPER GASTROINTESTINAL ENDOSCOPY      Comment:  Gastritis Biopsied, Benign   Social History    Socioeconomic History      Marital status:       Spouse name: Not on file      Number of children: Not on file      Years of education: Not on file      Highest education level: Not on file    Occupational History      Not on file    Social Needs      Financial resource strain: Not hard at all      Food insecurity:        Worry: Never true        Inability: Never true      Transportation needs:        Medical: No        Non-medical: No    Tobacco Use      Smoking status: Never Smoker      Smokeless tobacco: Never Used    Substance and Sexual Activity      Alcohol use: No        Frequency: Never        Drinks per session: Patient refused        Binge frequency: Never      Drug use: No      Sexual activity: Yes        Partners: Male    Lifestyle      Physical activity:        Days per week: 4 days         "Minutes per session: 60 min      Stress: Not at all    Relationships      Social connections:        Talks on phone: More than three times a week        Gets together: More than three times a week        Attends Restorationism service: Not on file        Active member of club or organization: No        Attends meetings of clubs or organizations: Patient refused        Relationship status:     Other Topics      Concerns:        Are you pregnant or think you may be?: Not Asked        Breast-feeding: Not Asked    Social History Narrative      Not on file     Review of patient's allergies indicates:   -- Rizatriptan -- Photosensitivity, Nausea Only and                            Other (See Comments)    --  "Feels like my brain is on fire."   -- Sumatriptan -- Photosensitivity, Nausea Only and                            Other (See Comments)    --  "Feels like my brain is on fire."   -- Zolmitriptan -- Photosensitivity, Nausea Only and                            Other (See Comments)    --  "Feels like my brain is on fire."   -- Gluten protein -- Hives, Diarrhea, Itching, Nausea                            And Vomiting, Swelling, Anxiety and                           Dermatitis   -- Latex, natural rubber -- Dermatitis    --  Latex gloves with Powder  Review of Systems   Constitutional:  Positive for activity change. Negative for appetite change, chills, diaphoresis, fatigue, fever and unexpected weight change.   HENT:  Negative for nasal congestion, hearing loss, mouth sores, postnasal drip, rhinorrhea, sinus pressure/congestion, sneezing, sore throat, trouble swallowing and voice change.    Eyes:  Negative for pain, discharge and visual disturbance.   Respiratory:  Negative for cough, chest tightness, shortness of breath and wheezing.    Cardiovascular:  Negative for chest pain, palpitations and leg swelling.   Gastrointestinal:  Negative for abdominal pain, blood in stool, constipation, diarrhea, nausea and vomiting. "   Endocrine: Negative for cold intolerance, heat intolerance, polydipsia and polyuria.   Genitourinary:  Negative for difficulty urinating, dysuria, frequency, hematuria, menstrual problem and urgency.   Musculoskeletal:  Negative for arthralgias, joint swelling, myalgias and neck pain.   Integumentary:  Negative for rash and wound.   Allergic/Immunologic: Negative for environmental allergies and food allergies.   Neurological:  Positive for headaches. Negative for dizziness, tremors, seizures, syncope, weakness and light-headedness.   Hematological:  Negative for adenopathy. Does not bruise/bleed easily.   Psychiatric/Behavioral:  Negative for confusion, dysphoric mood and sleep disturbance. The patient is not nervous/anxious.           Objective     Physical Exam  Vitals and nursing note reviewed.   Constitutional:       General: She is not in acute distress.     Appearance: Normal appearance. She is well-developed. She is not diaphoretic.   HENT:      Head: Normocephalic and atraumatic.      Right Ear: External ear normal.      Left Ear: External ear normal.      Nose: Nose normal.      Mouth/Throat:      Pharynx: No oropharyngeal exudate.   Eyes:      General: No scleral icterus.        Right eye: No discharge.         Left eye: No discharge.      Conjunctiva/sclera: Conjunctivae normal.      Pupils: Pupils are equal, round, and reactive to light.   Neck:      Thyroid: No thyromegaly.      Vascular: No JVD.   Cardiovascular:      Rate and Rhythm: Normal rate and regular rhythm.      Pulses: Normal pulses.      Heart sounds: Normal heart sounds. No murmur heard.  Pulmonary:      Effort: Pulmonary effort is normal. No respiratory distress.      Breath sounds: Normal breath sounds. No wheezing, rhonchi or rales.   Chest:      Chest wall: No tenderness.   Abdominal:      General: Bowel sounds are normal. There is no distension.      Palpations: Abdomen is soft.      Tenderness: There is no abdominal tenderness.  There is no guarding or rebound.   Musculoskeletal:      Cervical back: Neck supple.      Right lower leg: No edema.      Left lower leg: No edema.   Lymphadenopathy:      Cervical: No cervical adenopathy.   Skin:     General: Skin is warm and dry.      Coloration: Skin is not pale.      Findings: No rash.   Neurological:      General: No focal deficit present.      Mental Status: She is alert and oriented to person, place, and time.      Gait: Gait normal.   Psychiatric:         Behavior: Behavior normal.         Thought Content: Thought content normal.         Judgment: Judgment normal.            Assessment and Plan     1. Annual physical exam  -     CBC auto differential; Future; Expected date: 03/28/2025  -     Comprehensive metabolic panel; Future; Expected date: 03/28/2025  -     Hemoglobin A1c; Future; Expected date: 03/28/2025  -     Lipid panel; Future; Expected date: 03/28/2025  -     TSH; Future; Expected date: 03/28/2025  -     Urinalysis; Future; Expected date: 03/28/2025    2. Hypothyroidism, unspecified type    3. Migraine without aura and without status migrainosus, not intractable    4. Obsessive-compulsive disorder, unspecified type    5. Chronic pain in right foot         Outside blood work reviewed      Chronic right foot pain- pt postponed surgery      Hypothyroidism- stable on Synthroid 88 mcg qd      OCD- controlled on current meds, followed by [facility]      Migraines- stable on Fioricet PRN      F/u in 1 yr

## 2025-03-31 ENCOUNTER — TELEPHONE (OUTPATIENT)
Dept: INTERNAL MEDICINE | Facility: CLINIC | Age: 50
End: 2025-03-31
Payer: COMMERCIAL

## 2025-03-31 DIAGNOSIS — Z00.00 ANNUAL PHYSICAL EXAM: Primary | ICD-10-CM

## (undated) DEVICE — CANNULA ENDOPATH XCEL 5X100MM

## (undated) DEVICE — DRAPE PLASTIC U 60X72

## (undated) DEVICE — SCISSOR 5MMX35CM DIRECT DRIVE

## (undated) DEVICE — SEE MEDLINE ITEM 146313

## (undated) DEVICE — TROCAR ENDOPATH XCEL 12X100MM

## (undated) DEVICE — Device

## (undated) DEVICE — SPLINT PLASTER EXT FAST 4X15

## (undated) DEVICE — PAD CAST SPECIALIST STRL 4

## (undated) DEVICE — SUT VICRYL 4-0 RB1 27IN UD

## (undated) DEVICE — TOURNIQUET SB QC SP 18X4IN

## (undated) DEVICE — BRUSH SPONGE COMB. EZ SCRUBW/B

## (undated) DEVICE — SEE MEDLINE ITEM 152522

## (undated) DEVICE — SEE MEDLINE ITEM 152622

## (undated) DEVICE — SEE MEDLINE ITEM 157128

## (undated) DEVICE — SEE MEDLINE ITEM 146308

## (undated) DEVICE — BLADE SURG #15 CARBON STEEL

## (undated) DEVICE — NDL HYPO REG 25G X 1 1/2

## (undated) DEVICE — DRESSING LEUKOPLAST FLEX 1X3IN

## (undated) DEVICE — ADHESIVE MASTISOL VIAL 48/BX

## (undated) DEVICE — TUBING HF INSUFFLATION W/ FLTR

## (undated) DEVICE — ELECTRODE REM PLYHSV RETURN 9

## (undated) DEVICE — SUT STRATAFIX PDO 2-0 SH

## (undated) DEVICE — GLOVE PI ULTRA TOUCH G SURGEON

## (undated) DEVICE — BLADE SURG CARBON STEEL SZ11

## (undated) DEVICE — GAUZE SPONGE 4X4 12PLY

## (undated) DEVICE — SEE MEDLINE ITEM 152487

## (undated) DEVICE — SOL NACL IRR 1000ML BTL

## (undated) DEVICE — DRESSING XEROFORM FOIL PK 1X8

## (undated) DEVICE — TROCAR ENDOPATH XCEL 11MM 10CM

## (undated) DEVICE — SHEARS HARMONIC 5CM 36CM

## (undated) DEVICE — SEE MEDLINE ITEM 157216

## (undated) DEVICE — SEE MEDLINE ITEM 156902

## (undated) DEVICE — SEE MEDLINE ITEM 157131

## (undated) DEVICE — UNDERGLOVES BIOGEL PI SIZE 8

## (undated) DEVICE — BANDAGE ADHESIVE

## (undated) DEVICE — LUBRICANT SURGILUBE 2 OZ

## (undated) DEVICE — NDL SPINAL SPINOCAN 22GX3.5

## (undated) DEVICE — SUT GUT PL. 4-0 27 FS-2

## (undated) DEVICE — SUT 0 VICRYL / UR6 (J603)

## (undated) DEVICE — SLING ARM MEDIUM FOAM STRAP

## (undated) DEVICE — RELOAD ECHELON FLEX BLU 60MM

## (undated) DEVICE — SOL NS 1000CC

## (undated) DEVICE — RELOAD ECHELON FLEX GRN 60MM

## (undated) DEVICE — PAD PREP 50/CA

## (undated) DEVICE — APPLICATOR CHLORAPREP ORN 26ML

## (undated) DEVICE — ADHESIVE DERMABOND ADVANCED

## (undated) DEVICE — STRIP STERI 1/8 X 3

## (undated) DEVICE — SEE MEDLINE ITEM 157173

## (undated) DEVICE — TRAY MINOR GEN SURG

## (undated) DEVICE — IRRIGATOR ENDOSCOPY DISP.

## (undated) DEVICE — CLIP HEMO-LOK ML

## (undated) DEVICE — BAG TISS RETRV MONARCH 10MM

## (undated) DEVICE — TROCAR ENDOPATH XCEL 5MM 7.5CM

## (undated) DEVICE — SUT PROLENE 2-0 KS BL MONO

## (undated) DEVICE — STAPLER ECHELON FLEX 60MM 44CM

## (undated) DEVICE — DRAPE STERI U-SHAPED 47X51IN

## (undated) DEVICE — SPLINT WRST COCKUP LEFT UNIV

## (undated) DEVICE — SYR 10CC LUER LOCK

## (undated) DEVICE — SET CHOLANGIOGRAPHY ENDOSCOPIC

## (undated) DEVICE — TROCAR ENDOPATH XCEL 12MM 10CM

## (undated) DEVICE — SUT ETHILON 4-0 PS2 18 BLK